# Patient Record
Sex: FEMALE | Race: OTHER | HISPANIC OR LATINO | Employment: FULL TIME | URBAN - METROPOLITAN AREA
[De-identification: names, ages, dates, MRNs, and addresses within clinical notes are randomized per-mention and may not be internally consistent; named-entity substitution may affect disease eponyms.]

---

## 2017-01-03 ENCOUNTER — OFFICE VISIT (OUTPATIENT)
Dept: FAMILY MEDICINE | Facility: CLINIC | Age: 47
End: 2017-01-03
Payer: COMMERCIAL

## 2017-01-03 VITALS
WEIGHT: 150.81 LBS | DIASTOLIC BLOOD PRESSURE: 91 MMHG | SYSTOLIC BLOOD PRESSURE: 146 MMHG | HEIGHT: 61 IN | TEMPERATURE: 98 F | BODY MASS INDEX: 28.47 KG/M2 | HEART RATE: 63 BPM

## 2017-01-03 DIAGNOSIS — J45.30 MILD PERSISTENT ASTHMA WITHOUT COMPLICATION: ICD-10-CM

## 2017-01-03 DIAGNOSIS — J20.9 ACUTE BRONCHITIS, UNSPECIFIED ORGANISM: Primary | ICD-10-CM

## 2017-01-03 PROCEDURE — 3077F SYST BP >= 140 MM HG: CPT | Mod: S$GLB,,, | Performed by: PHYSICIAN ASSISTANT

## 2017-01-03 PROCEDURE — 1159F MED LIST DOCD IN RCRD: CPT | Mod: S$GLB,,, | Performed by: PHYSICIAN ASSISTANT

## 2017-01-03 PROCEDURE — 3080F DIAST BP >= 90 MM HG: CPT | Mod: S$GLB,,, | Performed by: PHYSICIAN ASSISTANT

## 2017-01-03 PROCEDURE — 99213 OFFICE O/P EST LOW 20 MIN: CPT | Mod: S$GLB,,, | Performed by: PHYSICIAN ASSISTANT

## 2017-01-03 PROCEDURE — 99999 PR PBB SHADOW E&M-EST. PATIENT-LVL III: CPT | Mod: PBBFAC,,, | Performed by: PHYSICIAN ASSISTANT

## 2017-01-03 RX ORDER — CODEINE PHOSPHATE AND GUAIFENESIN 10; 100 MG/5ML; MG/5ML
5-10 SOLUTION ORAL EVERY 4 HOURS PRN
Qty: 120 ML | Refills: 0 | Status: SHIPPED | OUTPATIENT
Start: 2017-01-03 | End: 2017-09-19

## 2017-01-03 RX ORDER — ALBUTEROL SULFATE 90 UG/1
2 AEROSOL, METERED RESPIRATORY (INHALATION) EVERY 6 HOURS PRN
Qty: 1 INHALER | Refills: 0 | Status: SHIPPED | OUTPATIENT
Start: 2017-01-03 | End: 2018-01-03

## 2017-01-03 RX ORDER — ALBUTEROL SULFATE 1.25 MG/3ML
1.25 SOLUTION RESPIRATORY (INHALATION) EVERY 6 HOURS PRN
Qty: 25 VIAL | Refills: 11 | Status: SHIPPED | OUTPATIENT
Start: 2017-01-03

## 2017-01-03 RX ORDER — AZITHROMYCIN 250 MG/1
TABLET, FILM COATED ORAL
Qty: 6 TABLET | Refills: 0 | Status: SHIPPED | OUTPATIENT
Start: 2017-01-03 | End: 2017-09-19

## 2017-01-03 NOTE — PROGRESS NOTES
Subjective:       Patient ID: Jacqui Cruz is a 46 y.o. female.    Chief Complaint: Cough and Chest Congestion    Cough   This is a new problem. The current episode started 1 to 4 weeks ago. The problem occurs constantly. The cough is productive of purulent sputum. Associated symptoms include nasal congestion, a sore throat and shortness of breath. Pertinent negatives include no chest pain, chills, fever, rhinorrhea or wheezing. She has tried a beta-agonist inhaler and OTC cough suppressant for the symptoms. The treatment provided no relief. Her past medical history is significant for asthma.     Review of Systems   Constitutional: Negative for appetite change, chills, diaphoresis, fatigue and fever.   HENT: Positive for congestion and sore throat. Negative for rhinorrhea.    Respiratory: Positive for cough and shortness of breath. Negative for chest tightness and wheezing.    Cardiovascular: Negative for chest pain, palpitations and leg swelling.       Objective:      Physical Exam   Constitutional: She appears well-developed and well-nourished. No distress.   HENT:   Head: Normocephalic and atraumatic.   Right Ear: Tympanic membrane, external ear and ear canal normal.   Left Ear: Tympanic membrane, external ear and ear canal normal.   Nose: No mucosal edema or rhinorrhea. Right sinus exhibits no maxillary sinus tenderness and no frontal sinus tenderness. Left sinus exhibits no maxillary sinus tenderness and no frontal sinus tenderness.   Mouth/Throat: No oropharyngeal exudate, posterior oropharyngeal edema or posterior oropharyngeal erythema.   Cardiovascular: Normal rate, regular rhythm and normal heart sounds.    Pulmonary/Chest: Effort normal. She has rhonchi (cleared post tussive ). Chest wall is not dull to percussion. She exhibits no tenderness.   Lymphadenopathy:        Head (right side): No tonsillar adenopathy present.        Head (left side): No tonsillar adenopathy present.     She has no cervical  adenopathy.   Skin: Skin is warm and dry. No cyanosis. Nails show no clubbing.   Vitals reviewed.      Assessment:       1. Acute bronchitis, unspecified organism    2. Mild persistent asthma without complication        Plan:       Jacqui was seen today for cough and chest congestion.    Diagnoses and all orders for this visit:    Acute bronchitis, unspecified organism    Mild persistent asthma without complication  -     beclomethasone (QVAR) 40 mcg/actuation Aero; Inhale 1 puff into the lungs 2 (two) times daily.  -     albuterol (ACCUNEB) 1.25 mg/3 mL Nebu; Take 3 mLs (1.25 mg total) by nebulization every 6 (six) hours as needed.      -     azithromycin (ZITHROMAX Z-EFREM) 250 MG tablet; Tad  -     albuterol 90 mcg/actuation inhaler; Inhale 2 puffs into the lungs every 6 (six) hours as needed.  -     guaifenesin-codeine 100-10 mg/5 ml (CHERATUSSIN AC)  mg/5 mL syrup; Take 5-10 mLs by mouth every 4 (four) hours as needed for Cough or Congestion.

## 2017-01-03 NOTE — LETTER
January 3, 2017      Jacqui Cruz  [address]  [place] [address]             [place] [facility]  [address]  [place] [address]  Phone: [phone]  Fax: [phone] Ms. Cruz    Was treated here on 01/03/2017    May Return to work/school on 1/3/2017    medical excuse for time missed on 1/2/2017  1/3/2017            SAVANAH Brown

## 2017-01-04 ENCOUNTER — DOCUMENTATION ONLY (OUTPATIENT)
Dept: FAMILY MEDICINE | Facility: CLINIC | Age: 47
End: 2017-01-04

## 2017-01-04 NOTE — PROGRESS NOTES
Pre-Visit Chart Review  For Appointment Scheduled on 1-5-17    Health Maintenance Due   Topic Date Due    Lipid Panel  1970    TETANUS VACCINE  04/13/1986    Mammogram  04/22/2015    Pap Smear  10/01/2015    Influenza Vaccine  08/01/2016

## 2017-01-05 ENCOUNTER — OFFICE VISIT (OUTPATIENT)
Dept: FAMILY MEDICINE | Facility: CLINIC | Age: 47
End: 2017-01-05
Payer: COMMERCIAL

## 2017-01-05 VITALS
TEMPERATURE: 98 F | HEART RATE: 72 BPM | WEIGHT: 150.81 LBS | HEIGHT: 61 IN | SYSTOLIC BLOOD PRESSURE: 131 MMHG | DIASTOLIC BLOOD PRESSURE: 89 MMHG | BODY MASS INDEX: 28.47 KG/M2

## 2017-01-05 DIAGNOSIS — R92.0 MICROCALCIFICATIONS OF THE BREAST: ICD-10-CM

## 2017-01-05 DIAGNOSIS — Z12.31 ENCOUNTER FOR SCREENING MAMMOGRAM FOR MALIGNANT NEOPLASM OF BREAST: Primary | ICD-10-CM

## 2017-01-05 DIAGNOSIS — J45.30 MILD PERSISTENT ASTHMA WITHOUT COMPLICATION: ICD-10-CM

## 2017-01-05 DIAGNOSIS — M75.111 INCOMPLETE TEAR OF RIGHT ROTATOR CUFF: ICD-10-CM

## 2017-01-05 PROCEDURE — 1159F MED LIST DOCD IN RCRD: CPT | Mod: S$GLB,,, | Performed by: FAMILY MEDICINE

## 2017-01-05 PROCEDURE — 99999 PR PBB SHADOW E&M-EST. PATIENT-LVL III: CPT | Mod: PBBFAC,,, | Performed by: FAMILY MEDICINE

## 2017-01-05 PROCEDURE — 3075F SYST BP GE 130 - 139MM HG: CPT | Mod: S$GLB,,, | Performed by: FAMILY MEDICINE

## 2017-01-05 PROCEDURE — 99214 OFFICE O/P EST MOD 30 MIN: CPT | Mod: S$GLB,,, | Performed by: FAMILY MEDICINE

## 2017-01-05 PROCEDURE — 3079F DIAST BP 80-89 MM HG: CPT | Mod: S$GLB,,, | Performed by: FAMILY MEDICINE

## 2017-01-05 RX ORDER — MELOXICAM 7.5 MG/1
7.5 TABLET ORAL DAILY
Qty: 30 TABLET | Refills: 11 | Status: SHIPPED | OUTPATIENT
Start: 2017-01-05 | End: 2017-09-19

## 2017-01-05 NOTE — MR AVS SNAPSHOT
[facility]  [address] [place] [address]  Phone: [phone]  Fax: [phone]                  Jacqui Cruz   2017 2:40 PM   Office Visit    Description:  Female : 1970   Provider:  Josephine Marquez MD   Department:  [facility]           Reason for Visit     Establish Care           Diagnoses this Visit        Comments    Encounter for screening mammogram for malignant neoplasm of breast    -  Primary     Incomplete tear of right rotator cuff                To Do List           Future Appointments        Provider Department Dept Phone    2017 4:20 PM Josephine Marquez MD [facility] [phone]      Goals (5 Years of Data)     None      Follow-Up and Disposition     Return in about 3 months (around 2017).       These Medications        Disp Refills Start End    meloxicam (MOBIC) 7.5 MG tablet 30 tablet 11 2017     Take 1 tablet (7.5 mg total) by mouth once daily. - Oral    Pharmacy: [facility] [address] [place] [address] Ph #: [phone]         [facility] On Call     [facility] On [facility] Nurse Care Line -  Assistance  Registered nurses in the [facility] On Call Center provide clinical advisement, health education, appointment booking, and other advisory services.  Call for this free service at [phone].             Medications           Message regarding Medications     Verify the changes and/or additions to your medication regime listed below are the same as discussed with your clinician today.  If any of these changes or additions are incorrect, please notify your healthcare provider.        START taking these NEW medications        Refills    meloxicam (MOBIC) 7.5 MG tablet 11    Sig: Take 1 tablet (7.5 mg total) by mouth once daily.    Class: Normal    Route: Oral           Verify that the below list of medications is an accurate representation of the medications you are currently taking.  If  "none reported, the list may be blank. If incorrect, please contact your healthcare provider. Carry this list with you in case of emergency.           Current Medications     albuterol (ACCUNEB) 1.25 mg/3 mL Nebu Take 3 mLs (1.25 mg total) by nebulization every 6 (six) hours as needed.    albuterol 90 mcg/actuation inhaler Inhale 2 puffs into the lungs every 6 (six) hours as needed.    ASA/ACETAMINOPHN/CAFFEIN/POT (GOODY'S HEADACHE POWDER ORAL) Take 1 each by mouth 3 (three) times daily as needed.    azithromycin (ZITHROMAX Z-EFREM) 250 MG tablet Tad    baclofen (LIORESAL) 10 MG tablet Take 1 tablet (10 mg total) by mouth 3 (three) times daily.    beclomethasone (QVAR) 40 mcg/actuation Aero Inhale 1 puff into the lungs 2 (two) times daily.    diclofenac sodium (VOLTAREN) 1 % Gel Apply 2 g topically 4 (four) times daily.    guaifenesin-codeine 100-10 mg/5 ml (CHERATUSSIN AC)  mg/5 mL syrup Take 5-10 mLs by mouth every 4 (four) hours as needed for Cough or Congestion.    meloxicam (MOBIC) 7.5 MG tablet Take 1 tablet (7.5 mg total) by mouth once daily.           Clinical Reference Information           Vital Signs - Last Recorded  Most recent update: 1/5/2017  2:55 PM by Ebony Morales MA    BP Pulse Temp Ht Wt BMI    131/89 (BP Location: Right arm, Patient Position: Sitting, BP Method: Automatic) 72 98.4 °F (36.9 °C) (Oral) 5' 1" (1.549 m) 68.4 kg (150 lb 12.7 oz) 28.49 kg/m2      Blood Pressure          Most Recent Value    BP  131/89      Allergies as of 1/5/2017     Doxycycline Hyclate      Immunizations Administered on Date of Encounter - 1/5/2017     None      Orders Placed During Today's Visit     Future Labs/Procedures Expected by Expires    Mammo Digital Screening Bilateral With CAD  1/5/2017 3/5/2018      "

## 2017-01-05 NOTE — PROGRESS NOTES
Subjective:       Patient ID: Jacqui Cruz is a 46 y.o. female.    Chief Complaint: Establish Care    HPIPatient is here to establish care  Patient Active Problem List   Diagnosis    Microcalcifications of the breast    Mild persistent asthma without complication    Strain of tendon of right rotator cuff   Recently had asthma exacerbation 2 days ago and seen in clinic 1/3/17.  Brought on by inhaling flour at [employer] where she works despite wearing mask.  Chronically on and usually controlled on Qvar and albuterol nebs/inh.  Started zpack and is doing better.    Has right rot cuff tear and bursitis.  Has had 2 steroid shots, PT, nsaids, tylenol and sx have improved but not resolved.  Under care of Dr. Marga morgan.  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  MRI 5/16 showed partial tear and bursitis.    Last mammogram 1 1/2 years ago -GYN clinic.  Has metal marker for calcifications being monitored in left breast    PAP [place] < 3 years-wnl    Review of Systems   Constitutional: Negative for fatigue and unexpected weight change.   Respiratory: Negative for cough, chest tightness, shortness of breath and wheezing.    Cardiovascular: Negative for chest pain, palpitations and leg swelling.   Gastrointestinal: Negative for abdominal pain.   Endocrine: Negative for polydipsia, polyphagia and polyuria.   Musculoskeletal: Positive for arthralgias.   Neurological: Negative for dizziness, syncope, light-headedness and headaches.       Objective:      Physical Exam   Constitutional: She is oriented to person, place, and time. She appears well-developed and well-nourished.   Cardiovascular: Normal rate, regular rhythm and normal heart sounds.    Pulmonary/Chest: Effort normal and breath sounds normal.   Musculoskeletal: She exhibits no edema.        Right shoulder: She exhibits tenderness, crepitus and pain. She exhibits normal range of motion, no bony tenderness, no swelling, no effusion, no  deformity, no laceration, no spasm, normal pulse and normal strength.   Neurological: She is alert and oriented to person, place, and time.   Skin: Skin is warm and dry.   Psychiatric: She has a normal mood and affect.   Nursing note and vitals reviewed.      Assessment:       1. Encounter for screening mammogram for malignant neoplasm of breast    2. Incomplete tear of right rotator cuff    3. Mild persistent asthma without complication    4. Microcalcifications of the breast        Plan:       1. Encounter for screening mammogram for malignant neoplasm of breast  Screen and treat as indicated:    - Mammo Digital Screening Bilateral With CAD; Future    2. Incomplete tear of right rotator cuff  Recommend f/u with ortho to consider surgical alternatives.  Add:  - meloxicam (MOBIC) 7.5 MG tablet; Take 1 tablet (7.5 mg total) by mouth once daily.  Dispense: 30 tablet; Refill: 11  Avoid other nsaids , tylenol ok otc.  [place], avoid repetitive shoulder activities.  May need work restrictions if sx persist and unable to manage work responsibilities    3. Mild persistent asthma without complication  Controlled on current medications.  Continue current medications.  Finish zpack.  Avoid allergens    4. Microcalcifications of the breast  Mammo ordered    Reeval 3months or sooner prn

## 2017-01-09 ENCOUNTER — TELEPHONE (OUTPATIENT)
Dept: FAMILY MEDICINE | Facility: CLINIC | Age: 47
End: 2017-01-09

## 2017-01-09 NOTE — TELEPHONE ENCOUNTER
----- Message from Milena Patel sent at 1/9/2017  1:28 PM CST -----  Contact: cyndi   Wants to know if dr damon is at    Call back      States she has called for this for the last couple days

## 2017-01-16 ENCOUNTER — TELEPHONE (OUTPATIENT)
Dept: ORTHOPEDICS | Facility: CLINIC | Age: 47
End: 2017-01-16

## 2017-01-16 NOTE — TELEPHONE ENCOUNTER
Spoke to patient and advised that she would need a follow up apt with Dr. Gresham. Scheduled patient an appt for 1/20/17.

## 2017-01-16 NOTE — TELEPHONE ENCOUNTER
----- Message from Abrahan Wright sent at 1/16/2017  9:00 AM CST -----  Contact: same  Patient stated she has completed her physical therapy and would like to see about being released so she can get back to work.    Patient call back number is [phone]

## 2017-01-20 ENCOUNTER — OFFICE VISIT (OUTPATIENT)
Dept: ORTHOPEDICS | Facility: CLINIC | Age: 47
End: 2017-01-20
Payer: COMMERCIAL

## 2017-01-20 VITALS
HEIGHT: 63 IN | BODY MASS INDEX: 25.87 KG/M2 | SYSTOLIC BLOOD PRESSURE: 138 MMHG | DIASTOLIC BLOOD PRESSURE: 79 MMHG | WEIGHT: 146 LBS | HEART RATE: 65 BPM

## 2017-01-20 DIAGNOSIS — M75.51 SHOULDER BURSITIS, RIGHT: Primary | ICD-10-CM

## 2017-01-20 PROCEDURE — 1159F MED LIST DOCD IN RCRD: CPT | Mod: S$GLB,,, | Performed by: ORTHOPAEDIC SURGERY

## 2017-01-20 PROCEDURE — 99999 PR PBB SHADOW E&M-EST. PATIENT-LVL III: CPT | Mod: PBBFAC,,, | Performed by: ORTHOPAEDIC SURGERY

## 2017-01-20 PROCEDURE — 3075F SYST BP GE 130 - 139MM HG: CPT | Mod: S$GLB,,, | Performed by: ORTHOPAEDIC SURGERY

## 2017-01-20 PROCEDURE — 99213 OFFICE O/P EST LOW 20 MIN: CPT | Mod: S$GLB,,, | Performed by: ORTHOPAEDIC SURGERY

## 2017-01-20 PROCEDURE — 3078F DIAST BP <80 MM HG: CPT | Mod: S$GLB,,, | Performed by: ORTHOPAEDIC SURGERY

## 2017-01-20 NOTE — MR AVS SNAPSHOT
[facility]  [place] [address]  Phone: [phone]                  Jacqui Cruz   2017 8:45 AM   Office Visit    Description:  Female : 1970   Provider:  Lev Gresham MD   Department:  [facility]           Reason for Visit     Right Shoulder - Pain           Diagnoses this Visit        Comments    Shoulder bursitis, right    -  Primary            To Do List           Future Appointments        Provider Department Dept Phone    2017 4:20 PM Josephine Marquez MD [facility] [phone]      Goals (5 Years of Data)     None      [facility] On Call     [facility] On Call Nurse Care Line -  Assistance  Registered nurses in the [facility] On Call Center provide clinical advisement, health education, appointment booking, and other advisory services.  Call for this free service at [phone].             Medications           Message regarding Medications     Verify the changes and/or additions to your medication regime listed below are the same as discussed with your clinician today.  If any of these changes or additions are incorrect, please notify your healthcare provider.             Verify that the below list of medications is an accurate representation of the medications you are currently taking.  If none reported, the list may be blank. If incorrect, please contact your healthcare provider. Carry this list with you in case of emergency.           Current Medications     albuterol (ACCUNEB) 1.25 mg/3 mL Nebu Take 3 mLs (1.25 mg total) by nebulization every 6 (six) hours as needed.    albuterol 90 mcg/actuation inhaler Inhale 2 puffs into the lungs every 6 (six) hours as needed.    ASA/ACETAMINOPHN/CAFFEIN/POT (GOODY'S HEADACHE POWDER ORAL) Take 1 each by mouth 3 (three) times daily as needed.    azithromycin (ZITHROMAX Z-EFREM) 250 MG tablet Tad    baclofen (LIORESAL) 10 MG tablet Take 1 tablet (10 mg total) by mouth  "3 (three) times daily.    beclomethasone (QVAR) 40 mcg/actuation Aero Inhale 1 puff into the lungs 2 (two) times daily.    guaifenesin-codeine 100-10 mg/5 ml (CHERATUSSIN AC)  mg/5 mL syrup Take 5-10 mLs by mouth every 4 (four) hours as needed for Cough or Congestion.    meloxicam (MOBIC) 7.5 MG tablet Take 1 tablet (7.5 mg total) by mouth once daily.    diclofenac sodium (VOLTAREN) 1 % Gel Apply 2 g topically 4 (four) times daily.           Clinical Reference Information           Vital Signs - Last Recorded  Most recent update: 1/20/2017  9:03 AM by Emily Mary LPN    BP Pulse Ht Wt BMI    138/79 65 5' 3" (1.6 m) 66.2 kg (146 lb) 25.86 kg/m2      Blood Pressure          Most Recent Value    BP  138/79      Allergies as of 1/20/2017     Doxycycline Hyclate      Immunizations Administered on Date of Encounter - 1/20/2017     None      "

## 2017-01-20 NOTE — PROGRESS NOTES
Past Medical History   Diagnosis Date    Asthma     Hypertension        Past Surgical History   Procedure Laterality Date    Breast surgery       biopsy       Current Outpatient Prescriptions   Medication Sig    albuterol (ACCUNEB) 1.25 mg/3 mL Nebu Take 3 mLs (1.25 mg total) by nebulization every 6 (six) hours as needed.    albuterol 90 mcg/actuation inhaler Inhale 2 puffs into the lungs every 6 (six) hours as needed.    ASA/ACETAMINOPHN/CAFFEIN/POT (GOODY'S HEADACHE POWDER ORAL) Take 1 each by mouth 3 (three) times daily as needed.    azithromycin (ZITHROMAX Z-EFREM) 250 MG tablet Tad    baclofen (LIORESAL) 10 MG tablet Take 1 tablet (10 mg total) by mouth 3 (three) times daily.    beclomethasone (QVAR) 40 mcg/actuation Aero Inhale 1 puff into the lungs 2 (two) times daily.    guaifenesin-codeine 100-10 mg/5 ml (CHERATUSSIN AC)  mg/5 mL syrup Take 5-10 mLs by mouth every 4 (four) hours as needed for Cough or Congestion.    meloxicam (MOBIC) 7.5 MG tablet Take 1 tablet (7.5 mg total) by mouth once daily.    diclofenac sodium (VOLTAREN) 1 % Gel Apply 2 g topically 4 (four) times daily.     No current facility-administered medications for this visit.        Review of patient's allergies indicates:   Allergen Reactions    Doxycycline hyclate Itching       Family History   Problem Relation Age of Onset    Hypertension Mother     Cancer Maternal Aunt      cervical       Social History     Social History    Marital status:      Spouse name: N/A    Number of children: N/A    Years of education: N/A     Occupational History    Not on file.     Social History Main Topics    Smoking status: Never Smoker    Smokeless tobacco: Never Used    Alcohol use No    Drug use: No    Sexual activity: Not Currently     Partners: Male     Birth control/ protection: None     Other Topics Concern    Not on file     Social History Narrative       Chief Complaint:   Chief Complaint   Patient presents with     Right Shoulder - Pain     Wants to return to work        Consulting Physician: No ref. provider found    History of present illness: This is a 46-year-old woman who had right shoulder pain.  Treated with injections and exercises and she reports right now that she is having 0 pain along with full range of motion.    Review of Systems:    Constitution: Denies chills, fever, and sweats.    HENT: Denies headaches or blurry vision.    Cardiovascular: Denies chest pain or irregular heart beat.    Respiratory: Denies cough or shortness of breath.    Gastrointestinal: Denies abdominal pain, nausea, or vomiting.    Musculoskeletal:  Denies muscle cramps.    Neurological: Denies dizziness or focal weakness.    Psychiatric/Behavioral: Normal mental status.    Hematologic/Lymphatic: Denies bleeding problem or easy bruising/bleeding.    Skin: Denies rash or suspicious lesions.      Examination:    Vital Signs:    Vitals:    01/20/17 0859   BP: 138/79   Pulse: 65       Body mass index is 25.86 kg/(m^2).    This a well-developed, well nourished patient in no acute distress.    Alert and oriented and cooperative to examination.       Physical Exam: Right Shoulder Exam     Skin  Rash:   None  Scars:   None    Inspection/Observation  Swelling:   None  Erythema:   None  Bruising:   None  Wounds:   None  Scapular Winging:  None  Scapular Dyskinesia:  None  Atrophy:   None  Masses:   None  Lymphadenopathy: None    Tenderness   AC Joint:   None    Range of Motion   Active Forward Flexion:  180  Active External Rotation:  45  Active Internal Rotation: Low Back    Muscle Strength   Forward Flexion:  5/5  External Rotation: 5/5  Internal Rotation:  5/5    Instability:  None    Tests & Signs   Cross Arm:   Negative    Other   Sensation:  Normal  Pulse:    2+ radial          Imaging:      Assessment: Right shoulder bursitis    Plan: She is doing very well this point in time with full range of motion and normal strength.  We will give her  a full release to return to work without restrictions.  We'll see her back on an as-needed basis.    DISCLAIMER: This note may have been dictated using voice recognition software and may contain grammatical errors.     NOTE: Consult report sent to referring provider via [employer] EMR.

## 2017-05-11 ENCOUNTER — TELEPHONE (OUTPATIENT)
Dept: FAMILY MEDICINE | Facility: CLINIC | Age: 47
End: 2017-05-11

## 2017-05-11 ENCOUNTER — DOCUMENTATION ONLY (OUTPATIENT)
Dept: FAMILY MEDICINE | Facility: CLINIC | Age: 47
End: 2017-05-11

## 2017-05-11 NOTE — TELEPHONE ENCOUNTER
----- Message from Becky Irby sent at 5/9/2017 11:12 AM CDT -----  Contact: Patient  Jacqui, patient [phone], Patient is calling about coding to visit on 1/5/17. It should have been coded as a annual physical but was mis-coded as a mammogram. Please correct. Thanks.

## 2017-05-11 NOTE — TELEPHONE ENCOUNTER
----- Message from Becky Irby sent at 5/11/2017 10:13 AM CDT -----  Contact: Patient  Jacqui, patient [phone], calling with information from her insurance company. Call to POD. Please advise. Thanks.

## 2017-05-11 NOTE — TELEPHONE ENCOUNTER
Spoke with patient about bill/coding issue. She had an [facility] appointment level 4 appointment with us. Not sure why they billed a mammogram. Gave her the code for her appointment type- 94469. She will call and see what needs to be done.

## 2017-05-11 NOTE — PROGRESS NOTES
Pre-Visit Chart Review  For Appointment Scheduled on 5-12-17    Health Maintenance Due   Topic Date Due    Mammogram  04/22/2015    Pap Smear  10/01/2015

## 2017-05-16 ENCOUNTER — TELEPHONE (OUTPATIENT)
Dept: FAMILY MEDICINE | Facility: CLINIC | Age: 47
End: 2017-05-16

## 2017-05-16 NOTE — TELEPHONE ENCOUNTER
----- Message from Tish Vogt sent at 5/12/2017  7:18 AM CDT -----  Contact: self  Patient [phone] is calling/left two messages yesterday to Nurse Monge but did not receive a return call concerning some information that patient is needing to give nurse/please call

## 2017-05-16 NOTE — TELEPHONE ENCOUNTER
Please inform patient:  I have reviewed my notes from the visit 1/5/17.  I cannot change the code for the office visit to an annual due to the fact that problems were addressed including her rotator cuff tear and her asthma.  Annual wellness visit are intended to be preventive maintenance only-no problems addressed.  Had she informed me at the time of the visit that she only wanted an annual wellness then I would have performed by eval and exam without addressing those problems and could have coded it as she wished.  I regret any unforeseen cost or inconvenience this has caused but must follow the proper rules for coding with regard to appropriate insurance billing    My chart message sent as well

## 2017-07-13 DIAGNOSIS — M25.511 RIGHT SHOULDER PAIN, UNSPECIFIED CHRONICITY: Primary | ICD-10-CM

## 2017-07-13 RX ORDER — IBUPROFEN 800 MG/1
800 TABLET ORAL 3 TIMES DAILY
Qty: 90 TABLET | Refills: 0 | Status: SHIPPED | OUTPATIENT
Start: 2017-07-13 | End: 2017-09-19

## 2017-07-13 NOTE — TELEPHONE ENCOUNTER
----- Message from Milena Patel sent at 7/13/2017  8:41 AM CDT -----  Contact: cyndi   Wants shot today, or 800 mg motrin and a note to stay home from work   Call back      Will not travel to [place]

## 2017-07-14 ENCOUNTER — OFFICE VISIT (OUTPATIENT)
Dept: ORTHOPEDICS | Facility: CLINIC | Age: 47
End: 2017-07-14
Payer: COMMERCIAL

## 2017-07-14 ENCOUNTER — HOSPITAL ENCOUNTER (OUTPATIENT)
Dept: RADIOLOGY | Facility: HOSPITAL | Age: 47
Discharge: HOME | End: 2017-07-14
Attending: ORTHOPAEDIC SURGERY
Payer: COMMERCIAL

## 2017-07-14 VITALS
BODY MASS INDEX: 25.86 KG/M2 | HEIGHT: 63 IN | DIASTOLIC BLOOD PRESSURE: 88 MMHG | WEIGHT: 145.94 LBS | SYSTOLIC BLOOD PRESSURE: 140 MMHG | HEART RATE: 76 BPM

## 2017-07-14 DIAGNOSIS — M25.511 RIGHT SHOULDER PAIN, UNSPECIFIED CHRONICITY: ICD-10-CM

## 2017-07-14 DIAGNOSIS — M25.511 ACUTE PAIN OF RIGHT SHOULDER: Primary | ICD-10-CM

## 2017-07-14 PROCEDURE — 99213 OFFICE O/P EST LOW 20 MIN: CPT | Mod: 25,S$GLB,, | Performed by: ORTHOPAEDIC SURGERY

## 2017-07-14 PROCEDURE — 20610 DRAIN/INJ JOINT/BURSA W/O US: CPT | Mod: RT,S$GLB,, | Performed by: ORTHOPAEDIC SURGERY

## 2017-07-14 PROCEDURE — 73030 X-RAY EXAM OF SHOULDER: CPT | Mod: TC,PN,RT

## 2017-07-14 PROCEDURE — 99999 PR PBB SHADOW E&M-EST. PATIENT-LVL III: CPT | Mod: PBBFAC,,, | Performed by: ORTHOPAEDIC SURGERY

## 2017-07-14 PROCEDURE — 73030 X-RAY EXAM OF SHOULDER: CPT | Mod: 26,RT,, | Performed by: RADIOLOGY

## 2017-07-14 RX ORDER — MELOXICAM 7.5 MG/1
7.5 TABLET ORAL DAILY
Qty: 60 TABLET | Refills: 1 | Status: SHIPPED | OUTPATIENT
Start: 2017-07-14 | End: 2017-09-19 | Stop reason: SDUPTHER

## 2017-07-14 RX ORDER — TRIAMCINOLONE ACETONIDE 40 MG/ML
40 INJECTION, SUSPENSION INTRA-ARTICULAR; INTRAMUSCULAR
Status: DISCONTINUED | OUTPATIENT
Start: 2017-07-14 | End: 2017-07-14 | Stop reason: HOSPADM

## 2017-07-14 RX ADMIN — TRIAMCINOLONE ACETONIDE 40 MG: 40 INJECTION, SUSPENSION INTRA-ARTICULAR; INTRAMUSCULAR at 04:07

## 2017-07-14 NOTE — PROCEDURES
Large Joint Aspiration/Injection  Date/Time: 7/14/2017 4:20 PM  Performed by: LYNDSAY ROBERSON  Authorized by: LYNDSAY ROBERSON     Consent Done?:  Yes (Verbal)  Indications:  Pain  Timeout: Prior to procedure the correct patient, procedure, and site was verified      Location:  Shoulder  Site:  R subacromial bursa  Prep: Patient was prepped and draped in usual sterile fashion    Approach:  Lateral  Medications:  40 mg triamcinolone acetonide 40 mg/mL

## 2017-07-14 NOTE — PROGRESS NOTES
Past Medical History:   Diagnosis Date    Asthma     Hypertension        Past Surgical History:   Procedure Laterality Date    BREAST SURGERY      biopsy       Current Outpatient Prescriptions   Medication Sig    albuterol (ACCUNEB) 1.25 mg/3 mL Nebu Take 3 mLs (1.25 mg total) by nebulization every 6 (six) hours as needed.    albuterol 90 mcg/actuation inhaler Inhale 2 puffs into the lungs every 6 (six) hours as needed.    ASA/ACETAMINOPHN/CAFFEIN/POT (GOODY'S HEADACHE POWDER ORAL) Take 1 each by mouth 3 (three) times daily as needed.    azithromycin (ZITHROMAX Z-EFREM) 250 MG tablet Tad    baclofen (LIORESAL) 10 MG tablet Take 1 tablet (10 mg total) by mouth 3 (three) times daily.    beclomethasone (QVAR) 40 mcg/actuation Aero Inhale 1 puff into the lungs 2 (two) times daily.    guaifenesin-codeine 100-10 mg/5 ml (CHERATUSSIN AC)  mg/5 mL syrup Take 5-10 mLs by mouth every 4 (four) hours as needed for Cough or Congestion.    ibuprofen (ADVIL,MOTRIN) 800 MG tablet Take 1 tablet (800 mg total) by mouth 3 (three) times daily.    meloxicam (MOBIC) 7.5 MG tablet Take 1 tablet (7.5 mg total) by mouth once daily.    diclofenac sodium (VOLTAREN) 1 % Gel Apply 2 g topically 4 (four) times daily.    meloxicam (MOBIC) 7.5 MG tablet Take 1 tablet (7.5 mg total) by mouth once daily.     No current facility-administered medications for this visit.        Review of patient's allergies indicates:   Allergen Reactions    Doxycycline hyclate Itching       Family History   Problem Relation Age of Onset    Hypertension Mother     Cancer Maternal Aunt      cervical       Social History     Social History    Marital status:      Spouse name: N/A    Number of children: N/A    Years of education: N/A     Occupational History    Not on file.     Social History Main Topics    Smoking status: Never Smoker    Smokeless tobacco: Never Used    Alcohol use No    Drug use: No    Sexual activity: Not Currently      Partners: Male     Birth control/ protection: None     Other Topics Concern    Not on file     Social History Narrative    No narrative on file       Chief Complaint:   Chief Complaint   Patient presents with    Right Shoulder - Pain       Consulting Physician: No ref. provider found    History of present illness: This is a 46-year-old woman who had right shoulder pain.  Treated with injections and exercises and she reports she was doing well up until last week.  She did a ladder at work and now her shoulder is sore and painful again.  Puts her pain is much as an 8 out of 10 and states it's worse with activities and overhead use.    Review of Systems:    Constitution: Denies chills, fever, and sweats.    HENT: Denies headaches or blurry vision.    Cardiovascular: Denies chest pain or irregular heart beat.    Respiratory: Denies cough or shortness of breath.    Gastrointestinal: Denies abdominal pain, nausea, or vomiting.    Musculoskeletal:  Denies muscle cramps.    Neurological: Denies dizziness or focal weakness.    Psychiatric/Behavioral: Normal mental status.    Hematologic/Lymphatic: Denies bleeding problem or easy bruising/bleeding.    Skin: Denies rash or suspicious lesions.      Examination:    Vital Signs:    Vitals:    07/14/17 1355   BP: (!) 140/88   Pulse: 76       Body mass index is 25.85 kg/m².    This a well-developed, well nourished patient in no acute distress.    Alert and oriented and cooperative to examination.       Physical Exam: Right Shoulder Exam     Skin  Rash:   None  Scars:   None    Inspection/Observation  Swelling:   None  Erythema:   None  Bruising:   None  Wounds:   None  Scapular Winging:  None  Scapular Dyskinesia:  None  Atrophy:   None  Masses:   None  Lymphadenopathy: None    Tenderness   AC Joint:   None    Range of Motion   Active Forward Flexion:  180  w pain  Active External Rotation:  45  Active Internal Rotation: Low Back w pain    Muscle Strength   Forward Flexion:   5/5  External Rotation: 5/5  Internal Rotation:  5/5    Instability:  None    Tests & Signs   Cross Arm:   Negative    Other   Sensation:  Normal  Pulse:    2+ radial          Imaging: X-rays of the right shoulder taken and reviewed today show no acute bony abnormality, fracture or dislocation.     Assessment: Right shoulder bursitis    Plan: She has some painful range of motion.  She has good strength.  She likely does have some bursitis.  We'll go ahead and give her an injection today and a prescription for Mobic.  We'll see her back as needed.    DISCLAIMER: This note may have been dictated using voice recognition software and may contain grammatical errors.     NOTE: Consult report sent to referring provider via [facility] EMR.

## 2017-07-17 ENCOUNTER — TELEPHONE (OUTPATIENT)
Dept: ORTHOPEDICS | Facility: CLINIC | Age: 47
End: 2017-07-17

## 2017-07-17 NOTE — TELEPHONE ENCOUNTER
----- Message from Bernie Allred sent at 7/17/2017 11:36 AM CDT -----  Contact: self  Patient needs a note for work Sunday 7/16 because she slept on her arm and it was painful and she could not make it to work

## 2017-07-17 NOTE — TELEPHONE ENCOUNTER
Spoke with pt and informed her that her return to work letter will be ready to picked up tomorrow in [facility] between the hours of 8-5. Pt voiced understanding and stated that she would pick letter up tomorrow. Pt has no further requests at this time.

## 2017-08-09 ENCOUNTER — TELEPHONE (OUTPATIENT)
Dept: ORTHOPEDICS | Facility: CLINIC | Age: 47
End: 2017-08-09

## 2017-08-09 NOTE — TELEPHONE ENCOUNTER
Called pt regarding her letter for work. Left VM for pt to call into clinic and left clinic call back number.

## 2017-08-09 NOTE — TELEPHONE ENCOUNTER
----- Message from Gauri Thomas sent at 8/9/2017  9:18 AM CDT -----  Contact: patient  Patient calling to request a note to get off of her work restrictions. Please advise.  Call back   Thanks!

## 2017-09-18 ENCOUNTER — DOCUMENTATION ONLY (OUTPATIENT)
Dept: FAMILY MEDICINE | Facility: CLINIC | Age: 47
End: 2017-09-18

## 2017-09-18 ENCOUNTER — TELEPHONE (OUTPATIENT)
Dept: FAMILY MEDICINE | Facility: CLINIC | Age: 47
End: 2017-09-18

## 2017-09-18 NOTE — PROGRESS NOTES
Pre-Visit Chart Review  For Appointment Scheduled on 09/19/2017    Health Maintenance Due   Topic Date Due    Mammogram  04/22/2015    Pap Smear with HPV Cotest  10/01/2015    Influenza Vaccine  08/01/2017

## 2017-09-18 NOTE — TELEPHONE ENCOUNTER
----- Message from Earline Gonzalez sent at 9/18/2017 10:57 AM CDT -----  Contact: [phone]  Patient called asking for a new rx antibiotic Z pack for sinus infection. [facility] on [address].Thanks!

## 2017-09-19 ENCOUNTER — OFFICE VISIT (OUTPATIENT)
Dept: FAMILY MEDICINE | Facility: CLINIC | Age: 47
End: 2017-09-19
Payer: COMMERCIAL

## 2017-09-19 VITALS
DIASTOLIC BLOOD PRESSURE: 94 MMHG | BODY MASS INDEX: 25.39 KG/M2 | HEART RATE: 65 BPM | HEIGHT: 63 IN | SYSTOLIC BLOOD PRESSURE: 152 MMHG | TEMPERATURE: 98 F | WEIGHT: 143.31 LBS

## 2017-09-19 DIAGNOSIS — R03.0 ELEVATED BP WITHOUT DIAGNOSIS OF HYPERTENSION: ICD-10-CM

## 2017-09-19 DIAGNOSIS — J01.90 ACUTE SINUSITIS, RECURRENCE NOT SPECIFIED, UNSPECIFIED LOCATION: Primary | ICD-10-CM

## 2017-09-19 PROCEDURE — 99213 OFFICE O/P EST LOW 20 MIN: CPT | Mod: S$GLB,,, | Performed by: PHYSICIAN ASSISTANT

## 2017-09-19 PROCEDURE — 3077F SYST BP >= 140 MM HG: CPT | Mod: S$GLB,,, | Performed by: PHYSICIAN ASSISTANT

## 2017-09-19 PROCEDURE — 99999 PR PBB SHADOW E&M-EST. PATIENT-LVL IV: CPT | Mod: PBBFAC,,, | Performed by: PHYSICIAN ASSISTANT

## 2017-09-19 PROCEDURE — 3008F BODY MASS INDEX DOCD: CPT | Mod: S$GLB,,, | Performed by: PHYSICIAN ASSISTANT

## 2017-09-19 PROCEDURE — 3080F DIAST BP >= 90 MM HG: CPT | Mod: S$GLB,,, | Performed by: PHYSICIAN ASSISTANT

## 2017-09-19 RX ORDER — DICLOFENAC SODIUM 50 MG/1
50 TABLET, DELAYED RELEASE ORAL 2 TIMES DAILY
Qty: 20 TABLET | Refills: 0 | Status: SHIPPED | OUTPATIENT
Start: 2017-09-19 | End: 2017-10-30

## 2017-09-19 RX ORDER — CEFDINIR 300 MG/1
300 CAPSULE ORAL 2 TIMES DAILY
Qty: 20 CAPSULE | Refills: 0 | Status: SHIPPED | OUTPATIENT
Start: 2017-09-19 | End: 2017-09-20

## 2017-09-19 RX ORDER — GUAIFENESIN 1200 MG/1
TABLET, EXTENDED RELEASE ORAL
Qty: 20 TABLET | Refills: 0 | Status: SHIPPED | OUTPATIENT
Start: 2017-09-19 | End: 2018-02-03

## 2017-09-19 NOTE — PATIENT INSTRUCTIONS
Sinusitis (Antibiotic Treatment)    The sinuses are air-filled spaces within the bones of the face. They connect to the inside of the nose. Sinusitis is an inflammation of the tissue lining the sinus cavity. Sinus inflammation can occur during a cold. It can also be due to allergies to pollens and other particles in the air. Sinusitis can cause symptoms of sinus congestion and fullness. A sinus infection causes fever, headache and facial pain. There is often green or yellow drainage from the nose or into the back of the throat (post-nasal drip). You have been given antibiotics to treat this condition.  Home care:  · Take the full course of antibiotics as instructed. Do not stop taking them, even if you feel better.  · Drink plenty of water, hot tea, and other liquids. This may help thin mucus. It also may promote sinus drainage.  · Heat may help soothe painful areas of the face. Use a towel soaked in hot water. Or,  the shower and direct the hot spray onto your face. Using a vaporizer along with a menthol rub at night may also help.   · An expectorant containing guaifenesin may help thin the mucus and promote drainage from the sinuses.  · Over-the-counter decongestants may be used unless a similar medicine was prescribed. Nasal sprays work the fastest. Use one that contains phenylephrine or oxymetazoline. First blow the nose gently. Then use the spray. Do not use these medicines more often than directed on the label or symptoms may get worse. You may also use tablets containing pseudoephedrine. Avoid products that combine ingredients, because side effects may be increased. Read labels. You can also ask the pharmacist for help. (NOTE: Persons with high blood pressure should not use decongestants. They can raise blood pressure.)  · Over-the-counter antihistamines may help if allergies contributed to your sinusitis.    · Do not use nasal rinses or irrigation during an acute sinus infection, unless told to by  your health care provider. Rinsing may spread the infection to other sinuses.  · Use acetaminophen or ibuprofen to control pain, unless another pain medicine was prescribed. (If you have chronic liver or kidney disease or ever had a stomach ulcer, talk with your doctor before using these medicines. Aspirin should never be used in anyone under 18 years of age who is ill with a fever. It may cause severe liver damage.)  · Don't smoke. This can worsen symptoms.  Follow-up care  Follow up with your healthcare provider or our staff if you are not improving within the next week.  When to seek medical advice  Call your healthcare provider if any of these occur:  · Facial pain or headache becoming more severe  · Stiff neck  · Unusual drowsiness or confusion  · Swelling of the forehead or eyelids  · Vision problems, including blurred or double vision  · Fever of 100.4ºF (38ºC) or higher, or as directed by your healthcare provider  · Seizure  · Breathing problems  · Symptoms not resolving within 10 days  Date Last Reviewed: 4/13/2015  © 4408-4851 [employer]. [address] [place] [address], [place] [address]. All rights reserved. This information is not intended as a substitute for professional medical care. Always follow your healthcare professional's instructions.

## 2017-09-19 NOTE — PROGRESS NOTES
Subjective:       Patient ID: Jacqui Cruz is a 47 y.o. female.    Chief Complaint: Sinusitis    Sinusitis   This is a recurrent problem. The current episode started 1 to 4 weeks ago (3 weeks). The problem is unchanged. There has been no fever. The pain is moderate. Associated symptoms include congestion, coughing, ear pain, sinus pressure and sneezing. Pertinent negatives include no chills or shortness of breath. Past treatments include acetaminophen. The treatment provided no relief.     Review of Systems   Constitutional: Positive for fatigue. Negative for activity change, appetite change, chills and fever.   HENT: Positive for congestion, ear pain, postnasal drip, rhinorrhea, sinus pressure and sneezing. Negative for voice change.    Respiratory: Positive for cough. Negative for shortness of breath and wheezing.    Cardiovascular: Negative for chest pain.   Gastrointestinal: Negative for nausea and vomiting.       Objective:      Physical Exam   Constitutional: She appears well-developed and well-nourished. No distress.   HENT:   Head: Normocephalic and atraumatic.   Right Ear: Tympanic membrane, external ear and ear canal normal.   Left Ear: Tympanic membrane, external ear and ear canal normal.   Nose: Mucosal edema present. No rhinorrhea. Right sinus exhibits maxillary sinus tenderness and frontal sinus tenderness. Left sinus exhibits maxillary sinus tenderness and frontal sinus tenderness.   Mouth/Throat: Oropharynx is clear and moist. Mucous membranes are not dry. No oropharyngeal exudate, posterior oropharyngeal edema or posterior oropharyngeal erythema.   Cardiovascular: Normal rate and regular rhythm.    No murmur heard.  Pulmonary/Chest: Effort normal and breath sounds normal. She has no wheezes. She has no rales.   Lymphadenopathy:        Head (right side): No tonsillar adenopathy present.        Head (left side): No tonsillar adenopathy present.     She has no cervical adenopathy.   Skin: Skin is  warm and dry.   Nursing note and vitals reviewed.      Assessment:       1. Acute sinusitis, recurrence not specified, unspecified location    2. Elevated BP without diagnosis of hypertension        Plan:       Jacqui was seen today for sinusitis.    Diagnoses and all orders for this visit:    Acute sinusitis, recurrence not specified, unspecified location  -     cefdinir (OMNICEF) 300 MG capsule; Take 1 capsule (300 mg total) by mouth 2 (two) times daily.  -     guaifenesin (MUCINEX) 1,200 mg Ta12; BID  -     diclofenac (VOLTAREN) 50 MG EC tablet; Take 1 tablet (50 mg total) by mouth 2 (two) times daily.    Elevated BP without diagnosis of hypertension    BP log    2 weeks with nurse for BP check and log review  Follow up PCP

## 2017-09-19 NOTE — LETTER
September 19, 2017      Jacqui Cruz   [address]  [place] [address]             [facility]  [address]  [facility] [address]  Phone: [phone]  Fax: [phone] Jacqui Cruz    Was treated here on 09/19/2017    May Return to work/school on 9/20/2017    please allow for frequent restroom breaks if needed              SAVANAH Brown

## 2017-09-20 ENCOUNTER — TELEPHONE (OUTPATIENT)
Dept: FAMILY MEDICINE | Facility: CLINIC | Age: 47
End: 2017-09-20

## 2017-09-20 RX ORDER — AZITHROMYCIN 250 MG/1
TABLET, FILM COATED ORAL
Qty: 6 TABLET | Refills: 0 | Status: SHIPPED | OUTPATIENT
Start: 2017-09-20 | End: 2017-10-30

## 2017-09-20 NOTE — TELEPHONE ENCOUNTER
Experiencing liquid stools since starting antibiotic; unable to work. Will start Immodium now, but requests antibiotic change.

## 2017-09-20 NOTE — TELEPHONE ENCOUNTER
----- Message from Sharri Campbell sent at 9/20/2017  8:41 AM CDT -----  Please call patient in regards to antibiotic that was prescribed yesterday making her sick, [phone] (home)     [facility] [address] [place] [address] [facility]  [address] [facility] [address]  Phone: [phone] Fax: [phone]

## 2017-09-26 ENCOUNTER — TELEPHONE (OUTPATIENT)
Dept: FAMILY MEDICINE | Facility: CLINIC | Age: 47
End: 2017-09-26

## 2017-09-26 NOTE — TELEPHONE ENCOUNTER
----- Message from Gauri William sent at 9/26/2017  9:23 AM CDT -----  Contact: patient  Patient calling in regards to medication giving her diarrhea. She wants to speak with the Nurse to see about coming in to get a Doctor's note for today. Please advise.  Call back   Thanks!

## 2017-09-26 NOTE — TELEPHONE ENCOUNTER
Patient misses work today due to continuing diarrhea even with taking Immodium. Started Zpak 9/21/17. She asks for work excuse for today and will pickup.

## 2017-10-28 ENCOUNTER — TELEPHONE (OUTPATIENT)
Dept: FAMILY MEDICINE | Facility: CLINIC | Age: 47
End: 2017-10-28

## 2017-10-28 NOTE — TELEPHONE ENCOUNTER
----- Message from Dimas Chandler sent at 10/28/2017  9:30 AM CDT -----  Contact: pt  Pt is requesting to be seen today(possible pink eye and fever blister)  Call Back#[phone]  Thanks

## 2017-10-30 ENCOUNTER — DOCUMENTATION ONLY (OUTPATIENT)
Dept: FAMILY MEDICINE | Facility: CLINIC | Age: 47
End: 2017-10-30

## 2017-10-30 ENCOUNTER — OFFICE VISIT (OUTPATIENT)
Dept: FAMILY MEDICINE | Facility: CLINIC | Age: 47
End: 2017-10-30
Payer: COMMERCIAL

## 2017-10-30 VITALS
RESPIRATION RATE: 20 BRPM | DIASTOLIC BLOOD PRESSURE: 104 MMHG | BODY MASS INDEX: 25.58 KG/M2 | SYSTOLIC BLOOD PRESSURE: 165 MMHG | WEIGHT: 144.38 LBS | HEIGHT: 63 IN | TEMPERATURE: 98 F | HEART RATE: 72 BPM

## 2017-10-30 DIAGNOSIS — J01.00 ACUTE NON-RECURRENT MAXILLARY SINUSITIS: Primary | ICD-10-CM

## 2017-10-30 DIAGNOSIS — B00.1 HERPES LABIALIS: ICD-10-CM

## 2017-10-30 DIAGNOSIS — R03.0 ELEVATED BLOOD PRESSURE READING WITHOUT DIAGNOSIS OF HYPERTENSION: ICD-10-CM

## 2017-10-30 PROCEDURE — 99214 OFFICE O/P EST MOD 30 MIN: CPT | Mod: S$GLB,,, | Performed by: PHYSICIAN ASSISTANT

## 2017-10-30 PROCEDURE — 99999 PR PBB SHADOW E&M-EST. PATIENT-LVL IV: CPT | Mod: PBBFAC,,, | Performed by: PHYSICIAN ASSISTANT

## 2017-10-30 RX ORDER — IBUPROFEN 800 MG/1
800 TABLET ORAL 3 TIMES DAILY
COMMUNITY
End: 2018-01-25

## 2017-10-30 RX ORDER — AMOXICILLIN AND CLAVULANATE POTASSIUM 875; 125 MG/1; MG/1
1 TABLET, FILM COATED ORAL 2 TIMES DAILY
Qty: 20 TABLET | Refills: 0 | Status: SHIPPED | OUTPATIENT
Start: 2017-10-30 | End: 2017-11-09

## 2017-10-30 RX ORDER — VALACYCLOVIR HYDROCHLORIDE 1 G/1
TABLET, FILM COATED ORAL
Qty: 4 TABLET | Refills: 0 | Status: SHIPPED | OUTPATIENT
Start: 2017-10-30 | End: 2018-01-25

## 2017-10-30 RX ORDER — CODEINE PHOSPHATE AND GUAIFENESIN 10; 100 MG/5ML; MG/5ML
5 SOLUTION ORAL 3 TIMES DAILY PRN
COMMUNITY
End: 2018-02-03

## 2017-10-30 NOTE — LETTER
[facility]  Family Medicine  [address]  [place] [address]  Phone: [phone]  Fax: [phone]   October 30, 2017     Patient: Jacqui Cruz   YOB: 1970   Date of Visit: 10/30/2017       To Whom it May Concern:    Jacqui Cruz was seen in my clinic on 10/30/2017. Please excuse her from work 10/28/17 and 10/29/17.    If you have any questions or concerns, please don't hesitate to call.    Sincerely,         Sumaya Moreira PA-C

## 2017-10-30 NOTE — PROGRESS NOTES
Pre-Visit Chart Review  For Appointment Scheduled on10/30/17.  Health Maintenance Due   Topic Date Due    Mammogram  04/22/2015    Pap Smear with HPV Cotest  10/01/2015    Influenza Vaccine  08/01/2017

## 2017-10-30 NOTE — PROGRESS NOTES
Subjective:       Patient ID: Jacqui Cruz is a 47 y.o. female.    Chief Complaint: Cough (started 10/27/17); Nausea; and Dizziness    Mrs. Cruz is a 47 year old female who presents to clinic for urgent care evaluation of cough. She reports 3 day history of abdominal pain, nausea, vomiting, and fever the end of last week, which has now resolved. She also complains of productive cough, sinus pressure, sinus pain, and thick nasal drainage. She has been taking ibuprofen, which was prescribed for her shoulder pain, for her facial/sinus pain in addition to goody's powder. BP is quite elevated today in clinic, but she denies headache, visual changes, or chest pain. She also complains of cold sore on the upper lip, which started 2 days ago.       Review of Systems   Constitutional: Positive for chills and fever. Negative for fatigue.   HENT: Positive for congestion, ear pain, postnasal drip, rhinorrhea, sinus pain, sinus pressure, sore throat and voice change. Negative for ear discharge, nosebleeds, sneezing and trouble swallowing.         Cold sore   Eyes: Negative.    Respiratory: Positive for cough. Negative for chest tightness, shortness of breath and wheezing.    Cardiovascular: Negative for chest pain, palpitations and leg swelling.   Gastrointestinal: Positive for abdominal pain (resolved) and vomiting (resolved). Negative for constipation, diarrhea and nausea.   Musculoskeletal: Negative for arthralgias and myalgias.   Allergic/Immunologic: Negative for environmental allergies and immunocompromised state.   Neurological: Positive for headaches. Negative for dizziness, syncope and light-headedness.   Hematological: Negative for adenopathy.       Objective:      Vitals:    10/30/17 1157   BP: (!) 165/104   Pulse:    Resp:    Temp:      Physical Exam   Constitutional: She is oriented to person, place, and time. She appears well-developed.   HENT:   Head: Normocephalic and atraumatic.   Right Ear: Hearing,  tympanic membrane, external ear and ear canal normal.   Left Ear: Hearing, tympanic membrane, external ear and ear canal normal.   Nose: Mucosal edema and rhinorrhea present. Right sinus exhibits maxillary sinus tenderness and frontal sinus tenderness. Left sinus exhibits maxillary sinus tenderness and frontal sinus tenderness.   Mouth/Throat: Oropharynx is clear and moist and mucous membranes are normal. Oral lesions (upper lip) present.   Eyes: Conjunctivae, EOM and lids are normal. Pupils are equal, round, and reactive to light.   Cardiovascular: Normal rate, regular rhythm, normal heart sounds and intact distal pulses.    Pulmonary/Chest: Effort normal and breath sounds normal.   Abdominal: Soft. Normal appearance. There is no tenderness. There is no rigidity, no rebound and no guarding.   Lymphadenopathy:     She has no cervical adenopathy.   Neurological: She is alert and oriented to person, place, and time.   Psychiatric: Her speech is normal.       Assessment:       1. Acute non-recurrent maxillary sinusitis    2. Herpes labialis    3. Elevated blood pressure reading without diagnosis of hypertension        Plan:       Acute non-recurrent maxillary sinusitis  -     amoxicillin-clavulanate 875-125mg (AUGMENTIN) 875-125 mg per tablet; Take 1 tablet by mouth 2 (two) times daily.  Dispense: 20 tablet; Refill: 0  - Take antibiotics with food.  Increase fluid intake.  Call the clinic if symptoms worsen, new symptoms develop or if you are not any better after completion of your antibiotics.      Herpes labialis  -     valACYclovir (VALTREX) 1000 MG tablet; 2 tabs PO every 12 hours for one day  Dispense: 4 tablet; Refill: 0  - Return to clinic if symptoms worsen or do not improve with treatment    Elevated blood pressure reading without diagnosis of hypertension        - Avoid NSAIDs and goody's powder. May take tylenol 1000 mg BID for pain.         - Nursing BP check in 1-2 weeks

## 2017-11-22 ENCOUNTER — TELEPHONE (OUTPATIENT)
Dept: FAMILY MEDICINE | Facility: CLINIC | Age: 47
End: 2017-11-22

## 2017-11-22 NOTE — TELEPHONE ENCOUNTER
----- Message from Madeline Humphreys sent at 11/22/2017 12:17 PM CST -----  Contact: Patient  Patient missed her appointment yesterday to have her blood pressure checked; patient needs to reschedule her appointment.  Please call patient with new date and time; please leave message is she doesn't answer.  Call Back#[phone]  Thanks

## 2017-11-24 ENCOUNTER — CLINICAL SUPPORT (OUTPATIENT)
Dept: FAMILY MEDICINE | Facility: CLINIC | Age: 47
End: 2017-11-24
Payer: COMMERCIAL

## 2017-11-24 ENCOUNTER — TELEPHONE (OUTPATIENT)
Dept: FAMILY MEDICINE | Facility: CLINIC | Age: 47
End: 2017-11-24

## 2017-11-24 VITALS — DIASTOLIC BLOOD PRESSURE: 90 MMHG | HEART RATE: 67 BPM | SYSTOLIC BLOOD PRESSURE: 144 MMHG

## 2017-11-24 DIAGNOSIS — R03.0 ELEVATED BP WITHOUT DIAGNOSIS OF HYPERTENSION: Primary | ICD-10-CM

## 2017-11-24 DIAGNOSIS — I10 ESSENTIAL HYPERTENSION: Primary | ICD-10-CM

## 2017-11-24 PROCEDURE — 99999 PR PBB SHADOW E&M-EST. PATIENT-LVL III: CPT | Mod: PBBFAC,,, | Performed by: PHYSICIAN ASSISTANT

## 2017-11-24 NOTE — TELEPHONE ENCOUNTER
Patient came in for nurse blood pressure check. Automatic reading was 144/94  P-67 . Right arm resting on desk, feet flat on floor, back straight. Patient had c/o knee pain. Patient is not on blood pressure medication this time . Allergies and medications reviewed with the patient. Blood pressure re-checked after 5 minutes with manual cuff, reading was 144/90 . Patient advised that message will be sent to the provider for recommendations and we will call with the reply.     Please advise

## 2017-11-24 NOTE — NURSING
2 patient identifiers used (name & ). Patient came in for nurse blood pressure check. Automatic reading was 144/94  P-67 . Right arm resting on desk, feet flat on floor, back straight. Patient had c/o knee pain. Patient is not on blood pressure medication this time . Allergies and medications reviewed with the patient. Blood pressure re-checked after 5 minutes with manual cuff, reading was 144/90 . Patient advised that message will be sent to the provider for recommendations and we will call with the reply.

## 2017-11-27 RX ORDER — LISINOPRIL 10 MG/1
10 TABLET ORAL DAILY
Qty: 90 TABLET | Refills: 3 | Status: SHIPPED | OUTPATIENT
Start: 2017-11-27 | End: 2018-01-25

## 2017-11-27 NOTE — TELEPHONE ENCOUNTER
Needs to start a BP medication.  I called in lisinopril 10 mg to take daily to pharmacy.  Common side effect is cough- let me  Know if occurs. 4 week nurse BP check and f/u

## 2017-11-29 NOTE — TELEPHONE ENCOUNTER
Spoke to patient and read message as written. Made appointment for a blood pressure check in 2 weeks.

## 2017-12-01 ENCOUNTER — TELEPHONE (OUTPATIENT)
Dept: FAMILY MEDICINE | Facility: CLINIC | Age: 47
End: 2017-12-01

## 2017-12-01 NOTE — TELEPHONE ENCOUNTER
Patient asking if she should take her blood pressure one or two times a day. Advised to take blood pressure twice a day. patient agreed.

## 2017-12-01 NOTE — TELEPHONE ENCOUNTER
----- Message from Gauri Thomas sent at 12/1/2017 10:36 AM CST -----  Contact: patient  Patient calling in regards to speaking with the Nurse. She wants to know does she take her blood pressure once or twice a day. Please advise.  Call back   Thanks!

## 2017-12-29 ENCOUNTER — CLINICAL SUPPORT (OUTPATIENT)
Dept: FAMILY MEDICINE | Facility: CLINIC | Age: 47
End: 2017-12-29
Payer: COMMERCIAL

## 2017-12-29 VITALS — SYSTOLIC BLOOD PRESSURE: 126 MMHG | DIASTOLIC BLOOD PRESSURE: 88 MMHG | HEART RATE: 62 BPM

## 2017-12-29 DIAGNOSIS — I10 ESSENTIAL HYPERTENSION: Primary | ICD-10-CM

## 2017-12-29 PROCEDURE — 99999 PR PBB SHADOW E&M-EST. PATIENT-LVL II: CPT | Mod: PBBFAC,,, | Performed by: NURSE PRACTITIONER

## 2017-12-29 NOTE — PROGRESS NOTES
Patient comes in for a nurse BP check. Patient's BP was 126/88 manually on RA. Patient is currently taking 10 mg lisinopril once daily. Advised patient to continue current medication and f/u with pcp as directed.

## 2018-01-04 ENCOUNTER — TELEPHONE (OUTPATIENT)
Dept: FAMILY MEDICINE | Facility: CLINIC | Age: 48
End: 2018-01-04

## 2018-01-04 NOTE — TELEPHONE ENCOUNTER
Left message that provider will be out of the clinic today and will need to call back to reschedule appointment.

## 2018-01-05 NOTE — TELEPHONE ENCOUNTER
Left message for patient to call back to reschedule appointment. Provider will be out of clinic on date of appointment.

## 2018-01-08 NOTE — TELEPHONE ENCOUNTER
Left message for patient to call back to reschedule appointment. Provider will be out of clinic on date of appointment.   Called patients  and he will notify patient of appointment.

## 2018-01-25 ENCOUNTER — OFFICE VISIT (OUTPATIENT)
Dept: FAMILY MEDICINE | Facility: CLINIC | Age: 48
End: 2018-01-25
Payer: COMMERCIAL

## 2018-01-25 ENCOUNTER — DOCUMENTATION ONLY (OUTPATIENT)
Dept: FAMILY MEDICINE | Facility: CLINIC | Age: 48
End: 2018-01-25

## 2018-01-25 VITALS
SYSTOLIC BLOOD PRESSURE: 140 MMHG | HEIGHT: 63 IN | HEART RATE: 67 BPM | OXYGEN SATURATION: 97 % | WEIGHT: 149.06 LBS | TEMPERATURE: 98 F | DIASTOLIC BLOOD PRESSURE: 86 MMHG | BODY MASS INDEX: 26.41 KG/M2

## 2018-01-25 DIAGNOSIS — I10 ESSENTIAL HYPERTENSION: Primary | ICD-10-CM

## 2018-01-25 DIAGNOSIS — J01.00 ACUTE NON-RECURRENT MAXILLARY SINUSITIS: ICD-10-CM

## 2018-01-25 PROCEDURE — 3008F BODY MASS INDEX DOCD: CPT | Mod: S$GLB,,, | Performed by: FAMILY MEDICINE

## 2018-01-25 PROCEDURE — 99999 PR PBB SHADOW E&M-EST. PATIENT-LVL III: CPT | Mod: PBBFAC,,, | Performed by: FAMILY MEDICINE

## 2018-01-25 PROCEDURE — 99214 OFFICE O/P EST MOD 30 MIN: CPT | Mod: S$GLB,,, | Performed by: FAMILY MEDICINE

## 2018-01-25 RX ORDER — LOSARTAN POTASSIUM AND HYDROCHLOROTHIAZIDE 12.5; 5 MG/1; MG/1
1 TABLET ORAL DAILY
Qty: 90 TABLET | Refills: 3 | Status: SHIPPED | OUTPATIENT
Start: 2018-01-25 | End: 2021-04-30

## 2018-01-25 RX ORDER — AMOXICILLIN AND CLAVULANATE POTASSIUM 875; 125 MG/1; MG/1
1 TABLET, FILM COATED ORAL 2 TIMES DAILY
Qty: 20 TABLET | Refills: 0 | Status: SHIPPED | OUTPATIENT
Start: 2018-01-25 | End: 2018-03-12 | Stop reason: ALTCHOICE

## 2018-01-25 RX ORDER — METHYLPREDNISOLONE 4 MG/1
TABLET ORAL
Qty: 1 PACKAGE | Refills: 0 | Status: SHIPPED | OUTPATIENT
Start: 2018-01-25 | End: 2018-02-03

## 2018-01-25 NOTE — LETTER
[facility]  Family Medicine  [address]  [place] [address]  Phone: [phone]  Fax: [phone]   January 25, 2018     Patient: Jacqui Cruz   YOB: 1970   Date of Visit: 1/25/2018       To Whom it May Concern:    Jacqui Cruz was seen in my clinic on 1/25/2018. She may return to work on 1/25/18.  She is taking a medication which may cause increased urination.  Please allow for her to be excused for brief restroom breaks every 1-2 hours.    If you have any questions or concerns, please don't hesitate to call.    Sincerely,         Josephine Marquez MD

## 2018-01-25 NOTE — PROGRESS NOTES
Subjective:       Patient ID: Jacqui Cruz is a 47 y.o. female.    Chief Complaint: Headache and Cough    Patient Active Problem List   Diagnosis    Microcalcifications of the breast    Mild persistent asthma without complication    Strain of tendon of right rotator cuff     HPI  Review of Systems   Constitutional: Positive for fatigue. Negative for chills and fever.   HENT: Positive for congestion, postnasal drip, rhinorrhea, sneezing and sore throat. Negative for ear discharge, ear pain and sinus pressure.    Respiratory: Positive for cough. Negative for shortness of breath and wheezing.        Objective:      Physical Exam   Constitutional: She is oriented to person, place, and time. She appears well-developed and well-nourished.   HENT:   Right Ear: Tympanic membrane and ear canal normal.   Left Ear: Tympanic membrane and ear canal normal.   Nose: Mucosal edema present. Right sinus exhibits maxillary sinus tenderness. Right sinus exhibits no frontal sinus tenderness. Left sinus exhibits maxillary sinus tenderness. Left sinus exhibits no frontal sinus tenderness.   Mouth/Throat: Mucous membranes are normal. Posterior oropharyngeal erythema present. No oropharyngeal exudate, posterior oropharyngeal edema or tonsillar abscesses.   Cardiovascular: Normal rate, regular rhythm and normal heart sounds.    Pulmonary/Chest: Effort normal and breath sounds normal.   Musculoskeletal: She exhibits no edema.   Neurological: She is alert and oriented to person, place, and time.   Skin: Skin is warm and dry.   Psychiatric: She has a normal mood and affect.   Nursing note and vitals reviewed.      Assessment:       1. Essential hypertension    2. Acute non-recurrent maxillary sinusitis        Plan:       1. Essential hypertension  Siobhan nue  - losartan-hydrochlorothiazide 50-12.5 mg (HYZAAR) 50-12.5 mg per tablet; Take 1 tablet by mouth once daily.  Dispense: 90 tablet; Refill: 3    2. Acute non-recurrent maxillary  sinusitis  I counseled the patient on general home care guidelines for cough and congestion including increasing fluid intake, getting plenty of rest and use of OTC cough and cold medications.  I recommended guafenesin for congestion and dextromethorphan as directed for cough.  A brand like Mucinex DM is recommended.  Avoidance of decongestants is recommended for patients with heart problems and hypertension.  Extra vitamin C may also benefit.  Return to clinic if symptoms last longer than 10 days or sooner if worsen with symptoms like fever > 100.4, severe sinus pain or headache, thick yellow nasal discharge or sputum, dehydration or lethargy.      - amoxicillin-clavulanate 875-125mg (AUGMENTIN) 875-125 mg per tablet; Take 1 tablet by mouth 2 (two) times daily.  Dispense: 20 tablet; Refill: 0  [facility] goal documentation:  Patient readiness: acceptance and barriers:readiness  During the course of the visit the patient was educated and counseled about the following: Hypertension:   Dietary sodium restriction.  Regular aerobic exercise.  Goals: Hypertension: Reduce Blood Pressure  Goal/Outcomes met:Hypertension  The following self management tools provided:none  Patient Instructions (the written plan) was given to the patient/family: Yes  Time spent with patient: 20 minutes    Patient with be reevaluated in 3 months or sooner prn    Greater than 50% of this visit was spent counseling as described in above documentation:No

## 2018-01-25 NOTE — PROGRESS NOTES
Pre-Visit Chart Review  For Appointment Scheduled on 1-25-18    Health Maintenance Due   Topic Date Due    Mammogram  04/22/2015    Pap Smear with HPV Cotest  10/01/2015    Influenza Vaccine  08/01/2017

## 2018-02-02 ENCOUNTER — TELEPHONE (OUTPATIENT)
Dept: FAMILY MEDICINE | Facility: CLINIC | Age: 48
End: 2018-02-02

## 2018-02-02 ENCOUNTER — DOCUMENTATION ONLY (OUTPATIENT)
Dept: FAMILY MEDICINE | Facility: CLINIC | Age: 48
End: 2018-02-02

## 2018-02-02 NOTE — PROGRESS NOTES
Health Maintenance Due   Topic Date Due    Mammogram  04/22/2015    Pap Smear with HPV Cotest  10/01/2015    Influenza Vaccine  08/01/2017

## 2018-02-02 NOTE — TELEPHONE ENCOUNTER
----- Message from Kimberly Mane sent at 2/2/2018  9:12 AM CST -----  Contact: Patient  Patient states that she has been very ill with stomach cramps and diarrhea.  She would like to talk to you about getting a doctors note for the two days she's been out.  She left work 1/2 day yesterday, 02/01/2018.  Can you please call the patient back at [phone].  Thank you

## 2018-02-03 ENCOUNTER — OFFICE VISIT (OUTPATIENT)
Dept: FAMILY MEDICINE | Facility: CLINIC | Age: 48
End: 2018-02-03
Payer: COMMERCIAL

## 2018-02-03 VITALS
SYSTOLIC BLOOD PRESSURE: 145 MMHG | BODY MASS INDEX: 25.47 KG/M2 | HEART RATE: 59 BPM | DIASTOLIC BLOOD PRESSURE: 87 MMHG | WEIGHT: 143.75 LBS | TEMPERATURE: 98 F | HEIGHT: 63 IN

## 2018-02-03 DIAGNOSIS — A05.9 FOODBORNE GASTROENTERITIS: Primary | ICD-10-CM

## 2018-02-03 PROCEDURE — 3008F BODY MASS INDEX DOCD: CPT | Mod: S$GLB,,, | Performed by: FAMILY MEDICINE

## 2018-02-03 PROCEDURE — 99999 PR PBB SHADOW E&M-EST. PATIENT-LVL III: CPT | Mod: PBBFAC,,, | Performed by: FAMILY MEDICINE

## 2018-02-03 PROCEDURE — 99214 OFFICE O/P EST MOD 30 MIN: CPT | Mod: S$GLB,,, | Performed by: FAMILY MEDICINE

## 2018-02-03 NOTE — LETTER
February 3, 2018                   [facility]  Family Medicine  [address] [place] [address]  Phone: [phone]  Fax: [phone]   February 3, 2018     Patient: Jacqui Cruz   YOB: 1970   Date of Visit: 2/3/2018       To Whom it May Concern:    Jacqui Cruz was seen in my clinic on 2/3/2018. She has been ill since 2/1/2018 with gastroenteritis and may return to work on 2/5/2018.    If you have any questions or concerns, please don't hesitate to call.    Sincerely,         Driss Milton MD

## 2018-02-03 NOTE — PATIENT INSTRUCTIONS
Food Poisoning (Adult)  Food poisoning is illness that is passed along in food. It usually occurs from 1 to 24 hours after eating food that has spoiled. Food poisoning can occur when you eat food or drink that contains viruses, bacteria, parasites, or toxins. This includes food that has not been cooked or refrigerated properly.  Food poisoning can cause these symptoms:  · Abdominal pain and cramping  · Nausea  · Vomiting  · Diarrhea  · Fever and chills  · Fatigue  The symptoms usually last from 1 to 2 days.  Antibiotics are not effective for this illness.  Home care  Follow all instructions given by your healthcare provider. Rest at home for the next 24 hours, or until you feel better. Avoid caffeine, tobacco, and alcohol. These can make diarrhea, cramping, and pain worse.  If taking medicines:  · Dont take over-the-counter diarrhea or nausea medicines unless your healthcare provider tells you to.  · You may be given medicine for nausea or vomiting to help keep down fluids. Take these medicines as prescribed.  · You may use acetaminophen or NSAID medicine like ibuprofen or naproxen to reduce pain and fever. Dont use these if you have chronic liver or kidney disease, or ever had a stomach ulcer or gastrointestinal bleeding. Talk with your healthcare provider first. Don't use NSAID medicines if you are already taking one for another condition (like arthritis) or are on aspirin (such as for heart disease or after a stroke).  To prevent the spread of illness:  · Remember that washing with soap and water or using alcohol-based  is the best way to prevent the spread of infection.  · Clean the toilet after each use.  · Wash your hands before eating.  · Wash your hands or use alcohol-based  before and after preparing food. Keep in mind that people with diarrhea or vomiting should not prepare food for others.  · Wash your hands after using cutting boards, counter-tops, and knives or other utensils that  have been in contact with raw foods.  · Wash and then peel fruits and vegetables.  · Keep uncooked meats away from cooked and ready-to-eat foods.  · Use a food thermometer when cooking. Cook poultry to at least 165°F (74°C). Cook ground meat (beef, veal, pork, lamb) to at least 160°F (71°C). Cook fresh beef, veal, lamb, and pork to at least 145°F (63°C).  · Dont eat raw or undercooked eggs (poached or carole side up), poultry, meat or unpasteurized milk and juices.  · Do not eat foods requiring refrigeration. Don't eat foods that have not been refrigerated for long periods such as at buffets or picnics.  · Do not eat seafood that is undercooked or with high rates of food toxins.  Food and drinks  The main goal while treating vomiting or diarrhea is to prevent dehydration. This is done by taking small amounts of liquids often.  · Keep in mind that liquids are more important than food right now.  · Drink only small amounts of liquids at a time.  · Dont force yourself to eat, especially if you are having cramping, vomiting, or diarrhea. Dont eat large amounts at a time, even if you are hungry.  · If you eat, avoid fatty, greasy, spicy, or fried foods.  · Dont eat dairy foods or drink milk if you have diarrhea. These can make diarrhea worse.  The first 24 hours you can try:  · Soft drinks without caffeine  · Ginger ale  · Water (plain or flavored)  · Decaf tea or coffee  · Clear broth, consommé, or bouillon  · Gelatin, popsicles, or frozen fruit juice bars  If you are very dehydrated, sports drinks are not a good choice. They have too much sugar and not enough electrolytes. In this case, commercially available products called oral rehydration solutions are best.  The second 24 hours, if you are feeling better, you can add:  · Hot cereal, plain toast, bread, rolls, or crackers  · Plain noodles, rice, mashed potatoes, chicken noodle soup, or rice soup  · Unsweetened canned fruit (no pineapple)  · Bananas  As you  recover:  · Limit fat intake to less than 15 grams per day. Dont eat margarine, butter, oils, mayonnaise, sauces, gravies, fried foods, peanut butter, meat, poultry, or fish.  · Limit fiber. Dont eat raw or cooked vegetables, fresh fruits except bananas, and bran cereals.  · Limit caffeine and chocolate.  · Dont use spices or seasonings except salt.  · Resume a normal diet over time, as you feel better and your symptoms improve.  · If the symptoms come back, go back to a simple diet or clear liquids.  Follow-up care  Follow up with your healthcare provider, or as advised. If a stool sample was taken or cultures were done, call the healthcare provider for the results as instructed.  Call 911  Call 911 if you have any of these symptoms:  · Trouble breathing  · Chest pain  · Confusion  · Extreme drowsiness or trouble walking  · Loss of consciousness  · Rapid heart rate  · Stiff neck  · Seizure  When to seek medical advice  Call your health care provider right away if any of these occur:  · Abdominal pain that gets worse  · Constant lower right abdominal pain  · Continued vomiting and inability to keep liquids down  · Diarrhea more than 5 times a day  · Blood in vomit or stool  · Dark urine or no urine for 8 hours, dry mouth and tongue, tiredness, weakness, or dizziness  · New rash  · You dont get better in 2 to 3 days  · Fever of 100.4°F (38°C) or higher that doesnt get lower with medicine  Date Last Reviewed: 1/3/2016  © 0384-4798 [employer]. [address] [place] [address], [place] [address]. All rights reserved. This information is not intended as a substitute for professional medical care. Always follow your healthcare professional's instructions.

## 2018-02-03 NOTE — PROGRESS NOTES
Subjective:       Patient ID: Jacqui Cruz is a 47 y.o. female.    Chief Complaint: Diarrhea    Diarrhea    This is a new problem. Episode onset: 2 days. The problem occurs 5 to 10 times per day. The problem has been gradually improving. The stool consistency is described as watery. Associated symptoms include abdominal pain and vomiting (once at onset). Pertinent negatives include no fever. Risk factors include suspect food intake. She has tried change of diet for the symptoms. The treatment provided mild relief. There is no history of inflammatory bowel disease.     Review of Systems   Constitutional: Negative for fever.   Respiratory: Negative for shortness of breath.    Cardiovascular: Negative for chest pain.   Gastrointestinal: Positive for abdominal pain, diarrhea and vomiting (once at onset). Negative for nausea.   Skin: Negative for rash.   All other systems reviewed and are negative.      Objective:      Physical Exam   Constitutional: She appears well-developed and well-nourished.   Eyes: Pupils are equal, round, and reactive to light. No scleral icterus.   Neck: Neck supple.   Cardiovascular: Normal rate and regular rhythm.    No murmur heard.  Pulmonary/Chest: Effort normal and breath sounds normal.   Abdominal: Soft. Bowel sounds are increased. There is tenderness in the left lower quadrant. There is no guarding.   Musculoskeletal: She exhibits no edema or tenderness.   Lymphadenopathy:     She has no cervical adenopathy.   Skin: Skin is warm and dry.       Assessment:       1. Foodborne gastroenteritis        Plan:         Jacqui was seen today for diarrhea.    Diagnoses and all orders for this visit:    Foodborne gastroenteritis     Printed DI's and reviewed w pt.

## 2018-03-12 ENCOUNTER — OFFICE VISIT (OUTPATIENT)
Dept: FAMILY MEDICINE | Facility: CLINIC | Age: 48
End: 2018-03-12
Payer: COMMERCIAL

## 2018-03-12 VITALS
TEMPERATURE: 98 F | DIASTOLIC BLOOD PRESSURE: 84 MMHG | HEIGHT: 63 IN | WEIGHT: 143.5 LBS | BODY MASS INDEX: 25.43 KG/M2 | HEART RATE: 74 BPM | SYSTOLIC BLOOD PRESSURE: 128 MMHG

## 2018-03-12 DIAGNOSIS — M75.111 INCOMPLETE TEAR OF RIGHT ROTATOR CUFF: Primary | ICD-10-CM

## 2018-03-12 PROCEDURE — 99213 OFFICE O/P EST LOW 20 MIN: CPT | Mod: S$GLB,,, | Performed by: NURSE PRACTITIONER

## 2018-03-12 PROCEDURE — 3074F SYST BP LT 130 MM HG: CPT | Mod: CPTII,S$GLB,, | Performed by: NURSE PRACTITIONER

## 2018-03-12 PROCEDURE — 99999 PR PBB SHADOW E&M-EST. PATIENT-LVL III: CPT | Mod: PBBFAC,,, | Performed by: NURSE PRACTITIONER

## 2018-03-12 PROCEDURE — 3079F DIAST BP 80-89 MM HG: CPT | Mod: CPTII,S$GLB,, | Performed by: NURSE PRACTITIONER

## 2018-03-12 RX ORDER — TIZANIDINE 2 MG/1
4 TABLET ORAL EVERY 6 HOURS PRN
Qty: 90 TABLET | Refills: 3 | Status: SHIPPED | OUTPATIENT
Start: 2018-03-12 | End: 2023-02-06 | Stop reason: SDUPTHER

## 2018-03-12 NOTE — PROGRESS NOTES
Subjective:       Patient ID: Jacqui Cruz is a 47 y.o. female.    Chief Complaint: Neck Pain    Ms. Cruz presents to the clinic today for right shoulder and neck pain present for over a year but comes and goes.  Worsens with more work.  She works at a factory.  She cannot take cyclobenzaprine or baclofen due to side effect of drowsiness.  She is taking NSAIDs.  Tylenol does not help. Applies heat and biofreeze which help.      Review of Systems   Constitutional: Negative for chills and fever.   Respiratory: Negative for cough and shortness of breath.    Musculoskeletal: Positive for arthralgias and neck pain. Negative for back pain and joint swelling.       Objective:      Physical Exam   Constitutional: She is oriented to person, place, and time. She appears well-nourished. No distress.   HENT:   Head: Normocephalic and atraumatic.   Right Ear: External ear normal.   Left Ear: External ear normal.   Mouth/Throat: Oropharynx is clear and moist. No oropharyngeal exudate.   Eyes: Pupils are equal, round, and reactive to light. Right eye exhibits no discharge. Left eye exhibits no discharge.   Neck: Neck supple. No thyromegaly present.   Cardiovascular: Normal rate and regular rhythm.  Exam reveals no gallop and no friction rub.    No murmur heard.  Pulmonary/Chest: Effort normal and breath sounds normal. No respiratory distress. She has no wheezes. She has no rales.   Abdominal: Soft. She exhibits no distension. There is no tenderness.   Musculoskeletal:        Right shoulder: She exhibits decreased range of motion, tenderness, swelling and decreased strength. She exhibits no bony tenderness, no effusion, no crepitus and normal pulse.        Arms:  Lymphadenopathy:     She has no cervical adenopathy.   Neurological: She is alert and oriented to person, place, and time. Coordination normal.   Skin: Skin is warm and dry.   Psychiatric: She has a normal mood and affect. Her behavior is normal. Thought content  normal.   Vitals reviewed.          Current Outpatient Prescriptions:     albuterol (ACCUNEB) 1.25 mg/3 mL Nebu, Take 3 mLs (1.25 mg total) by nebulization every 6 (six) hours as needed., Disp: 25 vial, Rfl: 11    losartan-hydrochlorothiazide 50-12.5 mg (HYZAAR) 50-12.5 mg per tablet, Take 1 tablet by mouth once daily., Disp: 90 tablet, Rfl: 3    beclomethasone (QVAR) 40 mcg/actuation Aero, Inhale 1 puff into the lungs 2 (two) times daily., Disp: 120 each, Rfl: 3    tiZANidine (ZANAFLEX) 2 MG tablet, Take 2 tablets (4 mg total) by mouth every 6 (six) hours as needed., Disp: 90 tablet, Rfl: 3  Assessment:       1. Incomplete tear of right rotator cuff        Plan:       Incomplete tear of right rotator cuff  Alternate heat and ice.  Keep f/u with Dr. Gresham.  Work note given--no heavy lifting or pulling.  -     tiZANidine (ZANAFLEX) 2 MG tablet; Take 2 tablets (4 mg total) by mouth every 6 (six) hours as needed.  Dispense: 90 tablet; Refill: 3

## 2018-03-13 ENCOUNTER — PATIENT MESSAGE (OUTPATIENT)
Dept: FAMILY MEDICINE | Facility: CLINIC | Age: 48
End: 2018-03-13

## 2018-03-13 ENCOUNTER — TELEPHONE (OUTPATIENT)
Dept: FAMILY MEDICINE | Facility: CLINIC | Age: 48
End: 2018-03-13

## 2018-03-13 NOTE — TELEPHONE ENCOUNTER
----- Message from Latrice Kaur sent at 3/13/2018  8:26 AM CDT -----  Contact: patient   Patient calling to speak to the Nurse about FMLA paperwork. She has a torn rotator cuff and asthma. She wants to cover all her bases at work. Please advise.   Call back  call before 12.   Thanks!

## 2018-03-16 ENCOUNTER — OFFICE VISIT (OUTPATIENT)
Dept: ORTHOPEDICS | Facility: CLINIC | Age: 48
End: 2018-03-16
Payer: COMMERCIAL

## 2018-03-16 VITALS
SYSTOLIC BLOOD PRESSURE: 157 MMHG | DIASTOLIC BLOOD PRESSURE: 87 MMHG | HEIGHT: 63 IN | HEART RATE: 63 BPM | BODY MASS INDEX: 25.43 KG/M2 | WEIGHT: 143.5 LBS

## 2018-03-16 DIAGNOSIS — M25.511 CHRONIC RIGHT SHOULDER PAIN: Primary | ICD-10-CM

## 2018-03-16 DIAGNOSIS — G89.29 CHRONIC RIGHT SHOULDER PAIN: Primary | ICD-10-CM

## 2018-03-16 PROCEDURE — 3077F SYST BP >= 140 MM HG: CPT | Mod: CPTII,S$GLB,, | Performed by: ORTHOPAEDIC SURGERY

## 2018-03-16 PROCEDURE — 99213 OFFICE O/P EST LOW 20 MIN: CPT | Mod: 25,S$GLB,, | Performed by: ORTHOPAEDIC SURGERY

## 2018-03-16 PROCEDURE — 3079F DIAST BP 80-89 MM HG: CPT | Mod: CPTII,S$GLB,, | Performed by: ORTHOPAEDIC SURGERY

## 2018-03-16 PROCEDURE — 20610 DRAIN/INJ JOINT/BURSA W/O US: CPT | Mod: RT,S$GLB,, | Performed by: ORTHOPAEDIC SURGERY

## 2018-03-16 PROCEDURE — 99999 PR PBB SHADOW E&M-EST. PATIENT-LVL III: CPT | Mod: PBBFAC,,, | Performed by: ORTHOPAEDIC SURGERY

## 2018-03-16 RX ADMIN — TRIAMCINOLONE ACETONIDE 40 MG: 40 INJECTION, SUSPENSION INTRA-ARTICULAR; INTRAMUSCULAR at 10:03

## 2018-03-21 RX ORDER — TRIAMCINOLONE ACETONIDE 40 MG/ML
40 INJECTION, SUSPENSION INTRA-ARTICULAR; INTRAMUSCULAR
Status: DISCONTINUED | OUTPATIENT
Start: 2018-03-16 | End: 2018-03-22 | Stop reason: HOSPADM

## 2018-03-22 ENCOUNTER — TELEPHONE (OUTPATIENT)
Dept: ORTHOPEDICS | Facility: CLINIC | Age: 48
End: 2018-03-22

## 2018-03-22 NOTE — TELEPHONE ENCOUNTER
----- Message from Nivia Lockwood sent at 3/22/2018  7:12 AM CDT -----  Patient requesting to speak with nurse concerning FMLA paperwork/would like to  today/needs to know if ready/please call patient back at [phone] to advise.

## 2018-03-22 NOTE — PROGRESS NOTES
Past Medical History:   Diagnosis Date    Asthma     Hypertension        Past Surgical History:   Procedure Laterality Date    BREAST SURGERY      biopsy       Current Outpatient Prescriptions   Medication Sig    losartan-hydrochlorothiazide 50-12.5 mg (HYZAAR) 50-12.5 mg per tablet Take 1 tablet by mouth once daily.    tiZANidine (ZANAFLEX) 2 MG tablet Take 2 tablets (4 mg total) by mouth every 6 (six) hours as needed.    albuterol (ACCUNEB) 1.25 mg/3 mL Nebu Take 3 mLs (1.25 mg total) by nebulization every 6 (six) hours as needed.    beclomethasone (QVAR) 40 mcg/actuation Aero Inhale 1 puff into the lungs 2 (two) times daily.     No current facility-administered medications for this visit.        Review of patient's allergies indicates:   Allergen Reactions    Doxycycline hyclate Itching       Family History   Problem Relation Age of Onset    Hypertension Mother     Cancer Maternal Aunt      cervical       Social History     Social History    Marital status:      Spouse name: N/A    Number of children: N/A    Years of education: N/A     Occupational History    Not on file.     Social History Main Topics    Smoking status: Never Smoker    Smokeless tobacco: Never Used    Alcohol use No    Drug use: No    Sexual activity: Not Currently     Partners: Male     Birth control/ protection: None     Other Topics Concern    Not on file     Social History Narrative    No narrative on file       Chief Complaint:   Chief Complaint   Patient presents with    Right Shoulder - Pain       Consulting Physician: No ref. provider found    History of present illness: This is a 46-year-old woman who has had right shoulder pain.  Treated with injections and exercises and she reports she was doing well up until last week.  She is sore and painful again.  Puts her pain is much as an 8 out of 10 and states it's worse with activities and overhead use.    Review of Systems:    Constitution: Denies chills, fever,  and sweats.    HENT: Denies headaches or blurry vision.    Cardiovascular: Denies chest pain or irregular heart beat.    Respiratory: Denies cough or shortness of breath.    Gastrointestinal: Denies abdominal pain, nausea, or vomiting.    Musculoskeletal:  Denies muscle cramps.    Neurological: Denies dizziness or focal weakness.    Psychiatric/Behavioral: Normal mental status.    Hematologic/Lymphatic: Denies bleeding problem or easy bruising/bleeding.    Skin: Denies rash or suspicious lesions.      Examination:    Vital Signs:    Vitals:    03/16/18 1106   BP: (!) 157/87   Pulse: 63       Body mass index is 25.42 kg/m².    This a well-developed, well nourished patient in no acute distress.    Alert and oriented and cooperative to examination.       Physical Exam: Right Shoulder Exam     Skin  Rash:   None  Scars:   None    Inspection/Observation  Swelling:   None  Erythema:   None  Bruising:   None  Wounds:   None  Scapular Winging:  None  Scapular Dyskinesia:  None  Atrophy:   None  Masses:   None  Lymphadenopathy: None    Tenderness   AC Joint:   None    Range of Motion   Active Forward Flexion:  180  w pain  Active External Rotation:  45  Active Internal Rotation: Low Back w pain    Muscle Strength   Forward Flexion:  5/5  External Rotation: 5/5  Internal Rotation:  5/5    Instability:  None    Tests & Signs   Cross Arm:   Negative    Other   Sensation:  Normal  Pulse:    2+ radial          Imaging: X-rays of the right shoulder show no acute bony abnormality, fracture or dislocation.     Assessment: Right shoulder bursitis    Plan: She likely has some bursitis.  We'll go ahead and give her an injection today and a prescription for Voltaren.  We'll see her back as needed.    DISCLAIMER: This note may have been dictated using voice recognition software and may contain grammatical errors.     NOTE: Consult report sent to referring provider via [employer].

## 2018-03-22 NOTE — TELEPHONE ENCOUNTER
Returned pt's call, advised that forms will be ready for  tomorrow.  She verbalized understanding.

## 2018-03-22 NOTE — PROCEDURES
Large Joint Aspiration/Injection  Date/Time: 3/16/2018 10:17 PM  Performed by: LYNDSAY ROBERSON  Authorized by: LYNDSAY ROBERSON     Consent Done?:  Yes (Verbal)  Indications:  Pain  Timeout: Prior to procedure the correct patient, procedure, and site was verified      Location:  Shoulder  Site:  R subacromial bursa  Prep: Patient was prepped and draped in usual sterile fashion    Approach:  Lateral  Medications:  40 mg triamcinolone acetonide 40 mg/mL

## 2018-04-10 ENCOUNTER — TELEPHONE (OUTPATIENT)
Dept: ORTHOPEDICS | Facility: CLINIC | Age: 48
End: 2018-04-10

## 2018-04-10 NOTE — TELEPHONE ENCOUNTER
----- Message from Tish Vogt sent at 4/10/2018  7:24 AM CDT -----  Contact: self  Patient [phone] had left a message yesterday to  a note in [place] to return to work today/Patient has Bursitis in her right arm and was off of work on Saturday 04 07 18 and is on FMLA/she has to be at work by 11am today in [place] and needs to  the note by 10am this morning 04 10 18/please have note ready and advise patient/she did call yesterday but did not receive any return call

## 2018-04-25 DIAGNOSIS — Z12.11 COLON CANCER SCREENING: Primary | ICD-10-CM

## 2018-04-27 ENCOUNTER — TELEPHONE (OUTPATIENT)
Dept: OPTOMETRY | Facility: CLINIC | Age: 48
End: 2018-04-27

## 2018-04-27 ENCOUNTER — DOCUMENTATION ONLY (OUTPATIENT)
Dept: FAMILY MEDICINE | Facility: CLINIC | Age: 48
End: 2018-04-27

## 2018-04-27 NOTE — PROGRESS NOTES
Pre-Visit Chart Review  For Appointment Scheduled on 4-30-18    Health Maintenance Due   Topic Date Due    Mammogram  04/22/2015    Pap Smear with HPV Cotest  10/01/2015    Influenza Vaccine  08/01/2017    Colonoscopy  04/13/2018

## 2018-04-30 ENCOUNTER — OFFICE VISIT (OUTPATIENT)
Dept: FAMILY MEDICINE | Facility: CLINIC | Age: 48
End: 2018-04-30
Payer: COMMERCIAL

## 2018-04-30 VITALS
SYSTOLIC BLOOD PRESSURE: 160 MMHG | TEMPERATURE: 98 F | HEART RATE: 61 BPM | BODY MASS INDEX: 25.35 KG/M2 | DIASTOLIC BLOOD PRESSURE: 96 MMHG | HEIGHT: 63 IN | WEIGHT: 143.06 LBS

## 2018-04-30 DIAGNOSIS — I10 ESSENTIAL HYPERTENSION: ICD-10-CM

## 2018-04-30 DIAGNOSIS — J32.9 FREQUENT SINUS INFECTIONS: ICD-10-CM

## 2018-04-30 DIAGNOSIS — J01.01 ACUTE RECURRENT MAXILLARY SINUSITIS: Primary | ICD-10-CM

## 2018-04-30 PROCEDURE — 3077F SYST BP >= 140 MM HG: CPT | Mod: CPTII,S$GLB,, | Performed by: NURSE PRACTITIONER

## 2018-04-30 PROCEDURE — 99213 OFFICE O/P EST LOW 20 MIN: CPT | Mod: S$GLB,,, | Performed by: NURSE PRACTITIONER

## 2018-04-30 PROCEDURE — 3080F DIAST BP >= 90 MM HG: CPT | Mod: CPTII,S$GLB,, | Performed by: NURSE PRACTITIONER

## 2018-04-30 PROCEDURE — 99999 PR PBB SHADOW E&M-EST. PATIENT-LVL IV: CPT | Mod: PBBFAC,,, | Performed by: NURSE PRACTITIONER

## 2018-04-30 RX ORDER — AMOXICILLIN 500 MG/1
500 TABLET, FILM COATED ORAL EVERY 12 HOURS
Qty: 14 TABLET | Refills: 0 | Status: SHIPPED | OUTPATIENT
Start: 2018-04-30 | End: 2018-05-07

## 2018-04-30 NOTE — PROGRESS NOTES
Subjective:       Patient ID: Jacqui Cruz is a 48 y.o. female.    Chief Complaint: Sinus Problem    Ms. Cruz presents to the clinic today for sinus pressure and pain, purulent nasal drainage.  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  States this is the seventh sinus infection she has had this year.  She uses Saline nasal spray.      Sinus Problem   This is a recurrent problem. The current episode started 1 to 4 weeks ago. The problem has been gradually worsening since onset. There has been no fever. The pain is severe. Associated symptoms include congestion, ear pain, headaches and sinus pressure. Pertinent negatives include no chills, coughing, shortness of breath, sore throat or swollen glands. Past treatments include acetaminophen and saline sprays.     Review of Systems   Constitutional: Negative for chills and fever.   HENT: Positive for congestion, ear pain, postnasal drip, sinus pain and sinus pressure. Negative for sore throat.    Respiratory: Negative for cough and shortness of breath.    Cardiovascular: Negative for chest pain.   Neurological: Positive for headaches. Negative for dizziness.       Objective:      Physical Exam   Constitutional: She is oriented to person, place, and time. She appears well-nourished. No distress.   HENT:   Head: Normocephalic and atraumatic.   Right Ear: Tympanic membrane and external ear normal.   Left Ear: Tympanic membrane and external ear normal.   Nose: No mucosal edema or rhinorrhea. Right sinus exhibits maxillary sinus tenderness and frontal sinus tenderness. Left sinus exhibits maxillary sinus tenderness and frontal sinus tenderness.   Mouth/Throat: Oropharynx is clear and moist. No oropharyngeal exudate or posterior oropharyngeal erythema.   Eyes: Pupils are equal, round, and reactive to light. Right eye exhibits no discharge. Left eye exhibits no discharge.   Neck: Neck supple. No  thyromegaly present.   Cardiovascular: Normal rate and regular rhythm.  Exam reveals no gallop and no friction rub.    No murmur heard.  Pulmonary/Chest: Effort normal and breath sounds normal. No respiratory distress. She has no wheezes. She has no rales.   Abdominal: Soft. She exhibits no distension. There is no tenderness.   Lymphadenopathy:     She has no cervical adenopathy.   Neurological: She is alert and oriented to person, place, and time. Coordination normal.   Skin: Skin is warm and dry.   Psychiatric: She has a normal mood and affect. Her behavior is normal. Thought content normal.   Vitals reviewed.          Current Outpatient Prescriptions:     albuterol (ACCUNEB) 1.25 mg/3 mL Nebu, Take 3 mLs (1.25 mg total) by nebulization every 6 (six) hours as needed., Disp: 25 vial, Rfl: 11    losartan-hydrochlorothiazide 50-12.5 mg (HYZAAR) 50-12.5 mg per tablet, Take 1 tablet by mouth once daily., Disp: 90 tablet, Rfl: 3    amoxicillin (AMOXIL) 500 MG Tab, Take 1 tablet (500 mg total) by mouth every 12 (twelve) hours., Disp: 14 tablet, Rfl: 0    beclomethasone (QVAR) 40 mcg/actuation Aero, Inhale 1 puff into the lungs 2 (two) times daily., Disp: 120 each, Rfl: 3    sodium chloride (SALINE NASAL MIST) 3 % Mist, 1 spray by Nasal route every 4 (four) hours as needed., Disp: , Rfl:   Assessment:       1. Acute recurrent maxillary sinusitis    2. Frequent sinus infections    3. Essential hypertension        Plan:       Acute recurrent maxillary sinusitis  Continue saline nasal spray.  -     sodium chloride (SALINE NASAL MIST) 3 % Mist; 1 spray by Nasal route every 4 (four) hours as needed.  -     amoxicillin (AMOXIL) 500 MG Tab; Take 1 tablet (500 mg total) by mouth every 12 (twelve) hours.  Dispense: 14 tablet; Refill: 0    Frequent sinus infections  Will see if ENT has any recs as far as preventing future sinusitis.  -     Ambulatory referral to ENT    Essential hypertension  Uncontrolled, likely due to sinus  pain.  States has been controlled at home.   BP log x 2 weeks, rtc nurse visit.    Patient readiness: acceptance and barriers:none    During the course of the visit the patient was educated and counseled about the following:     Hypertension:   Medication: no change.    Goals: Hypertension: Reduce Blood Pressure    Did patient meet goals/outcomes: No    The following self management tools provided: blood pressure log    Patient Instructions (the written plan) was given to the patient/family.     Time spent with patient: 15 minutes    Barriers to medications present (no )    Adverse reactions to current medications (no)    Over the counter medications reviewed (Yes)

## 2018-06-21 DIAGNOSIS — Z12.39 BREAST CANCER SCREENING: ICD-10-CM

## 2018-06-28 ENCOUNTER — TELEPHONE (OUTPATIENT)
Dept: FAMILY MEDICINE | Facility: CLINIC | Age: 48
End: 2018-06-28

## 2018-06-28 ENCOUNTER — DOCUMENTATION ONLY (OUTPATIENT)
Dept: FAMILY MEDICINE | Facility: CLINIC | Age: 48
End: 2018-06-28

## 2018-06-28 NOTE — TELEPHONE ENCOUNTER
Returned patient call. Appointment scheduled for tomorrow. No available appointments in clinic on today

## 2018-06-28 NOTE — TELEPHONE ENCOUNTER
----- Message from Sharri Miller sent at 6/28/2018  8:51 AM CDT -----  Type: Needs Medical Advice    Who Called:  patient  Symptoms (please be specific): sty in left eye  How long has patient had these symptoms: 3 days  Pharmacy name and phone #:   [facility] [place] [place] [address] [address]  [address] [address]  [facility] [address]  Phone: [phone] Fax: [phone]      Best Call Back Number:[phone] (home)     Additional Information: Wanted to be seen today before 12pm or have a prescription sent to the pharmacy.

## 2018-06-28 NOTE — PROGRESS NOTES
Pre-Visit Chart Review  For Appointment Scheduled on 6/29/18    Health Maintenance Due   Topic Date Due    Mammogram  04/22/2015    Pap Smear with HPV Cotest  10/01/2015    Colonoscopy  04/13/2018

## 2018-07-09 ENCOUNTER — TELEPHONE (OUTPATIENT)
Dept: ORTHOPEDICS | Facility: CLINIC | Age: 48
End: 2018-07-09

## 2018-07-10 ENCOUNTER — HOSPITAL ENCOUNTER (OUTPATIENT)
Dept: RADIOLOGY | Facility: HOSPITAL | Age: 48
Discharge: HOME | End: 2018-07-10
Attending: ORTHOPAEDIC SURGERY
Payer: COMMERCIAL

## 2018-07-10 ENCOUNTER — OFFICE VISIT (OUTPATIENT)
Dept: ORTHOPEDICS | Facility: CLINIC | Age: 48
End: 2018-07-10
Payer: COMMERCIAL

## 2018-07-10 ENCOUNTER — TELEPHONE (OUTPATIENT)
Dept: FAMILY MEDICINE | Facility: CLINIC | Age: 48
End: 2018-07-10

## 2018-07-10 VITALS
BODY MASS INDEX: 25.35 KG/M2 | WEIGHT: 143.06 LBS | SYSTOLIC BLOOD PRESSURE: 142 MMHG | DIASTOLIC BLOOD PRESSURE: 80 MMHG | HEIGHT: 63 IN | HEART RATE: 54 BPM

## 2018-07-10 DIAGNOSIS — M25.562 LEFT KNEE PAIN, UNSPECIFIED CHRONICITY: ICD-10-CM

## 2018-07-10 DIAGNOSIS — M17.12 ARTHRITIS OF LEFT KNEE: Primary | ICD-10-CM

## 2018-07-10 DIAGNOSIS — M25.562 LEFT KNEE PAIN, UNSPECIFIED CHRONICITY: Primary | ICD-10-CM

## 2018-07-10 PROCEDURE — 73562 X-RAY EXAM OF KNEE 3: CPT | Mod: 26,XS,RT, | Performed by: RADIOLOGY

## 2018-07-10 PROCEDURE — 73562 X-RAY EXAM OF KNEE 3: CPT | Mod: TC,PN,RT

## 2018-07-10 PROCEDURE — 3079F DIAST BP 80-89 MM HG: CPT | Mod: CPTII,S$GLB,, | Performed by: ORTHOPAEDIC SURGERY

## 2018-07-10 PROCEDURE — 3077F SYST BP >= 140 MM HG: CPT | Mod: CPTII,S$GLB,, | Performed by: ORTHOPAEDIC SURGERY

## 2018-07-10 PROCEDURE — 3008F BODY MASS INDEX DOCD: CPT | Mod: CPTII,S$GLB,, | Performed by: ORTHOPAEDIC SURGERY

## 2018-07-10 PROCEDURE — 99999 PR PBB SHADOW E&M-EST. PATIENT-LVL III: CPT | Mod: PBBFAC,,, | Performed by: ORTHOPAEDIC SURGERY

## 2018-07-10 PROCEDURE — 20610 DRAIN/INJ JOINT/BURSA W/O US: CPT | Mod: LT,S$GLB,, | Performed by: ORTHOPAEDIC SURGERY

## 2018-07-10 PROCEDURE — 73564 X-RAY EXAM KNEE 4 OR MORE: CPT | Mod: TC,PN,LT

## 2018-07-10 PROCEDURE — 73564 X-RAY EXAM KNEE 4 OR MORE: CPT | Mod: 26,LT,, | Performed by: RADIOLOGY

## 2018-07-10 PROCEDURE — 99214 OFFICE O/P EST MOD 30 MIN: CPT | Mod: 25,S$GLB,, | Performed by: ORTHOPAEDIC SURGERY

## 2018-07-10 RX ADMIN — TRIAMCINOLONE ACETONIDE 40 MG: 40 INJECTION, SUSPENSION INTRA-ARTICULAR; INTRAMUSCULAR at 08:07

## 2018-07-11 ENCOUNTER — TELEPHONE (OUTPATIENT)
Dept: ORTHOPEDICS | Facility: CLINIC | Age: 48
End: 2018-07-11

## 2018-07-11 NOTE — TELEPHONE ENCOUNTER
----- Message from Jessica Neal MA sent at 7/11/2018  9:42 AM CDT -----  Contact: Self  The patient is requesting a call back before 11:30.    The patient needs to reschedule her MRI for a Saturday or Monday Morning. The appointment scheduled for Friday is not going to work for her.    Call Back# [phone]  Thanks

## 2018-07-12 RX ORDER — TRIAMCINOLONE ACETONIDE 40 MG/ML
40 INJECTION, SUSPENSION INTRA-ARTICULAR; INTRAMUSCULAR
Status: DISCONTINUED | OUTPATIENT
Start: 2018-07-10 | End: 2018-07-12 | Stop reason: HOSPADM

## 2018-07-13 ENCOUNTER — HOSPITAL ENCOUNTER (OUTPATIENT)
Dept: RADIOLOGY | Facility: HOSPITAL | Age: 48
Discharge: HOME | End: 2018-07-13
Attending: ORTHOPAEDIC SURGERY
Payer: COMMERCIAL

## 2018-07-13 DIAGNOSIS — M25.562 LEFT KNEE PAIN, UNSPECIFIED CHRONICITY: ICD-10-CM

## 2018-07-13 PROCEDURE — 73721 MRI JNT OF LWR EXTRE W/O DYE: CPT | Mod: TC,LT

## 2018-07-13 PROCEDURE — 73721 MRI JNT OF LWR EXTRE W/O DYE: CPT | Mod: 26,LT,, | Performed by: RADIOLOGY

## 2018-07-13 NOTE — PROGRESS NOTES
Past Medical History:   Diagnosis Date    Asthma     Hypertension        Past Surgical History:   Procedure Laterality Date    BREAST SURGERY      biopsy       Current Outpatient Prescriptions   Medication Sig    albuterol (ACCUNEB) 1.25 mg/3 mL Nebu Take 3 mLs (1.25 mg total) by nebulization every 6 (six) hours as needed.    losartan-hydrochlorothiazide 50-12.5 mg (HYZAAR) 50-12.5 mg per tablet Take 1 tablet by mouth once daily.    sodium chloride (SALINE NASAL MIST) 3 % Mist 1 spray by Nasal route every 4 (four) hours as needed.    beclomethasone (QVAR) 40 mcg/actuation Aero Inhale 1 puff into the lungs 2 (two) times daily.     No current facility-administered medications for this visit.        Review of patient's allergies indicates:   Allergen Reactions    Omnicef [cefdinir] Diarrhea    Doxycycline hyclate Itching       Family History   Problem Relation Age of Onset    Hypertension Mother     Cancer Maternal Aunt         cervical       Social History     Social History    Marital status:      Spouse name: N/A    Number of children: N/A    Years of education: N/A     Occupational History    Not on file.     Social History Main Topics    Smoking status: Never Smoker    Smokeless tobacco: Never Used    Alcohol use No    Drug use: No    Sexual activity: Not Currently     Partners: Male     Birth control/ protection: None     Other Topics Concern    Not on file     Social History Narrative    No narrative on file       Chief Complaint:   Chief Complaint   Patient presents with    Left Knee - Pain       Consulting Physician: No ref. provider found    History of present illness:    This is a 48 y.o. female who complains of left knee pain for 2 months. 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 Pain 8/10 and worse with use. No injury.    Review of Systems:    Constitution: Denies chills, fever, and sweats.  HENT: Denies headaches or blurry vision.  Cardiovascular: Denies chest pain or irregular  "heart beat.  Respiratory: Denies cough or shortness of breath.  Gastrointestinal: Denies abdominal pain, nausea, or vomiting.  Musculoskeletal:  Denies muscle cramps.  Neurological: Denies dizziness or focal weakness.  Psychiatric/Behavioral: Normal mental status.  Hematologic/Lymphatic: Denies bleeding problem or easy bruising/bleeding.  Skin: Denies rash or suspicious lesions.    Examination:    Vital Signs:    Vitals:    07/10/18 0948   BP: (!) 142/80   Pulse: (!) 54   Weight: 64.9 kg (143 lb 1.3 oz)   Height: 5' 3" (1.6 m)   PainSc:   8   PainLoc: Knee       Body mass index is 25.35 kg/m².    This a well-developed, well nourished patient in no acute distress.    Alert and oriented x 3 and cooperative to examination.       Physical Exam: Left Knee Exam    Gait   Normal    Skin  Rash:   None  Scars:   None    Inspection  Erythema:  None  Bruising:  None  Effusion:  mild  Masses:  None  Lymphadenopathy: None    Range of Motion: 0 to 130° with pain    Medial Joint : y  Lateral Joint : n    Patellofemoral Tenderness: None  Patellofemoral Crepitus: None    Lachman:  Normal  Anterior Drawer: Normal  Posterior Drawer: Normal    Orquidea's:  +  Apley's:  +    Varus Stress:  Stable  Valgus Stress:  Stable    Strength:  5/5    Pulses:  Palpable distal    Sensation:  Intact          Imaging: X-rays ordered and reviewed today personally of left knee shows mild DJD.        Assessment: Arthritis of left knee  -     Large Joint Aspiration/Injection        Plan:  Possible medial meniscal injury from overuse. Will inject and MRI. Rx for orthotics.      DISCLAIMER: This note may have been dictated using voice recognition software and may contain grammatical errors.     NOTE: Consult report sent to referring provider via EP[facility] EMR.  "

## 2018-07-13 NOTE — PROCEDURES
Large Joint Aspiration/Injection  Date/Time: 7/10/2018 8:49 PM  Performed by: LYNDSAY ROBERSON  Authorized by: LYNDSAY ROBERSON     Consent Done?:  Yes (Verbal)  Indications:  Pain  Timeout: Prior to procedure the correct patient, procedure, and site was verified      Location:  Knee  Site:  L knee  Prep: Patient was prepped and draped in usual sterile fashion    Approach:  Anteromedial  Medications:  40 mg triamcinolone acetonide 40 mg/mL

## 2018-07-16 ENCOUNTER — OFFICE VISIT (OUTPATIENT)
Dept: ORTHOPEDICS | Facility: CLINIC | Age: 48
End: 2018-07-16
Payer: COMMERCIAL

## 2018-07-16 VITALS
DIASTOLIC BLOOD PRESSURE: 77 MMHG | HEART RATE: 61 BPM | HEIGHT: 63 IN | BODY MASS INDEX: 25.35 KG/M2 | SYSTOLIC BLOOD PRESSURE: 148 MMHG | WEIGHT: 143.06 LBS

## 2018-07-16 DIAGNOSIS — M25.562 LEFT KNEE PAIN, UNSPECIFIED CHRONICITY: Primary | ICD-10-CM

## 2018-07-16 PROCEDURE — 99214 OFFICE O/P EST MOD 30 MIN: CPT | Mod: S$GLB,,, | Performed by: ORTHOPAEDIC SURGERY

## 2018-07-16 PROCEDURE — 3077F SYST BP >= 140 MM HG: CPT | Mod: CPTII,S$GLB,, | Performed by: ORTHOPAEDIC SURGERY

## 2018-07-16 PROCEDURE — 3078F DIAST BP <80 MM HG: CPT | Mod: CPTII,S$GLB,, | Performed by: ORTHOPAEDIC SURGERY

## 2018-07-16 PROCEDURE — 3008F BODY MASS INDEX DOCD: CPT | Mod: CPTII,S$GLB,, | Performed by: ORTHOPAEDIC SURGERY

## 2018-07-16 PROCEDURE — 99999 PR PBB SHADOW E&M-EST. PATIENT-LVL III: CPT | Mod: PBBFAC,,, | Performed by: ORTHOPAEDIC SURGERY

## 2018-07-17 NOTE — PROGRESS NOTES
Past Medical History:   Diagnosis Date    Asthma     Hypertension        Past Surgical History:   Procedure Laterality Date    BREAST SURGERY      biopsy       Current Outpatient Prescriptions   Medication Sig    albuterol (ACCUNEB) 1.25 mg/3 mL Nebu Take 3 mLs (1.25 mg total) by nebulization every 6 (six) hours as needed.    losartan-hydrochlorothiazide 50-12.5 mg (HYZAAR) 50-12.5 mg per tablet Take 1 tablet by mouth once daily.    sodium chloride (SALINE NASAL MIST) 3 % Mist 1 spray by Nasal route every 4 (four) hours as needed.    beclomethasone (QVAR) 40 mcg/actuation Aero Inhale 1 puff into the lungs 2 (two) times daily.     No current facility-administered medications for this visit.        Review of patient's allergies indicates:   Allergen Reactions    Omnicef [cefdinir] Diarrhea    Doxycycline hyclate Itching       Family History   Problem Relation Age of Onset    Hypertension Mother     Cancer Maternal Aunt         cervical       Social History     Social History    Marital status:      Spouse name: N/A    Number of children: N/A    Years of education: N/A     Occupational History    Not on file.     Social History Main Topics    Smoking status: Never Smoker    Smokeless tobacco: Never Used    Alcohol use No    Drug use: No    Sexual activity: Not Currently     Partners: Male     Birth control/ protection: None     Other Topics Concern    Not on file     Social History Narrative    No narrative on file       Chief Complaint:   Chief Complaint   Patient presents with    Left Knee - Pain       Consulting Physician: No ref. provider found    History of present illness:    This is a 48 y.o. female who complains of left knee pain for 2 months. 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 Pain 8/10 and worse with use. No injury.    Review of Systems:    Constitution: Denies chills, fever, and sweats.  HENT: Denies headaches or blurry vision.  Cardiovascular: Denies chest pain or irregular  "heart beat.  Respiratory: Denies cough or shortness of breath.  Gastrointestinal: Denies abdominal pain, nausea, or vomiting.  Musculoskeletal:  Denies muscle cramps.  Neurological: Denies dizziness or focal weakness.  Psychiatric/Behavioral: Normal mental status.  Hematologic/Lymphatic: Denies bleeding problem or easy bruising/bleeding.  Skin: Denies rash or suspicious lesions.    Examination:    Vital Signs:    Vitals:    07/16/18 1532   BP: (!) 148/77   Pulse: 61   Weight: 64.9 kg (143 lb 1.3 oz)   Height: 5' 3" (1.6 m)   PainSc:   4   PainLoc: Knee       Body mass index is 25.35 kg/m².    This a well-developed, well nourished patient in no acute distress.    Alert and oriented x 3 and cooperative to examination.       Physical Exam: Left Knee Exam    Gait   Normal    Skin  Rash:   None  Scars:   None    Inspection  Erythema:  None  Bruising:  None  Effusion:  mild  Masses:  None  Lymphadenopathy: None    Range of Motion: 0 to 130° with pain    Medial Joint : y  Lateral Joint : n    Patellofemoral Tenderness: None  Patellofemoral Crepitus: None    Lachman:  Normal  Anterior Drawer: Normal  Posterior Drawer: Normal    Orquidea's:  +  Apley's:  +    Varus Stress:  Stable  Valgus Stress:  Stable    Strength:  5/5    Pulses:  Palpable distal    Sensation:  Intact          Imaging: X-rays of left knee shows mild DJD. MRI reviewed with pt today shows incomplete medial meniscal tear.        Assessment: Left knee pain, unspecified chronicity        Plan:  We discussed MRI and treatment options, and she elected to see how she does. Will get orthotics. [facility] for knee strengthening.    DISCLAIMER: This note may have been dictated using voice recognition software and may contain grammatical errors.     NOTE: Consult report sent to referring provider via EPIC EMR.  "

## 2018-08-17 ENCOUNTER — OFFICE VISIT (OUTPATIENT)
Dept: FAMILY MEDICINE | Facility: CLINIC | Age: 48
End: 2018-08-17
Payer: COMMERCIAL

## 2018-08-17 VITALS
RESPIRATION RATE: 16 BRPM | BODY MASS INDEX: 25.04 KG/M2 | TEMPERATURE: 98 F | DIASTOLIC BLOOD PRESSURE: 70 MMHG | OXYGEN SATURATION: 97 % | HEIGHT: 63 IN | HEART RATE: 60 BPM | WEIGHT: 141.31 LBS | SYSTOLIC BLOOD PRESSURE: 128 MMHG

## 2018-08-17 DIAGNOSIS — Z12.11 COLON CANCER SCREENING: ICD-10-CM

## 2018-08-17 DIAGNOSIS — Z12.39 BREAST CANCER SCREENING: ICD-10-CM

## 2018-08-17 DIAGNOSIS — Z12.4 CERVICAL CANCER SCREENING: ICD-10-CM

## 2018-08-17 DIAGNOSIS — J32.0 MAXILLARY SINUSITIS, UNSPECIFIED CHRONICITY: ICD-10-CM

## 2018-08-17 DIAGNOSIS — I10 ESSENTIAL HYPERTENSION: ICD-10-CM

## 2018-08-17 DIAGNOSIS — N92.0 MENORRHAGIA WITH REGULAR CYCLE: Primary | ICD-10-CM

## 2018-08-17 PROCEDURE — 99213 OFFICE O/P EST LOW 20 MIN: CPT | Mod: S$GLB,,, | Performed by: PHYSICIAN ASSISTANT

## 2018-08-17 PROCEDURE — 3078F DIAST BP <80 MM HG: CPT | Mod: CPTII,S$GLB,, | Performed by: PHYSICIAN ASSISTANT

## 2018-08-17 PROCEDURE — 3008F BODY MASS INDEX DOCD: CPT | Mod: CPTII,S$GLB,, | Performed by: PHYSICIAN ASSISTANT

## 2018-08-17 PROCEDURE — 99999 PR PBB SHADOW E&M-EST. PATIENT-LVL IV: CPT | Mod: PBBFAC,,, | Performed by: PHYSICIAN ASSISTANT

## 2018-08-17 PROCEDURE — 3074F SYST BP LT 130 MM HG: CPT | Mod: CPTII,S$GLB,, | Performed by: PHYSICIAN ASSISTANT

## 2018-08-17 RX ORDER — LORATADINE 10 MG/1
10 TABLET ORAL DAILY
Refills: 0 | COMMUNITY
Start: 2018-08-17 | End: 2020-06-02

## 2018-08-17 NOTE — PROGRESS NOTES
Subjective:       Patient ID: Jacqui Cruz is a 48 y.o. female.    Chief Complaint: Diarrhea; Emesis; and Cough    Ms. Cruz comes to clinic today complaining of very heavy menstrual cycles. She reports that she uses a super plus tampon with a pad and still bleeds through clothing. She reports she has had heavy periods her entire life. The patient reports she was previously on birth control that helped with these symptoms. She is no longer on birth control at this time. The patient reports that she had one episode of emesis and 2 episodes of diarrhea; this has now resolved. She also complains of sinus pressure and drainage. She has been using saline nasal spray. The patient is due to update several health maintenance items including mammogram, pap smear, and colonoscopy.      Review of Systems   Constitutional: Negative for activity change, appetite change and fever.   HENT: Positive for sinus pressure. Negative for postnasal drip and rhinorrhea.    Eyes: Negative for visual disturbance.   Respiratory: Negative for cough and shortness of breath.    Cardiovascular: Negative for chest pain.   Gastrointestinal: Negative for abdominal distention and abdominal pain.   Genitourinary: Positive for menstrual problem and vaginal bleeding. Negative for difficulty urinating and dysuria.   Musculoskeletal: Negative for arthralgias and myalgias.   Neurological: Negative for headaches.   Hematological: Negative for adenopathy.   Psychiatric/Behavioral: The patient is not nervous/anxious.        Objective:      Physical Exam   Constitutional: She is oriented to person, place, and time.   HENT:   Mouth/Throat: Oropharynx is clear and moist. No oropharyngeal exudate.   Eyes: Conjunctivae are normal. Pupils are equal, round, and reactive to light.   Cardiovascular: Normal rate and regular rhythm.   Pulmonary/Chest: Effort normal and breath sounds normal. She has no wheezes.   Abdominal: Soft. Bowel sounds are normal. She  exhibits no distension. There is no tenderness.   Musculoskeletal: She exhibits no edema.   Lymphadenopathy:     She has no cervical adenopathy.   Neurological: She is alert and oriented to person, place, and time.   Skin: No erythema.   Psychiatric: Her behavior is normal.       Assessment:       1. Menorrhagia with regular cycle    2. Breast cancer screening    3. Cervical cancer screening    4. Colon cancer screening    5. Essential hypertension    6. Maxillary sinusitis, unspecified chronicity        Plan:   Jacqui was seen today for diarrhea, emesis and cough.    Diagnoses and all orders for this visit:    Menorrhagia with regular cycle  -     Ambulatory referral to Obstetrics / Gynecology    Breast cancer screening  Mammogram per GYN  Cervical cancer screening  Pap smear mammogram   Colon cancer screening  Schedule with GI  Essential hypertension  -     Comprehensive metabolic panel; Future  -     CBC auto differential; Future  -     Lipid panel; Future  Controlled  Low salt diet  Continue current medication  Maxillary sinusitis, unspecified chronicity  Start claritin  Continue saline nasal spray  Other orders  -     loratadine (CLARITIN) 10 mg tablet; Take 1 tablet (10 mg total) by mouth once daily.

## 2018-08-17 NOTE — LETTER
August 17, 2018      [facility]  [address] [place] [address]  Phone: [phone]  Fax: [phone]       Patient: Jacqui Cruz   YOB: 1970  Date of Visit: 08/17/2018    To Whom It May Concern:    Jh Cruz  was at [facility] on 08/17/2018. She may return to work/school on 08/18/18 with no restrictions. Please excuse her absence on 8/16/18.  Please be advised that the patient is taking a medication that causes her to have to urinate more frequently. If you have any questions or concerns, or if I can be of further assistance, please do not hesitate to contact me.    Sincerely,        Gretchen Driver PA-C

## 2018-08-20 ENCOUNTER — TELEPHONE (OUTPATIENT)
Dept: FAMILY MEDICINE | Facility: CLINIC | Age: 48
End: 2018-08-20

## 2018-08-20 NOTE — TELEPHONE ENCOUNTER
----- Message from Nivia Jones sent at 8/18/2018  9:44 AM CDT -----  Type: Needs Medical Advice    Who Called:  Patient   Symptoms (please be specific):  diarrhea  Best Call Back Number: [phone]  Additional Information: patient still ill is requesting a work excuse to cover Saturday 08/18/18 also, due to still having diarrhea at work.     Thank you

## 2018-08-20 NOTE — TELEPHONE ENCOUNTER
Spoke to patient. She reports that she felt better after taking a break and was able to stay at work. Does not need a work excuse.

## 2018-08-31 ENCOUNTER — TELEPHONE (OUTPATIENT)
Dept: FAMILY MEDICINE | Facility: CLINIC | Age: 48
End: 2018-08-31

## 2018-08-31 ENCOUNTER — LAB VISIT (OUTPATIENT)
Dept: LAB | Facility: HOSPITAL | Age: 48
End: 2018-08-31
Attending: PHYSICIAN ASSISTANT
Payer: COMMERCIAL

## 2018-08-31 DIAGNOSIS — I10 ESSENTIAL HYPERTENSION: ICD-10-CM

## 2018-08-31 LAB
ALBUMIN SERPL BCP-MCNC: 3.6 G/DL
ALP SERPL-CCNC: 57 U/L
ALT SERPL W/O P-5'-P-CCNC: 32 U/L
ANION GAP SERPL CALC-SCNC: 7 MMOL/L
AST SERPL-CCNC: 36 U/L
BASOPHILS # BLD AUTO: 0 K/UL
BASOPHILS NFR BLD: 0.5 %
BILIRUB SERPL-MCNC: 1 MG/DL
BUN SERPL-MCNC: 7 MG/DL
CALCIUM SERPL-MCNC: 9.6 MG/DL
CHLORIDE SERPL-SCNC: 104 MMOL/L
CHOLEST SERPL-MCNC: 163 MG/DL
CHOLEST/HDLC SERPL: 2.3 {RATIO}
CO2 SERPL-SCNC: 28 MMOL/L
CREAT SERPL-MCNC: 0.7 MG/DL
DIFFERENTIAL METHOD: ABNORMAL
EOSINOPHIL # BLD AUTO: 0.1 K/UL
EOSINOPHIL NFR BLD: 1.8 %
ERYTHROCYTE [DISTWIDTH] IN BLOOD BY AUTOMATED COUNT: 13.2 %
EST. GFR  (AFRICAN AMERICAN): >60 ML/MIN/1.73 M^2
EST. GFR  (NON AFRICAN AMERICAN): >60 ML/MIN/1.73 M^2
GLUCOSE SERPL-MCNC: 92 MG/DL
HCT VFR BLD AUTO: 39.5 %
HDLC SERPL-MCNC: 70 MG/DL
HDLC SERPL: 42.9 %
HGB BLD-MCNC: 12.7 G/DL
LDLC SERPL CALC-MCNC: 76.6 MG/DL
LYMPHOCYTES # BLD AUTO: 1.7 K/UL
LYMPHOCYTES NFR BLD: 30 %
MCH RBC QN AUTO: 26.2 PG
MCHC RBC AUTO-ENTMCNC: 32.2 G/DL
MCV RBC AUTO: 81 FL
MONOCYTES # BLD AUTO: 0.5 K/UL
MONOCYTES NFR BLD: 8.4 %
NEUTROPHILS # BLD AUTO: 3.4 K/UL
NEUTROPHILS NFR BLD: 59.3 %
NONHDLC SERPL-MCNC: 93 MG/DL
PLATELET # BLD AUTO: 163 K/UL
PMV BLD AUTO: 10 FL
POTASSIUM SERPL-SCNC: 3.9 MMOL/L
PROT SERPL-MCNC: 7.6 G/DL
RBC # BLD AUTO: 4.87 M/UL
SODIUM SERPL-SCNC: 139 MMOL/L
TRIGL SERPL-MCNC: 82 MG/DL
WBC # BLD AUTO: 5.8 K/UL

## 2018-08-31 PROCEDURE — 36415 COLL VENOUS BLD VENIPUNCTURE: CPT

## 2018-08-31 PROCEDURE — 80053 COMPREHEN METABOLIC PANEL: CPT

## 2018-08-31 PROCEDURE — 85025 COMPLETE CBC W/AUTO DIFF WBC: CPT

## 2018-08-31 PROCEDURE — 80061 LIPID PANEL: CPT

## 2018-08-31 NOTE — TELEPHONE ENCOUNTER
----- Message from Tish Vogt sent at 8/31/2018  7:17 AM CDT -----  Contact: self  Patient [phone] is calling because she has an abscess on her bottom eye tooth and is in pain/she does not have a dentist and is asking if Dr Marquez will give her an antibiotic for this?/please advise

## 2018-10-16 ENCOUNTER — TELEPHONE (OUTPATIENT)
Dept: FAMILY MEDICINE | Facility: CLINIC | Age: 48
End: 2018-10-16

## 2018-10-16 NOTE — TELEPHONE ENCOUNTER
Spoke to patient.   Patient has complaints of diarrhea and nausea.   Notified no appointments tomorrow appointment scheduled for Thursday patient confirmed appointment. Advise to increase fluids, bland diet patient states understanding also advised urgent care

## 2018-10-16 NOTE — TELEPHONE ENCOUNTER
----- Message from Tj Dotson sent at 10/16/2018  3:09 PM CDT -----            Name of Who is Calling: SHARON SWIFT [[id number]]      What is the request in detail: Pt states she had to leave work due to diarrhea and vomiting. She also is experiencing congestion. She would like to be seen tomorrow, but nothing was available. Please call to discuss scheduling.      Can the clinic reply by [facility]: no      What Number to Call Back if not in [facility]: [phone]

## 2018-10-18 ENCOUNTER — OFFICE VISIT (OUTPATIENT)
Dept: FAMILY MEDICINE | Facility: CLINIC | Age: 48
End: 2018-10-18
Payer: COMMERCIAL

## 2018-10-18 VITALS
TEMPERATURE: 98 F | HEIGHT: 63 IN | BODY MASS INDEX: 24.96 KG/M2 | OXYGEN SATURATION: 98 % | SYSTOLIC BLOOD PRESSURE: 152 MMHG | DIASTOLIC BLOOD PRESSURE: 99 MMHG | HEART RATE: 66 BPM | WEIGHT: 140.88 LBS

## 2018-10-18 DIAGNOSIS — B96.89 BACTERIAL SINUSITIS: Primary | ICD-10-CM

## 2018-10-18 DIAGNOSIS — K52.9 GASTROENTERITIS: ICD-10-CM

## 2018-10-18 DIAGNOSIS — J32.9 BACTERIAL SINUSITIS: Primary | ICD-10-CM

## 2018-10-18 PROCEDURE — 3008F BODY MASS INDEX DOCD: CPT | Mod: CPTII,S$GLB,, | Performed by: PHYSICIAN ASSISTANT

## 2018-10-18 PROCEDURE — 3077F SYST BP >= 140 MM HG: CPT | Mod: CPTII,S$GLB,, | Performed by: PHYSICIAN ASSISTANT

## 2018-10-18 PROCEDURE — 99213 OFFICE O/P EST LOW 20 MIN: CPT | Mod: S$GLB,,, | Performed by: PHYSICIAN ASSISTANT

## 2018-10-18 PROCEDURE — 3080F DIAST BP >= 90 MM HG: CPT | Mod: CPTII,S$GLB,, | Performed by: PHYSICIAN ASSISTANT

## 2018-10-18 PROCEDURE — 99999 PR PBB SHADOW E&M-EST. PATIENT-LVL III: CPT | Mod: PBBFAC,,, | Performed by: PHYSICIAN ASSISTANT

## 2018-10-18 RX ORDER — LISINOPRIL 10 MG/1
TABLET ORAL
COMMUNITY
Start: 2018-10-04 | End: 2021-09-16 | Stop reason: DRUGHIGH

## 2018-10-18 RX ORDER — TIZANIDINE 2 MG/1
TABLET ORAL
COMMUNITY
Start: 2018-10-04 | End: 2020-03-06 | Stop reason: SDUPTHER

## 2018-10-18 RX ORDER — METHYLPREDNISOLONE 4 MG/1
TABLET ORAL
Qty: 1 PACKAGE | Refills: 0 | Status: SHIPPED | OUTPATIENT
Start: 2018-10-18 | End: 2019-11-08 | Stop reason: SDUPTHER

## 2018-10-18 RX ORDER — AMOXICILLIN AND CLAVULANATE POTASSIUM 875; 125 MG/1; MG/1
1 TABLET, FILM COATED ORAL 2 TIMES DAILY
Qty: 20 TABLET | Refills: 0 | Status: SHIPPED | OUTPATIENT
Start: 2018-10-18 | End: 2018-10-28

## 2018-10-18 NOTE — LETTER
October 18, 2018      [facility]  [address] [place] [address]  Phone: [phone]  Fax: [phone]       Patient: Jacqui Cruz   YOB: 1970  Date of Visit: 10/18/2018    To Whom It May Concern:    Jh Cruz  was at [facility] on 10/18/2018. She has been cleared from 10/16/2018 and may return to work on 10/20/2018 With no restrictions. If you have any questions or concerns, or if I can be of further assistance, please do not hesitate to contact me.    Sincerely,            Genoveva Lopez MA

## 2018-10-18 NOTE — PROGRESS NOTES
Subjective:       Patient ID: Jacqui Cruz is a 48 y.o. female.    Chief Complaint: URI (coughing up light brown mucus, diarrhea)    HPI   Cough and congestion x 2 wks  Hx sinusitis and allergy  Nausea and vomiting 3 days ago with diarrhea  That has resolved but pt still with loose stool  Review of Systems   Constitutional: Positive for activity change, appetite change, chills and fatigue. Negative for diaphoresis, fever and unexpected weight change.   HENT: Positive for congestion, ear pain, postnasal drip, sinus pressure, sinus pain and sore throat.    Eyes: Negative.    Respiratory: Positive for cough. Negative for shortness of breath and wheezing.    Cardiovascular: Negative.  Negative for chest pain and leg swelling.   Gastrointestinal: Positive for diarrhea, nausea and vomiting. Negative for abdominal distention, abdominal pain, anal bleeding, blood in stool, constipation and rectal pain.   Endocrine: Negative.    Genitourinary: Negative.    Musculoskeletal: Negative.    Skin: Negative.  Negative for rash.       Objective:      Physical Exam   Constitutional: She appears well-developed and well-nourished. No distress.   HENT:   Head: Normocephalic and atraumatic.   Right Ear: External ear normal.   Left Ear: External ear normal.   Nose: Nose normal.   Mouth/Throat: Oropharynx is clear and moist. No oropharyngeal exudate.   Max and frontal sinus tenderness   Cloudy mucus with boggy pale membranes  Mild throat redness   Eyes: Conjunctivae are normal. No scleral icterus.   Neck: Normal range of motion. Neck supple. No tracheal deviation present. No thyromegaly present.   Cardiovascular: Normal rate, regular rhythm, normal heart sounds and intact distal pulses. Exam reveals no gallop and no friction rub.   No murmur heard.  Pulmonary/Chest: Effort normal and breath sounds normal. No stridor. No respiratory distress. She has no wheezes. She has no rales.   Abdominal: Soft. Bowel sounds are normal. She exhibits  no distension and no mass. There is tenderness. There is no rebound and no guarding. No hernia.   No organomegaly  Mild diffuse tenderness   Musculoskeletal: She exhibits no edema.   Lymphadenopathy:     She has no cervical adenopathy.   Skin: Skin is warm and dry. No rash noted.   Vitals reviewed.      Assessment:       1. Bacterial sinusitis    2. Gastroenteritis        Plan:       Bacterial sinusitis  -     amoxicillin-clavulanate 875-125mg (AUGMENTIN) 875-125 mg per tablet; Take 1 tablet by mouth 2 (two) times daily. for 10 days  Dispense: 20 tablet; Refill: 0  -     methylPREDNISolone (MEDROL DOSEPACK) 4 mg tablet; use as directed  Dispense: 1 Package; Refill: 0    Gastroenteritis    discussed otc's  Discussed diet  Note for work

## 2018-10-18 NOTE — LETTER
October 18, 2018      [facility]  [address] [place] [address]  Phone: [phone]  Fax: [phone]       Patient: Jacqui Cruz   YOB: 1970  Date of Visit: 10/18/2018    To Whom It May Concern:    Jh Cruz  was at [facility] on 10/18/2018. She has been cleared from 10/15/2018 and may return to work on 10/20/2018 with restriction for using the bathroom frequently. If you have any questions or concerns, or if I can be of further assistance, please do not hesitate to contact me.    Sincerely,            Genoveva Lopez MA

## 2018-10-19 ENCOUNTER — TELEPHONE (OUTPATIENT)
Dept: FAMILY MEDICINE | Facility: CLINIC | Age: 48
End: 2018-10-19

## 2018-10-19 NOTE — TELEPHONE ENCOUNTER
Spoke with patient. Patient wanted another letter for her work, as she felt unwell today. Printed letter and patient said that it will be picked up today.

## 2018-10-19 NOTE — TELEPHONE ENCOUNTER
----- Message from Ann Marie Dick sent at 10/19/2018  1:42 PM CDT -----  Contact: Patient  Type: Needs Medical Advice    Who Called:  Patient  Symptoms (please be specific):  diarrhea  How long has patient had these symptoms:  na  Pharmacy name and phone #:   [facility] - [place] - [address]  [address]  [facility] [address]  Phone: [phone] Fax: [phone]     Best Call Back Number: [phone] (home)    Additional Information: Patient is requesting that her work excuse be extended for an extra day, she is still not feeling well

## 2018-12-10 ENCOUNTER — TELEPHONE (OUTPATIENT)
Dept: FAMILY MEDICINE | Facility: CLINIC | Age: 48
End: 2018-12-10

## 2018-12-10 DIAGNOSIS — M25.511 RIGHT SHOULDER PAIN, UNSPECIFIED CHRONICITY: Primary | ICD-10-CM

## 2018-12-10 NOTE — TELEPHONE ENCOUNTER
----- Message from Vale Best sent at 12/10/2018  1:52 PM CST -----  Contact: Patient   Patient would like a call back at [phone], Regards to getting a work excuse for yesterday.    Thanks  Td

## 2018-12-10 NOTE — TELEPHONE ENCOUNTER
Spoke with patient, informed that an appointment would be needed before a work excuse could be written. Offered appointment on 12/13, patient declined, stated she would go to [facility] later today.

## 2018-12-11 ENCOUNTER — OFFICE VISIT (OUTPATIENT)
Dept: ORTHOPEDICS | Facility: CLINIC | Age: 48
End: 2018-12-11
Attending: ORTHOPAEDIC SURGERY
Payer: COMMERCIAL

## 2018-12-11 ENCOUNTER — HOSPITAL ENCOUNTER (OUTPATIENT)
Dept: RADIOLOGY | Facility: HOSPITAL | Age: 48
Discharge: HOME | End: 2018-12-11
Attending: ORTHOPAEDIC SURGERY
Payer: COMMERCIAL

## 2018-12-11 VITALS
HEART RATE: 75 BPM | SYSTOLIC BLOOD PRESSURE: 177 MMHG | HEIGHT: 63 IN | WEIGHT: 140.88 LBS | DIASTOLIC BLOOD PRESSURE: 97 MMHG | BODY MASS INDEX: 24.96 KG/M2

## 2018-12-11 DIAGNOSIS — M25.511 CHRONIC RIGHT SHOULDER PAIN: Primary | ICD-10-CM

## 2018-12-11 DIAGNOSIS — G89.29 CHRONIC RIGHT SHOULDER PAIN: Primary | ICD-10-CM

## 2018-12-11 DIAGNOSIS — M25.511 RIGHT SHOULDER PAIN, UNSPECIFIED CHRONICITY: ICD-10-CM

## 2018-12-11 PROCEDURE — 3077F SYST BP >= 140 MM HG: CPT | Mod: CPTII,S$GLB,, | Performed by: ORTHOPAEDIC SURGERY

## 2018-12-11 PROCEDURE — 20610 DRAIN/INJ JOINT/BURSA W/O US: CPT | Mod: RT,S$GLB,, | Performed by: ORTHOPAEDIC SURGERY

## 2018-12-11 PROCEDURE — 99213 OFFICE O/P EST LOW 20 MIN: CPT | Mod: 25,S$GLB,, | Performed by: ORTHOPAEDIC SURGERY

## 2018-12-11 PROCEDURE — 73030 X-RAY EXAM OF SHOULDER: CPT | Mod: TC,PN,RT

## 2018-12-11 PROCEDURE — 99999 PR PBB SHADOW E&M-EST. PATIENT-LVL III: CPT | Mod: PBBFAC,,, | Performed by: ORTHOPAEDIC SURGERY

## 2018-12-11 PROCEDURE — 3008F BODY MASS INDEX DOCD: CPT | Mod: CPTII,S$GLB,, | Performed by: ORTHOPAEDIC SURGERY

## 2018-12-11 PROCEDURE — 73030 X-RAY EXAM OF SHOULDER: CPT | Mod: 26,RT,, | Performed by: RADIOLOGY

## 2018-12-11 PROCEDURE — 3080F DIAST BP >= 90 MM HG: CPT | Mod: CPTII,S$GLB,, | Performed by: ORTHOPAEDIC SURGERY

## 2018-12-11 RX ORDER — IBUPROFEN 800 MG/1
800 TABLET ORAL 3 TIMES DAILY
Qty: 90 TABLET | Refills: 0 | Status: SHIPPED | OUTPATIENT
Start: 2018-12-11 | End: 2019-05-29 | Stop reason: SDUPTHER

## 2018-12-11 RX ADMIN — TRIAMCINOLONE ACETONIDE 40 MG: 40 INJECTION, SUSPENSION INTRA-ARTICULAR; INTRAMUSCULAR at 09:12

## 2018-12-12 ENCOUNTER — TELEPHONE (OUTPATIENT)
Dept: ORTHOPEDICS | Facility: CLINIC | Age: 48
End: 2018-12-12

## 2018-12-12 RX ORDER — TRIAMCINOLONE ACETONIDE 40 MG/ML
40 INJECTION, SUSPENSION INTRA-ARTICULAR; INTRAMUSCULAR
Status: DISCONTINUED | OUTPATIENT
Start: 2018-12-11 | End: 2018-12-12 | Stop reason: HOSPADM

## 2018-12-12 NOTE — PROGRESS NOTES
Past Medical History:   Diagnosis Date    Asthma     Hypertension        Past Surgical History:   Procedure Laterality Date    BREAST SURGERY      biopsy       Current Outpatient Medications   Medication Sig    albuterol (ACCUNEB) 1.25 mg/3 mL Nebu Take 3 mLs (1.25 mg total) by nebulization every 6 (six) hours as needed.    lisinopril 10 MG tablet     loratadine (CLARITIN) 10 mg tablet Take 1 tablet (10 mg total) by mouth once daily.    losartan-hydrochlorothiazide 50-12.5 mg (HYZAAR) 50-12.5 mg per tablet Take 1 tablet by mouth once daily.    sodium chloride (SALINE NASAL MIST) 3 % Mist 1 spray by Nasal route every 4 (four) hours as needed.    tiZANidine (ZANAFLEX) 2 MG tablet     beclomethasone (QVAR) 40 mcg/actuation Aero Inhale 1 puff into the lungs 2 (two) times daily.    diclofenac sodium (PENNSAID) 2 % SoPk Apply 40 mg topically 2 (two) times daily.    ibuprofen (ADVIL,MOTRIN) 800 MG tablet Take 1 tablet (800 mg total) by mouth 3 (three) times daily.    methylPREDNISolone (MEDROL DOSEPACK) 4 mg tablet use as directed     No current facility-administered medications for this visit.        Review of patient's allergies indicates:   Allergen Reactions    Doxycycline hyclate Itching       Family History   Problem Relation Age of Onset    Hypertension Mother     Cancer Maternal Aunt         cervical       Social History     Socioeconomic History    Marital status:      Spouse name: Not on file    Number of children: Not on file    Years of education: Not on file    Highest education level: Not on file   Social Needs    Financial resource strain: Not on file    Food insecurity - worry: Not on file    Food insecurity - inability: Not on file    Transportation needs - medical: Not on file    Transportation needs - non-medical: Not on file   Occupational History    Not on file   Tobacco Use    Smoking status: Never Smoker    Smokeless tobacco: Never Used   Substance and Sexual  Activity    Alcohol use: No    Drug use: No    Sexual activity: Not Currently     Partners: Male     Birth control/protection: None   Other Topics Concern    Not on file   Social History Narrative    Not on file       Chief Complaint:   Chief Complaint   Patient presents with    Right Shoulder - Pain       Consulting Physician: Lev Gresham MD    History of present illness: This is a 46-year-old woman who has a history right shoulder pain associated with overuse.  Treated with injections and exercises and she reports she was doing well up until 2 weeks ago.  She is sore and painful again.  Puts her pain is much as an 8 out of 10 and states it's worse with activities and overhead use.    Review of Systems:    Constitution: Denies chills, fever, and sweats.    HENT: Denies headaches or blurry vision.    Cardiovascular: Denies chest pain or irregular heart beat.    Respiratory: Denies cough or shortness of breath.    Gastrointestinal: Denies abdominal pain, nausea, or vomiting.    Musculoskeletal:  Denies muscle cramps.    Neurological: Denies dizziness or focal weakness.    Psychiatric/Behavioral: Normal mental status.    Hematologic/Lymphatic: Denies bleeding problem or easy bruising/bleeding.    Skin: Denies rash or suspicious lesions.      Examination:    Vital Signs:    Vitals:    12/11/18 1456   BP: (!) 177/97   Pulse: 75       Body mass index is 24.95 kg/m².    This a well-developed, well nourished patient in no acute distress.    Alert and oriented and cooperative to examination.       Physical Exam: Right Shoulder Exam     Skin  Rash:   None  Scars:   None    Inspection/Observation  Swelling:   None  Erythema:   None  Bruising:   None  Wounds:   None  Scapular Winging:  None  Scapular Dyskinesia:  None  Atrophy:   None  Masses:   None  Lymphadenopathy: None    Tenderness   AC Joint:   None    Range of Motion   Active Forward Flexion:  180  w pain  Active External Rotation:  45  Active Internal  Rotation: Low Back w pain    Muscle Strength   Forward Flexion:  5/5  External Rotation: 5/5  Internal Rotation:  5/5    Instability:  None    Tests & Signs   Cross Arm:   Negative    Other   Sensation:  Normal  Pulse:    2+ radial          Imaging: X-rays of the right shoulder show no acute bony abnormality, fracture or dislocation.     Assessment: Right shoulder bursitis    Plan: She has bursitis.  We'll go ahead and give her an injection today and a prescription for pennsaid.  We'll see her back as needed.    DISCLAIMER: This note may have been dictated using voice recognition software and may contain grammatical errors.     NOTE: Consult report sent to referring provider via [facility].

## 2018-12-12 NOTE — PROCEDURES
Large Joint Aspiration/Injection: R subacromial bursa  Date/Time: 12/11/2018 9:03 AM  Performed by: Lev Gresham MD  Authorized by: Lev Gresham MD     Consent Done?:  Yes (Verbal)  Indications:  Pain  Timeout: Prior to procedure the correct patient, procedure, and site was verified      Location:  Shoulder  Site:  R subacromial bursa  Prep: Patient was prepped and draped in usual sterile fashion    Approach:  Lateral  Medications:  40 mg triamcinolone acetonide 40 mg/mL

## 2018-12-12 NOTE — TELEPHONE ENCOUNTER
----- Message from Jessica Neal MA sent at 12/12/2018  8:26 AM CST -----  Work Excuse needs to be fixed  NO pushing, pulling, or lifting over 20 lbs.    Received yesterday was no lifting over 20 lbs for 1 week. Patient needs it by 10 today.    Call Back# [phone]  Thanks

## 2018-12-28 ENCOUNTER — TELEPHONE (OUTPATIENT)
Dept: FAMILY MEDICINE | Facility: CLINIC | Age: 48
End: 2018-12-28

## 2018-12-28 DIAGNOSIS — I10 ESSENTIAL HYPERTENSION: ICD-10-CM

## 2018-12-28 RX ORDER — LISINOPRIL 10 MG/1
TABLET ORAL
Qty: 30 TABLET | Refills: 11 | Status: SHIPPED | OUTPATIENT
Start: 2018-12-28 | End: 2019-04-11

## 2018-12-28 NOTE — TELEPHONE ENCOUNTER
----- Message from Sandra Castro sent at 12/28/2018  4:05 PM CST -----  Contact: pt  Pt states that she is completely out,pharmacy had faxed request to the office and got no response on her lisinopril 10 MG tablet..[phone] (home)             .  [facility] [address] [place] [address] - [address] [address] [facility]  [address] [address] [facility] [address]  Phone: [phone] Fax: [phone]

## 2018-12-28 NOTE — TELEPHONE ENCOUNTER
Attempted to call patient to inform refill request was sent to Dr. Marquez. No answer, unable to leave voicemail.

## 2019-01-23 ENCOUNTER — TELEPHONE (OUTPATIENT)
Dept: ORTHOPEDICS | Facility: CLINIC | Age: 49
End: 2019-01-23

## 2019-01-23 NOTE — TELEPHONE ENCOUNTER
----- Message from Jessica Neal MA sent at 1/23/2019 11:13 AM CST -----  Contact: Self  Patient is off work today due to shoulder hurting. HR is requesting a doctor's note in order for her to take off today. She states she can come pick the note up.    Call Back# [phone]  Thanks

## 2019-02-19 ENCOUNTER — TELEPHONE (OUTPATIENT)
Dept: ORTHOPEDICS | Facility: CLINIC | Age: 49
End: 2019-02-19

## 2019-02-19 NOTE — TELEPHONE ENCOUNTER
----- Message from Lev Gresham MD sent at 2/18/2019  5:40 PM CST -----  Contact: patient  Yes  ----- Message -----  From: Remedios Alvarado LPN  Sent: 2/18/2019   8:14 AM  To: Lev Gresham MD     What would you like us to do?    Remedios  ----- Message -----  From: Albert Allan  Sent: 2/18/2019   7:30 AM  To: Marga Ohara Staff    Type: Needs Medical Advice    Who Called:  patient  Symptoms (please be specific):    How long has patient had these symptoms:    Pharmacy name and phone #:    Best Call Back Number: [phone]  Additional Information: requesting excuse from work note for 02/16/19, and 02/17/19, and with restrictions for three more day to lift or pull nothing more than 10lbs,pain in right arm.stated will  note at the office. Call back when note is ready

## 2019-03-04 ENCOUNTER — DOCUMENTATION ONLY (OUTPATIENT)
Dept: FAMILY MEDICINE | Facility: CLINIC | Age: 49
End: 2019-03-04

## 2019-04-05 ENCOUNTER — TELEPHONE (OUTPATIENT)
Dept: ORTHOPEDICS | Facility: CLINIC | Age: 49
End: 2019-04-05

## 2019-04-05 NOTE — TELEPHONE ENCOUNTER
Returned patients call. Advised work note available for pickup at the . Pt verbalized understanding.

## 2019-04-05 NOTE — TELEPHONE ENCOUNTER
----- Message from Roberta Pascual sent at 4/5/2019  1:34 PM CDT -----  Contact: patient  Type: Needs Medical Advice    Who Called:  patient  Best Call Back Number: [phone]  Additional Information: wanted to know if she could  her paperwork today. Please give call back

## 2019-04-10 ENCOUNTER — TELEPHONE (OUTPATIENT)
Dept: FAMILY MEDICINE | Facility: CLINIC | Age: 49
End: 2019-04-10

## 2019-04-10 NOTE — TELEPHONE ENCOUNTER
----- Message from Tip Sharma sent at 4/10/2019  2:08 PM CDT -----  Type:  Patient Call Back    Who Called:  pt   Does the patient know what this is regarding?: pt wants to know does she need to bring in a stool culture? Pt may have food poisoning/ stomach flu   Best Call Back Number: [phone]  Additional Information:  Please call pt and leave a detailed message if there is no answer.

## 2019-04-10 NOTE — TELEPHONE ENCOUNTER
Spoke with patient about bringing in a stool specimen tomorrow. I advised her that she needs to be evaluated to see if that is even necessary. If it is we will give her the equipment to collect it, but that it may not even be necessary. Patient verbalized understanding and asked what time her appt was tomorrow. Appt. time given.

## 2019-04-11 ENCOUNTER — OFFICE VISIT (OUTPATIENT)
Dept: FAMILY MEDICINE | Facility: CLINIC | Age: 49
End: 2019-04-11
Payer: COMMERCIAL

## 2019-04-11 VITALS
HEART RATE: 78 BPM | TEMPERATURE: 99 F | BODY MASS INDEX: 25.82 KG/M2 | SYSTOLIC BLOOD PRESSURE: 153 MMHG | WEIGHT: 145.75 LBS | DIASTOLIC BLOOD PRESSURE: 100 MMHG | OXYGEN SATURATION: 98 % | HEIGHT: 63 IN

## 2019-04-11 DIAGNOSIS — K52.9 GASTROENTERITIS: Primary | ICD-10-CM

## 2019-04-11 DIAGNOSIS — I10 ESSENTIAL HYPERTENSION: ICD-10-CM

## 2019-04-11 DIAGNOSIS — K59.00 CONSTIPATION, UNSPECIFIED CONSTIPATION TYPE: ICD-10-CM

## 2019-04-11 PROCEDURE — 99999 PR PBB SHADOW E&M-EST. PATIENT-LVL III: CPT | Mod: PBBFAC,,, | Performed by: NURSE PRACTITIONER

## 2019-04-11 PROCEDURE — 3077F PR MOST RECENT SYSTOLIC BLOOD PRESSURE >= 140 MM HG: ICD-10-PCS | Mod: CPTII,S$GLB,, | Performed by: NURSE PRACTITIONER

## 2019-04-11 PROCEDURE — 3008F BODY MASS INDEX DOCD: CPT | Mod: CPTII,S$GLB,, | Performed by: NURSE PRACTITIONER

## 2019-04-11 PROCEDURE — 99999 PR PBB SHADOW E&M-EST. PATIENT-LVL III: ICD-10-PCS | Mod: PBBFAC,,, | Performed by: NURSE PRACTITIONER

## 2019-04-11 PROCEDURE — 3080F DIAST BP >= 90 MM HG: CPT | Mod: CPTII,S$GLB,, | Performed by: NURSE PRACTITIONER

## 2019-04-11 PROCEDURE — 99213 PR OFFICE/OUTPT VISIT, EST, LEVL III, 20-29 MIN: ICD-10-PCS | Mod: S$GLB,,, | Performed by: NURSE PRACTITIONER

## 2019-04-11 PROCEDURE — 3077F SYST BP >= 140 MM HG: CPT | Mod: CPTII,S$GLB,, | Performed by: NURSE PRACTITIONER

## 2019-04-11 PROCEDURE — 3008F PR BODY MASS INDEX (BMI) DOCUMENTED: ICD-10-PCS | Mod: CPTII,S$GLB,, | Performed by: NURSE PRACTITIONER

## 2019-04-11 PROCEDURE — 99213 OFFICE O/P EST LOW 20 MIN: CPT | Mod: S$GLB,,, | Performed by: NURSE PRACTITIONER

## 2019-04-11 PROCEDURE — 3080F PR MOST RECENT DIASTOLIC BLOOD PRESSURE >= 90 MM HG: ICD-10-PCS | Mod: CPTII,S$GLB,, | Performed by: NURSE PRACTITIONER

## 2019-04-11 RX ORDER — DICLOFENAC SODIUM 20 MG/G
SOLUTION TOPICAL
Refills: 3 | COMMUNITY
Start: 2019-03-29 | End: 2019-05-06 | Stop reason: SDUPTHER

## 2019-04-11 NOTE — PROGRESS NOTES
Subjective:       Patient ID: Jacqui Cruz is a 48 y.o. female.    Chief Complaint: Diarrhea (vomiting)    Ms. Cruz presents today with N/V/D that she believes is related to food poisoning. Had menstrual cycle last week, thought this was the cause of abdominal cramping. Cycle ended Monday. Cramping persisted and she took imodium. Had fried chicken from the gas station in the middle of the night on Tuesday. Yesterday evening, she stood up and she felt like she was going to pass out, became pale and clammy. Started to vomit and diarrhea. Had hard stool prior to diarrhea, the straining made her feel like she was going to pass out. After she was able to pass the stool, this resolved. Only one episode of diarrhea and vomiting. Cool air and a cold towel made her feel better. Weight stable- 5 pound weight gain since last visit. Denies fever. Did not take medicine or eat anything this morning because she was scared. BP is elevated- did not take medication.    Patient Active Problem List   Diagnosis    Microcalcifications of the breast    Mild persistent asthma without complication    Strain of tendon of right rotator cuff    Essential hypertension     Review of Systems   Constitutional: Positive for appetite change, chills and fatigue. Negative for fever.   Gastrointestinal: Positive for abdominal pain, constipation, diarrhea, nausea and vomiting. Negative for abdominal distention and blood in stool.       Objective:      Physical Exam   Constitutional: She is oriented to person, place, and time. She appears well-developed and well-nourished.   HENT:   Head: Normocephalic and atraumatic.   Cardiovascular: Normal rate, regular rhythm and normal heart sounds.   Pulmonary/Chest: Effort normal and breath sounds normal.   Abdominal: Soft. Bowel sounds are normal. There is no tenderness.   Musculoskeletal: She exhibits no edema.   Neurological: She is alert and oriented to person, place, and time.   Skin: Skin is warm  and dry.   Psychiatric: She has a normal mood and affect.   Nursing note and vitals reviewed.      Assessment:       1. Gastroenteritis    2. Constipation, unspecified constipation type    3. Essential hypertension        Plan:       Jacqui was seen today for diarrhea.    Diagnoses and all orders for this visit:    Gastroenteritis  -     CBC auto differential; Future  -     Comprehensive metabolic panel; Future  Discussed BRAT diet and OTCs. Printed material provided  Note for work  I counseled the patient to increase intake of electrolyte rich fluids.  I advised the patient to avoid dairy products, greasy, high fiber, caffeinated and sugary foods while acutely ill and eat a bland diet.  Probiotics otc may be helpful.  If prescribed, the patient should take anti-emetics as needed as directed for nausea and vomiting.  The patient was warned to return to clinic or go to the emergency room if fever > 100.4, bloody or black tarry foods, severe abdominal pain, lethargy, dehydration, or if symptoms last longer than 10 days.    Constipation, unspecified constipation type  Patient was counseled that these lifestyle changes can help prevent constipation:  · Diet. Eat a high-fiber diet, with fresh fruit and vegetables, and reduce dairy intake, meats, and processed foods.  An over the counter fiber supplement is recommended such as Fiberchoice chewables or Metamucil.  · Fluids. It's important to get enough fluids each day. Drink plenty of water when you eat more fiber. If you are on diet that limits the amount of fluid you can have, talk about this with your health care provider.  · Regular exercise. Check with your health care provider first.  I also advised use of over the counter stool softeners like docusate as needed for persistent constipation.  OTC probiotics may also be of benefit like Align to help regulation.  If acutely constipated the patient was advised to use otc fleets enema(s) and/or magnesium citrate to  relieve symptoms.      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

## 2019-04-11 NOTE — LETTER
April 11, 2019      [facility]  [address] [place] [address]  Phone: [phone]  Fax: [phone]       Patient: Jacqui Cruz   YOB: 1970  Date of Visit: 04/11/2019    To Whom It May Concern:    Jh Cruz  was at [facility] on 04/11/2019. If you have any questions or concerns, or if I can be of further assistance, please do not hesitate to contact me.    Sincerely,    Cassidy Sam NP

## 2019-04-11 NOTE — PATIENT INSTRUCTIONS
Constipation (Adult)  Constipation means that you have bowel movements that are less frequent than usual. Stools often become very hard and difficult to pass.  Constipation is very common. At some point in life it affects almost everyone. Since everyone's bowel habits are different, what is constipation to one person may not be to another. Your healthcare provider may do tests to diagnose constipation. It depends on what he or she finds when evaluating you.    Symptoms of constipation include:  · Abdominal pain  · Bloating  · Vomiting  · Painful bowel movements  · Itching, swelling, bleeding, or pain around the anus  Causes  Constipation can have many causes. These include:  · Diet low in fiber  · Too much dairy  · Not drinking enough liquids  · Lack of exercise or physical activity. This is especially true for older adults.  · Changes in lifestyle or daily routine, including pregnancy, aging, work, and travel  · Frequent use or misuse of laxatives  · Ignoring the urge to have a bowel movement or delaying it until later  · Medicines, such as certain prescription pain medicines, iron supplements, antacids, certain antidepressants, and calcium supplements  · Diseases like irritable bowel syndrome, bowel obstructions, stroke, diabetes, thyroid disease, Parkinson disease, hemorrhoids, and colon cancer  Complications  Potential complications of constipation can include:  · Hemorrhoids  · Rectal bleeding from hemorrhoids or anal fissures (skin tears)  · Hernias  · Dependency on laxatives  · Chronic constipation  · Fecal impaction  · Bowel obstruction or perforation  Home care  All treatment should be done after talking with your healthcare provider. This is especially true if you have another medical problems, are taking prescription medicines, or are an older adult. Treatment most often involves lifestyle changes. You may also need medicines. Your healthcare provider will tell you which will work best for you. Follow  the advice below to help avoid this problem in the future.  Lifestyle changes  These lifestyle changes can help prevent constipation:  · Diet. Eat a high-fiber diet, with fresh fruit and vegetables, and reduce dairy intake, meats, and processed foods  · Fluids. It's important to get enough fluids each day. Drink plenty of water when you eat more fiber. If you are on diet that limits the amount of fluid you can have, talk about this with your healthcare provider.  · Regular exercise. Check with your healthcare provider first.  Medications  Take any medicines as directed. Some laxatives are safe to use only every now and then. Others can be taken on a regular basis. Talk with your doctor or pharmacist if you have questions.  Prescription pain medicines can cause constipation. If you are taking this kind of medicine, ask your healthcare provider if you should also take a stool softener.  Medicines you may take to treat constipation include:  · Fiber supplements  · Stool softeners  · Laxatives  · Enemas  · Rectal suppositories  Follow-up care  Follow up with your healthcare provider if symptoms don't get better in the next few days. You may need to have more tests or see a specialist.  Call 911  Call 911 if any of these occur:  · Trouble breathing  · Stiff, rigid abdomen that is severely painful to touch  · Confusion  · Fainting or loss of consciousness  · Rapid heart rate  · Chest pain  When to seek medical advice  Call your healthcare provider right away if any of these occur:  · Fever over 100.4°F (38°C)  · Failure to resume normal bowel movements  · Pain in your abdomen or back gets worse  · Nausea or vomiting  · Swelling in your abdomen  · Blood in the stool  · Black, tarry stool  · Involuntary weight loss  · Weakness  Date Last Reviewed: 12/30/2015  © 5966-7662 [employer]. [address] [place] [address], [place] [address]. All rights reserved. This information is not intended as a substitute for  "professional medical care. Always follow your healthcare professional's instructions.        [place] Diet  Your healthcare provider may recommend a bland diet if you have an upset stomach. It consists of foods that are mild and easy to digest. It is better to eat small frequent meals rather than 3 large meals a day.    Beverages  OK: Fruit juices, non-caffeinated teas and coffee, non-carbonated brown  Avoid: Carbonated beverage, caffeinated tea and coffee, all alcoholic beverages  Bread  OK: Refined white, wheat or rye bread, patricio or soda crackers, Leia toast, plain rolls, bagels  Avoid: Whole-grain bread  Cereal  OK: Refined cereals: cooked or ready to eat  Avoid: Whole-grain cereals and granola, or those containing bran, seeds or nuts  Desserts  OK: Peanut butter and all others except those to "avoid"  Avoid: Chocolate, cocoa, coconut, popcorn, nuts, seeds, jam, marmalade  Fruits  OK: Canned, cooked, frozen or fresh fruits without seeds or tough skin  Avoid: Olives, skin and seeds of fruit  Meats  OK: All fresh or preserved meat, fish and fowl  Avoid: Any that are prepared with those spices to "avoid"  Cheese and eggs  OK: Eggs, cottage cheese, cream cheese, other cheeses  Avoid: All cheeses made with those spices to "avoid"  Potatoes and pasta  OK: Potato, rice, macaroni, noodles, spaghetti  Avoid: None  Soups  OK: All soups without heavy seasoning  Avoid: Soups made with those spices to "avoid"  Vegetables  OK: Canned, cooked, fresh or frozen mildly flavored vegetables without seeds, skins or coarse fiber  Avoid: Vegetables prepared with those spices to "avoid"; skin and seeds of vegetables and those with coarse fiber  Spices  OK: Salt, lemon and lime juice, vinegar, all extracts, jacquie, cinnamon, thyme, mace, allspice, paprika  Avoid: Chili powder, cloves, pepper, seed spices, garlic, gravy pickles, highly seasoned salad dressings  Date Last Reviewed: 11/20/2015  © 5406-4108 [employer] [address] [place] [address]. All rights reserved. This information is not intended as a substitute for professional medical care. Always follow your healthcare professional's instructions.

## 2019-05-06 ENCOUNTER — PATIENT MESSAGE (OUTPATIENT)
Dept: ORTHOPEDICS | Facility: CLINIC | Age: 49
End: 2019-05-06

## 2019-05-06 RX ORDER — DICLOFENAC SODIUM 20 MG/G
SOLUTION TOPICAL
Qty: 112 G | Refills: 0 | Status: SHIPPED | OUTPATIENT
Start: 2019-05-06 | End: 2020-01-24

## 2019-05-06 NOTE — TELEPHONE ENCOUNTER
----- Message from Milena Patel MA sent at 5/6/2019  2:23 PM CDT -----  Contact: cyndi   Refill   PENNSAID 20 mg/gram /actuation(2 %) [facility]     Pharmacy  [facility]   Call back  [phone]

## 2019-05-29 ENCOUNTER — OFFICE VISIT (OUTPATIENT)
Dept: ORTHOPEDICS | Facility: CLINIC | Age: 49
End: 2019-05-29
Payer: COMMERCIAL

## 2019-05-29 ENCOUNTER — TELEPHONE (OUTPATIENT)
Dept: ORTHOPEDICS | Facility: CLINIC | Age: 49
End: 2019-05-29

## 2019-05-29 VITALS — BODY MASS INDEX: 25.82 KG/M2 | RESPIRATION RATE: 20 BRPM | WEIGHT: 145.75 LBS | HEIGHT: 63 IN

## 2019-05-29 DIAGNOSIS — G89.29 CHRONIC RIGHT SHOULDER PAIN: Primary | ICD-10-CM

## 2019-05-29 DIAGNOSIS — M25.511 CHRONIC RIGHT SHOULDER PAIN: Primary | ICD-10-CM

## 2019-05-29 PROCEDURE — 99213 OFFICE O/P EST LOW 20 MIN: CPT | Mod: 25,S$GLB,, | Performed by: ORTHOPAEDIC SURGERY

## 2019-05-29 PROCEDURE — 3008F PR BODY MASS INDEX (BMI) DOCUMENTED: ICD-10-PCS | Mod: CPTII,S$GLB,, | Performed by: ORTHOPAEDIC SURGERY

## 2019-05-29 PROCEDURE — 20610 LARGE JOINT ASPIRATION/INJECTION: R SUBACROMIAL BURSA: ICD-10-PCS | Mod: RT,S$GLB,, | Performed by: ORTHOPAEDIC SURGERY

## 2019-05-29 PROCEDURE — 99999 PR PBB SHADOW E&M-EST. PATIENT-LVL III: CPT | Mod: PBBFAC,,, | Performed by: ORTHOPAEDIC SURGERY

## 2019-05-29 PROCEDURE — 99999 PR PBB SHADOW E&M-EST. PATIENT-LVL III: ICD-10-PCS | Mod: PBBFAC,,, | Performed by: ORTHOPAEDIC SURGERY

## 2019-05-29 PROCEDURE — 3008F BODY MASS INDEX DOCD: CPT | Mod: CPTII,S$GLB,, | Performed by: ORTHOPAEDIC SURGERY

## 2019-05-29 PROCEDURE — 20610 DRAIN/INJ JOINT/BURSA W/O US: CPT | Mod: RT,S$GLB,, | Performed by: ORTHOPAEDIC SURGERY

## 2019-05-29 PROCEDURE — 99213 PR OFFICE/OUTPT VISIT, EST, LEVL III, 20-29 MIN: ICD-10-PCS | Mod: 25,S$GLB,, | Performed by: ORTHOPAEDIC SURGERY

## 2019-05-29 RX ORDER — TRIAMCINOLONE ACETONIDE 40 MG/ML
40 INJECTION, SUSPENSION INTRA-ARTICULAR; INTRAMUSCULAR
Status: DISCONTINUED | OUTPATIENT
Start: 2019-05-29 | End: 2019-05-29 | Stop reason: HOSPADM

## 2019-05-29 RX ORDER — IBUPROFEN 800 MG/1
800 TABLET ORAL 3 TIMES DAILY
Qty: 90 TABLET | Refills: 0 | Status: SHIPPED | OUTPATIENT
Start: 2019-05-29 | End: 2020-06-22 | Stop reason: SDUPTHER

## 2019-05-29 RX ADMIN — TRIAMCINOLONE ACETONIDE 40 MG: 40 INJECTION, SUSPENSION INTRA-ARTICULAR; INTRAMUSCULAR at 03:05

## 2019-05-29 NOTE — PROGRESS NOTES
Past Medical History:   Diagnosis Date    Asthma     Hypertension        Past Surgical History:   Procedure Laterality Date    BREAST SURGERY      biopsy       Current Outpatient Medications   Medication Sig    albuterol (ACCUNEB) 1.25 mg/3 mL Nebu Take 3 mLs (1.25 mg total) by nebulization every 6 (six) hours as needed.    ibuprofen (ADVIL,MOTRIN) 800 MG tablet Take 1 tablet (800 mg total) by mouth 3 (three) times daily.    lisinopril 10 MG tablet     loratadine (CLARITIN) 10 mg tablet Take 1 tablet (10 mg total) by mouth once daily.    methylPREDNISolone (MEDROL DOSEPACK) 4 mg tablet use as directed    PENNSAID 20 mg/gram /actuation(2 %) sopm APPLY 2 PUMPS TO THE AFFECTED AREA TWICE DAILY    sodium chloride (SALINE NASAL MIST) 3 % Mist 1 spray by Nasal route every 4 (four) hours as needed.    tiZANidine (ZANAFLEX) 2 MG tablet     beclomethasone (QVAR) 40 mcg/actuation Aero Inhale 1 puff into the lungs 2 (two) times daily.    losartan-hydrochlorothiazide 50-12.5 mg (HYZAAR) 50-12.5 mg per tablet Take 1 tablet by mouth once daily.     No current facility-administered medications for this visit.        Review of patient's allergies indicates:   Allergen Reactions    Doxycycline hyclate Itching       Family History   Problem Relation Age of Onset    Hypertension Mother     Cancer Maternal Aunt         cervical       Social History     Socioeconomic History    Marital status:      Spouse name: Not on file    Number of children: Not on file    Years of education: Not on file    Highest education level: Not on file   Occupational History    Not on file   Social Needs    Financial resource strain: Not on file    Food insecurity:     Worry: Not on file     Inability: Not on file    Transportation needs:     Medical: Not on file     Non-medical: Not on file   Tobacco Use    Smoking status: Never Smoker    Smokeless tobacco: Never Used   Substance and Sexual Activity    Alcohol use: No     Drug use: No    Sexual activity: Not Currently     Partners: Male     Birth control/protection: None   Lifestyle    Physical activity:     Days per week: Not on file     Minutes per session: Not on file    Stress: Not on file   Relationships    Social connections:     Talks on phone: Not on file     Gets together: Not on file     Attends Confucianism service: Not on file     Active member of club or organization: Not on file     Attends meetings of clubs or organizations: Not on file     Relationship status: Not on file   Other Topics Concern    Not on file   Social History Narrative    Not on file       Chief Complaint:   Chief Complaint   Patient presents with    Right Shoulder - Pain       Consulting Physician: No ref. provider found    History of present illness: This is a 46-year-old woman who has a history right shoulder pain associated with overuse.  Treated with injections and exercises and she reports she was doing well up until 2 weeks ago.  She is sore and painful again.  Puts her pain is much as an 8 out of 10 and states it's worse with activities and overhead use.  No new injury    Review of Systems:    Constitution: Denies chills, fever, and sweats.    HENT: Denies headaches or blurry vision.    Cardiovascular: Denies chest pain or irregular heart beat.    Respiratory: Denies cough or shortness of breath.    Gastrointestinal: Denies abdominal pain, nausea, or vomiting.    Musculoskeletal:  Denies muscle cramps.    Neurological: Denies dizziness or focal weakness.    Psychiatric/Behavioral: Normal mental status.    Hematologic/Lymphatic: Denies bleeding problem or easy bruising/bleeding.    Skin: Denies rash or suspicious lesions.      Examination:    Vital Signs:    Vitals:    05/29/19 1530   Resp: 20       Body mass index is 25.81 kg/m².    This a well-developed, well nourished patient in no acute distress.    Alert and oriented and cooperative to examination.       Physical Exam: Right Shoulder  Exam     Skin  Rash:   None  Scars:   None    Inspection/Observation  Swelling:   None  Erythema:   None  Bruising:   None  Wounds:   None  Scapular Winging:  None  Scapular Dyskinesia:  None  Atrophy:   None  Masses:   None  Lymphadenopathy: None    Tenderness   AC Joint:   None    Range of Motion   Active Forward Flexion:  180  w pain  Active External Rotation:  45  Active Internal Rotation: Low Back w pain    Muscle Strength   Forward Flexion:  5/5  External Rotation: 5/5  Internal Rotation:  5/5    Instability:  None    Tests & Signs   Cross Arm:   Negative    Other   Sensation:  Normal  Pulse:    2+ radial          Imaging: X-rays of the right shoulder show no acute bony abnormality, fracture or dislocation.     Assessment: Right shoulder bursitis    Plan:  She is using ibuprofen and Pennsaid with good results.  Will give her an injection today.  Will also give her restricted duty for the next 2 weeks with no lifting pushing or pulling with a 5 lb.    DISCLAIMER: This note may have been dictated using voice recognition software and may contain grammatical errors.     NOTE: Consult report sent to referring provider via [employer].

## 2019-05-29 NOTE — TELEPHONE ENCOUNTER
----- Message from Milena Patel MA sent at 5/29/2019  1:53 PM CDT -----  Contact: cyndi   Took off of work yesterday and today, wants to know if Dr Gresham can inject R arm   Call back      Would not make appt without talking to nurse

## 2019-05-29 NOTE — PROCEDURES
Large Joint Aspiration/Injection: R subacromial bursa  Date/Time: 5/29/2019 3:47 PM  Performed by: Lev Gresham MD  Authorized by: Lev Gresham MD     Consent Done?:  Yes (Verbal)  Indications:  Pain  Timeout: Prior to procedure the correct patient, procedure, and site was verified      Location:  Shoulder  Site:  R subacromial bursa  Prep: Patient was prepped and draped in usual sterile fashion    Approach:  Lateral  Medications:  40 mg triamcinolone acetonide 40 mg/mL

## 2019-05-30 ENCOUNTER — TELEPHONE (OUTPATIENT)
Dept: ORTHOPEDICS | Facility: CLINIC | Age: 49
End: 2019-05-30

## 2019-05-30 NOTE — TELEPHONE ENCOUNTER
----- Message from Milena Patel MA sent at 5/30/2019  3:32 PM CDT -----  Contact: cyndi   Wants another day off,   Needs to change Dr note to employer   Call back

## 2019-05-30 NOTE — TELEPHONE ENCOUNTER
Spoke to patient. She was seen yesterday for her shoulder and still having a little pain. Would like work note for today. Advised work note will be available for pickup at the  at her convenience. Verbalized understanding.

## 2019-06-21 ENCOUNTER — TELEPHONE (OUTPATIENT)
Dept: ORTHOPEDICS | Facility: CLINIC | Age: 49
End: 2019-06-21

## 2019-06-21 NOTE — TELEPHONE ENCOUNTER
----- Message from Milena Patel MA sent at 6/21/2019  3:30 PM CDT -----  Contact: cyndi   Wants work excuse for today, arm hurting   Call back

## 2019-06-21 NOTE — TELEPHONE ENCOUNTER
Returned pt's call, advised that Dr. Gresham is on vacation and we will not be able give her an excuse.  She verbalized understanding.

## 2019-06-29 ENCOUNTER — CLINICAL SUPPORT (OUTPATIENT)
Dept: URGENT CARE | Facility: CLINIC | Age: 49
End: 2019-06-29
Payer: COMMERCIAL

## 2019-06-29 VITALS
HEART RATE: 70 BPM | RESPIRATION RATE: 16 BRPM | SYSTOLIC BLOOD PRESSURE: 161 MMHG | OXYGEN SATURATION: 98 % | WEIGHT: 150 LBS | BODY MASS INDEX: 26.57 KG/M2 | TEMPERATURE: 98 F | DIASTOLIC BLOOD PRESSURE: 95 MMHG

## 2019-06-29 DIAGNOSIS — J01.90 ACUTE NON-RECURRENT SINUSITIS, UNSPECIFIED LOCATION: Primary | ICD-10-CM

## 2019-06-29 PROCEDURE — 99204 PR OFFICE/OUTPT VISIT, NEW, LEVL IV, 45-59 MIN: ICD-10-PCS | Mod: S$GLB,,, | Performed by: NURSE PRACTITIONER

## 2019-06-29 PROCEDURE — 99204 OFFICE O/P NEW MOD 45 MIN: CPT | Mod: S$GLB,,, | Performed by: NURSE PRACTITIONER

## 2019-06-29 RX ORDER — FLUTICASONE PROPIONATE 50 MCG
2 SPRAY, SUSPENSION (ML) NASAL 2 TIMES DAILY
Qty: 1 BOTTLE | Refills: 0 | Status: SHIPPED | OUTPATIENT
Start: 2019-06-29 | End: 2019-06-29

## 2019-06-29 RX ORDER — CETIRIZINE HYDROCHLORIDE 10 MG/1
10 TABLET ORAL DAILY
Qty: 30 TABLET | Refills: 2 | Status: SHIPPED | OUTPATIENT
Start: 2019-06-29 | End: 2021-04-30

## 2019-06-29 RX ORDER — PREDNISONE 20 MG/1
20 TABLET ORAL 2 TIMES DAILY
Qty: 10 TABLET | Refills: 0 | Status: SHIPPED | OUTPATIENT
Start: 2019-06-29 | End: 2019-07-04

## 2019-06-29 RX ORDER — AMOXICILLIN 875 MG/1
875 TABLET, FILM COATED ORAL 2 TIMES DAILY
Qty: 20 TABLET | Refills: 0 | Status: SHIPPED | OUTPATIENT
Start: 2019-06-29 | End: 2019-07-09

## 2019-06-29 RX ORDER — BROMPHENIRAMINE MALEATE, PSEUDOEPHEDRINE HYDROCHLORIDE, AND DEXTROMETHORPHAN HYDROBROMIDE 2; 30; 10 MG/5ML; MG/5ML; MG/5ML
5 SYRUP ORAL EVERY 4 HOURS PRN
Qty: 240 ML | Refills: 0 | Status: SHIPPED | OUTPATIENT
Start: 2019-06-29 | End: 2019-07-09

## 2019-06-29 NOTE — LETTER
June 29, 2019      [facility]  [address]  [place] [address]  Phone: [phone]       Patient: Jacqui Cruz   YOB: 1970  Date of Visit: 06/29/2019    To Whom It May Concern:    Jh Cruz  was at [facility] on 06/29/2019. She may return to work/school on 6/30/19 with restrictions. Please allow bathroom breaks as needed due to side effects of medications. If you have any questions or concerns, or if I can be of further assistance, please do not hesitate to contact me.    Sincerely,    AMADEO Coreas

## 2019-06-29 NOTE — PROGRESS NOTES
Subjective: sinus pressure, right ear pain, stuffy nose, dizziness,        Patient ID: Jacqui Cruz is a 49 y.o. female.    Vitals:  weight is 68 kg (150 lb). Her temperature is 97.8 °F (36.6 °C). Her blood pressure is 161/95 (abnormal) and her pulse is 70. Her respiration is 16 and oxygen saturation is 98%.     Chief Complaint: Sinus Problem (sinus pressure, right ear pain, stuffy nose)    Sinus Problem   This is a new problem. The current episode started in the past 7 days. Associated symptoms include congestion, coughing, ear pain, headaches, sinus pressure and a sore throat. Pertinent negatives include no chills or shortness of breath.       Constitution: Positive for fatigue. Negative for chills and fever.   HENT: Positive for ear pain, congestion, postnasal drip, sinus pain, sinus pressure and sore throat.    Neck: Negative for painful lymph nodes.   Cardiovascular: Negative for chest pain and leg swelling.   Eyes: Negative for double vision and blurred vision.   Respiratory: Positive for cough. Negative for shortness of breath.    Gastrointestinal: Negative for nausea, vomiting and diarrhea.   Genitourinary: Negative for dysuria, frequency, urgency and history of kidney stones.   Musculoskeletal: Negative for joint pain, joint swelling, muscle cramps and muscle ache.   Skin: Negative for color change, pale, rash and bruising.   Allergic/Immunologic: Negative for seasonal allergies.   Neurological: Positive for headaches. Negative for dizziness, history of vertigo, light-headedness and passing out.   Hematologic/Lymphatic: Negative for swollen lymph nodes.   Psychiatric/Behavioral: Negative for nervous/anxious, sleep disturbance and depression. The patient is not nervous/anxious.        Objective:      Physical Exam   Constitutional: She is oriented to person, place, and time. Vital signs are normal. She appears well-developed and well-nourished. She is cooperative.   HENT:   Head: Normocephalic.   Right  Ear: Hearing, external ear and ear canal normal. A middle ear effusion is present.   Left Ear: Hearing, external ear and ear canal normal. A middle ear effusion is present.   Nose: Mucosal edema and rhinorrhea present. Right sinus exhibits maxillary sinus tenderness. Left sinus exhibits maxillary sinus tenderness.   Mouth/Throat: Uvula is midline and mucous membranes are normal. Posterior oropharyngeal erythema present.   Eyes: Pupils are equal, round, and reactive to light. Conjunctivae, EOM and lids are normal.   Neck: Trachea normal, normal range of motion, full passive range of motion without pain and phonation normal. Neck supple.   Cardiovascular: Normal rate, regular rhythm, normal heart sounds, intact distal pulses and normal pulses.   Pulmonary/Chest: Effort normal and breath sounds normal.   Abdominal: Soft. Normal appearance, normal aorta and bowel sounds are normal. There is no tenderness.   Musculoskeletal: Normal range of motion.   Neurological: She is alert and oriented to person, place, and time. She has normal strength. GCS eye subscore is 4. GCS verbal subscore is 5. GCS motor subscore is 6.   Skin: Skin is warm, dry and intact. Capillary refill takes less than 2 seconds.   Psychiatric: She has a normal mood and affect. Her speech is normal and behavior is normal. Judgment and thought content normal. Cognition and memory are normal.       Assessment:       1. Acute non-recurrent sinusitis, unspecified location        Plan:         Acute non-recurrent sinusitis, unspecified location    Other orders  -     brompheniramine-pseudoeph-DM (BROMFED DM) 2-30-10 mg/5 mL Syrp; Take 5 mLs by mouth every 4 (four) hours as needed.  Dispense: 240 mL; Refill: 0  -     cetirizine (ZYRTEC) 10 MG tablet; Take 1 tablet (10 mg total) by mouth once daily.  Dispense: 30 tablet; Refill: 2  -     Discontinue: fluticasone propionate (FLONASE) 50 mcg/actuation nasal spray; 2 sprays (100 mcg total) by Each Nare route 2 (two)  times daily.  Dispense: 1 Bottle; Refill: 0  -     predniSONE (DELTASONE) 20 MG tablet; Take 1 tablet (20 mg total) by mouth 2 (two) times daily. for 5 days  Dispense: 10 tablet; Refill: 0  -     amoxicillin (AMOXIL) 875 MG tablet; Take 1 tablet (875 mg total) by mouth 2 (two) times daily. for 10 days  Dispense: 20 tablet; Refill: 0  -     fluticasone (FLONASE SENSIMIST) 27.5 mcg/actuation nasal spray; 2 sprays by Nasal route once daily.  Dispense: 15.8 mL; Refill: 0

## 2019-07-01 DIAGNOSIS — Z12.11 COLON CANCER SCREENING: ICD-10-CM

## 2019-07-05 ENCOUNTER — HOSPITAL ENCOUNTER (OUTPATIENT)
Dept: RADIOLOGY | Facility: OTHER | Age: 49
Discharge: HOME | End: 2019-07-05
Attending: NURSE PRACTITIONER
Payer: COMMERCIAL

## 2019-07-05 ENCOUNTER — OFFICE VISIT (OUTPATIENT)
Dept: OBSTETRICS AND GYNECOLOGY | Facility: CLINIC | Age: 49
End: 2019-07-05
Payer: COMMERCIAL

## 2019-07-05 ENCOUNTER — PATIENT OUTREACH (OUTPATIENT)
Dept: ADMINISTRATIVE | Facility: OTHER | Age: 49
End: 2019-07-05

## 2019-07-05 ENCOUNTER — TELEPHONE (OUTPATIENT)
Dept: FAMILY MEDICINE | Facility: CLINIC | Age: 49
End: 2019-07-05

## 2019-07-05 ENCOUNTER — TELEPHONE (OUTPATIENT)
Dept: OBSTETRICS AND GYNECOLOGY | Facility: CLINIC | Age: 49
End: 2019-07-05

## 2019-07-05 VITALS — SYSTOLIC BLOOD PRESSURE: 128 MMHG | WEIGHT: 145.5 LBS | BODY MASS INDEX: 25.77 KG/M2 | DIASTOLIC BLOOD PRESSURE: 84 MMHG

## 2019-07-05 DIAGNOSIS — Z12.4 PAP SMEAR FOR CERVICAL CANCER SCREENING: ICD-10-CM

## 2019-07-05 DIAGNOSIS — Z01.419 WELL WOMAN EXAM WITH ROUTINE GYNECOLOGICAL EXAM: ICD-10-CM

## 2019-07-05 DIAGNOSIS — N93.8 DYSFUNCTIONAL UTERINE BLEEDING: ICD-10-CM

## 2019-07-05 DIAGNOSIS — Z12.31 VISIT FOR SCREENING MAMMOGRAM: ICD-10-CM

## 2019-07-05 DIAGNOSIS — N95.1 PERIMENOPAUSAL VASOMOTOR SYMPTOMS: Primary | ICD-10-CM

## 2019-07-05 PROCEDURE — 99386 PR PREVENTIVE VISIT,NEW,40-64: ICD-10-PCS | Mod: S$GLB,,, | Performed by: NURSE PRACTITIONER

## 2019-07-05 PROCEDURE — 77067 MAMMO DIGITAL SCREENING BILAT WITH TOMOSYNTHESIS_CAD: ICD-10-PCS | Mod: 26,,, | Performed by: RADIOLOGY

## 2019-07-05 PROCEDURE — 87624 HPV HI-RISK TYP POOLED RSLT: CPT

## 2019-07-05 PROCEDURE — 77067 SCR MAMMO BI INCL CAD: CPT | Mod: 26,,, | Performed by: RADIOLOGY

## 2019-07-05 PROCEDURE — 3074F PR MOST RECENT SYSTOLIC BLOOD PRESSURE < 130 MM HG: ICD-10-PCS | Mod: CPTII,S$GLB,, | Performed by: NURSE PRACTITIONER

## 2019-07-05 PROCEDURE — 99386 PREV VISIT NEW AGE 40-64: CPT | Mod: S$GLB,,, | Performed by: NURSE PRACTITIONER

## 2019-07-05 PROCEDURE — 76856 US EXAM PELVIC COMPLETE: CPT | Mod: 26,,, | Performed by: SPECIALIST

## 2019-07-05 PROCEDURE — 88141 CYTOPATH C/V INTERPRET: CPT | Mod: ,,, | Performed by: PATHOLOGY

## 2019-07-05 PROCEDURE — 76830 TRANSVAGINAL US NON-OB: CPT | Mod: 26,,, | Performed by: SPECIALIST

## 2019-07-05 PROCEDURE — 99999 PR PBB SHADOW E&M-EST. PATIENT-LVL IV: CPT | Mod: PBBFAC,,, | Performed by: NURSE PRACTITIONER

## 2019-07-05 PROCEDURE — 99999 PR PBB SHADOW E&M-EST. PATIENT-LVL IV: ICD-10-PCS | Mod: PBBFAC,,, | Performed by: NURSE PRACTITIONER

## 2019-07-05 PROCEDURE — 76830 US PELVIS COMP WITH TRANSVAG NON-OB (XPD): ICD-10-PCS | Mod: 26,,, | Performed by: SPECIALIST

## 2019-07-05 PROCEDURE — 88141 LIQUID-BASED PAP SMEAR, SCREENING: ICD-10-PCS | Mod: ,,, | Performed by: PATHOLOGY

## 2019-07-05 PROCEDURE — 3079F DIAST BP 80-89 MM HG: CPT | Mod: CPTII,S$GLB,, | Performed by: NURSE PRACTITIONER

## 2019-07-05 PROCEDURE — 3074F SYST BP LT 130 MM HG: CPT | Mod: CPTII,S$GLB,, | Performed by: NURSE PRACTITIONER

## 2019-07-05 PROCEDURE — 77067 SCR MAMMO BI INCL CAD: CPT | Mod: TC

## 2019-07-05 PROCEDURE — 3079F PR MOST RECENT DIASTOLIC BLOOD PRESSURE 80-89 MM HG: ICD-10-PCS | Mod: CPTII,S$GLB,, | Performed by: NURSE PRACTITIONER

## 2019-07-05 PROCEDURE — 76830 TRANSVAGINAL US NON-OB: CPT | Mod: TC

## 2019-07-05 PROCEDURE — 77063 MAMMO DIGITAL SCREENING BILAT WITH TOMOSYNTHESIS_CAD: ICD-10-PCS | Mod: 26,,, | Performed by: RADIOLOGY

## 2019-07-05 PROCEDURE — 76856 US PELVIS COMP WITH TRANSVAG NON-OB (XPD): ICD-10-PCS | Mod: 26,,, | Performed by: SPECIALIST

## 2019-07-05 PROCEDURE — 88142 CYTOPATH C/V THIN LAYER: CPT | Performed by: PATHOLOGY

## 2019-07-05 PROCEDURE — 77063 BREAST TOMOSYNTHESIS BI: CPT | Mod: 26,,, | Performed by: RADIOLOGY

## 2019-07-05 RX ORDER — FLUTICASONE PROPIONATE 50 MCG
SPRAY, SUSPENSION (ML) NASAL
Refills: 0 | COMMUNITY
Start: 2019-06-29 | End: 2022-03-21

## 2019-07-05 RX ORDER — PREDNISONE 5 MG/1
TABLET ORAL
Refills: 0 | COMMUNITY
Start: 2019-06-29 | End: 2020-06-02

## 2019-07-05 NOTE — TELEPHONE ENCOUNTER
Patient states she is having heavy period with clots for 13 days now. Patient states it is usually 7 days long. She states her legs have been cramping after 13 hours shifts and she has been feeling dizzy. Patient hasn't seen a GYN in a long time. Patient would like a referral to an OBGYN within [facility]. I will try to get her in with a female provider as soon as possible. Please advise.

## 2019-07-05 NOTE — LETTER
July 5, 2019      [facility] [id number]  [address] [address]  Phone: [phone]  Fax: [phone]       Patient: Jacqui Cruz   YOB: 1970  Date of Visit: 07/05/2019    To Whom It May Concern:    Jh Cruz  was at [facility] on 07/05/2019. She may return to work with no restrictions. If you have any questions or concerns, or if I can be of further assistance, please do not hesitate to contact me.    Sincerely,    Antoinette Mason NP

## 2019-07-05 NOTE — TELEPHONE ENCOUNTER
----- Message from Lexus Allen sent at 7/5/2019  9:29 AM CDT -----  Contact: Jacqiu pt  Type: Needs Medical Advice    Who Called:  Jacqui  Symptoms (please be specific):  Her period is still here after 13 days  How long has patient had these symptoms:  13 days  Pharmacy name and phone #:    [facility] - [place], [place] - [address]  [address] [facility] [address]  Phone: [phone] Fax: [phone]    Best Call Back Number: [phone]  Additional Information: Pls call pt regarding her menstrual cycle lasting over 13 days

## 2019-07-05 NOTE — TELEPHONE ENCOUNTER
----- Message from Ann Marie Dick sent at 7/5/2019 11:07 AM CDT -----  Contact: Patient  Type:  Same Day Appointment Request    Caller is requesting a same day appointment.  Caller declined first available appointment listed below.      Name of Caller:  Patient  When is the first available appointment?  7/9  Symptoms:  Ongoing period since 6/20  Best Call Back Number:  [phone] (home)    Additional Information:   New patient to the clinic. Has been having the same bleeding since  6/20/19.

## 2019-07-05 NOTE — PROGRESS NOTES
CC: Annual  HPI: Pt is a 49 y.o.  female who presents for routine annual exam. First time seeing me. Lives in [place] and was unable to find a provider there. Still having monthly cycles. They have started becoming somewhat irregular and vary when it comes down. Current cycle has been prolonged for her and abnormally heavy. Started on June 20 and is still bleeding today. Mother went through menopause in her 60s. + hot flashes mainly at night. No family hx of breast cancer. Personal hx of a left breast biopsy in 2012. She is . Reports low sex drive for years, last SA 7 months ago.    Last pap-2012  Last MMG-2013    ROS:  GENERAL: Feeling well overall. Denies fever or chills.   SKIN: Denies rash or lesions.   HEAD: Denies head injury or headache.   NODES: Denies enlarged lymph nodes.   CHEST: Denies chest pain or shortness of breath.   CARDIOVASCULAR: Denies palpitations or left sided chest pain.   ABDOMEN: No abdominal pain, constipation, diarrhea, nausea, vomiting or rectal bleeding.   URINARY: No dysuria, hematuria, or burning on urination.  REPRODUCTIVE: See HPI.   BREASTS: Denies pain, lumps, or nipple discharge.   HEMATOLOGIC: No easy bruisability or excessive bleeding.   MUSCULOSKELETAL: Denies joint pain or swelling.   NEUROLOGIC: Denies syncope or weakness.   PSYCHIATRIC: Denies depression, anxiety or mood swings.    PE:   APPEARANCE: Well nourished, well developed, Black or  female in no acute distress.  NODES: no cervical, supraclavicular, or inguinal lymphadenopathy  BREASTS: Symmetrical, no skin changes or visible lesions. No palpable masses, nipple discharge or adenopathy bilaterally.  ABDOMEN: Soft. No tenderness or masses. No distention. No hernias palpated. No CVA tenderness.  VULVA: No lesions. Normal external female genitalia.  URETHRAL MEATUS: Normal size and location, no lesions, no prolapse.  URETHRA: No masses, tenderness, or prolapse.  VAGINA: Moist. No lesions or  lacerations noted. No abnormal discharge present. No odor present. SMALL AMOUNT OF MENSTRUAL BLOOD IN VAULT  CERVIX: No lesions or discharge. No cervical motion tenderness.   UTERUS: Normal size, regular shape, mobile, non-tender.  ADNEXA: No tenderness. No fullness or masses palpated in the adnexal regions.   ANUS PERINEUM: Normal.      Diagnosis:  1. Perimenopausal vasomotor symptoms    2. Visit for screening mammogram    3. Well woman exam with routine gynecological exam    4. Pap smear for cervical cancer screening    5. Dysfunctional uterine bleeding        Plan:     Orders Placed This Encounter    HPV High Risk Genotypes, PCR    US Pelvis Comp with Transvag NON-OB (xpd    Mammo Digital Screening Bilat With CAD    Liquid-based pap smear, screening     1. MMG  2. Pelvic u/s 2/2 DUB and reassurance. D/w pt most likely perimenopause and reviewed bleeding precautions.   3. Discussed HRT options for hot flashes, pt declines for now  4. Pap    Patient was counseled today on the new ACS guidelines for cervical cytology screening as well as the current recommendations for breast cancer screening. She was counseled to follow up with her PCP for other routine health maintenance. Counseling session lasted approximately 10 minutes, and all her questions were answered.    Follow-up with me in 1 year for routine exam; pap in 3 years.

## 2019-07-09 ENCOUNTER — TELEPHONE (OUTPATIENT)
Dept: OBSTETRICS AND GYNECOLOGY | Facility: CLINIC | Age: 49
End: 2019-07-09

## 2019-07-09 DIAGNOSIS — N83.201 RIGHT OVARIAN CYST: Primary | ICD-10-CM

## 2019-07-09 NOTE — TELEPHONE ENCOUNTER
Called pt to discuss u/s results. No answer. Left VM for call back. MMG normal, needs f/u pelvic u/s in 6-8 weeks for right ovarian cyst.

## 2019-07-11 LAB
HPV HR 12 DNA CVX QL NAA+PROBE: NEGATIVE
HPV16 AG SPEC QL: NEGATIVE
HPV18 DNA SPEC QL NAA+PROBE: NEGATIVE

## 2019-07-16 ENCOUNTER — TELEPHONE (OUTPATIENT)
Dept: ORTHOPEDICS | Facility: CLINIC | Age: 49
End: 2019-07-16

## 2019-07-16 NOTE — TELEPHONE ENCOUNTER
Returned pt's call, Per Dr. Gresham pt would need to come in for an appointment.  He will not authorize work excuse.

## 2019-07-16 NOTE — TELEPHONE ENCOUNTER
----- Message from Milena Patel MA sent at 7/16/2019  3:03 PM CDT -----  Contact: pt   Needs back to work letter, will return today   Call back

## 2019-07-18 PROBLEM — R87.615 UNSATISFACTORY CERVICAL PAPANICOLAOU SMEAR: Status: ACTIVE | Noted: 2019-07-18

## 2019-07-19 ENCOUNTER — HOSPITAL ENCOUNTER (OUTPATIENT)
Dept: RADIOLOGY | Facility: HOSPITAL | Age: 49
Discharge: HOME | End: 2019-07-19
Attending: ORTHOPAEDIC SURGERY
Payer: COMMERCIAL

## 2019-07-19 ENCOUNTER — TELEPHONE (OUTPATIENT)
Dept: ORTHOPEDICS | Facility: CLINIC | Age: 49
End: 2019-07-19

## 2019-07-19 ENCOUNTER — OFFICE VISIT (OUTPATIENT)
Dept: ORTHOPEDICS | Facility: CLINIC | Age: 49
End: 2019-07-19
Payer: COMMERCIAL

## 2019-07-19 VITALS — BODY MASS INDEX: 25.69 KG/M2 | HEIGHT: 63 IN | WEIGHT: 145 LBS

## 2019-07-19 DIAGNOSIS — M25.569 KNEE PAIN, UNSPECIFIED CHRONICITY, UNSPECIFIED LATERALITY: Primary | ICD-10-CM

## 2019-07-19 DIAGNOSIS — M17.12 ARTHRITIS OF LEFT KNEE: Primary | ICD-10-CM

## 2019-07-19 DIAGNOSIS — M25.569 KNEE PAIN, UNSPECIFIED CHRONICITY, UNSPECIFIED LATERALITY: ICD-10-CM

## 2019-07-19 PROCEDURE — 20610 LARGE JOINT ASPIRATION/INJECTION: L KNEE: ICD-10-PCS | Mod: LT,S$GLB,, | Performed by: ORTHOPAEDIC SURGERY

## 2019-07-19 PROCEDURE — 73564 X-RAY EXAM KNEE 4 OR MORE: CPT | Mod: 26,LT,, | Performed by: RADIOLOGY

## 2019-07-19 PROCEDURE — 20610 DRAIN/INJ JOINT/BURSA W/O US: CPT | Mod: LT,S$GLB,, | Performed by: ORTHOPAEDIC SURGERY

## 2019-07-19 PROCEDURE — 3008F BODY MASS INDEX DOCD: CPT | Mod: CPTII,S$GLB,, | Performed by: ORTHOPAEDIC SURGERY

## 2019-07-19 PROCEDURE — 73564 X-RAY EXAM KNEE 4 OR MORE: CPT | Mod: TC,50,PN

## 2019-07-19 PROCEDURE — 3008F PR BODY MASS INDEX (BMI) DOCUMENTED: ICD-10-PCS | Mod: CPTII,S$GLB,, | Performed by: ORTHOPAEDIC SURGERY

## 2019-07-19 PROCEDURE — 99999 PR PBB SHADOW E&M-EST. PATIENT-LVL II: ICD-10-PCS | Mod: PBBFAC,,, | Performed by: ORTHOPAEDIC SURGERY

## 2019-07-19 PROCEDURE — 99213 PR OFFICE/OUTPT VISIT, EST, LEVL III, 20-29 MIN: ICD-10-PCS | Mod: 25,S$GLB,, | Performed by: ORTHOPAEDIC SURGERY

## 2019-07-19 PROCEDURE — 99999 PR PBB SHADOW E&M-EST. PATIENT-LVL II: CPT | Mod: PBBFAC,,, | Performed by: ORTHOPAEDIC SURGERY

## 2019-07-19 PROCEDURE — 99213 OFFICE O/P EST LOW 20 MIN: CPT | Mod: 25,S$GLB,, | Performed by: ORTHOPAEDIC SURGERY

## 2019-07-19 PROCEDURE — 73564 XR KNEE ORTHO BILAT WITH FLEXION: ICD-10-PCS | Mod: 26,RT,, | Performed by: RADIOLOGY

## 2019-07-19 PROCEDURE — 73564 X-RAY EXAM KNEE 4 OR MORE: CPT | Mod: 26,RT,, | Performed by: RADIOLOGY

## 2019-07-19 RX ADMIN — TRIAMCINOLONE ACETONIDE 40 MG: 40 INJECTION, SUSPENSION INTRA-ARTICULAR; INTRAMUSCULAR at 08:07

## 2019-07-19 NOTE — TELEPHONE ENCOUNTER
----- Message from Alex Garcia sent at 7/19/2019  8:44 AM CDT -----  Contact: pt   Patient wants yo come in today. [phone]

## 2019-07-20 RX ORDER — TRIAMCINOLONE ACETONIDE 40 MG/ML
40 INJECTION, SUSPENSION INTRA-ARTICULAR; INTRAMUSCULAR
Status: DISCONTINUED | OUTPATIENT
Start: 2019-07-19 | End: 2019-07-20 | Stop reason: HOSPADM

## 2019-07-21 NOTE — PROGRESS NOTES
Past Medical History:   Diagnosis Date    Asthma     Hypertension        Past Surgical History:   Procedure Laterality Date    BREAST BIOPSY Left 2012    Benign calcifications    BREAST SURGERY      biopsy    DILATION AND CURETTAGE OF UTERUS         Current Outpatient Medications   Medication Sig    albuterol (ACCUNEB) 1.25 mg/3 mL Nebu Take 3 mLs (1.25 mg total) by nebulization every 6 (six) hours as needed.    cetirizine (ZYRTEC) 10 MG tablet Take 1 tablet (10 mg total) by mouth once daily.    fluticasone (FLONASE SENSIMIST) 27.5 mcg/actuation nasal spray 2 sprays by Nasal route once daily.    fluticasone propionate (FLONASE) 50 mcg/actuation nasal spray USE 2 SPRAY(S) IN EACH NOSTRIL TWICE DAILY    ibuprofen (ADVIL,MOTRIN) 800 MG tablet Take 1 tablet (800 mg total) by mouth 3 (three) times daily.    lisinopril 10 MG tablet     loratadine (CLARITIN) 10 mg tablet Take 1 tablet (10 mg total) by mouth once daily.    methylPREDNISolone (MEDROL DOSEPACK) 4 mg tablet use as directed    PENNSAID 20 mg/gram /actuation(2 %) sopm APPLY 2 PUMPS TO THE AFFECTED AREA TWICE DAILY    predniSONE (DELTASONE) 5 MG tablet TAKE 4 TABLETS BY MOUTH TWICE DAILY FOR 5 DAYS    sodium chloride (SALINE NASAL MIST) 3 % Mist 1 spray by Nasal route every 4 (four) hours as needed.    tiZANidine (ZANAFLEX) 2 MG tablet     beclomethasone (QVAR) 40 mcg/actuation Aero Inhale 1 puff into the lungs 2 (two) times daily.    losartan-hydrochlorothiazide 50-12.5 mg (HYZAAR) 50-12.5 mg per tablet Take 1 tablet by mouth once daily.     No current facility-administered medications for this visit.        Review of patient's allergies indicates:   Allergen Reactions    Omnicef [cefdinir] Diarrhea    Doxycycline hyclate Itching       Family History   Problem Relation Age of Onset    Hypertension Mother     Cancer Maternal Aunt         cervical       Social History     Socioeconomic History    Marital status:      Spouse name: Not  on file    Number of children: Not on file    Years of education: Not on file    Highest education level: Not on file   Occupational History    Not on file   Social Needs    Financial resource strain: Not on file    Food insecurity:     Worry: Not on file     Inability: Not on file    Transportation needs:     Medical: Not on file     Non-medical: Not on file   Tobacco Use    Smoking status: Never Smoker    Smokeless tobacco: Never Used   Substance and Sexual Activity    Alcohol use: No    Drug use: No    Sexual activity: Not Currently     Partners: Male     Birth control/protection: None   Lifestyle    Physical activity:     Days per week: Not on file     Minutes per session: Not on file    Stress: Not on file   Relationships    Social connections:     Talks on phone: Not on file     Gets together: Not on file     Attends Latter-day service: Not on file     Active member of club or organization: Not on file     Attends meetings of clubs or organizations: Not on file     Relationship status: Not on file   Other Topics Concern    Not on file   Social History Narrative    Not on file       Chief Complaint:   Chief Complaint   Patient presents with    Knee Pain     left knee pain        Consulting Physician: No ref. provider found    History of present illness:    This is a 49 y.o. year old female who complains of left knee pain for weeks. Denies injury. Pain 10/10 and worse with use.    Review of Systems:    Constitution: Denies chills, fever, and sweats.  HENT: Denies headaches or blurry vision.  Cardiovascular: Denies chest pain or irregular heart beat.  Respiratory: Denies cough or shortness of breath.  Gastrointestinal: Denies abdominal pain, nausea, or vomiting.  Musculoskeletal:  Denies muscle cramps.  Neurological: Denies dizziness or focal weakness.  Psychiatric/Behavioral: Normal mental status.  Hematologic/Lymphatic: Denies bleeding problem or easy bruising/bleeding.  Skin: Denies rash or  "suspicious lesions.    Examination:    Vital Signs:    Vitals:    07/19/19 0945   Weight: 65.8 kg (145 lb)   Height: 5' 3" (1.6 m)   PainSc: 10-Worst pain ever       Body mass index is 25.69 kg/m².    This a well-developed, well nourished patient in no acute distress.    Alert and oriented x 3 and cooperative to examination.       Physical Exam: Left Knee Exam    Gait   Antalgic    Skin  Rash:   None  Scars:   None    Inspection  Erythema:  None  Bruising:  None  Effusion:  None  Masses:  None  Lymphadenopathy: None    Range of Motion: 0 to 130°    Medial Joint : y  Lateral Joint : y    Patellofemoral Tenderness: Yes  Patellofemoral Crepitus: Yes    Lachman:  Normal  Anterior Drawer: Normal  Posterior Drawer: Normal    Orquidea's:  Negative  Apley's:  Negative    Varus Stress:  Stable  Valgus Stress:  Stable    Strength:  5/5    Pulses:  Palpable  Sensation:  Intact          Imaging: X-rays ordered and images interpreted today personally by me of left knee shows mild DJD.        Assessment: Arthritis of left knee  -     Large Joint Aspiration/Injection: L knee        Plan:  Discussed options and she wants steroid injection. Declined euflexxa. Will restrict work duties for a few days.      DISCLAIMER: This note may have been dictated using voice recognition software and may contain grammatical errors.     NOTE: Consult report sent to referring provider via [employer] E[employer].  "

## 2019-07-21 NOTE — PROCEDURES
Large Joint Aspiration/Injection: L knee  Date/Time: 7/19/2019 8:12 PM  Performed by: Lev Gresham MD  Authorized by: Lev Gresham MD     Consent Done?:  Yes (Verbal)  Indications:  Pain  Timeout: Prior to procedure the correct patient, procedure, and site was verified      Location:  Knee  Site:  L knee  Prep: Patient was prepped and draped in usual sterile fashion    Approach:  Anteromedial  Medications:  40 mg triamcinolone acetonide 40 mg/mL

## 2019-07-22 ENCOUNTER — TELEPHONE (OUTPATIENT)
Dept: ORTHOPEDICS | Facility: CLINIC | Age: 49
End: 2019-07-22

## 2019-07-22 NOTE — TELEPHONE ENCOUNTER
Pt called back, she advised that she was unable to return to Saturday and will be returning to work today.  She will bring previous excuse in for correction.

## 2019-07-22 NOTE — TELEPHONE ENCOUNTER
----- Message from Milena Patel MA sent at 7/22/2019  9:36 AM CDT -----  Contact: james monet to be changed   Call back    Will come in today

## 2019-09-04 ENCOUNTER — TELEPHONE (OUTPATIENT)
Dept: ORTHOPEDICS | Facility: CLINIC | Age: 49
End: 2019-09-04

## 2019-09-04 NOTE — TELEPHONE ENCOUNTER
----- Message from Milena Patel MA sent at 9/4/2019 11:11 AM CDT -----  Contact: pt   Pt is asking for restriction letter, for employer, no push pull or liftging 10 lbs for   4 days    Call back

## 2019-09-04 NOTE — TELEPHONE ENCOUNTER
Returned pt's call, LVM advising that provider is out of town and we will not be able to give her the requested work note.

## 2019-09-06 ENCOUNTER — TELEPHONE (OUTPATIENT)
Dept: ORTHOPEDICS | Facility: CLINIC | Age: 49
End: 2019-09-06

## 2019-09-06 NOTE — TELEPHONE ENCOUNTER
----- Message from Milena Patel MA sent at 9/6/2019 11:13 AM CDT -----  Contact: pt   Wants call back from office   Needs work excuse for Wednesday   Wants work restrictions cannot pull, push, or lift over 10 lbs   Call back

## 2019-09-09 ENCOUNTER — TELEPHONE (OUTPATIENT)
Dept: ORTHOPEDICS | Facility: CLINIC | Age: 49
End: 2019-09-09

## 2019-09-09 NOTE — TELEPHONE ENCOUNTER
----- Message from Alex Garcia sent at 9/9/2019  3:42 PM CDT -----  Contact: pt  3rd call today, PLEASE call  ----- Message -----  From: Alex Garcia  Sent: 9/9/2019   8:09 AM  To: Marga Ohara Staff    regarding Dr Hawkins, [phone] can leave [facility]

## 2019-09-09 NOTE — TELEPHONE ENCOUNTER
Returned pt's call, she advised that she decided to stay off work Friday and Saturday also, and would like work note revised to state this.  She will bring origional note to office.

## 2019-09-09 NOTE — TELEPHONE ENCOUNTER
----- Message from Zakiya Choi LPN sent at 9/9/2019 10:21 AM CDT -----  Contact: pt      ----- Message -----  From: Alex Garcia  Sent: 9/9/2019   8:09 AM  To: Marga Ohara Staff    regarding Dr Hawkins, [phone] can leave [facility]

## 2019-09-10 ENCOUNTER — PATIENT OUTREACH (OUTPATIENT)
Dept: ADMINISTRATIVE | Facility: OTHER | Age: 49
End: 2019-09-10

## 2019-10-22 ENCOUNTER — OFFICE VISIT (OUTPATIENT)
Dept: ORTHOPEDICS | Facility: CLINIC | Age: 49
End: 2019-10-22
Payer: COMMERCIAL

## 2019-10-22 VITALS
BODY MASS INDEX: 25.7 KG/M2 | HEIGHT: 63 IN | WEIGHT: 145.06 LBS | HEART RATE: 62 BPM | DIASTOLIC BLOOD PRESSURE: 99 MMHG | SYSTOLIC BLOOD PRESSURE: 180 MMHG

## 2019-10-22 DIAGNOSIS — M25.511 CHRONIC RIGHT SHOULDER PAIN: Primary | ICD-10-CM

## 2019-10-22 DIAGNOSIS — G89.29 CHRONIC RIGHT SHOULDER PAIN: Primary | ICD-10-CM

## 2019-10-22 PROCEDURE — 99999 PR PBB SHADOW E&M-EST. PATIENT-LVL III: CPT | Mod: PBBFAC,,, | Performed by: ORTHOPAEDIC SURGERY

## 2019-10-22 PROCEDURE — 3080F PR MOST RECENT DIASTOLIC BLOOD PRESSURE >= 90 MM HG: ICD-10-PCS | Mod: CPTII,S$GLB,, | Performed by: ORTHOPAEDIC SURGERY

## 2019-10-22 PROCEDURE — 3080F DIAST BP >= 90 MM HG: CPT | Mod: CPTII,S$GLB,, | Performed by: ORTHOPAEDIC SURGERY

## 2019-10-22 PROCEDURE — 3077F PR MOST RECENT SYSTOLIC BLOOD PRESSURE >= 140 MM HG: ICD-10-PCS | Mod: CPTII,S$GLB,, | Performed by: ORTHOPAEDIC SURGERY

## 2019-10-22 PROCEDURE — 20610 LARGE JOINT ASPIRATION/INJECTION: R SUBACROMIAL BURSA: ICD-10-PCS | Mod: RT,S$GLB,, | Performed by: ORTHOPAEDIC SURGERY

## 2019-10-22 PROCEDURE — 3077F SYST BP >= 140 MM HG: CPT | Mod: CPTII,S$GLB,, | Performed by: ORTHOPAEDIC SURGERY

## 2019-10-22 PROCEDURE — 99213 PR OFFICE/OUTPT VISIT, EST, LEVL III, 20-29 MIN: ICD-10-PCS | Mod: 25,S$GLB,, | Performed by: ORTHOPAEDIC SURGERY

## 2019-10-22 PROCEDURE — 99999 PR PBB SHADOW E&M-EST. PATIENT-LVL III: ICD-10-PCS | Mod: PBBFAC,,, | Performed by: ORTHOPAEDIC SURGERY

## 2019-10-22 PROCEDURE — 20610 DRAIN/INJ JOINT/BURSA W/O US: CPT | Mod: RT,S$GLB,, | Performed by: ORTHOPAEDIC SURGERY

## 2019-10-22 PROCEDURE — 3008F BODY MASS INDEX DOCD: CPT | Mod: CPTII,S$GLB,, | Performed by: ORTHOPAEDIC SURGERY

## 2019-10-22 PROCEDURE — 3008F PR BODY MASS INDEX (BMI) DOCUMENTED: ICD-10-PCS | Mod: CPTII,S$GLB,, | Performed by: ORTHOPAEDIC SURGERY

## 2019-10-22 PROCEDURE — 99213 OFFICE O/P EST LOW 20 MIN: CPT | Mod: 25,S$GLB,, | Performed by: ORTHOPAEDIC SURGERY

## 2019-10-22 RX ORDER — TRIAMCINOLONE ACETONIDE 40 MG/ML
40 INJECTION, SUSPENSION INTRA-ARTICULAR; INTRAMUSCULAR
Status: DISCONTINUED | OUTPATIENT
Start: 2019-10-22 | End: 2019-10-22 | Stop reason: HOSPADM

## 2019-10-22 RX ADMIN — TRIAMCINOLONE ACETONIDE 40 MG: 40 INJECTION, SUSPENSION INTRA-ARTICULAR; INTRAMUSCULAR at 09:10

## 2019-10-22 NOTE — PROGRESS NOTES
Past Medical History:   Diagnosis Date    Asthma     Hypertension        Past Surgical History:   Procedure Laterality Date    BREAST BIOPSY Left 2012    Benign calcifications    BREAST SURGERY      biopsy    DILATION AND CURETTAGE OF UTERUS         Current Outpatient Medications   Medication Sig    ibuprofen (ADVIL,MOTRIN) 800 MG tablet Take 1 tablet (800 mg total) by mouth 3 (three) times daily.    lisinopril 10 MG tablet     PENNSAID 20 mg/gram /actuation(2 %) sopm APPLY 2 PUMPS TO THE AFFECTED AREA TWICE DAILY    tiZANidine (ZANAFLEX) 2 MG tablet     albuterol (ACCUNEB) 1.25 mg/3 mL Nebu Take 3 mLs (1.25 mg total) by nebulization every 6 (six) hours as needed. (Patient not taking: Reported on 10/22/2019)    beclomethasone (QVAR) 40 mcg/actuation Aero Inhale 1 puff into the lungs 2 (two) times daily.    cetirizine (ZYRTEC) 10 MG tablet Take 1 tablet (10 mg total) by mouth once daily. (Patient not taking: Reported on 10/22/2019)    fluticasone (FLONASE SENSIMIST) 27.5 mcg/actuation nasal spray 2 sprays by Nasal route once daily. (Patient not taking: Reported on 10/22/2019)    fluticasone propionate (FLONASE) 50 mcg/actuation nasal spray USE 2 SPRAY(S) IN EACH NOSTRIL TWICE DAILY    loratadine (CLARITIN) 10 mg tablet Take 1 tablet (10 mg total) by mouth once daily.    losartan-hydrochlorothiazide 50-12.5 mg (HYZAAR) 50-12.5 mg per tablet Take 1 tablet by mouth once daily.    methylPREDNISolone (MEDROL DOSEPACK) 4 mg tablet use as directed (Patient not taking: Reported on 10/22/2019)    predniSONE (DELTASONE) 5 MG tablet TAKE 4 TABLETS BY MOUTH TWICE DAILY FOR 5 DAYS    sodium chloride (SALINE NASAL MIST) 3 % Mist 1 spray by Nasal route every 4 (four) hours as needed. (Patient not taking: Reported on 10/22/2019)     No current facility-administered medications for this visit.        Review of patient's allergies indicates:   Allergen Reactions    Doxycycline hyclate Itching       Family History    Problem Relation Age of Onset    Hypertension Mother     Cancer Maternal Aunt         cervical       Social History     Socioeconomic History    Marital status:      Spouse name: Not on file    Number of children: Not on file    Years of education: Not on file    Highest education level: Not on file   Occupational History    Not on file   Social Needs    Financial resource strain: Not on file    Food insecurity:     Worry: Not on file     Inability: Not on file    Transportation needs:     Medical: Not on file     Non-medical: Not on file   Tobacco Use    Smoking status: Never Smoker    Smokeless tobacco: Never Used   Substance and Sexual Activity    Alcohol use: No    Drug use: No    Sexual activity: Not Currently     Partners: Male     Birth control/protection: None   Lifestyle    Physical activity:     Days per week: Not on file     Minutes per session: Not on file    Stress: Not on file   Relationships    Social connections:     Talks on phone: Not on file     Gets together: Not on file     Attends Sabianist service: Not on file     Active member of club or organization: Not on file     Attends meetings of clubs or organizations: Not on file     Relationship status: Not on file   Other Topics Concern    Not on file   Social History Narrative    Not on file       Chief Complaint:   Chief Complaint   Patient presents with    Right Shoulder - Pain       Consulting Physician: No ref. provider found    History of present illness: This is a 46-year-old woman who has a history right shoulder pain associated with overuse.  Treated with injections and exercises and she reports she was doing well up until 3 weeks ago.  She is sore and painful again.  Puts her pain is much as an 10 out of 10 and states it's worse with activities and overhead use.  No new injury.    Review of Systems:    Constitution: Denies chills, fever, and sweats.    HENT: Denies headaches or blurry  vision.    Cardiovascular: Denies chest pain or irregular heart beat.    Respiratory: Denies cough or shortness of breath.    Gastrointestinal: Denies abdominal pain, nausea, or vomiting.    Musculoskeletal:  Denies muscle cramps.    Neurological: Denies dizziness or focal weakness.    Psychiatric/Behavioral: Normal mental status.    Hematologic/Lymphatic: Denies bleeding problem or easy bruising/bleeding.    Skin: Denies rash or suspicious lesions.      Examination:    Vital Signs:    Vitals:    10/22/19 0928   BP: (!) 180/99   Pulse: 62       Body mass index is 25.7 kg/m².    This a well-developed, well nourished patient in no acute distress.    Alert and oriented and cooperative to examination.       Physical Exam: Right Shoulder Exam     Skin  Rash:   None  Scars:   None    Inspection/Observation  Swelling:   None  Erythema:   None  Bruising:   None  Wounds:   None  Scapular Winging:  None  Scapular Dyskinesia:  None  Atrophy:   None  Masses:   None  Lymphadenopathy: None    Tenderness   AC Joint:   None    Range of Motion   Active Forward Flexion:  180 w pain  Active External Rotation:  30  Active Internal Rotation: Low Back w pain    Muscle Strength   Forward Flexion:  5/5  External Rotation: 5/5  Internal Rotation:  5/5    Instability:  None    Tests & Signs   Cross Arm:   Negative    Other   Sensation:  Normal  Pulse:    2+ radial          Imaging: X-rays of the right shoulder show no acute bony abnormality, fracture or dislocation.     Assessment: Right shoulder bursitis    Plan:   Will give her an injection today.  Will also give her restricted duty with no lifting, pushing or pulling more than 5 lb.    DISCLAIMER: This note may have been dictated using voice recognition software and may contain grammatical errors.     NOTE: Consult report sent to referring provider via [employer].

## 2019-10-22 NOTE — PROCEDURES
Large Joint Aspiration/Injection: R subacromial bursa  Date/Time: 10/22/2019 9:15 AM  Performed by: eLv Gresham MD  Authorized by: Lev Gresham MD     Consent Done?:  Yes (Verbal)  Indications:  Pain  Timeout: Prior to procedure the correct patient, procedure, and site was verified      Location:  Shoulder  Site:  R subacromial bursa  Prep: Patient was prepped and draped in usual sterile fashion    Approach:  Lateral  Medications:  40 mg triamcinolone acetonide 40 mg/mL

## 2019-11-06 ENCOUNTER — TELEPHONE (OUTPATIENT)
Dept: ORTHOPEDICS | Facility: CLINIC | Age: 49
End: 2019-11-06

## 2019-11-06 NOTE — TELEPHONE ENCOUNTER
Returned call to patient. No answer, LVM advising to schedule an appointment if needed for her hand. Advised to return call an appt can be scheduled through the phone room.

## 2019-11-06 NOTE — TELEPHONE ENCOUNTER
----- Message from Alex Garcia sent at 11/6/2019  8:17 AM CST -----  Contact: pt  Hand hurting, [phone]

## 2019-11-07 DIAGNOSIS — M79.642 LEFT HAND PAIN: Primary | ICD-10-CM

## 2019-11-08 ENCOUNTER — TELEPHONE (OUTPATIENT)
Dept: ORTHOPEDICS | Facility: CLINIC | Age: 49
End: 2019-11-08

## 2019-11-08 ENCOUNTER — HOSPITAL ENCOUNTER (OUTPATIENT)
Dept: RADIOLOGY | Facility: HOSPITAL | Age: 49
Discharge: HOME | End: 2019-11-08
Attending: ORTHOPAEDIC SURGERY
Payer: COMMERCIAL

## 2019-11-08 ENCOUNTER — OFFICE VISIT (OUTPATIENT)
Dept: ORTHOPEDICS | Facility: CLINIC | Age: 49
End: 2019-11-08
Payer: COMMERCIAL

## 2019-11-08 VITALS
HEART RATE: 73 BPM | BODY MASS INDEX: 25.69 KG/M2 | HEIGHT: 63 IN | DIASTOLIC BLOOD PRESSURE: 98 MMHG | WEIGHT: 145 LBS | SYSTOLIC BLOOD PRESSURE: 174 MMHG

## 2019-11-08 DIAGNOSIS — M79.642 LEFT HAND PAIN: Primary | ICD-10-CM

## 2019-11-08 DIAGNOSIS — B96.89 BACTERIAL SINUSITIS: ICD-10-CM

## 2019-11-08 DIAGNOSIS — J32.9 BACTERIAL SINUSITIS: ICD-10-CM

## 2019-11-08 DIAGNOSIS — M79.642 LEFT HAND PAIN: ICD-10-CM

## 2019-11-08 PROCEDURE — 99999 PR PBB SHADOW E&M-EST. PATIENT-LVL III: CPT | Mod: PBBFAC,,, | Performed by: ORTHOPAEDIC SURGERY

## 2019-11-08 PROCEDURE — 3080F DIAST BP >= 90 MM HG: CPT | Mod: CPTII,S$GLB,, | Performed by: ORTHOPAEDIC SURGERY

## 2019-11-08 PROCEDURE — 99999 PR PBB SHADOW E&M-EST. PATIENT-LVL III: ICD-10-PCS | Mod: PBBFAC,,, | Performed by: ORTHOPAEDIC SURGERY

## 2019-11-08 PROCEDURE — 3080F PR MOST RECENT DIASTOLIC BLOOD PRESSURE >= 90 MM HG: ICD-10-PCS | Mod: CPTII,S$GLB,, | Performed by: ORTHOPAEDIC SURGERY

## 2019-11-08 PROCEDURE — 3008F BODY MASS INDEX DOCD: CPT | Mod: CPTII,S$GLB,, | Performed by: ORTHOPAEDIC SURGERY

## 2019-11-08 PROCEDURE — 99214 OFFICE O/P EST MOD 30 MIN: CPT | Mod: S$GLB,,, | Performed by: ORTHOPAEDIC SURGERY

## 2019-11-08 PROCEDURE — 3077F PR MOST RECENT SYSTOLIC BLOOD PRESSURE >= 140 MM HG: ICD-10-PCS | Mod: CPTII,S$GLB,, | Performed by: ORTHOPAEDIC SURGERY

## 2019-11-08 PROCEDURE — 3077F SYST BP >= 140 MM HG: CPT | Mod: CPTII,S$GLB,, | Performed by: ORTHOPAEDIC SURGERY

## 2019-11-08 PROCEDURE — 3008F PR BODY MASS INDEX (BMI) DOCUMENTED: ICD-10-PCS | Mod: CPTII,S$GLB,, | Performed by: ORTHOPAEDIC SURGERY

## 2019-11-08 PROCEDURE — 73130 X-RAY EXAM OF HAND: CPT | Mod: TC,PN,LT

## 2019-11-08 PROCEDURE — 99214 PR OFFICE/OUTPT VISIT, EST, LEVL IV, 30-39 MIN: ICD-10-PCS | Mod: S$GLB,,, | Performed by: ORTHOPAEDIC SURGERY

## 2019-11-08 PROCEDURE — 73130 XR HAND COMPLETE 3 VIEW LEFT: ICD-10-PCS | Mod: 26,LT,, | Performed by: RADIOLOGY

## 2019-11-08 PROCEDURE — 73130 X-RAY EXAM OF HAND: CPT | Mod: 26,LT,, | Performed by: RADIOLOGY

## 2019-11-08 RX ORDER — METHYLPREDNISOLONE 4 MG/1
TABLET ORAL
Qty: 1 PACKAGE | Refills: 0 | Status: SHIPPED | OUTPATIENT
Start: 2019-11-08 | End: 2020-06-02

## 2019-11-08 NOTE — TELEPHONE ENCOUNTER
Returned pt's call, advised that paperwork has been sent to [employer] and I will leave copy at  for her to .  She verbalized understanding.

## 2019-11-08 NOTE — TELEPHONE ENCOUNTER
----- Message from Milena Patel MA sent at 11/8/2019 12:50 PM CST -----  Contact: cyndi   Wants to know when paperwork will be completed and ready for    Call back

## 2019-11-12 ENCOUNTER — TELEPHONE (OUTPATIENT)
Dept: ORTHOPEDICS | Facility: CLINIC | Age: 49
End: 2019-11-12

## 2019-11-12 NOTE — TELEPHONE ENCOUNTER
Returned call to patient. States she will be dropping by additional paperwork to be signed by Dr Gresham regarding her short term disability.

## 2019-11-12 NOTE — PROGRESS NOTES
Past Medical History:   Diagnosis Date    Asthma     Hypertension        Past Surgical History:   Procedure Laterality Date    BREAST BIOPSY Left 2012    Benign calcifications    BREAST SURGERY      biopsy    DILATION AND CURETTAGE OF UTERUS         Current Outpatient Medications   Medication Sig    albuterol (ACCUNEB) 1.25 mg/3 mL Nebu Take 3 mLs (1.25 mg total) by nebulization every 6 (six) hours as needed.    cetirizine (ZYRTEC) 10 MG tablet Take 1 tablet (10 mg total) by mouth once daily.    fluticasone (FLONASE SENSIMIST) 27.5 mcg/actuation nasal spray 2 sprays by Nasal route once daily.    fluticasone propionate (FLONASE) 50 mcg/actuation nasal spray USE 2 SPRAY(S) IN EACH NOSTRIL TWICE DAILY    ibuprofen (ADVIL,MOTRIN) 800 MG tablet Take 1 tablet (800 mg total) by mouth 3 (three) times daily.    lisinopril 10 MG tablet     PENNSAID 20 mg/gram /actuation(2 %) sopm APPLY 2 PUMPS TO THE AFFECTED AREA TWICE DAILY    predniSONE (DELTASONE) 5 MG tablet TAKE 4 TABLETS BY MOUTH TWICE DAILY FOR 5 DAYS    sodium chloride (SALINE NASAL MIST) 3 % Mist 1 spray by Nasal route every 4 (four) hours as needed.    tiZANidine (ZANAFLEX) 2 MG tablet     beclomethasone (QVAR) 40 mcg/actuation Aero Inhale 1 puff into the lungs 2 (two) times daily.    loratadine (CLARITIN) 10 mg tablet Take 1 tablet (10 mg total) by mouth once daily.    losartan-hydrochlorothiazide 50-12.5 mg (HYZAAR) 50-12.5 mg per tablet Take 1 tablet by mouth once daily.    methylPREDNISolone (MEDROL DOSEPACK) 4 mg tablet use as directed     No current facility-administered medications for this visit.        Review of patient's allergies indicates:   Allergen Reactions    Omnicef [cefdinir] Diarrhea    Doxycycline hyclate Itching       Family History   Problem Relation Age of Onset    Hypertension Mother     Cancer Maternal Aunt         cervical       Social History     Socioeconomic History    Marital status:      Spouse name: Not  on file    Number of children: Not on file    Years of education: Not on file    Highest education level: Not on file   Occupational History    Not on file   Social Needs    Financial resource strain: Not on file    Food insecurity:     Worry: Not on file     Inability: Not on file    Transportation needs:     Medical: Not on file     Non-medical: Not on file   Tobacco Use    Smoking status: Never Smoker    Smokeless tobacco: Never Used   Substance and Sexual Activity    Alcohol use: No    Drug use: No    Sexual activity: Not Currently     Partners: Male     Birth control/protection: None   Lifestyle    Physical activity:     Days per week: Not on file     Minutes per session: Not on file    Stress: Not on file   Relationships    Social connections:     Talks on phone: Not on file     Gets together: Not on file     Attends Jain service: Not on file     Active member of club or organization: Not on file     Attends meetings of clubs or organizations: Not on file     Relationship status: Not on file   Other Topics Concern    Not on file   Social History Narrative    Not on file       Chief Complaint:   Chief Complaint   Patient presents with    Left Hand - Pain       Consulting Physician: No ref. provider found    History of present illness:    This is a 49 y.o. year old female who complains of pain in the left palm of her hand.  She denies any injury.  She has never had this before.  She puts her pain as much as a 7/10 worse with activities.  She states wearing a brace is helpful.    Review of Systems:    Constitution: Denies chills, fever, and sweats.  HENT: Denies headaches or blurry vision.  Cardiovascular: Denies chest pain or irregular heart beat.  Respiratory: Denies cough or shortness of breath.  Gastrointestinal: Denies abdominal pain, nausea, or vomiting.  Musculoskeletal:  Denies muscle cramps.  Neurological: Denies dizziness or focal weakness.  Psychiatric/Behavioral: Normal mental  "status.  Hematologic/Lymphatic: Denies bleeding problem or easy bruising/bleeding.  Skin: Denies rash or suspicious lesions.    Examination:    Vital Signs:    Vitals:    11/08/19 0910   BP: (!) 174/98   Pulse: 73   Weight: 65.8 kg (145 lb)   Height: 5' 3" (1.6 m)   PainSc:   7   PainLoc: Hand       Body mass index is 25.69 kg/m².    This a well-developed, well nourished patient in no acute distress.    Alert and oriented x 3 and cooperative to examination.       Physical Exam: Left Hand Exam    Skin  Scars:   None  Rash:   None    Inspection  Erythema:  None  Bruising:  None  Swelling:  None  Masses:  None  Lymphadenopathy: None    Coordination:  Normal  Instability:  None    Range of Motion  Finger ROM:  Full    Strength:  Normal   Tenderness:  Mild in center of palm    Pulse:   2+ radial  Capillary Refill: Normal    Sensation:  Intact          Imaging: X-rays ordered and images interpreted today personally by me of left hand appear normal.        Assessment: Left hand pain        Plan:  This is likely an overuse tendinitis injury.  We will start her on a Medrol Dosepak and have her continue her brace.  We will see her back in 2 weeks.      DISCLAIMER: This note may have been dictated using voice recognition software and may contain grammatical errors.     NOTE: Consult report sent to referring provider via [employer].  "

## 2019-11-12 NOTE — TELEPHONE ENCOUNTER
----- Message from Milena Patel MA sent at 11/12/2019 12:15 PM CST -----  Contact: cyndi   Checking on disability paperwork  Call back

## 2019-11-13 ENCOUNTER — PATIENT MESSAGE (OUTPATIENT)
Dept: ORTHOPEDICS | Facility: CLINIC | Age: 49
End: 2019-11-13

## 2019-11-14 ENCOUNTER — TELEPHONE (OUTPATIENT)
Dept: ORTHOPEDICS | Facility: CLINIC | Age: 49
End: 2019-11-14

## 2019-11-14 NOTE — TELEPHONE ENCOUNTER
----- Message from Alex Garcia sent at 11/14/2019  9:00 AM CST -----  Contact: pt  Are papers ready to be picked up? [phone]

## 2019-11-14 NOTE — TELEPHONE ENCOUNTER
Returned pt's call, LVM asking her to call back with last date that she worked so that forms can be completed.

## 2019-11-15 ENCOUNTER — TELEPHONE (OUTPATIENT)
Dept: ORTHOPEDICS | Facility: CLINIC | Age: 49
End: 2019-11-15

## 2019-11-15 NOTE — TELEPHONE ENCOUNTER
----- Message from Milena Patel MA sent at 11/15/2019 12:05 PM CST -----  Contact: pt   Wants to know if paperwork has been completed   And at    Call back

## 2019-11-19 ENCOUNTER — TELEPHONE (OUTPATIENT)
Dept: ORTHOPEDICS | Facility: CLINIC | Age: 49
End: 2019-11-19

## 2019-11-19 NOTE — TELEPHONE ENCOUNTER
Spoke to patient. Advised the paperwork she dropped off is ready to be picked up. Pt verbalized understanding.

## 2019-11-19 NOTE — TELEPHONE ENCOUNTER
----- Message from Alex Garcia sent at 11/19/2019  1:44 PM CST -----  Contact: pt  Need paperwork corrected ,needs a Return to word date for short term disability , [phone]

## 2019-11-21 ENCOUNTER — TELEPHONE (OUTPATIENT)
Dept: ORTHOPEDICS | Facility: CLINIC | Age: 49
End: 2019-11-21

## 2019-11-21 NOTE — TELEPHONE ENCOUNTER
Spoke to patient. States she would like to discuss her health issues related to her short term disablilty with Dr Gresham. Pt states she keeps getting phone calls being asked about her plan of care. Advised all paperwork we were sent has been completed and faxed in. Patient verbalized understanding, appt scheduled for 11/25/19.

## 2019-11-21 NOTE — TELEPHONE ENCOUNTER
----- Message from Alex Garcia sent at 11/21/2019 11:24 AM CST -----  Contact: pt  Personal health issues, [phone]

## 2019-11-22 ENCOUNTER — TELEPHONE (OUTPATIENT)
Dept: ORTHOPEDICS | Facility: CLINIC | Age: 49
End: 2019-11-22

## 2019-11-22 NOTE — TELEPHONE ENCOUNTER
----- Message from Zafar Braun MA sent at 11/22/2019 10:03 AM CST -----  Contact: patient  Short term disability needs medical records dated 10/1-present, patient ask that you please call her  Call back

## 2019-11-22 NOTE — TELEPHONE ENCOUNTER
Returned pt's call, she stated that her short term [employer] company is wanting her office notes from October to present faxed to them.  Advised that I would fax this afternoon, no other needs at present.

## 2019-11-25 RX ORDER — TIZANIDINE 2 MG/1
TABLET ORAL
OUTPATIENT
Start: 2019-11-25

## 2019-11-25 NOTE — TELEPHONE ENCOUNTER
Patient has been informed and verbalizes full understanding. States she will make an appointment after the 1st of the year as she only uses this med PRN.

## 2019-12-12 ENCOUNTER — TELEPHONE (OUTPATIENT)
Dept: ORTHOPEDICS | Facility: CLINIC | Age: 49
End: 2019-12-12

## 2019-12-12 NOTE — TELEPHONE ENCOUNTER
----- Message from Milena Patel MA sent at 12/12/2019  2:07 PM CST -----  Contact: the [employer]   Making sure fax was received   Short term disability   Call back   Ext [phone]

## 2019-12-17 ENCOUNTER — TELEPHONE (OUTPATIENT)
Dept: ORTHOPEDICS | Facility: CLINIC | Age: 49
End: 2019-12-17

## 2019-12-17 NOTE — TELEPHONE ENCOUNTER
----- Message from Alex Garcia sent at 12/17/2019 11:12 AM CST -----  Contact: pt  Did disability paperwork arrive, [phone]

## 2019-12-18 ENCOUNTER — TELEPHONE (OUTPATIENT)
Dept: ORTHOPEDICS | Facility: CLINIC | Age: 49
End: 2019-12-18

## 2019-12-18 NOTE — TELEPHONE ENCOUNTER
----- Message from Milena Patel MA sent at 12/18/2019  3:37 PM CST -----  Contact: [employer]   [employer]   Have we received fax from 12/12/2019   On short term disability   Call back [phone]

## 2020-01-20 ENCOUNTER — OFFICE VISIT (OUTPATIENT)
Dept: ORTHOPEDICS | Facility: CLINIC | Age: 50
End: 2020-01-20
Payer: COMMERCIAL

## 2020-01-20 VITALS
HEIGHT: 63 IN | BODY MASS INDEX: 25.69 KG/M2 | DIASTOLIC BLOOD PRESSURE: 83 MMHG | SYSTOLIC BLOOD PRESSURE: 165 MMHG | HEART RATE: 80 BPM | WEIGHT: 145 LBS

## 2020-01-20 DIAGNOSIS — M17.12 ARTHRITIS OF LEFT KNEE: Primary | ICD-10-CM

## 2020-01-20 PROCEDURE — 99213 PR OFFICE/OUTPT VISIT, EST, LEVL III, 20-29 MIN: ICD-10-PCS | Mod: 25,S$GLB,, | Performed by: ORTHOPAEDIC SURGERY

## 2020-01-20 PROCEDURE — 3079F DIAST BP 80-89 MM HG: CPT | Mod: CPTII,S$GLB,, | Performed by: ORTHOPAEDIC SURGERY

## 2020-01-20 PROCEDURE — 3077F PR MOST RECENT SYSTOLIC BLOOD PRESSURE >= 140 MM HG: ICD-10-PCS | Mod: CPTII,S$GLB,, | Performed by: ORTHOPAEDIC SURGERY

## 2020-01-20 PROCEDURE — 99999 PR PBB SHADOW E&M-EST. PATIENT-LVL III: CPT | Mod: PBBFAC,,, | Performed by: ORTHOPAEDIC SURGERY

## 2020-01-20 PROCEDURE — 20610 LARGE JOINT ASPIRATION/INJECTION: L KNEE: ICD-10-PCS | Mod: LT,S$GLB,, | Performed by: ORTHOPAEDIC SURGERY

## 2020-01-20 PROCEDURE — 3008F PR BODY MASS INDEX (BMI) DOCUMENTED: ICD-10-PCS | Mod: CPTII,S$GLB,, | Performed by: ORTHOPAEDIC SURGERY

## 2020-01-20 PROCEDURE — 3077F SYST BP >= 140 MM HG: CPT | Mod: CPTII,S$GLB,, | Performed by: ORTHOPAEDIC SURGERY

## 2020-01-20 PROCEDURE — 99999 PR PBB SHADOW E&M-EST. PATIENT-LVL III: ICD-10-PCS | Mod: PBBFAC,,, | Performed by: ORTHOPAEDIC SURGERY

## 2020-01-20 PROCEDURE — 3008F BODY MASS INDEX DOCD: CPT | Mod: CPTII,S$GLB,, | Performed by: ORTHOPAEDIC SURGERY

## 2020-01-20 PROCEDURE — 3079F PR MOST RECENT DIASTOLIC BLOOD PRESSURE 80-89 MM HG: ICD-10-PCS | Mod: CPTII,S$GLB,, | Performed by: ORTHOPAEDIC SURGERY

## 2020-01-20 PROCEDURE — 99213 OFFICE O/P EST LOW 20 MIN: CPT | Mod: 25,S$GLB,, | Performed by: ORTHOPAEDIC SURGERY

## 2020-01-20 PROCEDURE — 20610 DRAIN/INJ JOINT/BURSA W/O US: CPT | Mod: LT,S$GLB,, | Performed by: ORTHOPAEDIC SURGERY

## 2020-01-20 RX ORDER — TRIAMCINOLONE ACETONIDE 40 MG/ML
40 INJECTION, SUSPENSION INTRA-ARTICULAR; INTRAMUSCULAR
Status: DISCONTINUED | OUTPATIENT
Start: 2020-01-20 | End: 2020-01-20 | Stop reason: HOSPADM

## 2020-01-20 RX ADMIN — TRIAMCINOLONE ACETONIDE 40 MG: 40 INJECTION, SUSPENSION INTRA-ARTICULAR; INTRAMUSCULAR at 10:01

## 2020-01-20 NOTE — PROGRESS NOTES
Past Medical History:   Diagnosis Date    Asthma     Hypertension        Past Surgical History:   Procedure Laterality Date    BREAST BIOPSY Left 2012    Benign calcifications    BREAST SURGERY      biopsy    DILATION AND CURETTAGE OF UTERUS         Current Outpatient Medications   Medication Sig    albuterol (ACCUNEB) 1.25 mg/3 mL Nebu Take 3 mLs (1.25 mg total) by nebulization every 6 (six) hours as needed.    beclomethasone (QVAR) 40 mcg/actuation Aero Inhale 1 puff into the lungs 2 (two) times daily.    cetirizine (ZYRTEC) 10 MG tablet Take 1 tablet (10 mg total) by mouth once daily.    fluticasone (FLONASE SENSIMIST) 27.5 mcg/actuation nasal spray 2 sprays by Nasal route once daily.    fluticasone propionate (FLONASE) 50 mcg/actuation nasal spray USE 2 SPRAY(S) IN EACH NOSTRIL TWICE DAILY    ibuprofen (ADVIL,MOTRIN) 800 MG tablet Take 1 tablet (800 mg total) by mouth 3 (three) times daily.    lisinopril 10 MG tablet     loratadine (CLARITIN) 10 mg tablet Take 1 tablet (10 mg total) by mouth once daily.    losartan-hydrochlorothiazide 50-12.5 mg (HYZAAR) 50-12.5 mg per tablet Take 1 tablet by mouth once daily.    methylPREDNISolone (MEDROL DOSEPACK) 4 mg tablet use as directed    PENNSAID 20 mg/gram /actuation(2 %) sopm APPLY 2 PUMPS TO THE AFFECTED AREA TWICE DAILY    predniSONE (DELTASONE) 5 MG tablet TAKE 4 TABLETS BY MOUTH TWICE DAILY FOR 5 DAYS    sodium chloride (SALINE NASAL MIST) 3 % Mist 1 spray by Nasal route every 4 (four) hours as needed.    tiZANidine (ZANAFLEX) 2 MG tablet      No current facility-administered medications for this visit.        Review of patient's allergies indicates:   Allergen Reactions    Omnicef [cefdinir] Diarrhea    Doxycycline hyclate Itching       Family History   Problem Relation Age of Onset    Hypertension Mother     Cancer Maternal Aunt         cervical       Social History     Socioeconomic History    Marital status:      Spouse name: Not  on file    Number of children: Not on file    Years of education: Not on file    Highest education level: Not on file   Occupational History    Not on file   Social Needs    Financial resource strain: Not on file    Food insecurity:     Worry: Not on file     Inability: Not on file    Transportation needs:     Medical: Not on file     Non-medical: Not on file   Tobacco Use    Smoking status: Never Smoker    Smokeless tobacco: Never Used   Substance and Sexual Activity    Alcohol use: No    Drug use: No    Sexual activity: Not Currently     Partners: Male     Birth control/protection: None   Lifestyle    Physical activity:     Days per week: Not on file     Minutes per session: Not on file    Stress: Not on file   Relationships    Social connections:     Talks on phone: Not on file     Gets together: Not on file     Attends Latter day service: Not on file     Active member of club or organization: Not on file     Attends meetings of clubs or organizations: Not on file     Relationship status: Not on file   Other Topics Concern    Not on file   Social History Narrative    Not on file       Chief Complaint:   Chief Complaint   Patient presents with    Left Knee - Pain       Consulting Physician: No ref. provider found    History of present illness:    This is a 49 y.o. year old female who complains of left knee pain for a week. Denies injury. Pain 8/10 and worse with use.  She reports that her previous injection was very helpful lasted up until now.    Review of Systems:    Constitution: Denies chills, fever, and sweats.  HENT: Denies headaches or blurry vision.  Cardiovascular: Denies chest pain or irregular heart beat.  Respiratory: Denies cough or shortness of breath.  Gastrointestinal: Denies abdominal pain, nausea, or vomiting.  Musculoskeletal:  Denies muscle cramps.  Neurological: Denies dizziness or focal weakness.  Psychiatric/Behavioral: Normal mental status.  Hematologic/Lymphatic: Denies  "bleeding problem or easy bruising/bleeding.  Skin: Denies rash or suspicious lesions.    Examination:    Vital Signs:    Vitals:    01/20/20 1100   BP: (!) 165/83   Pulse: 80   Weight: 65.8 kg (145 lb)   Height: 5' 3" (1.6 m)   PainSc:   8   PainLoc: Knee       Body mass index is 25.69 kg/m².    This a well-developed, well nourished patient in no acute distress.    Alert and oriented x 3 and cooperative to examination.       Physical Exam: Left Knee Exam    Gait   Antalgic    Skin  Rash:   None  Scars:   None    Inspection  Erythema:  None  Bruising:  None  Effusion:  None  Masses:  None  Lymphadenopathy: None    Range of Motion: 0 to 130°    Medial Joint : y  Lateral Joint : y    Patellofemoral Tenderness: Yes  Patellofemoral Crepitus: Yes    Lachman:  Normal  Anterior Drawer: Normal  Posterior Drawer: Normal    Orquidea's:  Negative  Apley's:  Negative    Varus Stress:  Stable  Valgus Stress:  Stable    Strength:  5/5    Pulses:  Palpable  Sensation:  Intact          Imaging: X-rays of left knee shows mild DJD.        Assessment: Arthritis of left knee  -     Large Joint Aspiration/Injection: L knee        Plan:  Discussed options and she wants steroid injection followed by euflexxa. Will restrict work duties for a few days.      DISCLAIMER: This note may have been dictated using voice recognition software and may contain grammatical errors.     NOTE: Consult report sent to referring provider via [employer].  "

## 2020-01-20 NOTE — PROCEDURES
Large Joint Aspiration/Injection: L knee  Date/Time: 1/20/2020 10:45 AM  Performed by: Lev Gresham MD  Authorized by: Lev Gresham MD     Consent Done?:  Yes (Verbal)  Indications:  Pain  Timeout: Prior to procedure the correct patient, procedure, and site was verified      Location:  Knee  Site:  L knee  Prep: Patient was prepped and draped in usual sterile fashion    Approach:  Anteromedial  Medications:  40 mg triamcinolone acetonide 40 mg/mL

## 2020-01-24 RX ORDER — DICLOFENAC SODIUM 20 MG/G
SOLUTION TOPICAL
Qty: 112 G | Refills: 0 | Status: SHIPPED | OUTPATIENT
Start: 2020-01-24 | End: 2020-06-16

## 2020-01-30 ENCOUNTER — TELEPHONE (OUTPATIENT)
Dept: ORTHOPEDICS | Facility: CLINIC | Age: 50
End: 2020-01-30

## 2020-01-30 NOTE — TELEPHONE ENCOUNTER
Called and LVM for pt advising that insurance will not cover the Euflexxa injections.  Asked her to call back and let us know if she wants to pay for them out of pocket or cancel appt.

## 2020-02-01 ENCOUNTER — PATIENT OUTREACH (OUTPATIENT)
Dept: ADMINISTRATIVE | Facility: OTHER | Age: 50
End: 2020-02-01

## 2020-02-03 ENCOUNTER — TELEPHONE (OUTPATIENT)
Dept: ORTHOPEDICS | Facility: CLINIC | Age: 50
End: 2020-02-03

## 2020-02-03 NOTE — TELEPHONE ENCOUNTER
Called to [place] as requested from patient.  No answer, DANIELLE on her voicemail asking her to call me back so we can see if injections will be covered or not.

## 2020-02-03 NOTE — TELEPHONE ENCOUNTER
----- Message from Milena Patel MA sent at 2/3/2020  1:24 PM CST -----  Contact: pt   [phone]  [facility]   Direct number to insurance   Call back

## 2020-02-04 ENCOUNTER — TELEPHONE (OUTPATIENT)
Dept: ORTHOPEDICS | Facility: CLINIC | Age: 50
End: 2020-02-04

## 2020-02-04 NOTE — TELEPHONE ENCOUNTER
----- Message from Alex Garcia sent at 2/4/2020 11:04 AM CST -----  Contact: pt  --  [phone]  Return to work

## 2020-02-04 NOTE — TELEPHONE ENCOUNTER
Spoke to patient. Would like to come in to discuss her return to work. Appointment scheduled for tomorrow per request. Verbalized understanding.

## 2020-02-05 ENCOUNTER — OFFICE VISIT (OUTPATIENT)
Dept: ORTHOPEDICS | Facility: CLINIC | Age: 50
End: 2020-02-05
Payer: COMMERCIAL

## 2020-02-05 VITALS — WEIGHT: 145.06 LBS | BODY MASS INDEX: 25.7 KG/M2 | RESPIRATION RATE: 16 BRPM | HEIGHT: 63 IN

## 2020-02-05 DIAGNOSIS — G89.29 CHRONIC RIGHT SHOULDER PAIN: Primary | ICD-10-CM

## 2020-02-05 DIAGNOSIS — M25.511 CHRONIC RIGHT SHOULDER PAIN: Primary | ICD-10-CM

## 2020-02-05 PROCEDURE — 3008F BODY MASS INDEX DOCD: CPT | Mod: CPTII,S$GLB,, | Performed by: ORTHOPAEDIC SURGERY

## 2020-02-05 PROCEDURE — 99213 OFFICE O/P EST LOW 20 MIN: CPT | Mod: S$GLB,,, | Performed by: ORTHOPAEDIC SURGERY

## 2020-02-05 PROCEDURE — 3008F PR BODY MASS INDEX (BMI) DOCUMENTED: ICD-10-PCS | Mod: CPTII,S$GLB,, | Performed by: ORTHOPAEDIC SURGERY

## 2020-02-05 PROCEDURE — 99999 PR PBB SHADOW E&M-EST. PATIENT-LVL III: CPT | Mod: PBBFAC,,, | Performed by: ORTHOPAEDIC SURGERY

## 2020-02-05 PROCEDURE — 99999 PR PBB SHADOW E&M-EST. PATIENT-LVL III: ICD-10-PCS | Mod: PBBFAC,,, | Performed by: ORTHOPAEDIC SURGERY

## 2020-02-05 PROCEDURE — 99213 PR OFFICE/OUTPT VISIT, EST, LEVL III, 20-29 MIN: ICD-10-PCS | Mod: S$GLB,,, | Performed by: ORTHOPAEDIC SURGERY

## 2020-02-05 NOTE — PROGRESS NOTES
Past Medical History:   Diagnosis Date    Asthma     Hypertension        Past Surgical History:   Procedure Laterality Date    BREAST BIOPSY Left 2012    Benign calcifications    BREAST SURGERY      biopsy    DILATION AND CURETTAGE OF UTERUS         Current Outpatient Medications   Medication Sig    albuterol (ACCUNEB) 1.25 mg/3 mL Nebu Take 3 mLs (1.25 mg total) by nebulization every 6 (six) hours as needed.    cetirizine (ZYRTEC) 10 MG tablet Take 1 tablet (10 mg total) by mouth once daily.    fluticasone (FLONASE SENSIMIST) 27.5 mcg/actuation nasal spray 2 sprays by Nasal route once daily.    fluticasone propionate (FLONASE) 50 mcg/actuation nasal spray USE 2 SPRAY(S) IN EACH NOSTRIL TWICE DAILY    ibuprofen (ADVIL,MOTRIN) 800 MG tablet Take 1 tablet (800 mg total) by mouth 3 (three) times daily.    lisinopril 10 MG tablet     methylPREDNISolone (MEDROL DOSEPACK) 4 mg tablet use as directed    PENNSAID 20 mg/gram /actuation(2 %) sopm APPLY 2 PUMPS TO THE AFFECTED AREA TWICE DAILY    predniSONE (DELTASONE) 5 MG tablet TAKE 4 TABLETS BY MOUTH TWICE DAILY FOR 5 DAYS    sodium chloride (SALINE NASAL MIST) 3 % Mist 1 spray by Nasal route every 4 (four) hours as needed.    tiZANidine (ZANAFLEX) 2 MG tablet     beclomethasone (QVAR) 40 mcg/actuation Aero Inhale 1 puff into the lungs 2 (two) times daily.    loratadine (CLARITIN) 10 mg tablet Take 1 tablet (10 mg total) by mouth once daily.    losartan-hydrochlorothiazide 50-12.5 mg (HYZAAR) 50-12.5 mg per tablet Take 1 tablet by mouth once daily.     No current facility-administered medications for this visit.        Review of patient's allergies indicates:   Allergen Reactions    Doxycycline hyclate Itching       Family History   Problem Relation Age of Onset    Hypertension Mother     Cancer Maternal Aunt         cervical       Social History     Socioeconomic History    Marital status:      Spouse name: Not on file    Number of children:  Not on file    Years of education: Not on file    Highest education level: Not on file   Occupational History    Not on file   Social Needs    Financial resource strain: Not on file    Food insecurity:     Worry: Not on file     Inability: Not on file    Transportation needs:     Medical: Not on file     Non-medical: Not on file   Tobacco Use    Smoking status: Never Smoker    Smokeless tobacco: Never Used   Substance and Sexual Activity    Alcohol use: No    Drug use: No    Sexual activity: Not Currently     Partners: Male     Birth control/protection: None   Lifestyle    Physical activity:     Days per week: Not on file     Minutes per session: Not on file    Stress: Not on file   Relationships    Social connections:     Talks on phone: Not on file     Gets together: Not on file     Attends Confucianist service: Not on file     Active member of club or organization: Not on file     Attends meetings of clubs or organizations: Not on file     Relationship status: Not on file   Other Topics Concern    Not on file   Social History Narrative    Not on file       Chief Complaint:   Chief Complaint   Patient presents with    Right Shoulder - Pain       Consulting Physician: No ref. provider found    History of present illness: This is a 46-year-old woman who has a history right shoulder pain associated with overuse.  Treated with injections and exercises.  She reports that her shoulder is feeling better.  She puts her pain at a 3/10 worse with activities.  No new injury.    Review of Systems:    Constitution: Denies chills, fever, and sweats.    HENT: Denies headaches or blurry vision.    Cardiovascular: Denies chest pain or irregular heart beat.    Respiratory: Denies cough or shortness of breath.    Gastrointestinal: Denies abdominal pain, nausea, or vomiting.    Musculoskeletal:  Denies muscle cramps.    Neurological: Denies dizziness or focal weakness.    Psychiatric/Behavioral: Normal mental  status.    Hematologic/Lymphatic: Denies bleeding problem or easy bruising/bleeding.    Skin: Denies rash or suspicious lesions.      Examination:    Vital Signs:    Vitals:    02/05/20 0948   Resp: 16       Body mass index is 25.7 kg/m².    This a well-developed, well nourished patient in no acute distress.    Alert and oriented and cooperative to examination.       Physical Exam: Right Shoulder Exam     Skin  Rash:   None  Scars:   None    Inspection/Observation  Swelling:   None  Erythema:   None  Bruising:   None  Wounds:   None  Scapular Winging:  None  Scapular Dyskinesia:  None  Atrophy:   None  Masses:   None  Lymphadenopathy: None    Tenderness   AC Joint:   None    Range of Motion   Active Forward Flexion:  180   Active External Rotation:  30  Active Internal Rotation: Low Back    Muscle Strength   Forward Flexion:  5/5  External Rotation: 5/5  Internal Rotation:  5/5    Instability:  None    Tests & Signs   Cross Arm:   Negative    Other   Sensation:  Normal  Pulse:    2+ radial          Imaging: X-rays of the right shoulder show no acute bony abnormality, fracture or dislocation.     Assessment: Right shoulder bursitis    Plan:   She has recurring bursitis and tendinitis in her shoulder.  At this point time she is doing well with full range of motion and strength.  We will let her return to work but put restrictions on her of no lifting pushing or pulling more than 40 lb and no overhead lifting effective 02/11/2020.    DISCLAIMER: This note may have been dictated using voice recognition software and may contain grammatical errors.     NOTE: Consult report sent to referring provider via [employer].

## 2020-02-11 ENCOUNTER — PATIENT MESSAGE (OUTPATIENT)
Dept: ORTHOPEDICS | Facility: CLINIC | Age: 50
End: 2020-02-11

## 2020-02-19 ENCOUNTER — TELEPHONE (OUTPATIENT)
Dept: ORTHOPEDICS | Facility: CLINIC | Age: 50
End: 2020-02-19

## 2020-02-19 NOTE — TELEPHONE ENCOUNTER
----- Message from Zafar Braun MA sent at 2/19/2020  8:44 AM CST -----  Contact: Patient  Patient would like to schedule injection for hand,   Call back

## 2020-03-01 ENCOUNTER — PATIENT OUTREACH (OUTPATIENT)
Dept: ADMINISTRATIVE | Facility: OTHER | Age: 50
End: 2020-03-01

## 2020-03-05 ENCOUNTER — TELEPHONE (OUTPATIENT)
Dept: ORTHOPEDICS | Facility: CLINIC | Age: 50
End: 2020-03-05

## 2020-03-05 NOTE — TELEPHONE ENCOUNTER
Called patient, no answer. LVM advising will need to reschedule her missed appt in order to get a note for work. Advised to return call if needed.

## 2020-03-05 NOTE — TELEPHONE ENCOUNTER
----- Message from Alex Garcia sent at 3/5/2020 10:24 AM CST -----  Contact: pt  Please call before  [phone]  Needs work note

## 2020-03-06 ENCOUNTER — OFFICE VISIT (OUTPATIENT)
Dept: URGENT CARE | Facility: CLINIC | Age: 50
End: 2020-03-06
Payer: COMMERCIAL

## 2020-03-06 ENCOUNTER — PATIENT MESSAGE (OUTPATIENT)
Dept: ORTHOPEDICS | Facility: CLINIC | Age: 50
End: 2020-03-06

## 2020-03-06 VITALS
BODY MASS INDEX: 26.4 KG/M2 | DIASTOLIC BLOOD PRESSURE: 97 MMHG | OXYGEN SATURATION: 98 % | HEART RATE: 68 BPM | WEIGHT: 149 LBS | TEMPERATURE: 97 F | SYSTOLIC BLOOD PRESSURE: 162 MMHG | RESPIRATION RATE: 18 BRPM | HEIGHT: 63 IN

## 2020-03-06 DIAGNOSIS — M62.838 TRAPEZIUS MUSCLE SPASM: ICD-10-CM

## 2020-03-06 DIAGNOSIS — M25.511 CHRONIC RIGHT SHOULDER PAIN: Primary | ICD-10-CM

## 2020-03-06 DIAGNOSIS — G89.29 CHRONIC RIGHT SHOULDER PAIN: Primary | ICD-10-CM

## 2020-03-06 PROCEDURE — 99214 PR OFFICE/OUTPT VISIT, EST, LEVL IV, 30-39 MIN: ICD-10-PCS | Mod: S$GLB,,, | Performed by: NURSE PRACTITIONER

## 2020-03-06 PROCEDURE — 99214 OFFICE O/P EST MOD 30 MIN: CPT | Mod: S$GLB,,, | Performed by: NURSE PRACTITIONER

## 2020-03-06 RX ORDER — TIZANIDINE 2 MG/1
2 TABLET ORAL EVERY 8 HOURS PRN
Qty: 60 TABLET | Refills: 0 | Status: SHIPPED | OUTPATIENT
Start: 2020-03-06 | End: 2021-09-27 | Stop reason: SDUPTHER

## 2020-03-06 NOTE — PROGRESS NOTES
"Subjective:       Patient ID: Jacqui Cruz is a 49 y.o. female.    Vitals:  height is 5' 3" (1.6 m) and weight is 67.6 kg (149 lb). Her oral temperature is 97.3 °F (36.3 °C). Her blood pressure is 162/97 (abnormal) and her pulse is 68. Her respiration is 18 and oxygen saturation is 98%.     Chief Complaint: Shoulder Pain    Pt presents for right shoulder pain. Pt has h/o chronic right shoulder pain and is being treated by Dr. Gresham orthopedic. She states after working last night she developed increased pain to her right shoulder with assoc trapezius muscle spasm. She is unable to get appt with Dr. Gresham until Monday and is here for work note and refill on tizanidine. She describes her right shoulder pain as aching quality, constant timing, worsens with movement. She has NSAIDs at home.     Shoulder Pain    Pertinent negatives include no fever or headaches.       Constitution: Negative for chills, fatigue and fever.   HENT: Negative for congestion and sore throat.    Neck: Negative for painful lymph nodes.   Cardiovascular: Negative for chest pain and leg swelling.   Eyes: Negative for double vision and blurred vision.   Respiratory: Negative for cough and shortness of breath.    Gastrointestinal: Negative for nausea, vomiting and diarrhea.   Genitourinary: Negative for dysuria, frequency, urgency and history of kidney stones.   Musculoskeletal: Positive for joint pain and muscle ache. Negative for joint swelling and muscle cramps.   Skin: Negative for color change, pale, rash and bruising.   Allergic/Immunologic: Negative for seasonal allergies.   Neurological: Negative for dizziness, history of vertigo, light-headedness, passing out and headaches.   Hematologic/Lymphatic: Negative for swollen lymph nodes.   Psychiatric/Behavioral: Negative for nervous/anxious, sleep disturbance and depression. The patient is not nervous/anxious.        Objective:      Physical Exam   Constitutional: She is oriented to " person, place, and time. She appears well-developed and well-nourished. She is cooperative.  Non-toxic appearance. She does not appear ill. No distress.   HENT:   Head: Normocephalic and atraumatic.   Right Ear: Hearing, tympanic membrane, external ear and ear canal normal.   Left Ear: Hearing, tympanic membrane, external ear and ear canal normal.   Nose: Nose normal. No mucosal edema, rhinorrhea or nasal deformity. No epistaxis. Right sinus exhibits no maxillary sinus tenderness and no frontal sinus tenderness. Left sinus exhibits no maxillary sinus tenderness and no frontal sinus tenderness.   Mouth/Throat: Uvula is midline, oropharynx is clear and moist and mucous membranes are normal. No trismus in the jaw. Normal dentition. No uvula swelling. No posterior oropharyngeal erythema.   Eyes: Pupils are equal, round, and reactive to light. Conjunctivae, EOM and lids are normal. Right eye exhibits no discharge. Left eye exhibits no discharge. No scleral icterus.   Neck: Trachea normal, normal range of motion, full passive range of motion without pain and phonation normal. Neck supple.   Cardiovascular: Normal rate, regular rhythm, normal heart sounds, intact distal pulses and normal pulses.   Pulmonary/Chest: Effort normal and breath sounds normal. No respiratory distress.   Abdominal: Soft. Normal appearance and bowel sounds are normal. She exhibits no distension, no pulsatile midline mass and no mass. There is no tenderness.   Musculoskeletal: Normal range of motion. She exhibits tenderness. She exhibits no edema or deformity.   Muscle spasm to right trapezius, pain to right shoulder with ROM, no edema, ecchymosis or rash.   Neurological: She is alert and oriented to person, place, and time. She exhibits normal muscle tone. Coordination normal.   Skin: Skin is warm, dry, intact, not diaphoretic and not pale.   Psychiatric: She has a normal mood and affect. Her speech is normal and behavior is normal. Judgment and  thought content normal. Cognition and memory are normal.   Nursing note and vitals reviewed.        Assessment:       1. Chronic right shoulder pain    2. Trapezius muscle spasm        Plan:         Chronic right shoulder pain    Trapezius muscle spasm    Other orders  -     tiZANidine (ZANAFLEX) 2 MG tablet; Take 1 tablet (2 mg total) by mouth every 8 (eight) hours as needed.  Dispense: 60 tablet; Refill: 0

## 2020-03-06 NOTE — LETTER
March 6, 2020      [facility]  [address]  [place] [address]  Phone: [phone]       Patient: Jacqui Cruz   YOB: 1970  Date of Visit: 03/06/2020    To Whom It May Concern:    Jh Cruz  was at [facility] on 03/06/2020. She may return to work/school on 3/7/20 with no restrictions. If you have any questions or concerns, or if I can be of further assistance, please do not hesitate to contact me.    Sincerely,    Daphne Cotter, NP

## 2020-03-06 NOTE — PATIENT INSTRUCTIONS
Neck Spasm     A spasm of the neck muscles can happen after a sudden awkward neck movement. Sleeping with your neck in a crooked position can also cause spasm. Some people respond to emotional stress by tensing the muscles of their neck, shoulders, and upper back. If neck spasm lasts long enough, it can cause headache.  The treatment described below will usually help the pain to go away in 5 to 7 days. Pain that continues may need further evaluation or other types of treatment such as physical therapy.  Home care  · Rest and relax the muscles. Use a comfortable pillow that supports the head and keeps the spine in a neutral position. The position of the head should not be tilted forward or backward. A rolled up towel may help for a custom fit.  · Some people find relief with heat. Heat can be applied with either a warm shower or bath or a moist towel heated in the microwave and massage. Others prefer cold packs. You can make an ice pack by filling a plastic bag that seals at the top with ice cubes or crushed ice and then wrapping it with a thin towel. Try both and use the method that feels best for 15 to 20 minutes, several times a day.  · Whether using ice or heat, be careful that you do not injure your skin. Never put ice directly on the skin. Always wrap the ice in a towel or other type of cloth. This is very important, especially in people with poor skin sensations.  · Try to reduce your stress level. Emotional stress can lead to neck muscle tension and get in the way of or delay the healing process.  · You may use over-the-counter pain medicine to control pain, unless another medicine was prescribed.If you have chronic liver or kidney disease or ever had a stomach ulcer or GI bleeding, talk with your healthcare provider before using these medicines.  Follow-up care  Follow up with your healthcare provider if your symptoms do not show signs of improvement after one week. Physical therapy or further tests may be  needed.  If X-rays, CT scans, or MRI scans were taken, you will be told of any new findings that may affect your care.  Call 911  Call 911 if you have:  · Sudden weakness or numbness in one or both arms  · Neck swelling, difficulty or painful swallowing  · Difficulty breathing  · Chest pain  When to seek medical advice  Call your healthcare provider right away if any of these occur:  · Pain becomes worse or spreads into one or both arms  · Increasing headache with nausea or vomiting  · Fever of 100.4°F (38°C) or above lasting for 24 to 48 hours  Date Last Reviewed: 11/21/2015  © 8946-5914 [employer]. [address] [place] [address], [place] [address]. All rights reserved. This information is not intended as a substitute for professional medical care. Always follow your healthcare professional's instructions.

## 2020-03-08 ENCOUNTER — PATIENT OUTREACH (OUTPATIENT)
Dept: ADMINISTRATIVE | Facility: OTHER | Age: 50
End: 2020-03-08

## 2020-03-17 ENCOUNTER — PATIENT MESSAGE (OUTPATIENT)
Dept: ORTHOPEDICS | Facility: CLINIC | Age: 50
End: 2020-03-17

## 2020-03-27 ENCOUNTER — CLINICAL SUPPORT (OUTPATIENT)
Dept: URGENT CARE | Facility: CLINIC | Age: 50
End: 2020-03-27
Payer: COMMERCIAL

## 2020-03-27 VITALS
DIASTOLIC BLOOD PRESSURE: 87 MMHG | TEMPERATURE: 98 F | HEART RATE: 75 BPM | HEIGHT: 62 IN | BODY MASS INDEX: 27.23 KG/M2 | SYSTOLIC BLOOD PRESSURE: 132 MMHG | OXYGEN SATURATION: 98 % | RESPIRATION RATE: 19 BRPM | WEIGHT: 148 LBS

## 2020-03-27 DIAGNOSIS — G89.29 CHRONIC RIGHT SHOULDER PAIN: Primary | ICD-10-CM

## 2020-03-27 DIAGNOSIS — M25.511 CHRONIC RIGHT SHOULDER PAIN: Primary | ICD-10-CM

## 2020-03-27 DIAGNOSIS — M62.838 TRAPEZIUS MUSCLE SPASM: ICD-10-CM

## 2020-03-27 PROCEDURE — 99214 PR OFFICE/OUTPT VISIT, EST, LEVL IV, 30-39 MIN: ICD-10-PCS | Mod: S$GLB,,, | Performed by: NURSE PRACTITIONER

## 2020-03-27 PROCEDURE — 99214 OFFICE O/P EST MOD 30 MIN: CPT | Mod: S$GLB,,, | Performed by: NURSE PRACTITIONER

## 2020-03-27 RX ORDER — LIDOCAINE 50 MG/G
1 PATCH TOPICAL DAILY
Qty: 15 PATCH | Refills: 0 | Status: SHIPPED | OUTPATIENT
Start: 2020-03-27 | End: 2022-03-21

## 2020-03-27 NOTE — LETTER
March 27, 2020      [facility]  [address]  [place] [address]  Phone: [phone]       Patient: Jacqui Cruz   YOB: 1970  Date of Visit: 03/27/2020    To Whom It May Concern:    Jh Cruz  was at [facility] on 03/27/2020. She may return to work/school on 3/28/2020 with same prior restrictions. If you have any questions or concerns, or if I can be of further assistance, please do not hesitate to contact me.    Sincerely,    Valencia Gupta NP

## 2020-03-27 NOTE — PROGRESS NOTES
"  Subjective:       Patient ID: Jacqui Cruz is a 49 y.o. female.    Vitals:  height is 5' 2" (1.575 m) and weight is 67.1 kg (148 lb). Her temperature is 98.4 °F (36.9 °C). Her blood pressure is 132/87 and her pulse is 75. Her respiration is 19 and oxygen saturation is 98%.     Chief Complaint: Follow-up    Pt presents for right shoulder pain. Pt has h/o chronic right shoulder pain and is being treated by Dr. Gresham orthopedic. She states after working last night she developed increased pain to her right shoulder with assoc trapezius muscle spasm. She describes her right shoulder pain as aching quality, constant timing, worsens with movement. She has NSAIDs at home.  She was last seen on 3/6/20 for chronic shoulder pain; she was prescribed Zanaflex states which does help but she cant that while working bc makes her drowsy.  She is looking for something else that she can take while working.  is wondering if theres something OTC she can take to get thru work      Shoulder Pain    Pertinent negatives include no fever or headaches.     Constitution: Negative for chills, fatigue and fever.   HENT: Negative for congestion and sore throat.    Neck: Negative for painful lymph nodes.   Cardiovascular: Negative for chest pain and leg swelling.   Eyes: Negative for double vision and blurred vision.   Respiratory: Negative for cough and shortness of breath.    Gastrointestinal: Negative for nausea, vomiting and diarrhea.   Genitourinary: Negative for dysuria, frequency, urgency and history of kidney stones.   Musculoskeletal: Positive for joint pain and muscle ache. Negative for joint swelling and muscle cramps.   Skin: Negative for color change, pale, rash and bruising.   Allergic/Immunologic: Negative for seasonal allergies.   Neurological: Negative for dizziness, history of vertigo, light-headedness, passing out and headaches.   Hematologic/Lymphatic: Negative for swollen lymph nodes.   Psychiatric/Behavioral: Negative " for nervous/anxious, sleep disturbance and depression. The patient is not nervous/anxious.        Objective:      Physical Exam   Constitutional: She is oriented to person, place, and time. She appears well-developed and well-nourished. She is cooperative.  Non-toxic appearance. She does not appear ill. No distress.   HENT:   Head: Normocephalic and atraumatic.   Right Ear: Hearing, tympanic membrane, external ear and ear canal normal.   Left Ear: Hearing, tympanic membrane, external ear and ear canal normal.   Nose: Nose normal. No mucosal edema, rhinorrhea or nasal deformity. No epistaxis. Right sinus exhibits no maxillary sinus tenderness and no frontal sinus tenderness. Left sinus exhibits no maxillary sinus tenderness and no frontal sinus tenderness.   Mouth/Throat: Uvula is midline, oropharynx is clear and moist and mucous membranes are normal. No trismus in the jaw. Normal dentition. No uvula swelling. No posterior oropharyngeal erythema.   Eyes: Pupils are equal, round, and reactive to light. Conjunctivae, EOM and lids are normal. Right eye exhibits no discharge. Left eye exhibits no discharge. No scleral icterus.   Neck: Trachea normal, normal range of motion, full passive range of motion without pain and phonation normal. Neck supple.   Cardiovascular: Normal rate, regular rhythm, normal heart sounds, intact distal pulses and normal pulses.   Pulmonary/Chest: Effort normal and breath sounds normal. No respiratory distress.   Abdominal: Soft. Normal appearance and bowel sounds are normal. She exhibits no distension, no pulsatile midline mass and no mass. There is no tenderness.   Musculoskeletal: Normal range of motion. She exhibits tenderness. She exhibits no edema or deformity.   Muscle spasm to right trapezius, pain to right shoulder with ROM, no edema, ecchymosis or rash.   Neurological: She is alert and oriented to person, place, and time. She exhibits normal muscle tone. Coordination normal.   Skin:  Skin is warm, dry, intact, not diaphoretic and not pale.   Psychiatric: She has a normal mood and affect. Her speech is normal and behavior is normal. Judgment and thought content normal. Cognition and memory are normal.   Nursing note and vitals reviewed.        Assessment:       1. Chronic right shoulder pain    2. Trapezius muscle spasm        Plan:         Chronic right shoulder pain  -     lidocaine (LIDODERM) 5 %; Place 1 patch onto the skin once daily. Remove & Discard patch within 12 hours or as directed by MD  Dispense: 15 patch; Refill: 0    Trapezius muscle spasm  -     lidocaine (LIDODERM) 5 %; Place 1 patch onto the skin once daily. Remove & Discard patch within 12 hours or as directed by MD  Dispense: 15 patch; Refill: 0

## 2020-04-10 ENCOUNTER — OFFICE VISIT (OUTPATIENT)
Dept: URGENT CARE | Facility: CLINIC | Age: 50
End: 2020-04-10
Payer: COMMERCIAL

## 2020-04-10 VITALS
SYSTOLIC BLOOD PRESSURE: 162 MMHG | TEMPERATURE: 97 F | DIASTOLIC BLOOD PRESSURE: 100 MMHG | HEIGHT: 61 IN | BODY MASS INDEX: 27.56 KG/M2 | HEART RATE: 68 BPM | WEIGHT: 146 LBS | RESPIRATION RATE: 16 BRPM | OXYGEN SATURATION: 99 %

## 2020-04-10 DIAGNOSIS — K04.7 DENTAL ABSCESS: ICD-10-CM

## 2020-04-10 DIAGNOSIS — M27.2 ABSCESS OF JAW: Primary | ICD-10-CM

## 2020-04-10 PROCEDURE — 99214 PR OFFICE/OUTPT VISIT, EST, LEVL IV, 30-39 MIN: ICD-10-PCS | Mod: S$GLB,,, | Performed by: NURSE PRACTITIONER

## 2020-04-10 PROCEDURE — 99214 OFFICE O/P EST MOD 30 MIN: CPT | Mod: S$GLB,,, | Performed by: NURSE PRACTITIONER

## 2020-04-10 RX ORDER — TRAMADOL HYDROCHLORIDE 50 MG/1
50 TABLET ORAL EVERY 6 HOURS PRN
Qty: 12 TABLET | Refills: 0 | Status: SHIPPED | OUTPATIENT
Start: 2020-04-10 | End: 2022-08-22 | Stop reason: SDUPTHER

## 2020-04-10 RX ORDER — PENICILLIN V POTASSIUM 500 MG/1
500 TABLET, FILM COATED ORAL EVERY 6 HOURS
Qty: 28 TABLET | Refills: 0 | Status: SHIPPED | OUTPATIENT
Start: 2020-04-10 | End: 2020-04-17

## 2020-04-10 RX ORDER — CHLORHEXIDINE GLUCONATE ORAL RINSE 1.2 MG/ML
15 SOLUTION DENTAL 2 TIMES DAILY
Qty: 300 ML | Refills: 0 | Status: SHIPPED | OUTPATIENT
Start: 2020-04-10 | End: 2020-04-24

## 2020-04-10 NOTE — LETTER
April 10, 2020      [facility]  [address]  [place] [address]  Phone: [phone]       Patient: Jacqui Cruz   YOB: 1970  Date of Visit: 04/10/2020    To Whom It May Concern:    Jh Cruz  was at [facility] on 04/10/2020. She may return to work/school on 04/12/2020 with no restrictions.  Please allow frequent bathroom breaks as needed. If you have any questions or concerns, or if I can be of further assistance, please do not hesitate to contact me.    Sincerely,    Pelon Mcintyre MA

## 2020-04-10 NOTE — PATIENT INSTRUCTIONS
Dental Abscess  An abscess is a sac of pus. A dental abscess forms when a tooth or the tissue around it becomes infected with bacteria. The bacteria can enter through a cavity or a crack in a tooth. It can also infect the gum tissue or bone around a tooth. An untreated abscess can cause the loss of the tooth. It can even spread to other parts of the body and become life-threatening.    Symptoms of a dental abscess   Signs of a dental abscess include:  · Toothache, often severe  · Tooth pain with hot, cold, or pressure  · Pain in the gums, cheek, or jaw  · Bad breath or bitter taste in the mouth  · Trouble swallowing or opening the mouth  · Fever  · Swollen or enlarged glands in the neck  Diagnosing a dental abscess  An abscess is diagnosed by looking at your teeth and gums. You will be told if any tests, like dental X-rays, are needed.  Treating a dental abscess  Treatments for a dental abscess may include the following:  · Antibiotic medicines to treat the underlying infection.  · Pain relievers to help you feel more comfortable. Your health care provider may prescribe a medicine for you. Or, use over-the-counter pain relievers, like acetaminophen or ibuprofen.  · Warm saltwater rinses to soothe discomfort and help clear away pus.  · Root canal surgery if needed to save the tooth. With a root canal, the infected part of the tooth is removed. A special substance is then used to fill the empty space in the tooth.  · Drainage of the abscess if needed. Incisions are made to allow the infected material to drain from the tooth.  · Removal of the tooth in cases of severe infection that cant be treated another way.  If the infection is severe, has spread, or doesnt respond to treatment, you may need to be admitted to a hospital.        When to call the dentist  Call your dentist right away if you have any of the following:  · Fever of 100.4°F (38°C) or higher  · Increased pain, redness, drainage, or swelling in the  treated area  · Swelling of the face or jawbone  · Pain that cannot be controlled with medicines   Preventing dental abscess  To prevent another abscess in the future, keep your teeth clean and healthy. Brush twice a day and floss at least once daily. See your dentist for regular tooth cleanings. And avoid sugary foods and drinks that can lead to tooth decay.  Date Last Reviewed: 7/14/2015  © 1612-7470 [employer]. [address] [place] [address], [place] [address]. All rights reserved. This information is not intended as a substitute for professional medical care. Always follow your healthcare professional's instructions.

## 2020-04-10 NOTE — PROGRESS NOTES
"Subjective:       Patient ID: Jacqui Cruz is a 49 y.o. female.    Vitals:  height is 5' 1" (1.549 m) and weight is 66.2 kg (146 lb). Her temperature is 97.3 °F (36.3 °C). Her blood pressure is 162/100 (abnormal) and her pulse is 68. Her respiration is 16 and oxygen saturation is 99%.     Chief Complaint: Abscess    Left side Ear and face hurting for 3 days. It has started swelling.   Treatments tried- motrin, tylenol    Abscess   Chronicity:  New  Associated Symptoms: no fever, no chills      Constitution: Negative for appetite change, chills and fever.   HENT: Positive for dental problem. Negative for ear pain, drooling, congestion and sore throat.    Neck: Negative for painful lymph nodes.   Eyes: Negative for eye discharge and eye redness.   Respiratory: Negative for cough.    Gastrointestinal: Negative for vomiting and diarrhea.   Genitourinary: Negative for dysuria.   Musculoskeletal: Negative for muscle ache.   Skin: Positive for abscess. Negative for rash and erythema.   Neurological: Negative for headaches and seizures.   Hematologic/Lymphatic: Negative for swollen lymph nodes.       Objective:      Physical Exam   Constitutional: She is oriented to person, place, and time. She appears well-developed and well-nourished.   HENT:   Head: Normocephalic and atraumatic. Head is without abrasion, without contusion and without laceration.   Right Ear: External ear normal.   Left Ear: External ear normal.   Nose: Nose normal.   Mouth/Throat: Uvula is midline, oropharynx is clear and moist and mucous membranes are normal. Dental abscesses and dental caries present.       Eyes: Pupils are equal, round, and reactive to light. Conjunctivae, EOM and lids are normal.   Neck: Trachea normal, full passive range of motion without pain and phonation normal. Neck supple.   Cardiovascular: Normal rate, regular rhythm and normal heart sounds.   Pulmonary/Chest: Effort normal and breath sounds normal. No stridor. No " respiratory distress.   Musculoskeletal: Normal range of motion.   Neurological: She is alert and oriented to person, place, and time.   Skin: Skin is warm, dry, intact and no rash. Capillary refill takes less than 2 seconds. abrasion, burn, bruising, erythema and ecchymosis  Psychiatric: She has a normal mood and affect. Her speech is normal and behavior is normal. Judgment and thought content normal. Cognition and memory are normal.   Nursing note and vitals reviewed.        Assessment:       1. Abscess of jaw    2. Dental abscess        Plan:         Abscess of jaw    Dental abscess    Other orders  -     penicillin v potassium (VEETID) 500 MG tablet; Take 1 tablet (500 mg total) by mouth every 6 (six) hours. for 7 days  Dispense: 28 tablet; Refill: 0  -     chlorhexidine (PERIDEX) 0.12 % solution; Use as directed 15 mLs in the mouth or throat 2 (two) times daily. for 14 days  Dispense: 300 mL; Refill: 0  -     traMADoL (ULTRAM) 50 mg tablet; Take 1 tablet (50 mg total) by mouth every 6 (six) hours as needed for Pain.  Dispense: 12 tablet; Refill: 0

## 2020-04-10 NOTE — LETTER
April 10, 2020      [facility]  [address]  [facility] [address]  Phone: [phone]       Patient: Jacqui Cruz   YOB: 1970  Date of Visit: 04/10/2020    To Whom It May Concern:    Jh Cruz  was at [facility] on 04/10/2020. She may return to work/school on 4/11/2020 with no restrictions. Please allow frequent bathroom breaks as needed. If you have any questions or concerns, or if I can be of further assistance, please do not hesitate to contact me.    Sincerely,    AMADEO Coreas

## 2020-05-05 ENCOUNTER — PATIENT MESSAGE (OUTPATIENT)
Dept: ADMINISTRATIVE | Facility: HOSPITAL | Age: 50
End: 2020-05-05

## 2020-06-02 ENCOUNTER — PATIENT MESSAGE (OUTPATIENT)
Dept: FAMILY MEDICINE | Facility: CLINIC | Age: 50
End: 2020-06-02

## 2020-06-02 ENCOUNTER — OFFICE VISIT (OUTPATIENT)
Dept: FAMILY MEDICINE | Facility: CLINIC | Age: 50
End: 2020-06-02
Payer: COMMERCIAL

## 2020-06-02 ENCOUNTER — TELEPHONE (OUTPATIENT)
Dept: FAMILY MEDICINE | Facility: CLINIC | Age: 50
End: 2020-06-02

## 2020-06-02 DIAGNOSIS — J32.9 BACTERIAL SINUSITIS: Primary | ICD-10-CM

## 2020-06-02 DIAGNOSIS — B96.89 BACTERIAL SINUSITIS: Primary | ICD-10-CM

## 2020-06-02 DIAGNOSIS — B34.9 ACUTE VIRAL SYNDROME: ICD-10-CM

## 2020-06-02 DIAGNOSIS — H10.31 ACUTE BACTERIAL CONJUNCTIVITIS OF RIGHT EYE: ICD-10-CM

## 2020-06-02 PROCEDURE — 99213 OFFICE O/P EST LOW 20 MIN: CPT | Mod: 95,,, | Performed by: NURSE PRACTITIONER

## 2020-06-02 PROCEDURE — 99213 PR OFFICE/OUTPT VISIT, EST, LEVL III, 20-29 MIN: ICD-10-PCS | Mod: 95,,, | Performed by: NURSE PRACTITIONER

## 2020-06-02 RX ORDER — METHYLPREDNISOLONE 4 MG/1
TABLET ORAL
Qty: 1 PACKAGE | Refills: 0 | Status: SHIPPED | OUTPATIENT
Start: 2020-06-02 | End: 2022-03-21

## 2020-06-02 RX ORDER — AZITHROMYCIN 250 MG/1
TABLET, FILM COATED ORAL
Qty: 6 TABLET | Refills: 0 | Status: SHIPPED | OUTPATIENT
Start: 2020-06-02 | End: 2020-06-07

## 2020-06-02 RX ORDER — POLYMYXIN B SULFATE AND TRIMETHOPRIM 1; 10000 MG/ML; [USP'U]/ML
1 SOLUTION OPHTHALMIC EVERY 6 HOURS
Qty: 10.1334 ML | Refills: 0 | Status: SHIPPED | OUTPATIENT
Start: 2020-06-02 | End: 2020-07-10

## 2020-06-02 NOTE — LETTER
June 2, 2020      [facility]  [address]  [place] [address]  Phone: [phone]  Fax: [phone]       Patient: Jacqui Cruz   YOB: 1970  Date of Visit: 06/02/2020    To Whom It May Concern:    Jh Cruz  was at [facility] on 06/02/2020. She may return to work/school on 6/4/2020 with no restrictions. If you have any questions or concerns, or if I can be of further assistance, please do not hesitate to contact me.    Sincerely,    Rashida Gonzalez, DNP

## 2020-06-02 NOTE — PROGRESS NOTES
The patient location is: home  The chief complaint leading to consultation is: eye irritation, sinus problem    Visit type: audiovisual    Face to Face time with patient: 10 minutes  20 minutes of total time spent on the encounter, which includes face to face time and non-face to face time preparing to see the patient (eg, review of tests), Obtaining and/or reviewing separately obtained history, Documenting clinical information in the electronic or other health record, Independently interpreting results (not separately reported) and communicating results to the patient/family/caregiver, or Care coordination (not separately reported).         Each patient to whom he or she provides medical services by telemedicine is:  (1) informed of the relationship between the physician and patient and the respective role of any other health care provider with respect to management of the patient; and (2) notified that he or she may decline to receive medical services by telemedicine and may withdraw from such care at any time.    Notes:    Patient ID: Jacqui Cruz is a 50 y.o. female.    Chief Complaint:   Sinus Problem and Conjunctivitis    Sinus Problem   This is a new problem. The current episode started in the past 7 days. The problem has been gradually worsening since onset. There has been no fever. The pain is mild. Associated symptoms include congestion, sinus pressure, sneezing and a sore throat. Pertinent negatives include no chills, coughing, diaphoresis, ear pain, headaches, hoarse voice, neck pain, shortness of breath or swollen glands. Past treatments include saline sprays. The treatment provided no relief.   Conjunctivitis   This is a new problem. The current episode started yesterday. The problem has been gradually worsening. Associated symptoms include congestion, nausea and a sore throat. Pertinent negatives include no abdominal pain, anorexia, arthralgias, change in bowel habit, chest pain, chills, coughing,  diaphoresis, fatigue, fever, headaches, joint swelling, myalgias, neck pain, numbness, rash, swollen glands, urinary symptoms, vertigo, visual change, vomiting or weakness. Nothing aggravates the symptoms. Treatments tried: visine, warm compress. The treatment provided no relief.        Patient is new to me. Reviewed past medical and social history.    Past Medical History:   Diagnosis Date    Asthma     Hypertension      Past Surgical History:   Procedure Laterality Date    BREAST BIOPSY Left 2012    Benign calcifications    BREAST SURGERY      biopsy    DILATION AND CURETTAGE OF UTERUS       Current Outpatient Medications on File Prior to Visit   Medication Sig Dispense Refill    albuterol (ACCUNEB) 1.25 mg/3 mL Nebu Take 3 mLs (1.25 mg total) by nebulization every 6 (six) hours as needed. 25 vial 11    beclomethasone (QVAR) 40 mcg/actuation Aero Inhale 1 puff into the lungs 2 (two) times daily. 120 each 3    cetirizine (ZYRTEC) 10 MG tablet Take 1 tablet (10 mg total) by mouth once daily. 30 tablet 2    fluticasone (FLONASE SENSIMIST) 27.5 mcg/actuation nasal spray 2 sprays by Nasal route once daily. 15.8 mL 0    fluticasone propionate (FLONASE) 50 mcg/actuation nasal spray USE 2 SPRAY(S) IN EACH NOSTRIL TWICE DAILY  0    ibuprofen (ADVIL,MOTRIN) 800 MG tablet Take 1 tablet (800 mg total) by mouth 3 (three) times daily. 90 tablet 0    lidocaine (LIDODERM) 5 % Place 1 patch onto the skin once daily. Remove & Discard patch within 12 hours or as directed by MD 15 patch 0    lisinopril 10 MG tablet       losartan-hydrochlorothiazide 50-12.5 mg (HYZAAR) 50-12.5 mg per tablet Take 1 tablet by mouth once daily. 90 tablet 3    PENNSAID 20 mg/gram /actuation(2 %) sopm APPLY 2 PUMPS TO THE AFFECTED AREA TWICE DAILY 112 g 0    sodium chloride (SALINE NASAL MIST) 3 % Mist 1 spray by Nasal route every 4 (four) hours as needed.      tiZANidine (ZANAFLEX) 2 MG tablet Take 1 tablet (2 mg total) by mouth every 8  (eight) hours as needed. 60 tablet 0    traMADoL (ULTRAM) 50 mg tablet Take 1 tablet (50 mg total) by mouth every 6 (six) hours as needed for Pain. 12 tablet 0    [DISCONTINUED] loratadine (CLARITIN) 10 mg tablet Take 1 tablet (10 mg total) by mouth once daily.  0    [DISCONTINUED] methylPREDNISolone (MEDROL DOSEPACK) 4 mg tablet use as directed 1 Package 0    [DISCONTINUED] predniSONE (DELTASONE) 5 MG tablet TAKE 4 TABLETS BY MOUTH TWICE DAILY FOR 5 DAYS  0     No current facility-administered medications on file prior to visit.        Review of Systems   Constitutional: Negative for chills, diaphoresis, fatigue and fever.   HENT: Positive for congestion, postnasal drip, rhinorrhea, sinus pressure, sinus pain, sneezing and sore throat. Negative for ear pain and hoarse voice.    Respiratory: Negative for cough, shortness of breath and wheezing.    Cardiovascular: Negative for chest pain and palpitations.   Gastrointestinal: Positive for nausea. Negative for abdominal pain, anorexia, change in bowel habit, constipation, diarrhea and vomiting.   Musculoskeletal: Negative for arthralgias, joint swelling, myalgias and neck pain.   Skin: Negative for rash.   Neurological: Negative for vertigo, weakness, numbness and headaches.   All other systems reviewed and are negative.      Objective:      Physical Exam   Constitutional: She is oriented to person, place, and time. She appears well-developed and well-nourished.   HENT:   Head: Normocephalic and atraumatic.   Nose: Right sinus exhibits maxillary sinus tenderness. Left sinus exhibits maxillary sinus tenderness.   Sinus tenderness per patient assessment   Eyes: Right eye exhibits discharge.   Neck: Normal range of motion.   Pulmonary/Chest: Effort normal.   Neurological: She is alert and oriented to person, place, and time.   Psychiatric: She has a normal mood and affect.       Assessment:       1. Bacterial sinusitis    2. Acute viral syndrome    3. Acute bacterial  conjunctivitis of right eye        Plan:       Jacqui was seen today for sinus problem and conjunctivitis.    Diagnoses and all orders for this visit:    Bacterial sinusitis  -     azithromycin (Z-EFREM) 250 MG tablet; Take 2 tablets by mouth on day 1; Take 1 tablet by mouth on days 2-5  -     methylPREDNISolone (MEDROL DOSEPACK) 4 mg tablet; use as directed    Acute viral syndrome    Acute bacterial conjunctivitis of right eye  -     polymyxin B sulf-trimethoprim (POLYTRIM) 10,000 unit- 1 mg/mL Drop; Place 1 drop into the right eye every 6 (six) hours.    Patient education provided.  All questions and concerns addressed  RTC PRN and if symptoms worsen or fail to improve  Patient verbalizes understanding        Patient Instructions       Conjunctivitis, Bacterial    You have an infection in the membranes covering the white part of the eye. This part of the eye is called the conjunctiva. The infection is called conjunctivitis. The most common symptoms of conjunctivitis include a thick, pus-like discharge from the eye, swollen eyelids, redness, eyelids sticking together upon awakening, and a gritty or scratchy feeling in the eye. Your infection was caused by bacteria. It may be treated with medicine. With treatment, the infection takes about 7 to 10 days to resolve.  Home care  · Use prescribed antibiotic eye drops or ointment as directed to treat the infection.  · Apply a warm compress (towel soaked in warm water) to the affected eye 3 to 4 times a day. Do this just before applying medicine to the eye.  · Use a warm, wet cloth to wipe away crusting of the eyelids in the morning. This is caused by mucus drainage during the night. You may also use saline irrigating solution or artificial tears to rinse away mucus in the eye. Do not put a patch over the eye.  · Wash your hands before and after touching the infected eye. This is to prevent spreading the infection to the other eye, and to other people. Do not share your  towels or washcloths with others.  · You may use acetaminophen or ibuprofen to control pain, unless another medicine was prescribed. (Note: If you have chronic liver or kidney disease or have ever had a stomach ulcer or gastrointestinal bleeding, talk with your doctor before using these medicines.)  · Do not wear contact lenses until your eyes have healed and all symptoms are gone.  Follow-up care  Follow up with your healthcare provider, or as advised.  When to seek medical advice  Call your healthcare provider right away if any of these occur:  · Worsening vision  · Increasing pain in the eye  · Increasing swelling or redness of the eyelid  · Redness spreading around the eye  Date Last Reviewed: 6/14/2015  © 8086-4094 [employer]. [address] [place] [address], [place] [address]. All rights reserved. This information is not intended as a substitute for professional medical care. Always follow your healthcare professional's instructions.        Self-Care for Sinusitis     Drinking plenty of water can help sinuses drain.   Sinusitis can often be managed with self-care. Self-care can keep sinuses moist and make you feel more comfortable. Remember to follow your doctor's instructions closely. This can make a big difference in getting your sinus problem under control.  Drink fluids  Drinking extra fluids helps thin your mucus. This lets it drain from your sinuses more easily. Have a glass of water every hour or two. A humidifier helps in much the same way. Fluids can also offset the drying effects of certain medicines. If you use a humidifier, follow the product maker's instructions on how to use it. Clean it on a regular schedule.  Use saltwater rinses  Rinses help keep your sinuses and nose moist. Mix a teaspoon of salt in 8 ounces of fresh, warm water. Use a bulb syringe to gently squirt the water into your nose a few times a day. You can also buy ready-made saline nasal sprays.  Apply hot or cold  packs  Applying heat to the area surrounding your sinuses may make you feel more comfortable. Use a hot water bottle or a hand towel dipped in hot water. Some people also find ice packs effective for relieving pain.  Medicines  Your doctor may prescribe medications to help treat your sinusitis. If you have an infection, antibiotics can help clear it up. If you are prescribed antibiotics, take all pills on schedule until they are gone, even if you feel better. Decongestants help relieve swelling. Use decongestant sprays for short periods only under the direction of your doctor. If you have allergies, your doctor may prescribe medications to help relieve them.   Date Last Reviewed: 10/1/2016  © 5366-4835 [employer]. [address] [place] [address], [place] [address]. All rights reserved. This information is not intended as a substitute for professional medical care. Always follow your healthcare professional's instructions.

## 2020-06-02 NOTE — TELEPHONE ENCOUNTER
----- Message from Sarahy Rodriguez sent at 6/2/2020  9:09 AM CDT -----  Contact: pt  Type:  Same Day Appointment Request    Caller is requesting a same day appointment.  Caller declined first available appointment listed below.      Name of Caller:  pt  When is the first available appointment?  06/03  Symptoms:  Pinkeye and poss sinus infection  Best Call Back Number:  [phone] (home)     Additional Information:        I have reviewed and confirmed nurses' notes...

## 2020-06-02 NOTE — PATIENT INSTRUCTIONS
Conjunctivitis, Bacterial    You have an infection in the membranes covering the white part of the eye. This part of the eye is called the conjunctiva. The infection is called conjunctivitis. The most common symptoms of conjunctivitis include a thick, pus-like discharge from the eye, swollen eyelids, redness, eyelids sticking together upon awakening, and a gritty or scratchy feeling in the eye. Your infection was caused by bacteria. It may be treated with medicine. With treatment, the infection takes about 7 to 10 days to resolve.  Home care  · Use prescribed antibiotic eye drops or ointment as directed to treat the infection.  · Apply a warm compress (towel soaked in warm water) to the affected eye 3 to 4 times a day. Do this just before applying medicine to the eye.  · Use a warm, wet cloth to wipe away crusting of the eyelids in the morning. This is caused by mucus drainage during the night. You may also use saline irrigating solution or artificial tears to rinse away mucus in the eye. Do not put a patch over the eye.  · Wash your hands before and after touching the infected eye. This is to prevent spreading the infection to the other eye, and to other people. Do not share your towels or washcloths with others.  · You may use acetaminophen or ibuprofen to control pain, unless another medicine was prescribed. (Note: If you have chronic liver or kidney disease or have ever had a stomach ulcer or gastrointestinal bleeding, talk with your doctor before using these medicines.)  · Do not wear contact lenses until your eyes have healed and all symptoms are gone.  Follow-up care  Follow up with your healthcare provider, or as advised.  When to seek medical advice  Call your healthcare provider right away if any of these occur:  · Worsening vision  · Increasing pain in the eye  · Increasing swelling or redness of the eyelid  · Redness spreading around the eye  Date Last Reviewed: 6/14/2015  © 3225-7600 [employer]. [address] [place] [address], [place] [address]. All rights reserved. This information is not intended as a substitute for professional medical care. Always follow your healthcare professional's instructions.        Self-Care for Sinusitis     Drinking plenty of water can help sinuses drain.   Sinusitis can often be managed with self-care. Self-care can keep sinuses moist and make you feel more comfortable. Remember to follow your doctor's instructions closely. This can make a big difference in getting your sinus problem under control.  Drink fluids  Drinking extra fluids helps thin your mucus. This lets it drain from your sinuses more easily. Have a glass of water every hour or two. A humidifier helps in much the same way. Fluids can also offset the drying effects of certain medicines. If you use a humidifier, follow the product maker's instructions on how to use it. Clean it on a regular schedule.  Use saltwater rinses  Rinses help keep your sinuses and nose moist. Mix a teaspoon of salt in 8 ounces of fresh, warm water. Use a bulb syringe to gently squirt the water into your nose a few times a day. You can also buy ready-made saline nasal sprays.  Apply hot or cold packs  Applying heat to the area surrounding your sinuses may make you feel more comfortable. Use a hot water bottle or a hand towel dipped in hot water. Some people also find ice packs effective for relieving pain.  Medicines  Your doctor may prescribe medications to help treat your sinusitis. If you have an infection, antibiotics can help clear it up. If you are prescribed antibiotics, take all pills on schedule until they are gone, even if you feel better. Decongestants help relieve swelling. Use decongestant sprays for short periods only under the direction of your doctor. If you have allergies, your doctor may prescribe medications to help relieve them.   Date Last Reviewed: 10/1/2016  © 2304-5292 [employer]. [address] [place] [address]  [place] [address]. All rights reserved. This information is not intended as a substitute for professional medical care. Always follow your healthcare professional's instructions.

## 2020-06-22 ENCOUNTER — OFFICE VISIT (OUTPATIENT)
Dept: URGENT CARE | Facility: CLINIC | Age: 50
End: 2020-06-22
Payer: COMMERCIAL

## 2020-06-22 VITALS
WEIGHT: 150 LBS | SYSTOLIC BLOOD PRESSURE: 139 MMHG | HEART RATE: 63 BPM | DIASTOLIC BLOOD PRESSURE: 91 MMHG | OXYGEN SATURATION: 98 % | HEIGHT: 62 IN | BODY MASS INDEX: 27.6 KG/M2 | TEMPERATURE: 98 F | RESPIRATION RATE: 16 BRPM

## 2020-06-22 DIAGNOSIS — M25.511 CHRONIC RIGHT SHOULDER PAIN: Primary | ICD-10-CM

## 2020-06-22 DIAGNOSIS — G89.29 CHRONIC RIGHT SHOULDER PAIN: Primary | ICD-10-CM

## 2020-06-22 PROCEDURE — 99214 PR OFFICE/OUTPT VISIT, EST, LEVL IV, 30-39 MIN: ICD-10-PCS | Mod: S$GLB,,, | Performed by: NURSE PRACTITIONER

## 2020-06-22 PROCEDURE — 99214 OFFICE O/P EST MOD 30 MIN: CPT | Mod: S$GLB,,, | Performed by: NURSE PRACTITIONER

## 2020-06-22 NOTE — LETTER
June 22, 2020      [facility]  [address]  [place] [address]  Phone: [phone]       Patient: Jacqui Cruz   YOB: 1970  Date of Visit: 06/22/2020    To Whom It May Concern:    Jh Cruz  was at [facility] on 06/23/2020. She may return to work/school on 06/24/2020 with no restrictions. Please excuse her form 06/20/2020- 06/23/202. If you have any questions or concerns, or if I can be of further assistance, please do not hesitate to contact me.    Sincerely,    Pelon Mcintyre MA

## 2020-06-22 NOTE — LETTER
June 22, 2020      [facility]  [address]  [facility] [place] [address]  Phone: [phone]       Patient: Jacqui Cruz   YOB: 1970  Date of Visit: 06/22/2020    To Whom It May Concern:    Jh Cruz  was at [facility] on 06/22/2020. She may return to work/school on 6- with no restrictions. Please excuse  6- through 6-210-2020 as well. If you have any questions or concerns, or if I can be of further assistance, please do not hesitate to contact me.    Sincerely,    Blanca Rodriguez NP

## 2020-06-22 NOTE — TELEPHONE ENCOUNTER
----- Message from Alex Garcia sent at 6/22/2020 11:44 AM CDT -----  Regarding: wants to be seen today  Contact: pt [phone]  wants to be seen today, I cant riley

## 2020-06-22 NOTE — LETTER
June 23, 2020      [facility]  [address]  [place] [address]  Phone: [phone]       Patient: Jacqui Cruz   YOB: 1970  Date of Visit: 06/22/2020    To Whom It May Concern:    Jh Cruz  was at [facility] on 06/22/2020. She may return to work/school on 06/24/2020 with no restrictions.Please excuse her from 06/20/2020-06/23/2020.  If you have any questions or concerns, or if I can be of further assistance, please do not hesitate to contact me.    Sincerely,    Pelon Mcintyre MA

## 2020-06-22 NOTE — TELEPHONE ENCOUNTER
Returned call to patient. Patient requesting work excuse for missing work on Saturday. Informed patient that I could not do that without orders from Dr. Gresham. Informed patient he was not in clinic today.  Appointment made for 6/30/2020 at 2:45 for right shoulder pain.     Patient requesting refill on medication for Motrin. Informed patient I would send to Dr. Gresham.    Patient verbalized understanding.     Please advise: last refill 5/29/19 #90 Motrin 800mg

## 2020-06-23 RX ORDER — IBUPROFEN 800 MG/1
800 TABLET ORAL 3 TIMES DAILY
Qty: 90 TABLET | Refills: 0 | Status: SHIPPED | OUTPATIENT
Start: 2020-06-23 | End: 2021-05-06 | Stop reason: SDUPTHER

## 2020-06-23 NOTE — PATIENT INSTRUCTIONS
Shoulder Problems  Arthritis, injury, bone disease, and torn muscles and tendons can cause pain, stiffness, and sometimes swelling in your shoulder. Then even simple movements become painful and difficult.    Osteoarthritis  Osteoarthritis is a wearing away of your joint. Your cartilage becomes cracked and pitted, and your socket may wear down. Eventually, your bone is exposed and may develop growths called spurs. Without a cushion of cartilage, your joint becomes stiff and painful. It may feel as if its grinding or slipping out of place when you move your arm.    Inflammatory (rheumatoid) arthritis  Inflammatory arthritis is a chronic joint disease. Your synovium (the membrane that lines your joints) thickens. It then forms a tissue growth (pannus) that clings to your cartilage and releases chemicals that destroy it. Your joint may become red, swollen, and warm. Pain may radiate into your neck and arm. Over time, your joint may get stiff and your muscles may weaken from disuse. Your bone may also be destroyed.     Fracture  A fracture can occur when you fall on an outstretched hand or elbow. The ball and/or tuberosities can break off, leaving your arm bone in pieces. A fractured shoulder is painful and may be black and blue and look deformed.    Avascular necrosis  A number of conditions, including long-term use of steroids or alcohol, can cause the blood supply to your bone to be cut off. As the bone dies, it collapses. Your shoulder becomes painful and movement is limited.    Rotator cuff tear  A chronic rotator cuff tear may lead to severe arthritis. As the ball rides up against your acromion, your joint becomes painful, stiff, and weak. Surgery can relieve the pain, but you may never regain flexibility and strength.  Date Last Reviewed: 9/26/2015  © 5576-5200 [employer]. [address] [place] [address], [place] [address]. All rights reserved. This information is not intended as a substitute for  professional medical care. Always follow your healthcare professional's instructions.

## 2020-06-25 ENCOUNTER — PATIENT OUTREACH (OUTPATIENT)
Dept: ADMINISTRATIVE | Facility: OTHER | Age: 50
End: 2020-06-25

## 2020-06-25 DIAGNOSIS — M25.511 RIGHT SHOULDER PAIN, UNSPECIFIED CHRONICITY: Primary | ICD-10-CM

## 2020-06-25 NOTE — PROGRESS NOTES
Requested updates within Care Everywhere.  Patient's chart was reviewed for overdue ASPEN topics.   updated

## 2020-06-30 ENCOUNTER — HOSPITAL ENCOUNTER (OUTPATIENT)
Dept: RADIOLOGY | Facility: HOSPITAL | Age: 50
Discharge: HOME | End: 2020-06-30
Attending: ORTHOPAEDIC SURGERY
Payer: COMMERCIAL

## 2020-06-30 ENCOUNTER — OFFICE VISIT (OUTPATIENT)
Dept: ORTHOPEDICS | Facility: CLINIC | Age: 50
End: 2020-06-30
Payer: COMMERCIAL

## 2020-06-30 VITALS — WEIGHT: 150 LBS | BODY MASS INDEX: 27.6 KG/M2 | RESPIRATION RATE: 15 BRPM | TEMPERATURE: 98 F | HEIGHT: 62 IN

## 2020-06-30 DIAGNOSIS — M25.611 GLENOHUMERAL INTERNAL ROTATION DEFICIT OF RIGHT SHOULDER: Primary | ICD-10-CM

## 2020-06-30 DIAGNOSIS — M25.511 CHRONIC RIGHT SHOULDER PAIN: Primary | ICD-10-CM

## 2020-06-30 DIAGNOSIS — M25.511 RIGHT SHOULDER PAIN, UNSPECIFIED CHRONICITY: ICD-10-CM

## 2020-06-30 DIAGNOSIS — G89.29 CHRONIC RIGHT SHOULDER PAIN: Primary | ICD-10-CM

## 2020-06-30 PROCEDURE — 20610 LARGE JOINT ASPIRATION/INJECTION: R SUBACROMIAL BURSA: ICD-10-PCS | Mod: RT,S$GLB,, | Performed by: ORTHOPAEDIC SURGERY

## 2020-06-30 PROCEDURE — 73030 X-RAY EXAM OF SHOULDER: CPT | Mod: TC,PN,RT

## 2020-06-30 PROCEDURE — 73030 XR SHOULDER TRAUMA 3 VIEW RIGHT: ICD-10-PCS | Mod: 26,RT,, | Performed by: RADIOLOGY

## 2020-06-30 PROCEDURE — 3008F PR BODY MASS INDEX (BMI) DOCUMENTED: ICD-10-PCS | Mod: CPTII,S$GLB,, | Performed by: ORTHOPAEDIC SURGERY

## 2020-06-30 PROCEDURE — 99999 PR PBB SHADOW E&M-EST. PATIENT-LVL III: ICD-10-PCS | Mod: PBBFAC,,, | Performed by: ORTHOPAEDIC SURGERY

## 2020-06-30 PROCEDURE — 73030 X-RAY EXAM OF SHOULDER: CPT | Mod: 26,RT,, | Performed by: RADIOLOGY

## 2020-06-30 PROCEDURE — 99999 PR PBB SHADOW E&M-EST. PATIENT-LVL III: CPT | Mod: PBBFAC,,, | Performed by: ORTHOPAEDIC SURGERY

## 2020-06-30 PROCEDURE — 3008F BODY MASS INDEX DOCD: CPT | Mod: CPTII,S$GLB,, | Performed by: ORTHOPAEDIC SURGERY

## 2020-06-30 PROCEDURE — 99213 PR OFFICE/OUTPT VISIT, EST, LEVL III, 20-29 MIN: ICD-10-PCS | Mod: 25,S$GLB,, | Performed by: ORTHOPAEDIC SURGERY

## 2020-06-30 PROCEDURE — 99213 OFFICE O/P EST LOW 20 MIN: CPT | Mod: 25,S$GLB,, | Performed by: ORTHOPAEDIC SURGERY

## 2020-06-30 PROCEDURE — 20610 DRAIN/INJ JOINT/BURSA W/O US: CPT | Mod: RT,S$GLB,, | Performed by: ORTHOPAEDIC SURGERY

## 2020-06-30 RX ORDER — TRIAMCINOLONE ACETONIDE 40 MG/ML
40 INJECTION, SUSPENSION INTRA-ARTICULAR; INTRAMUSCULAR
Status: DISCONTINUED | OUTPATIENT
Start: 2020-06-30 | End: 2020-06-30 | Stop reason: HOSPADM

## 2020-06-30 RX ADMIN — TRIAMCINOLONE ACETONIDE 40 MG: 40 INJECTION, SUSPENSION INTRA-ARTICULAR; INTRAMUSCULAR at 02:06

## 2020-06-30 NOTE — PROCEDURES
Large Joint Aspiration/Injection: R subacromial bursa    Date/Time: 6/30/2020 2:45 PM  Performed by: Lev Gresham MD  Authorized by: Lev Gresham MD     Consent Done?:  Yes (Verbal)  Indications:  Pain  Timeout: prior to procedure the correct patient, procedure, and site was verified    Approach:  Lateral  Location:  Shoulder  Site:  R subacromial bursa  Medications:  40 mg triamcinolone acetonide 40 mg/mL

## 2020-06-30 NOTE — PROGRESS NOTES
Past Medical History:   Diagnosis Date    Asthma     Hypertension        Past Surgical History:   Procedure Laterality Date    BREAST BIOPSY Left 2012    Benign calcifications    BREAST SURGERY      biopsy    DILATION AND CURETTAGE OF UTERUS         Current Outpatient Medications   Medication Sig    albuterol (ACCUNEB) 1.25 mg/3 mL Nebu Take 3 mLs (1.25 mg total) by nebulization every 6 (six) hours as needed. (Patient not taking: Reported on 6/22/2020)    beclomethasone (QVAR) 40 mcg/actuation Aero Inhale 1 puff into the lungs 2 (two) times daily.    cetirizine (ZYRTEC) 10 MG tablet Take 1 tablet (10 mg total) by mouth once daily.    fluticasone (FLONASE SENSIMIST) 27.5 mcg/actuation nasal spray 2 sprays by Nasal route once daily.    fluticasone propionate (FLONASE) 50 mcg/actuation nasal spray USE 2 SPRAY(S) IN EACH NOSTRIL TWICE DAILY    ibuprofen (ADVIL,MOTRIN) 800 MG tablet Take 1 tablet (800 mg total) by mouth 3 (three) times daily.    lidocaine (LIDODERM) 5 % Place 1 patch onto the skin once daily. Remove & Discard patch within 12 hours or as directed by MD (Patient not taking: Reported on 6/22/2020)    lisinopril 10 MG tablet     losartan-hydrochlorothiazide 50-12.5 mg (HYZAAR) 50-12.5 mg per tablet Take 1 tablet by mouth once daily.    methylPREDNISolone (MEDROL DOSEPACK) 4 mg tablet use as directed (Patient not taking: Reported on 6/22/2020)    PENNSAID 20 mg/gram /actuation(2 %) sopm APPLY 2 PUMPS TO THE AFFECTED AREA TWICE DAILY    polymyxin B sulf-trimethoprim (POLYTRIM) 10,000 unit- 1 mg/mL Drop Place 1 drop into the right eye every 6 (six) hours. (Patient not taking: Reported on 6/22/2020)    sodium chloride (SALINE NASAL MIST) 3 % Mist 1 spray by Nasal route every 4 (four) hours as needed.    tiZANidine (ZANAFLEX) 2 MG tablet Take 1 tablet (2 mg total) by mouth every 8 (eight) hours as needed.    traMADoL (ULTRAM) 50 mg tablet Take 1 tablet (50 mg total) by mouth every 6 (six)  hours as needed for Pain.     No current facility-administered medications for this visit.        Review of patient's allergies indicates:   Allergen Reactions    Doxycycline hyclate Itching       Family History   Problem Relation Age of Onset    Hypertension Mother     Cancer Maternal Aunt         cervical       Social History     Socioeconomic History    Marital status:      Spouse name: Not on file    Number of children: Not on file    Years of education: Not on file    Highest education level: Not on file   Occupational History    Not on file   Social Needs    Financial resource strain: Somewhat hard    Food insecurity     Worry: Sometimes true     Inability: Never true    Transportation needs     Medical: No     Non-medical: No   Tobacco Use    Smoking status: Never Smoker    Smokeless tobacco: Never Used   Substance and Sexual Activity    Alcohol use: No     Frequency: Monthly or less     Drinks per session: Patient refused     Binge frequency: Never    Drug use: No    Sexual activity: Not Currently     Partners: Male     Birth control/protection: None   Lifestyle    Physical activity     Days per week: 3 days     Minutes per session: 60 min    Stress: Not at all   Relationships    Social connections     Talks on phone: More than three times a week     Gets together: Never     Attends Mu-ism service: Not on file     Active member of club or organization: No     Attends meetings of clubs or organizations: Never     Relationship status:    Other Topics Concern    Not on file   Social History Narrative    Not on file       Chief Complaint:   Chief Complaint   Patient presents with    Right Shoulder - Pain       Consulting Physician: No ref. provider found    History of present illness: This is a 46-year-old woman who has a history right shoulder pain associated with overuse.  Treated with injections and exercises. She puts her pain at a 4/10 worse with activities.  No new  injury.    Review of Systems:    Constitution: Denies chills, fever, and sweats.    HENT: Denies headaches or blurry vision.    Cardiovascular: Denies chest pain or irregular heart beat.    Respiratory: Denies cough or shortness of breath.    Gastrointestinal: Denies abdominal pain, nausea, or vomiting.    Musculoskeletal:  Denies muscle cramps.    Neurological: Denies dizziness or focal weakness.    Psychiatric/Behavioral: Normal mental status.    Hematologic/Lymphatic: Denies bleeding problem or easy bruising/bleeding.    Skin: Denies rash or suspicious lesions.      Examination:    Vital Signs:    Vitals:    06/30/20 1420   Resp: 15   Temp: 98.4 °F (36.9 °C)       Body mass index is 27.44 kg/m².    This a well-developed, well nourished patient in no acute distress.    Alert and oriented and cooperative to examination.       Physical Exam: Right Shoulder Exam     Skin  Rash:   None  Scars:   None    Inspection/Observation  Swelling:   None  Erythema:   None  Bruising:   None  Wounds:   None  Scapular Winging:  None  Scapular Dyskinesia:  None  Atrophy:   None  Masses:   None  Lymphadenopathy: None    Tenderness   AC Joint:   None    Range of Motion   Active Forward Flexion:  180   Active External Rotation:  30  Active Internal Rotation: Low Back    Muscle Strength   Forward Flexion:  5/5  External Rotation: 5/5  Internal Rotation:  5/5    Instability:  None    Tests & Signs   Cross Arm:   Negative    Other   Sensation:  Normal  Pulse:    2+ radial          Imaging: X-rays of the right shoulder show no acute bony abnormality, fracture or dislocation.     Assessment: Right shoulder bursitis in secondary impingement    Plan:   She has recurring bursitis and tendinitis in her shoulder.  She is glenohumeral internal rotation deficit.  We discussed this with her and she will start physical therapy including the sleeper stretch.  We gave her another injection today.  We gave her prescription for Pennsaid.  We will let  her return to work but put restrictions on her of no lifting pushing or pulling more than 20 lb and no overhead lifting effective today.    DISCLAIMER: This note may have been dictated using voice recognition software and may contain grammatical errors.     NOTE: Consult report sent to referring provider via [employer].

## 2020-09-22 ENCOUNTER — TELEPHONE (OUTPATIENT)
Dept: ORTHOPEDICS | Facility: CLINIC | Age: 50
End: 2020-09-22

## 2020-09-22 ENCOUNTER — HOSPITAL ENCOUNTER (OUTPATIENT)
Dept: RADIOLOGY | Facility: HOSPITAL | Age: 50
Discharge: HOME | End: 2020-09-22
Attending: ORTHOPAEDIC SURGERY
Payer: COMMERCIAL

## 2020-09-22 ENCOUNTER — OFFICE VISIT (OUTPATIENT)
Dept: ORTHOPEDICS | Facility: CLINIC | Age: 50
End: 2020-09-22
Payer: COMMERCIAL

## 2020-09-22 ENCOUNTER — PATIENT OUTREACH (OUTPATIENT)
Dept: ADMINISTRATIVE | Facility: OTHER | Age: 50
End: 2020-09-22

## 2020-09-22 VITALS — RESPIRATION RATE: 16 BRPM | BODY MASS INDEX: 27.6 KG/M2 | HEIGHT: 62 IN | WEIGHT: 150 LBS

## 2020-09-22 DIAGNOSIS — M25.579 ANKLE PAIN, UNSPECIFIED CHRONICITY, UNSPECIFIED LATERALITY: ICD-10-CM

## 2020-09-22 DIAGNOSIS — M25.572 ACUTE LEFT ANKLE PAIN: Primary | ICD-10-CM

## 2020-09-22 DIAGNOSIS — M77.52 LEFT ANKLE TENDONITIS: ICD-10-CM

## 2020-09-22 DIAGNOSIS — M25.579 ANKLE PAIN, UNSPECIFIED CHRONICITY, UNSPECIFIED LATERALITY: Primary | ICD-10-CM

## 2020-09-22 PROCEDURE — 99999 PR PBB SHADOW E&M-EST. PATIENT-LVL III: ICD-10-PCS | Mod: PBBFAC,,, | Performed by: ORTHOPAEDIC SURGERY

## 2020-09-22 PROCEDURE — 99999 PR PBB SHADOW E&M-EST. PATIENT-LVL III: CPT | Mod: PBBFAC,,, | Performed by: ORTHOPAEDIC SURGERY

## 2020-09-22 PROCEDURE — 99214 OFFICE O/P EST MOD 30 MIN: CPT | Mod: S$GLB,,, | Performed by: ORTHOPAEDIC SURGERY

## 2020-09-22 PROCEDURE — 3008F BODY MASS INDEX DOCD: CPT | Mod: CPTII,S$GLB,, | Performed by: ORTHOPAEDIC SURGERY

## 2020-09-22 PROCEDURE — 73610 X-RAY EXAM OF ANKLE: CPT | Mod: TC,PN,LT

## 2020-09-22 PROCEDURE — 3008F PR BODY MASS INDEX (BMI) DOCUMENTED: ICD-10-PCS | Mod: CPTII,S$GLB,, | Performed by: ORTHOPAEDIC SURGERY

## 2020-09-22 PROCEDURE — 99214 PR OFFICE/OUTPT VISIT, EST, LEVL IV, 30-39 MIN: ICD-10-PCS | Mod: S$GLB,,, | Performed by: ORTHOPAEDIC SURGERY

## 2020-09-22 PROCEDURE — 73610 XR ANKLE COMPLETE 3 VIEW LEFT: ICD-10-PCS | Mod: 26,LT,, | Performed by: RADIOLOGY

## 2020-09-22 PROCEDURE — 73610 X-RAY EXAM OF ANKLE: CPT | Mod: 26,LT,, | Performed by: RADIOLOGY

## 2020-09-22 NOTE — PROGRESS NOTES
Past Medical History:   Diagnosis Date    Asthma     Hypertension        Past Surgical History:   Procedure Laterality Date    BREAST BIOPSY Left 2012    Benign calcifications    BREAST SURGERY      biopsy    DILATION AND CURETTAGE OF UTERUS         Current Outpatient Medications   Medication Sig    albuterol (ACCUNEB) 1.25 mg/3 mL Nebu Take 3 mLs (1.25 mg total) by nebulization every 6 (six) hours as needed.    fluticasone propionate (FLONASE) 50 mcg/actuation nasal spray USE 2 SPRAY(S) IN EACH NOSTRIL TWICE DAILY    ibuprofen (ADVIL,MOTRIN) 800 MG tablet Take 1 tablet (800 mg total) by mouth 3 (three) times daily.    lidocaine (LIDODERM) 5 % Place 1 patch onto the skin once daily. Remove & Discard patch within 12 hours or as directed by MD    lisinopril 10 MG tablet     methylPREDNISolone (MEDROL DOSEPACK) 4 mg tablet use as directed    PENNSAID 20 mg/gram /actuation(2 %) sopm APPLY 2 PUMPS TO THE AFFECTED AREA TWICE DAILY    sodium chloride (SALINE NASAL MIST) 3 % Mist 1 spray by Nasal route every 4 (four) hours as needed.    tiZANidine (ZANAFLEX) 2 MG tablet Take 1 tablet (2 mg total) by mouth every 8 (eight) hours as needed.    traMADoL (ULTRAM) 50 mg tablet Take 1 tablet (50 mg total) by mouth every 6 (six) hours as needed for Pain.    beclomethasone (QVAR) 40 mcg/actuation Aero Inhale 1 puff into the lungs 2 (two) times daily.    cetirizine (ZYRTEC) 10 MG tablet Take 1 tablet (10 mg total) by mouth once daily.    fluticasone (FLONASE SENSIMIST) 27.5 mcg/actuation nasal spray 2 sprays by Nasal route once daily.    losartan-hydrochlorothiazide 50-12.5 mg (HYZAAR) 50-12.5 mg per tablet Take 1 tablet by mouth once daily.     No current facility-administered medications for this visit.        Review of patient's allergies indicates:   Allergen Reactions    Omnicef [cefdinir] Diarrhea    Doxycycline hyclate Itching       Family History   Problem Relation Age of Onset    Hypertension Mother      Cancer Maternal Aunt         cervical       Social History     Socioeconomic History    Marital status:      Spouse name: Not on file    Number of children: Not on file    Years of education: Not on file    Highest education level: Not on file   Occupational History    Not on file   Social Needs    Financial resource strain: Somewhat hard    Food insecurity     Worry: Sometimes true     Inability: Never true    Transportation needs     Medical: No     Non-medical: No   Tobacco Use    Smoking status: Never Smoker    Smokeless tobacco: Never Used   Substance and Sexual Activity    Alcohol use: No     Frequency: Monthly or less     Drinks per session: Patient refused     Binge frequency: Never    Drug use: No    Sexual activity: Not Currently     Partners: Male     Birth control/protection: None   Lifestyle    Physical activity     Days per week: 3 days     Minutes per session: 60 min    Stress: Not at all   Relationships    Social connections     Talks on phone: More than three times a week     Gets together: Never     Attends Orthodoxy service: Not on file     Active member of club or organization: No     Attends meetings of clubs or organizations: Never     Relationship status:    Other Topics Concern    Not on file   Social History Narrative    Not on file       Chief Complaint:   Chief Complaint   Patient presents with    Ankle Pain     left ankle pain and popping        Consulting Physician: No ref. provider found    History of present illness:    This is a 50 y.o. year old female who complains of left ankle pain and popping that she has had for about 2 months.  She denies any injury or fall.  She puts her pain at a 4 in 10 worse with activities.  She states that the popping is increasing and now happens all the time.  She states the pain is also getting worse.    Review of Systems:    Constitution:   Denies chills, fever, and sweats.  HENT:   Denies headaches or blurry  "vision.  Cardiovascular:  Denies chest pain or irregular heart beat.  Respiratory:   Denies cough or shortness of breath.  Gastrointestinal:  Denies abdominal pain, nausea, or vomiting.  Musculoskeletal:   Denies muscle cramps.  Neurological:   Denies dizziness or focal weakness.  Psychiatric/Behavior: Normal mental status.  Hematology/Lymph:  Denies bleeding problem or easy bruising/bleeding.  Skin:    Denies rash or suspicious lesions.    Examination:    Vital Signs:    Vitals:    09/22/20 1436   Resp: 16   Weight: 68 kg (150 lb)   Height: 5' 2" (1.575 m)   PainSc:   4   PainLoc: Ankle       Body mass index is 27.44 kg/m².    Constitution:   Well-developed, well nourished patient in no acute distress.  Neurological:   Alert and oriented x 3 and cooperative to examination.     Psychiatric/Behavior: Normal mental status.  Respiratory:   No shortness of breath.  Eyes:    Extraoccular muscles intact  Skin:    No scars, rash or suspicious lesions.    Physical Exam: Left Ankle Exam     Gait:   Normal    Skin  Scars:   None  Rashes:  None    Inspection   Deformity:   None  Erythema:   None  Bruising:   None   Swelling:   None  Lymphadenopathy: None    Instability:  None    Range of Motion   Ankle Joint   Dorsiflexion:   Normal   Plantar flexion:  Normal   Subtalar Joint   Inversion:   Normal   Eversion:  Normal     Muscle Strength   Strength:  5/5    Tests   Anterior drawer:  Negative  Varus tilt:  Negative    Other   Ankle Crepitus:  None  Sensation:   Normal  Achilles:  Normal  Forefoot:  Normal  Tenderness:  Mild over medial tendons    Vascular Exam   Dorsalis Pedis:       Palpable  Capillary refill:    Normal          Imaging: X-rays ordered and images interpreted today personally by me of left ankle appear normal.         Assessment: Acute left ankle pain        Plan:  She does have a pop when she walks.  She has some tenderness over the tendons behind the medial malleolus.  It is possible that she has a snapping " tendon.  We discussed treatment options and we will go ahead and put her into a boot today and have her see Dr. Chaney.      DISCLAIMER: This note may have been dictated using voice recognition software and may contain grammatical errors.     NOTE: Consult report sent to referring provider via [employer]MR.

## 2020-09-22 NOTE — TELEPHONE ENCOUNTER
Spoke to patient. Appointment scheduled with Dr Chaney this Friday at 10am for her left ankle. Pt verbalized understanding.

## 2020-09-22 NOTE — PROGRESS NOTES
Chart was reviewed for overdue [facility] Encounters (ASPEN)  topics  Updates were requested from care everywhere  Health Maintenance has been updated  LIN[place] immunization registry triggered  Colonoscopy due. Did not order fit kit due to patient having a hx of rectal bleeding

## 2020-09-23 ENCOUNTER — PATIENT MESSAGE (OUTPATIENT)
Dept: ORTHOPEDICS | Facility: CLINIC | Age: 50
End: 2020-09-23

## 2020-09-25 ENCOUNTER — HOSPITAL ENCOUNTER (OUTPATIENT)
Dept: RADIOLOGY | Facility: HOSPITAL | Age: 50
Discharge: HOME | End: 2020-09-25
Attending: ORTHOPAEDIC SURGERY
Payer: COMMERCIAL

## 2020-09-25 ENCOUNTER — PATIENT MESSAGE (OUTPATIENT)
Dept: ORTHOPEDICS | Facility: CLINIC | Age: 50
End: 2020-09-25

## 2020-09-25 ENCOUNTER — OFFICE VISIT (OUTPATIENT)
Dept: ORTHOPEDICS | Facility: CLINIC | Age: 50
End: 2020-09-25
Payer: COMMERCIAL

## 2020-09-25 ENCOUNTER — TELEPHONE (OUTPATIENT)
Dept: ORTHOPEDICS | Facility: CLINIC | Age: 50
End: 2020-09-25

## 2020-09-25 VITALS — HEIGHT: 62 IN | WEIGHT: 150 LBS | BODY MASS INDEX: 27.6 KG/M2 | RESPIRATION RATE: 16 BRPM

## 2020-09-25 DIAGNOSIS — M76.822 TIBIAL TENDONITIS, POSTERIOR, LEFT: ICD-10-CM

## 2020-09-25 DIAGNOSIS — M79.672 LEFT FOOT PAIN: Primary | ICD-10-CM

## 2020-09-25 DIAGNOSIS — M79.672 LEFT FOOT PAIN: ICD-10-CM

## 2020-09-25 PROCEDURE — 99999 PR PBB SHADOW E&M-EST. PATIENT-LVL III: CPT | Mod: PBBFAC,,, | Performed by: ORTHOPAEDIC SURGERY

## 2020-09-25 PROCEDURE — 99244 OFF/OP CNSLTJ NEW/EST MOD 40: CPT | Mod: S$GLB,,, | Performed by: ORTHOPAEDIC SURGERY

## 2020-09-25 PROCEDURE — 99999 PR PBB SHADOW E&M-EST. PATIENT-LVL III: ICD-10-PCS | Mod: PBBFAC,,, | Performed by: ORTHOPAEDIC SURGERY

## 2020-09-25 PROCEDURE — 73630 X-RAY EXAM OF FOOT: CPT | Mod: TC,PN,LT

## 2020-09-25 PROCEDURE — 99244 PR OFFICE CONSULTATION,LEVEL IV: ICD-10-PCS | Mod: S$GLB,,, | Performed by: ORTHOPAEDIC SURGERY

## 2020-09-25 PROCEDURE — 73630 X-RAY EXAM OF FOOT: CPT | Mod: 26,LT,, | Performed by: RADIOLOGY

## 2020-09-25 PROCEDURE — 73630 XR FOOT COMPLETE 3 VIEW LEFT: ICD-10-PCS | Mod: 26,LT,, | Performed by: RADIOLOGY

## 2020-09-25 NOTE — LETTER
September 25, 2020    Jacqui Cruz  [address] [facility] [place] [address]         [facility]  [address] [place] [address] [address]  [facility] [address]  Phone: [phone] September 25, 2020     Patient: Jacqui Cruz   YOB: 1970   Date of Visit: 9/25/2020       To Whom It May Concern:    It is my medical opinion that Jacqui Cruz should remain out of work until 10/10/2020.    If you have any questions or concerns, please don't hesitate to call.    Sincerely,        Aravind Chaney MD

## 2020-09-25 NOTE — LETTER
October 6, 2020      Lev Gresham MD  [address] [place] [address] [address]  [address]  [facility] [address]           [facility]  [address] [place] [address] [address]  [facility] [address]  Phone: [phone]          Patient: Jacqui Cruz   MR Number: [id number]   YOB: 1970   Date of Visit: 9/25/2020       Dear Dr. Lev Gresham:    Thank you for referring Jacqui Cruz to me for evaluation. Attached you will find relevant portions of my assessment and plan of care.    If you have questions, please do not hesitate to call me. I look forward to following Jacqui Cruz along with you.    Sincerely,    Aravind Chaney MD    Enclosure  CC:  No Recipients    If you would like to receive this communication electronically, please contact [email or web address] or [phone] to request more information on [employer] Link access.    For providers and/or their staff who would like to refer a patient to [facility], please contact us through our one-stop-shop provider referral line, [facility] [place], at [phone].    If you feel you have received this communication in error or would no longer like to receive these types of communications, please e-mail [email or web address]

## 2020-09-25 NOTE — TELEPHONE ENCOUNTER
----- Message from Milena Patel MA sent at 9/25/2020  2:41 PM CDT -----  Contact: pt  Did not get RX for inserts for shoes   Call back

## 2020-09-25 NOTE — TELEPHONE ENCOUNTER
Left message on voicemail for pt to check her [facility] messages. I have sent the info there. Thanks, oNva

## 2020-09-25 NOTE — PROGRESS NOTES
"HPI: Jacqui Cruz is a  50 y.o. female who was referred to me by Dr. Gresham and was seen in consultation today for left ankle pain.  No history of injury or trauma. 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 The pain is intermittent but it is getting owrse of the past several months. She notes popping in the ankle at times. She has been wearing a boot. Pt denies weakness, numbness, and tingling.  She rates her pain as 3/10 today. Pt denies weakness, numbness, and tingling.       PAST MEDICAL/SURGICAL/FAMILY/SOCIAL/ HISTORY: REVIEWED    ALLERGIES/MEDICATIONS: REVIEWED       Review of Systems:     Constitution: Negative.   HEENT: Negative.   Eyes: Negative.   Cardiovascular: Negative.   Respiratory: Negative.   Endocrine: Negative.   Hematologic/Lymphatic: Negative.   Skin: Negative.   Musculoskeletal: Positive for left ankle pain   Gastrointestinal: Negative.   Genitourinary: Negative.   Neurological: Negative.   Psychiatric/Behavioral: Negative.   Allergic/Immunologic: Negative.       PHYSICAL EXAM:  Vitals:    09/25/20 1012   Resp: 16     Ht Readings from Last 1 Encounters:   09/25/20 5' 2" (1.575 m)     Wt Readings from Last 1 Encounters:   09/25/20 68 kg (150 lb)       GENERAL: Well developed, well nourished, no acute distress.  SKIN: Skin is intact. No atrophy, abrasions or lesions are noted.   Neurological: Normal mental status. Appropriate and conversant. Alert and oriented x 3.  GAIT: Walks with non-antalgic gait.    Left  lower extremity compared with RLE:  2+ dorsalis pedis pulse.  Capillary refill < 3 seconds.  Normal range of motion tibiotalar and subtalar joints. Mild pes planovalgus.  5/5 strength EHL, FHL, tibialis anterior, gastrocsoleus, tibialis posterior and peroneals. Sensation to light touch intact sural, saphenous, superficial peroneal and deep peroneal nerves. No swelling, ecchymosis or deformity. No lymphadenopathy, no masses or tumors palpated.  "  tenderness to palpation at the posterior tibial tendon insertion. She is able to perform single heel raise.     XRAYS:   3 views of left foot and ankle obtained and reviewed today reveal No evidence of fractures or dislocations. Mild pes planus.       ASSESSMENT:        Encounter Diagnoses   Name Primary?    Left foot pain Yes    Tibial tendonitis, posterior, left         PLAN:  I spent 45 minutes in consulation with the patient today. More than half the time was spent counseling the patient on her condition. I gave her a handout on posterior tibial tendonitis.  I will keep her off of work for 2 weeks to rest the foot. Ankle sleeve given. PT 2/6. Return to clinic once 6 weeks of PT has been completed.

## 2020-10-06 PROBLEM — M76.822 TIBIAL TENDONITIS, POSTERIOR, LEFT: Status: ACTIVE | Noted: 2020-10-06

## 2020-10-07 ENCOUNTER — PATIENT MESSAGE (OUTPATIENT)
Dept: ORTHOPEDICS | Facility: CLINIC | Age: 50
End: 2020-10-07

## 2020-10-07 ENCOUNTER — TELEPHONE (OUTPATIENT)
Dept: ORTHOPEDICS | Facility: CLINIC | Age: 50
End: 2020-10-07

## 2020-10-07 DIAGNOSIS — M79.672 LEFT FOOT PAIN: ICD-10-CM

## 2020-10-07 DIAGNOSIS — M76.822 TIBIAL TENDONITIS, POSTERIOR, LEFT: Primary | ICD-10-CM

## 2020-10-07 NOTE — TELEPHONE ENCOUNTER
----- Message from Milena Patel MA sent at 10/7/2020  8:10 AM CDT -----  Contact: pt  Wants call back   Wants more time off from work   Call back      MRI and PT start dates

## 2020-10-08 ENCOUNTER — CLINICAL SUPPORT (OUTPATIENT)
Dept: REHABILITATION | Facility: HOSPITAL | Age: 50
End: 2020-10-08
Attending: ORTHOPAEDIC SURGERY
Payer: COMMERCIAL

## 2020-10-08 DIAGNOSIS — R29.898 LEFT LEG WEAKNESS: ICD-10-CM

## 2020-10-08 DIAGNOSIS — M79.672 LEFT FOOT PAIN: ICD-10-CM

## 2020-10-08 DIAGNOSIS — M25.672 DECREASED RANGE OF MOTION OF LEFT ANKLE: ICD-10-CM

## 2020-10-08 DIAGNOSIS — R26.89 ANTALGIC GAIT: ICD-10-CM

## 2020-10-08 DIAGNOSIS — M76.822 TIBIAL TENDONITIS, POSTERIOR, LEFT: Primary | ICD-10-CM

## 2020-10-08 PROCEDURE — 97112 NEUROMUSCULAR REEDUCATION: CPT | Mod: PN

## 2020-10-08 PROCEDURE — 97161 PT EVAL LOW COMPLEX 20 MIN: CPT | Mod: PN

## 2020-10-08 PROCEDURE — 97110 THERAPEUTIC EXERCISES: CPT | Mod: PN

## 2020-10-08 NOTE — TELEPHONE ENCOUNTER
Called pt back. Advised that Dr. Chaney will authorize 2 weeks off of work. Pt would like to  the letter tomorrow in [place]. Thanks, Nova

## 2020-10-08 NOTE — PLAN OF CARE
"[facility]  Physical Therapy Initial Evaluation    Name: Jacqui Cruz  Clinic Number: [id number]    Therapy Diagnosis:   Encounter Diagnoses   Name Primary?    Tibial tendonitis, posterior, left Yes    Left foot pain     Decreased range of motion of left ankle     Left leg weakness     Antalgic gait      Physician: Aravind Chaney MD  Physician Orders: PT Eval and Treat  Medical Diagnosis from Referral:   M76.822 (ICD-10-CM) - Tibial tendonitis, posterior, left   M79.672 (ICD-10-CM) - Left foot pain     Evaluation Date: 10/8/2020  Plan of Care Certification Period: 10/8/2020 - 11/30/20  Authorization Period Start - Expiration: 10/7/20 - 12/31/20  Visit # / Visits POC / Visits authorized: 1/ 7 / 20    Time In: 0815  Time Out: 0900  Total Billable Timed: 25 minutes  Total Billable Un-timed: 20 minutes    Precautions: Standard    Subjective   Date of onset: chronic  History of current condition - Jacqui reports: increasing left ankle/foot pain. She reports no mechanism of injury, but relates the injury to her job in which she stands for 12 hour shifts. She denies the use of any orthotic shoes or arch supports. She reports that pain increases in her left medial ankle and lower leg with prolonged standing and walking >15 minutes. She also reports "popping" in her ankle with walking. No numbness or tingling noted. She has taken x2 weeks off of work per dr. Mayers and has not noticed any changes in symptoms. She also states that she did not take time off her feet either stating she had to cook, walk the dog, and clean which also irritated her symptoms. She also reports increase in swelling with prolonged standing. She was provided a compression brace. She also notes that she may have to extend her time off due to continued pain.      Medical History:   Past Medical History:   Diagnosis Date    Asthma     Hypertension      Surgical History:   Jacqui Cruz  has a past surgical " history that includes Breast surgery; Dilation and curettage of uterus; and Breast biopsy (Left, 2012).    Medications:   Jacqui has a current medication list which includes the following prescription(s): albuterol, beclomethasone, cetirizine, fluticasone, fluticasone propionate, ibuprofen, lidocaine, lisinopril, losartan-hydrochlorothiazide 50-12.5 mg, methylprednisolone, pennsaid, sodium chloride, tizanidine, and tramadol.    Allergies:   Review of patient's allergies indicates:   Allergen Reactions    Omnicef [cefdinir] Diarrhea    Doxycycline hyclate Itching      Imaging, X-ray: no significant findings    Prior Therapy: right RTC  Social History: lives with their family  Occupation: [employer]  Prior Level of Function: independent  Current Level of Function: modified independent with walking and standing at work.     Pain:  Current 2/10, worst 7/10, best 0/10   Location: left ankles and feet   Description: Aching and Sharp  Aggravating Factors: Standing and Walking  Easing Factors: rest    Pts goals: to decrease pain in left ankle in order to return to work.     Objective     Structural/Postural Inspection: noted increase subtalar supination bilateral in standing    Palpation for Condition: no noted edema, redness or bruising    Active Range of Motion (AROM)/Passive Range of Motion (PROM):     Hip/Knee/Ankle (Degrees) AROM (Right) PROM (Right) AROM (Left) PROM (Left) Comments   Hip Flexion        Hip Extension        Hip Abduction        Hip Adduction        Hip Internal Rotation        Hip External Rotation        Knee Flexion        Knee Extension        Ankle Dorsiflexion 5  -5 3    Ankle Plantarflexion WFL  WFL WFL    Ankle Inversion WFL  WFL WFL    Ankle Eversion 15  5  12      Pain in left medial ankle with passive eversion, active inversion and plantarflexion    Joint Accessory: WFL for left talocrural and subtalar    Muscle Length Tension Testing:     Lumbar/Hip/Knee Right  Left  Comments    Gastrocnemius  Significant tightness to noted dorsiflexion at 3 degrees pain   Soleus  Significant tightness noted to 3 degrees of dorsiflexion pain     Manual Muscle Testing:     RLE 5/5 4+/5 4/5 4-/5 3+/5 3/5 3-/5 2+/5 2/5 2-/5 1/5 0   Hip Flexion               Hip Extension               Hip Abduction               Hip Adduction               Hip Internal Rotation               Hip External Rotation               Knee Flexion               Knee Extension               Ankle Dorsiflexion x              Ankle Plantarflexion x              Ankle Inversion   x            Ankle Eversion x              Iliopsoas               Glut Max               Posterior Glut Med fibers               Sartorious                 LLE 5/5 4+/5 4/5 4-/5 3+/5 3/5 3-/5 2+/5 2/5 2-/5 1/5 0   Hip Flexion               Hip Extension               Hip Abduction               Hip Adduction               Hip Internal Rotation               Hip External Rotation               Knee Flexion               Knee Extension               Ankle Dorsiflexion x              Ankle Plantarflexion   X pain            Ankle Inversion    X pain           Ankle Eversion  x             EDL x              FDL  X pain             Posterior Glut Med fibers               Sartorious                  Special Tests:    Ankle Right Left   Anterior Drawer Test Negative. Negative.   Compression Test Negative. Negative.   Eversion Stress Test Negative. Positive.   Inversion Stress Test Negative. Negative.   Medardo's Sign Test Negative. Negative.   Monroe's Squeeze Test Negative. Negative.   Tibial Torsion Test Negative. Negative.   Feiss' Line Test Negative. Negative.   Heel Tap Test Negative. Negative.     Movement Analysis:  SLS on Right: 30 sec  SLS on Left: 9 sec  Sit <> Stand: increase in talocrural supination  Squat: increase in talocrural supination  Single leg calf raise right x 16, left x 7  Gait: decreased LLE stance time with increase supination of left  ankle    Palpation for Tenderness: posterior tibialis insertion and muscle belly    Neurovascular/Neural Tension:   None indicated    Limitation/Restriction for FOTO LEFS Survey    Therapist reviewed FOTO scores for Jacqui Cruz on 10/8/2020.   FOTO documents entered into EPIC - see Media section.    Limitation Score: 60%       TREATMENT   Treatment Time In: 0835  Treatment Time Out: 0900  Total Treatment time separate from Evaluation: 25 minutes    Jacqui received 1:1 therapeutic exercises to develop strength, endurance, ROM, flexibility and posture for 15 minutes including:  Left gastrocnemius stretch 3 x 30 sec long sitting   Left soleus stretch 3 x 30 sec long sitting  Bilateral ankle inversion in sitting with pillow 10 x 5 sec  Left ankle inversion against gravity 3 x 10 with 3 sec hold  Seated arch activation 10 x 3 sec hold  Standing arch activation 10 x 3 sec hold    Jacqui participated in neuromuscular re-education activities to improve: Coordination, Kinesthetic, Proprioception and Posture for 10 minutes. The following activities were included:  Tactile cuing for posterior tib activation with arch support in sitting and standing   Rock tape applied to facilitate left posterior tibialis. Pt educated on donning and doffing of tape. Cleared of contraindications    Patient Education and Home Exercise Program     Education provided:   - HEP provided, activity modification, mechanics of ankle and contributing factors to pain, purpose of taping, arch support for shoes    Written Home Exercises Provided: yes.  Exercises were reviewed and Jacqui was able to demonstrate them prior to the end of the session.  Jacqui demonstrated fair  understanding of the education provided.     See EMR under Patient Instructions for exercises provided 10/8/2020.    Assessment   Jacqui is a 50 y.o. female referred to outpatient Physical Therapy with a medical diagnosis of tibialis tendonitis, posterior, left. The patient  demonstrates decreased left ankle dorsiflexion and eversion with pain passively and weakness of ankle plantarflexion and inversion with pain during active and resistance. Pt demonstrates decreased arch support with increased supination during standing and gait increasing her pain during prolonged standing. Pt will benefit from skilled PT to address left ankle impairments in order to increase her tolerance to standing and walking and for her to return to work with decrease pain and functional limitations.     The patient will benefit from skilled outpatient Physical Therapy to address the deficits stated above and in the chart below, provide pt/family education, and to maximize pt's level of independence.     Pt prognosis is Good.     Plan of care discussed with patient: Yes  Pt's spiritual, cultural and educational needs considered and patient is agreeable to the plan of care and goals as stated below:     Anticipated Barriers for therapy: lack of understanding diagnosis and current condition    Medical Necessity is demonstrated by the following  History  Co-morbidities and personal factors that may impact the plan of care Co-morbidities:   HTN    Personal Factors:   no deficits     low   Examination  Body Structures and Functions, activity limitations and participation restrictions that may impact the plan of care Body Regions:   lower extremities    Body Systems:    ROM  strength  balance  gait  motor control    Participation Restrictions:   none    Activity limitations:   Learning and applying knowledge  no deficits    General Tasks and Commands  no deficits    Communication  no deficits    Mobility  walking    Self care  looking after one's health    Domestic Life  shopping  cooking  doing house work (cleaning house, washing dishes, laundry)    Interactions/Relationships  no deficits    Life Areas  employment    Community and Social Life  no deficits         low   Clinical Presentation stable and uncomplicated  low   Decision Making/ Complexity Score: low     Goals:  Short Term Goals (STG) # weeks Goal Review Date Reviewed Date Met   Pt will demonstrate independence with initial HEP to facilitate therex progression and improvements in functional mobility 1 Initial 10/8/2020    The patient will increase left ankle strength to greater than or equal to 1/3 manual muscle grade for all deficient areas in order to facilitate standing tolerance >20 min 2 Initial 10/8/2020    The patient will improve left ankle active dorsiflexion to > neutral in order to facilitate heel strike during gait 2 Initial 10/8/2020    Pt will be able to perform SLS on left foot >5 sec to facilitate increased stance time during gait 2 Initial 10/8/2020                                                Long Term Goals (LTG) # weeks Goal Review Date Reviewed Date Met   The patient will improve the Lower Extremity Functional Scale to less than 35% Limitations 4 Initial 10/8/2020    The patient will increase left ankle strength to 5/5 in all planes in order to return to work fulltime 4 Initial 10/8/2020    The patient will increase left ankle ROM to WFL for all planes in order to perform gait with no deviations  4 Initial 10/8/2020                                                         Plan   Plan of care Certification: 10/8/2020 to 11/30/20.    Outpatient Physical Therapy 2 times weekly for 3 weeks to include the following interventions: Electrical Stimulation IFC, TENs, NMES, Fluidotherapy, Gait Training, Iontophoresis (with Dexa), Manual Therapy, Moist Heat/ Ice, Neuromuscular Re-ed, Orthotic Management and Training, Patient Education, Self Care, Therapeutic Activites, Therapeutic Exercise, Ultrasound and Dry Needling.     Jackson Ma, PT

## 2020-10-13 ENCOUNTER — CLINICAL SUPPORT (OUTPATIENT)
Dept: REHABILITATION | Facility: HOSPITAL | Age: 50
End: 2020-10-13
Payer: COMMERCIAL

## 2020-10-13 DIAGNOSIS — R26.89 ANTALGIC GAIT: ICD-10-CM

## 2020-10-13 DIAGNOSIS — R29.898 LEFT LEG WEAKNESS: ICD-10-CM

## 2020-10-13 DIAGNOSIS — M25.672 DECREASED RANGE OF MOTION OF LEFT ANKLE: ICD-10-CM

## 2020-10-13 PROCEDURE — 97110 THERAPEUTIC EXERCISES: CPT | Mod: PN

## 2020-10-13 PROCEDURE — 97112 NEUROMUSCULAR REEDUCATION: CPT | Mod: PN

## 2020-10-13 PROCEDURE — 97140 MANUAL THERAPY 1/> REGIONS: CPT | Mod: PN

## 2020-10-13 NOTE — PROGRESS NOTES
[facility]  Outpatient Physical Therapy Daily Treatment Note      Name: Jacqui Cruz  Clinic Number: [id number]  Visit Date: 10/13/2020    Therapy Diagnosis:   Encounter Diagnoses   Name Primary?    Decreased range of motion of left ankle     Left leg weakness     Antalgic gait      Physician: Lev Gresham MD  Physician Orders: PT Eval and Treat  Medical Diagnosis from Referral:   M76.822 (ICD-10-CM) - Tibial tendonitis, posterior, left   M79.672 (ICD-10-CM) - Left foot pain      Evaluation Date: 10/8/2020  Plan of Care Certification Period: 10/8/2020 - 11/30/20  Authorization Period Start - Expiration: 10/7/20 - 12/31/20  Visit # / Visits POC / Visits authorized: 1/ 7 / 20  FOTO Due Visit 5 -  Follow up  FOTO Due Visit 10 - Follow up    Time In: 0815  Time Out: 0900  Total Billable Timed: 45 minutes  Total Billable Un-timed: 0 minutes    Precautions: Standard    Subjective     The patient presents to therapy reporting that she still has her pain but the taping helped. She has also performed her exercises in which she stated she felt    Response to previous treatment: good to taping for arch support  Functional change: none noted    Pain: 1/10  Location: left ankles     Objective     Jacqui received therapeutic exercises to develop strength, endurance, ROM, flexibility, posture and core stabilization for 25 minutes including:  Calf stretch 3 x 30 sec  Soleus stretch 3 x 30 sec  Ankle inversion 3 x 10   Ankle eversion 3 x 10   Calf raise 3 x 10  Towel crunches 2 x 2 min  Toe yoga with big toe ext, digit 2-4 ext, and arch lift x 20 each    Jacqui received the following manual therapy techniques: Joint mobilizations and Friction Massage were applied to the: left ankle for 10 minutes, including:  Left talocrural P/A and A/P   Subtalar medial glides  1st MTP plantar/dorsal  Friction massage to left posterior tib tendon posterior to medial malleoli    Jacqui participated in  neuromuscular re-education activities to improve: Balance, Coordination, Kinesthetic, Proprioception and Posture for 10 minutes. The following activities were included:  Supine ankle inversion with isometric hold using submax contraction and tactile cue for inversion, hold for 5 sec  Supine ankle eversion with isometric hold using submax contraction and tactile cue for eversion, hold for 5 sec  SLS on foam in // bars with BUE - tactile cuing for maintaining arch, hold for 15 sec  Rock tape applied for arch support    Patient Education and HEP     She was compliant with home exercise program.    Education provided:   - ankle ABC, purchase of arch support insert    Written Home Exercises Provided: Patient instructed to cont prior HEP.  Exercises were reviewed and Jacqui was able to demonstrate them prior to the end of the session.  Jacqui demonstrated good  understanding of the education provided.     See EMR under Patient Instructions for exercises provided prior visit.    Assessment     The patient demonstrates poor activity tolerance and slight increases in ankle pain during therex that had to be modified to prevent onset of pain. Pt demonstrates weakness of ankle stabilizers and decreased coordination and will continue to benefit from skilled PT to address her deficits in order to facilitate her return to work.     Pt will continue to benefit from skilled outpatient physical therapy to address the deficits listed in the problem list box on initial evaluation, provide pt/family education and to maximize pt's level of independence in the home and community environment.     Jacqui is progressing well towards her goals.   Pt prognosis is Good.     Pt's spiritual, cultural and educational needs considered and pt agreeable to plan of care and goals.    Anticipated barriers to physical therapy: lack of understanding of diagnosis and current condition    Goals:   Short Term Goals (STG) # weeks Goal Review Date Reviewed  Date Met   Pt will demonstrate independence with initial HEP to facilitate therex progression and improvements in functional mobility 1 Progressing 10/13/2020       The patient will increase left ankle strength to greater than or equal to 1/3 manual muscle grade for all deficient areas in order to facilitate standing tolerance >20 min 2 Progressing 10/13/2020       The patient will improve left ankle active dorsiflexion to > neutral in order to facilitate heel strike during gait 2 Progressing 10/13/2020       Pt will be able to perform SLS on left foot >5 sec to facilitate increased stance time during gait 2 Progressing 10/13/2020                                                                                  Long Term Goals (LTG) # weeks Goal Review Date Reviewed Date Met   The patient will improve the Lower Extremity Functional Scale to less than 35% Limitations 4 Progressing 10/13/2020       The patient will increase left ankle strength to 5/5 in all planes in order to return to work fulltime 4 Progressing 10/13/2020       The patient will increase left ankle ROM to WFL for all planes in order to perform gait with no deviations  4 Progressing 10/13/2020                                                                                           Plan     Continue with the plan of care established per initial evaluation    Jackson Ma, PT

## 2020-10-22 ENCOUNTER — CLINICAL SUPPORT (OUTPATIENT)
Dept: REHABILITATION | Facility: HOSPITAL | Age: 50
End: 2020-10-22
Payer: COMMERCIAL

## 2020-10-22 DIAGNOSIS — R26.89 ANTALGIC GAIT: ICD-10-CM

## 2020-10-22 DIAGNOSIS — R29.898 LEFT LEG WEAKNESS: ICD-10-CM

## 2020-10-22 DIAGNOSIS — M25.672 DECREASED RANGE OF MOTION OF LEFT ANKLE: ICD-10-CM

## 2020-10-22 PROCEDURE — 97140 MANUAL THERAPY 1/> REGIONS: CPT | Mod: PN

## 2020-10-22 PROCEDURE — 97110 THERAPEUTIC EXERCISES: CPT | Mod: PN

## 2020-10-22 PROCEDURE — 97112 NEUROMUSCULAR REEDUCATION: CPT | Mod: PN

## 2020-10-22 NOTE — PROGRESS NOTES
[facility]  Outpatient Physical Therapy Daily Treatment Note      Name: Jacqui Cruz  Clinic Number: [id number]  Visit Date: 10/22/2020    Therapy Diagnosis:   Encounter Diagnoses   Name Primary?    Decreased range of motion of left ankle     Left leg weakness     Antalgic gait      Physician: Lev Gresham MD  Physician Orders: PT Eval and Treat  Medical Diagnosis from Referral:   M76.822 (ICD-10-CM) - Tibial tendonitis, posterior, left   M79.672 (ICD-10-CM) - Left foot pain      Evaluation Date: 10/8/2020  Plan of Care Certification Period: 10/8/2020 - 11/30/20  Authorization Period Start - Expiration: 10/7/20 - 12/31/20  Visit # / Visits POC / Visits authorized: 2/ 7 / 20  FOTO Due Visit 5 -  Follow up  FOTO Due Visit 10 - Follow up    Time In: 0815  Time Out: 0900  Total Billable Timed: 45 minutes  Total Billable Un-timed: 0 minutes    Precautions: Standard    Subjective     The patient presents to therapy reporting that she purchased her arch supports but has not tried them out yet. She reports that the taping continues to help.     Response to previous treatment: good to taping for arch support  Functional change: none noted    Pain: 0/10  Location: left ankles     Objective     Jacqui received therapeutic exercises to develop strength, endurance, ROM, flexibility, posture and core stabilization for 25 minutes including:  Calf stretch 3 x 30 sec  Soleus stretch 3 x 30 sec  Ankle inversion 3 x 10 GTB  Ankle eversion 3 x 10 - manual resistance  Calf raise 3 x 10 on foam  Towel crunches 2 x 2 min  Toe yoga with big toe ext, digit 2-4 ext, and arch lift x 20 each  Standing on half foam inversion and eversion x 30    Jacqui received the following manual therapy techniques: Joint mobilizations and Friction Massage were applied to the: left ankle for 10 minutes, including:  Left talocrural P/A and A/P   Subtalar medial glides  1st MTP plantar/dorsal  Friction massage to left  posterior tib tendon posterior to medial malleoli    Jacqui participated in neuromuscular re-education activities to improve: Balance, Coordination, Kinesthetic, Proprioception and Posture for 10 minutes. The following activities were included:  Supine ankle inversion with isometric hold using submax contraction and tactile cue for inversion, hold for 5 sec  Supine ankle eversion with isometric hold using submax contraction and tactile cue for eversion, hold for 5 sec  SLS on foam in // bars with BUE - tactile cuing for maintaining arch, hold for 15 sec  Ankle rhythmic stabilization 2 x 15 sec with manual pertubations    Patient Education and HEP     She was compliant with home exercise program.    Education provided:   - don arch supports    Written Home Exercises Provided: Patient instructed to cont prior HEP.  Exercises were reviewed and Jacqui was able to demonstrate them prior to the end of the session.  Jacqui demonstrated good  understanding of the education provided.     See EMR under Patient Instructions for exercises provided prior visit.    Assessment     The patient continues to demonstrate poor activity tolerance but no increase in pain this session. Pt demonstrates weakness of ankle stabilizers and decreased coordination and will continue to benefit from skilled PT to address her deficits in order to facilitate her return to work.     Pt will continue to benefit from skilled outpatient physical therapy to address the deficits listed in the problem list box on initial evaluation, provide pt/family education and to maximize pt's level of independence in the home and community environment.     Jacqui is progressing well towards her goals.   Pt prognosis is Good.     Pt's spiritual, cultural and educational needs considered and pt agreeable to plan of care and goals.    Anticipated barriers to physical therapy: lack of understanding of diagnosis and current condition    Goals:   Short Term Goals (STG)  # weeks Goal Review Date Reviewed Date Met   Pt will demonstrate independence with initial HEP to facilitate therex progression and improvements in functional mobility 1 Met  10/22/20   The patient will increase left ankle strength to greater than or equal to 1/3 manual muscle grade for all deficient areas in order to facilitate standing tolerance >20 min 2 Progressing 10/22/2020       The patient will improve left ankle active dorsiflexion to > neutral in order to facilitate heel strike during gait 2 Progressing 10/22/2020       Pt will be able to perform SLS on left foot >5 sec to facilitate increased stance time during gait 2 Progressing 10/22/2020                                                                                  Long Term Goals (LTG) # weeks Goal Review Date Reviewed Date Met   The patient will improve the Lower Extremity Functional Scale to less than 35% Limitations 4 Progressing 10/22/2020       The patient will increase left ankle strength to 5/5 in all planes in order to return to work fulltime 4 Progressing 10/22/2020       The patient will increase left ankle ROM to WFL for all planes in order to perform gait with no deviations  4 Progressing 10/22/2020                                                                                           Plan     Continue with the plan of care established per initial evaluation    Jackson Ma, PT

## 2020-10-26 ENCOUNTER — PATIENT OUTREACH (OUTPATIENT)
Dept: ADMINISTRATIVE | Facility: OTHER | Age: 50
End: 2020-10-26

## 2020-10-26 NOTE — PROGRESS NOTES
Chart was reviewed for overdue [facility]active [facility] Encounters ([facility])  topics  Updates were requested from care everywhere  Health Maintenance has been updated  [place] immunization registry triggered  Colonoscopy due did not order fit kit due to patient having a hx of rectal bleeding

## 2020-10-27 ENCOUNTER — CLINICAL SUPPORT (OUTPATIENT)
Dept: REHABILITATION | Facility: HOSPITAL | Age: 50
End: 2020-10-27
Attending: ORTHOPAEDIC SURGERY
Payer: COMMERCIAL

## 2020-10-27 ENCOUNTER — TELEPHONE (OUTPATIENT)
Dept: ORTHOPEDICS | Facility: CLINIC | Age: 50
End: 2020-10-27

## 2020-10-27 DIAGNOSIS — M25.672 DECREASED RANGE OF MOTION OF LEFT ANKLE: ICD-10-CM

## 2020-10-27 DIAGNOSIS — R26.89 ANTALGIC GAIT: ICD-10-CM

## 2020-10-27 DIAGNOSIS — R29.898 LEFT LEG WEAKNESS: ICD-10-CM

## 2020-10-27 PROCEDURE — 97110 THERAPEUTIC EXERCISES: CPT | Mod: PN

## 2020-10-27 NOTE — PROGRESS NOTES
[facility]  Outpatient Physical Therapy Daily Treatment Note      Name: Jacqui Cruz  Clinic Number: [id number]  Visit Date: 10/27/2020    Therapy Diagnosis:   Encounter Diagnoses   Name Primary?    Decreased range of motion of left ankle     Left leg weakness     Antalgic gait      Physician: Lev Gresham MD  Physician Orders: PT Eval and Treat  Medical Diagnosis from Referral:   M76.822 (ICD-10-CM) - Tibial tendonitis, posterior, left   M79.672 (ICD-10-CM) - Left foot pain      Evaluation Date: 10/8/2020  Plan of Care Certification Period: 10/8/2020 - 11/30/20  Authorization Period Start - Expiration: 10/7/20 - 12/31/20  Visit # / Visits POC / Visits authorized: 3/ 7 / 20  FOTO Due Visit 5 -  Follow up  FOTO Due Visit 10 - Follow up    Time In: 1200  Time Out: 1215  Total Billable Timed: 15 minutes  Total Billable Un-timed: 0 minutes    Precautions: Standard    Subjective     The patient presents to therapy following her first return to work. She states her inserts really help her and her pain has lessened since beginning therapy. She does report increased fatigue and stomach pain increasing her bathroom use but is willing to attempt PT.     Response to previous treatment: good to taping for arch support  Functional change: none noted    Pain: 0/10  Location: left ankles     Objective     Jacqui received therapeutic exercises to develop strength, endurance, ROM, flexibility, posture and core stabilization for 15 minutes including:  Calf stretch 3 x 30 sec  Soleus stretch 3 x 30 sec  Ankle inversion 3 x 10 GTB  Ankle eversion 3 x 10 - manual resistance  Calf raise 3 x 10 on foam    Not Performed  Towel crunches 2 x 2 min  Toe yoga with big toe ext, digit 2-4 ext, and arch lift x 20 each  Standing on half foam inversion and eversion x 30    Not performed  Jacqui received the following manual therapy techniques: Joint mobilizations and Friction Massage were applied to the:  left ankle for 10 minutes, including:  Left talocrural P/A and A/P   Subtalar medial glides  1st MTP plantar/dorsal  Friction massage to left posterior tib tendon posterior to medial malleoli    Not performed  Jacqui participated in neuromuscular re-education activities to improve: Balance, Coordination, Kinesthetic, Proprioception and Posture for 10 minutes. The following activities were included:  Supine ankle inversion with isometric hold using submax contraction and tactile cue for inversion, hold for 5 sec  Supine ankle eversion with isometric hold using submax contraction and tactile cue for eversion, hold for 5 sec  SLS on foam in // bars with BUE - tactile cuing for maintaining arch, hold for 15 sec  Ankle rhythmic stabilization 2 x 15 sec with manual pertubations    Patient Education and HEP     She was compliant with home exercise program.    Education provided:   - don arch supports    Written Home Exercises Provided: Patient instructed to cont prior HEP.  Exercises were reviewed and Jacqui was able to demonstrate them prior to the end of the session.  Jacqui demonstrated good  understanding of the education provided.     See EMR under Patient Instructions for exercises provided prior visit.    Assessment     The patient had to discontinue the therapy session due to stomach aches and bathroom urge. She also demonstrated decreased motivation to participate in PT due to increase in fatigue following long work shift. She will continue to benefit from skilled PT to address her deficits in order to facilitate her return to work.     Pt will continue to benefit from skilled outpatient physical therapy to address the deficits listed in the problem list box on initial evaluation, provide pt/family education and to maximize pt's level of independence in the home and community environment.     Jacqui is progressing well towards her goals.   Pt prognosis is Good.     Pt's spiritual, cultural and educational  needs considered and pt agreeable to plan of care and goals.    Anticipated barriers to physical therapy: lack of understanding of diagnosis and current condition    Goals:   Short Term Goals (STG) # weeks Goal Review Date Reviewed Date Met   Pt will demonstrate independence with initial HEP to facilitate therex progression and improvements in functional mobility 1 Met  10/22/20   The patient will increase left ankle strength to greater than or equal to 1/3 manual muscle grade for all deficient areas in order to facilitate standing tolerance >20 min 2 Progressing 10/27/2020       The patient will improve left ankle active dorsiflexion to > neutral in order to facilitate heel strike during gait 2 Progressing 10/27/2020       Pt will be able to perform SLS on left foot >5 sec to facilitate increased stance time during gait 2 Progressing 10/27/2020                                                                                  Long Term Goals (LTG) # weeks Goal Review Date Reviewed Date Met   The patient will improve the Lower Extremity Functional Scale to less than 35% Limitations 4 Progressing 10/27/2020       The patient will increase left ankle strength to 5/5 in all planes in order to return to work fulltime 4 Progressing 10/27/2020       The patient will increase left ankle ROM to WFL for all planes in order to perform gait with no deviations  4 Progressing 10/27/2020                                                                                           Plan     Continue with the plan of care established per initial evaluation    Jackson Ma, PT

## 2020-10-27 NOTE — TELEPHONE ENCOUNTER
Left message on voicemail for pt to call back. Per Dr. Chaney, appt on 10/30/2020 needs to be rescheduled until after pt has completed her full course of Physical Therapy. Will also send my chart message. Thanks, Nova

## 2020-10-28 ENCOUNTER — TELEPHONE (OUTPATIENT)
Dept: ORTHOPEDICS | Facility: CLINIC | Age: 50
End: 2020-10-28

## 2020-10-28 NOTE — TELEPHONE ENCOUNTER
Left another message on voicemail for pt to call back. Per Dr. Chaney, appt on 10/30/2020 needs to be rescheduled until after pt has completed her full course of Physical Therapy. Thanks, Nova

## 2020-10-29 ENCOUNTER — TELEPHONE (OUTPATIENT)
Dept: ORTHOPEDICS | Facility: CLINIC | Age: 50
End: 2020-10-29

## 2020-10-29 NOTE — TELEPHONE ENCOUNTER
Called for pt again today. Spoke to her. Advised that we need to reschedule her visit with Dr. Chaney until finished with PT. Pt will call once her last visit has been scheduled. Thanks, Nova

## 2020-10-30 ENCOUNTER — TELEPHONE (OUTPATIENT)
Dept: ORTHOPEDICS | Facility: CLINIC | Age: 50
End: 2020-10-30

## 2020-10-30 NOTE — TELEPHONE ENCOUNTER
----- Message from Alex Garcia sent at 10/30/2020 11:34 AM CDT -----  Regarding: Wants a virtural visit today  Contact: pt   call

## 2020-11-05 ENCOUNTER — CLINICAL SUPPORT (OUTPATIENT)
Dept: REHABILITATION | Facility: HOSPITAL | Age: 50
End: 2020-11-05
Attending: ORTHOPAEDIC SURGERY
Payer: COMMERCIAL

## 2020-11-05 DIAGNOSIS — R29.898 LEFT LEG WEAKNESS: ICD-10-CM

## 2020-11-05 DIAGNOSIS — R26.89 ANTALGIC GAIT: ICD-10-CM

## 2020-11-05 DIAGNOSIS — M25.672 DECREASED RANGE OF MOTION OF LEFT ANKLE: ICD-10-CM

## 2020-11-05 PROCEDURE — 97140 MANUAL THERAPY 1/> REGIONS: CPT | Mod: PN

## 2020-11-05 PROCEDURE — 97112 NEUROMUSCULAR REEDUCATION: CPT | Mod: PN

## 2020-11-05 PROCEDURE — 97110 THERAPEUTIC EXERCISES: CPT | Mod: PN

## 2020-11-05 NOTE — PROGRESS NOTES
[facility]  Outpatient Physical Therapy Daily Treatment Note      Name: Jacqui Cruz  Clinic Number: [id number]  Visit Date: 11/5/2020    Therapy Diagnosis:   Encounter Diagnoses   Name Primary?    Decreased range of motion of left ankle     Left leg weakness     Antalgic gait      Physician: Aravind Chaney MD  Physician Orders: PT Eval and Treat  Medical Diagnosis from Referral:   M76.822 (ICD-10-CM) - Tibial tendonitis, posterior, left   M79.672 (ICD-10-CM) - Left foot pain      Evaluation Date: 10/8/2020  Plan of Care Certification Period: 10/8/2020 - 11/30/20  Authorization Period Start - Expiration: 10/7/20 - 12/31/20  Visit # / Visits POC / Visits authorized: 3/ 7 / 20  FOTO Due Visit 5 -  Follow up  FOTO Due Visit 10 - Follow up    Time In: 805  Time Out: 900  Total Billable Timed: 55 minutes  Total Billable Un-timed: 0 minutes    Precautions: Standard    Subjective     The patient presents to therapy following her first return to work. She states her inserts really help her and her pain has lessened since beginning therapy. She does report increased fatigue and stomach pain increasing her bathroom use but is willing to attempt PT.     Pt reports the use of shoe inserts and work task modifications/sitting during 12 hour work shift to reduce pain. Pt     Response to previous treatment: good to taping for arch support  Functional change: none noted    Pain: 0/10  Location: left ankles     Objective     Jacqui received therapeutic exercises to develop strength, endurance, ROM, flexibility, posture and core stabilization for 30 minutes including:  Calf stretch 3 x 30 sec  Soleus stretch 3 x 30 sec  Ankle inversion 3 x 10 GTB  Ankle eversion 3 x 10 GTB  Calf raise 3 x 10 on foam  Standing on half foam inversion and eversion x 30  Standing on half foam PF/DF x 30      Not Performed  Towel crunches 2 x 2 min  Toe yoga with big toe ext, digit 2-4 ext, and arch lift x 20  each      Not performed  Jacqui received the following manual therapy techniques: Joint mobilizations and Friction Massage were applied to the: left ankle for 10 minutes, including:  Left talocrural P/A and A/P   Subtalar medial glides  1st MTP plantar/dorsal  Friction massage to left posterior tib tendon posterior to medial malleoli    Not performed  Jacqui participated in neuromuscular re-education activities to improve: Balance, Coordination, Kinesthetic, Proprioception and Posture for 15 minutes. The following activities were included:  Supine ankle inversion with isometric hold using submax contraction and tactile cue for inversion, hold for 5 sec  Supine ankle eversion with isometric hold using submax contraction and tactile cue for eversion, hold for 5 sec  SLS on foam in // bars with BUE - tactile cuing for maintaining arch, hold for 15 sec  Ankle rhythmic stabilization 2 x 15 sec with manual pertubations    Patient Education and HEP     She was compliant with home exercise program.    Education provided:   - don arch supports    Written Home Exercises Provided: Patient instructed to cont prior HEP.  Exercises were reviewed and Jacqui was able to demonstrate them prior to the end of the session.  Jacqui demonstrated good  understanding of the education provided.     See EMR under Patient Instructions for exercises provided prior visit.    Assessment     Pt participated in therapy with improve tolerance for participations with reports of HEP compliance.  Pt continues to present with limited talo-cruel mobility with manual therapy; Pt required multiple verbal and contact cues to perform therex without substitutions.     Pt will continue to benefit from skilled outpatient physical therapy to address the deficits listed in the problem list box on initial evaluation, provide pt/family education and to maximize pt's level of independence in the home and community environment.     Jacqui is progressing well  towards her goals.   Pt prognosis is Good.     Pt's spiritual, cultural and educational needs considered and pt agreeable to plan of care and goals.    Anticipated barriers to physical therapy: lack of understanding of diagnosis and current condition    Goals:   Short Term Goals (STG) # weeks Goal Review Date Reviewed Date Met   Pt will demonstrate independence with initial HEP to facilitate therex progression and improvements in functional mobility 1 Met  10/22/20   The patient will increase left ankle strength to greater than or equal to 1/3 manual muscle grade for all deficient areas in order to facilitate standing tolerance >20 min 2 Progressing 11/5/2020       The patient will improve left ankle active dorsiflexion to > neutral in order to facilitate heel strike during gait 2 Progressing 11/5/2020       Pt will be able to perform SLS on left foot >5 sec to facilitate increased stance time during gait 2 Progressing 11/5/2020                                                                                  Long Term Goals (LTG) # weeks Goal Review Date Reviewed Date Met   The patient will improve the Lower Extremity Functional Scale to less than 35% Limitations 4 Progressing 11/5/2020       The patient will increase left ankle strength to 5/5 in all planes in order to return to work fulltime 4 Progressing 11/5/2020       The patient will increase left ankle ROM to WFL for all planes in order to perform gait with no deviations  4 Progressing 11/5/2020                                                                                           Plan     Continue with the plan of care established per initial evaluation    James Wadsworth, PT

## 2020-11-13 ENCOUNTER — OFFICE VISIT (OUTPATIENT)
Dept: ORTHOPEDICS | Facility: CLINIC | Age: 50
End: 2020-11-13
Payer: COMMERCIAL

## 2020-11-13 VITALS — BODY MASS INDEX: 27.6 KG/M2 | HEIGHT: 62 IN | WEIGHT: 150 LBS | RESPIRATION RATE: 16 BRPM

## 2020-11-13 DIAGNOSIS — G89.29 CHRONIC RIGHT SHOULDER PAIN: Primary | ICD-10-CM

## 2020-11-13 DIAGNOSIS — M25.511 CHRONIC RIGHT SHOULDER PAIN: Primary | ICD-10-CM

## 2020-11-13 PROCEDURE — 1125F PR PAIN SEVERITY QUANTIFIED, PAIN PRESENT: ICD-10-PCS | Mod: S$GLB,,, | Performed by: ORTHOPAEDIC SURGERY

## 2020-11-13 PROCEDURE — 20610 LARGE JOINT ASPIRATION/INJECTION: R SUBACROMIAL BURSA: ICD-10-PCS | Mod: RT,S$GLB,, | Performed by: ORTHOPAEDIC SURGERY

## 2020-11-13 PROCEDURE — 99999 PR PBB SHADOW E&M-EST. PATIENT-LVL III: ICD-10-PCS | Mod: PBBFAC,,, | Performed by: ORTHOPAEDIC SURGERY

## 2020-11-13 PROCEDURE — 99213 PR OFFICE/OUTPT VISIT, EST, LEVL III, 20-29 MIN: ICD-10-PCS | Mod: 25,S$GLB,, | Performed by: ORTHOPAEDIC SURGERY

## 2020-11-13 PROCEDURE — 1125F AMNT PAIN NOTED PAIN PRSNT: CPT | Mod: S$GLB,,, | Performed by: ORTHOPAEDIC SURGERY

## 2020-11-13 PROCEDURE — 3008F BODY MASS INDEX DOCD: CPT | Mod: CPTII,S$GLB,, | Performed by: ORTHOPAEDIC SURGERY

## 2020-11-13 PROCEDURE — 20610 DRAIN/INJ JOINT/BURSA W/O US: CPT | Mod: RT,S$GLB,, | Performed by: ORTHOPAEDIC SURGERY

## 2020-11-13 PROCEDURE — 3008F PR BODY MASS INDEX (BMI) DOCUMENTED: ICD-10-PCS | Mod: CPTII,S$GLB,, | Performed by: ORTHOPAEDIC SURGERY

## 2020-11-13 PROCEDURE — 99213 OFFICE O/P EST LOW 20 MIN: CPT | Mod: 25,S$GLB,, | Performed by: ORTHOPAEDIC SURGERY

## 2020-11-13 PROCEDURE — 99999 PR PBB SHADOW E&M-EST. PATIENT-LVL III: CPT | Mod: PBBFAC,,, | Performed by: ORTHOPAEDIC SURGERY

## 2020-11-13 RX ADMIN — TRIAMCINOLONE ACETONIDE 40 MG: 40 INJECTION, SUSPENSION INTRA-ARTICULAR; INTRAMUSCULAR at 09:11

## 2020-11-16 RX ORDER — TRIAMCINOLONE ACETONIDE 40 MG/ML
40 INJECTION, SUSPENSION INTRA-ARTICULAR; INTRAMUSCULAR
Status: DISCONTINUED | OUTPATIENT
Start: 2020-11-13 | End: 2020-11-17 | Stop reason: HOSPADM

## 2020-11-17 ENCOUNTER — PATIENT MESSAGE (OUTPATIENT)
Dept: REHABILITATION | Facility: HOSPITAL | Age: 50
End: 2020-11-17

## 2020-11-17 NOTE — PROCEDURES
Large Joint Aspiration/Injection: R subacromial bursa    Date/Time: 11/13/2020 9:15 AM  Performed by: Lev Gresham MD  Authorized by: Lev Gresham MD     Consent Done?:  Yes (Verbal)  Indications:  Pain  Timeout: prior to procedure the correct patient, procedure, and site was verified    Approach:  Anteromedial  Location:  Shoulder  Site:  R subacromial bursa  Medications:  40 mg triamcinolone acetonide 40 mg/mL

## 2020-11-17 NOTE — PROGRESS NOTES
Past Medical History:   Diagnosis Date    Asthma     Hypertension        Past Surgical History:   Procedure Laterality Date    BREAST BIOPSY Left 2012    Benign calcifications    BREAST SURGERY      biopsy    DILATION AND CURETTAGE OF UTERUS         Current Outpatient Medications   Medication Sig    albuterol (ACCUNEB) 1.25 mg/3 mL Nebu Take 3 mLs (1.25 mg total) by nebulization every 6 (six) hours as needed.    fluticasone propionate (FLONASE) 50 mcg/actuation nasal spray USE 2 SPRAY(S) IN EACH NOSTRIL TWICE DAILY    ibuprofen (ADVIL,MOTRIN) 800 MG tablet Take 1 tablet (800 mg total) by mouth 3 (three) times daily.    lidocaine (LIDODERM) 5 % Place 1 patch onto the skin once daily. Remove & Discard patch within 12 hours or as directed by MD    lisinopril 10 MG tablet     methylPREDNISolone (MEDROL DOSEPACK) 4 mg tablet use as directed    PENNSAID 20 mg/gram /actuation(2 %) sopm APPLY 2 PUMPS TO THE AFFECTED AREA TWICE DAILY    sodium chloride (SALINE NASAL MIST) 3 % Mist 1 spray by Nasal route every 4 (four) hours as needed.    tiZANidine (ZANAFLEX) 2 MG tablet Take 1 tablet (2 mg total) by mouth every 8 (eight) hours as needed.    traMADoL (ULTRAM) 50 mg tablet Take 1 tablet (50 mg total) by mouth every 6 (six) hours as needed for Pain.    beclomethasone (QVAR) 40 mcg/actuation Aero Inhale 1 puff into the lungs 2 (two) times daily.    cetirizine (ZYRTEC) 10 MG tablet Take 1 tablet (10 mg total) by mouth once daily.    fluticasone (FLONASE SENSIMIST) 27.5 mcg/actuation nasal spray 2 sprays by Nasal route once daily.    losartan-hydrochlorothiazide 50-12.5 mg (HYZAAR) 50-12.5 mg per tablet Take 1 tablet by mouth once daily.     No current facility-administered medications for this visit.        Review of patient's allergies indicates:   Allergen Reactions    Doxycycline hyclate Itching       Family History   Problem Relation Age of Onset    Hypertension Mother     Cancer Maternal Aunt          cervical       Social History     Socioeconomic History    Marital status:      Spouse name: Not on file    Number of children: Not on file    Years of education: Not on file    Highest education level: Not on file   Occupational History    Not on file   Social Needs    Financial resource strain: Somewhat hard    Food insecurity     Worry: Sometimes true     Inability: Never true    Transportation needs     Medical: No     Non-medical: No   Tobacco Use    Smoking status: Never Smoker    Smokeless tobacco: Never Used   Substance and Sexual Activity    Alcohol use: No     Frequency: Monthly or less     Drinks per session: Patient refused     Binge frequency: Never    Drug use: No    Sexual activity: Not Currently     Partners: Male     Birth control/protection: None   Lifestyle    Physical activity     Days per week: 3 days     Minutes per session: 60 min    Stress: Not at all   Relationships    Social connections     Talks on phone: More than three times a week     Gets together: Never     Attends Hindu service: Not on file     Active member of club or organization: No     Attends meetings of clubs or organizations: Never     Relationship status:    Other Topics Concern    Not on file   Social History Narrative    Not on file       Chief Complaint:   Chief Complaint   Patient presents with    Right Shoulder - Pain, Swelling       Consulting Physician: No ref. provider found    History of present illness: This is a 46-year-old woman who has a history right shoulder pain associated with overuse.  Treated with injections and exercises. She puts her pain at a 8/10 worse with activities.  No new injury but has been lifting more at work.    Review of Systems:    Constitution: Denies chills, fever, and sweats.    HENT: Denies headaches or blurry vision.    Cardiovascular: Denies chest pain or irregular heart beat.    Respiratory: Denies cough or shortness of breath.    Gastrointestinal:  Denies abdominal pain, nausea, or vomiting.    Musculoskeletal:  Denies muscle cramps.    Neurological: Denies dizziness or focal weakness.    Psychiatric/Behavioral: Normal mental status.    Hematologic/Lymphatic: Denies bleeding problem or easy bruising/bleeding.    Skin: Denies rash or suspicious lesions.      Examination:    Vital Signs:    Vitals:    11/13/20 0928   Resp: 16       Body mass index is 27.44 kg/m².    This a well-developed, well nourished patient in no acute distress.    Alert and oriented and cooperative to examination.       Physical Exam: Right Shoulder Exam     Skin  Rash:   None  Scars:   None    Inspection/Observation  Swelling:   None  Erythema:   None  Bruising:   None  Wounds:   None  Scapular Winging:  None  Scapular Dyskinesia:  None  Atrophy:   None  Masses:   None  Lymphadenopathy: None    Tenderness   AC Joint:   None    Range of Motion   Active Forward Flexion:  180   Active External Rotation:  30  Active Internal Rotation: Low Back    Muscle Strength   Forward Flexion:  5/5  External Rotation: 5/5  Internal Rotation:  5/5    Instability:  None    Tests & Signs   Cross Arm:   Negative    Other   Sensation:  Normal  Pulse:    2+ radial          Imaging: X-rays of the right shoulder show no acute bony abnormality, fracture or dislocation.     Assessment: Right shoulder bursitis with secondary impingement    Plan:   She has recurring bursitis and tendinitis in her shoulder.  She has glenohumeral internal rotation deficit. We gave her another injection today.  We will let her return to work but put restrictions on her of no lifting pushing or pulling more than 20 lb and no overhead lifting.    DISCLAIMER: This note may have been dictated using voice recognition software and may contain grammatical errors.     NOTE: Consult report sent to referring provider via [employer].

## 2020-11-19 ENCOUNTER — PATIENT MESSAGE (OUTPATIENT)
Dept: REHABILITATION | Facility: HOSPITAL | Age: 50
End: 2020-11-19

## 2020-11-30 ENCOUNTER — PATIENT MESSAGE (OUTPATIENT)
Dept: REHABILITATION | Facility: HOSPITAL | Age: 50
End: 2020-11-30

## 2020-12-08 ENCOUNTER — PATIENT MESSAGE (OUTPATIENT)
Dept: ORTHOPEDICS | Facility: CLINIC | Age: 50
End: 2020-12-08

## 2020-12-08 DIAGNOSIS — M76.822 TIBIAL TENDONITIS, POSTERIOR, LEFT: Primary | ICD-10-CM

## 2020-12-08 NOTE — TELEPHONE ENCOUNTER
Aravind Chaney MD  You 24 minutes ago (10:57 AM)     We can reorder her PT.   She can f/u once she completes a total of 6 weeks of PT.     Message

## 2020-12-14 ENCOUNTER — TELEPHONE (OUTPATIENT)
Dept: FAMILY MEDICINE | Facility: CLINIC | Age: 50
End: 2020-12-14

## 2020-12-14 ENCOUNTER — OFFICE VISIT (OUTPATIENT)
Dept: URGENT CARE | Facility: CLINIC | Age: 50
End: 2020-12-14
Payer: COMMERCIAL

## 2020-12-14 VITALS
RESPIRATION RATE: 18 BRPM | OXYGEN SATURATION: 98 % | HEART RATE: 88 BPM | SYSTOLIC BLOOD PRESSURE: 136 MMHG | TEMPERATURE: 98 F | DIASTOLIC BLOOD PRESSURE: 92 MMHG

## 2020-12-14 DIAGNOSIS — Z03.818 ENCOUNTER FOR OBSERVATION FOR SUSPECTED EXPOSURE TO OTHER BIOLOGICAL AGENTS RULED OUT: ICD-10-CM

## 2020-12-14 DIAGNOSIS — U07.1 COVID-19 VIRUS DETECTED: ICD-10-CM

## 2020-12-14 DIAGNOSIS — U07.1 COVID-19 VIRUS DETECTED: Primary | ICD-10-CM

## 2020-12-14 LAB
CTP QC/QA: YES
SARS-COV-2 RDRP RESP QL NAA+PROBE: POSITIVE

## 2020-12-14 PROCEDURE — U0002: ICD-10-PCS | Mod: QW,S$GLB,, | Performed by: EMERGENCY MEDICINE

## 2020-12-14 PROCEDURE — 99213 PR OFFICE/OUTPT VISIT, EST, LEVL III, 20-29 MIN: ICD-10-PCS | Mod: S$GLB,,, | Performed by: EMERGENCY MEDICINE

## 2020-12-14 PROCEDURE — 99213 OFFICE O/P EST LOW 20 MIN: CPT | Mod: S$GLB,,, | Performed by: EMERGENCY MEDICINE

## 2020-12-14 PROCEDURE — U0002 COVID-19 LAB TEST NON-CDC: HCPCS | Mod: QW,S$GLB,, | Performed by: EMERGENCY MEDICINE

## 2020-12-14 NOTE — PROGRESS NOTES
Subjective:       Patient ID: Jacqui Cruz is a 50 y.o. female.    Vitals:  temperature is 98.2 °F (36.8 °C). Her blood pressure is 136/92 (abnormal) and her pulse is 88. Her respiration is 18 and oxygen saturation is 98%.     Chief Complaint: No chief complaint on file.    Jacqui Cruz is a 50 year old female presenting to the clinic with fatigue, fever up to 101, chest congestion, right ear pain that began 2 days ago. She had diarrhea today with no vomiting. She has has been taking mucinex and tylenol. No known exposure.       Constitution: Positive for fatigue and fever. Negative for chills.   HENT: Positive for congestion. Negative for sore throat.    Neck: Negative for painful lymph nodes.   Cardiovascular: Negative for chest pain and leg swelling.   Eyes: Negative for double vision and blurred vision.   Respiratory: Positive for cough. Negative for shortness of breath.    Gastrointestinal: Positive for diarrhea. Negative for nausea and vomiting.   Genitourinary: Negative for dysuria, frequency, urgency and history of kidney stones.   Musculoskeletal: Negative for joint pain, joint swelling, muscle cramps and muscle ache.   Skin: Negative for color change, pale, rash and bruising.   Allergic/Immunologic: Negative for seasonal allergies.   Neurological: Negative for dizziness, history of vertigo, light-headedness, passing out and headaches.   Hematologic/Lymphatic: Negative for swollen lymph nodes.   Psychiatric/Behavioral: Negative for nervous/anxious, sleep disturbance and depression. The patient is not nervous/anxious.        Objective:      Physical Exam   Constitutional: She is oriented to person, place, and time. She appears well-developed. She is cooperative.  Non-toxic appearance. She does not appear ill. No distress.   HENT:   Head: Normocephalic and atraumatic.   Ears:   Right Ear: Hearing, tympanic membrane, external ear and ear canal normal.   Left Ear: Hearing, tympanic membrane, external ear  and ear canal normal.   Nose: Nose normal. No mucosal edema, rhinorrhea or nasal deformity. No epistaxis. Right sinus exhibits no maxillary sinus tenderness and no frontal sinus tenderness. Left sinus exhibits no maxillary sinus tenderness and no frontal sinus tenderness.   Mouth/Throat: Uvula is midline, oropharynx is clear and moist and mucous membranes are normal. No trismus in the jaw. Normal dentition. No uvula swelling. No posterior oropharyngeal erythema.   Eyes: Conjunctivae and lids are normal. Right eye exhibits no discharge. Left eye exhibits no discharge. No scleral icterus.   Neck: Trachea normal, normal range of motion, full passive range of motion without pain and phonation normal. Neck supple.   Cardiovascular: Normal rate, regular rhythm, normal heart sounds and normal pulses.   Pulmonary/Chest: Effort normal and breath sounds normal. No respiratory distress.   Abdominal: Normal appearance.   Musculoskeletal: Normal range of motion.         General: No deformity.   Neurological: She is alert and oriented to person, place, and time. She exhibits normal muscle tone. Coordination normal.   Skin: Skin is warm, dry, intact, not diaphoretic and not pale. Psychiatric: Her speech is normal and behavior is normal. Judgment and thought content normal.   Nursing note and vitals reviewed.        Assessment:       1. COVID-19 virus detected    2. Encounter for observation for suspected exposure to other biological agents ruled out        Plan:       The patient appears well hydrated and nontoxic. In no distress while in clinic. She is tolerating PO intake without difficulty. COVID positive so advised of CDC guidelines for quarantine. Also advised to notify close contacts. Follow up as needed. Symptomatic treatment as needed.     COVID-19 virus detected    Encounter for observation for suspected exposure to other biological agents ruled out  -     POCT COVID-19 Rapid Screening

## 2020-12-17 ENCOUNTER — TELEPHONE (OUTPATIENT)
Dept: FAMILY MEDICINE | Facility: CLINIC | Age: 50
End: 2020-12-17

## 2020-12-17 NOTE — TELEPHONE ENCOUNTER
Spoke to patient, she stated that she was diagnosed with Covid on the 14th and she has been taking Vitamin D3 Vitamin C and Asprin. She stated the is not taking anything else and would like to know what else she should be doing. She is walking around and drinking fluids but she has not been eating and she said she has no appetite and has lost 9lbs since Friday. Please advise.

## 2020-12-17 NOTE — TELEPHONE ENCOUNTER
----- Message from Suze David sent at 12/17/2020  9:28 AM CST -----  Type: Needs Medical Advice  Who Called:  Patient  Best Call Back Number: [phone] (home)   Additional Information: the pt said she tested positive for Covid, and she has been waiting on a nurse to call her back she needs advice that the health dept told her to get from her Dr she cant eat at all she needs to speak to someone today asap

## 2020-12-18 ENCOUNTER — NURSE TRIAGE (OUTPATIENT)
Dept: ADMINISTRATIVE | Facility: CLINIC | Age: 50
End: 2020-12-18

## 2020-12-18 ENCOUNTER — OFFICE VISIT (OUTPATIENT)
Dept: FAMILY MEDICINE | Facility: CLINIC | Age: 50
End: 2020-12-18
Payer: COMMERCIAL

## 2020-12-18 DIAGNOSIS — U07.1 COVID-19 VIRUS INFECTION: Primary | ICD-10-CM

## 2020-12-18 PROCEDURE — 99213 PR OFFICE/OUTPT VISIT, EST, LEVL III, 20-29 MIN: ICD-10-PCS | Mod: 95,,, | Performed by: NURSE PRACTITIONER

## 2020-12-18 PROCEDURE — 99213 OFFICE O/P EST LOW 20 MIN: CPT | Mod: 95,,, | Performed by: NURSE PRACTITIONER

## 2020-12-18 NOTE — PROGRESS NOTES
Subjective:       Patient ID: Jacqui Cruz is a 50 y.o. female.    Chief Complaint: No chief complaint on file.    The patient location is: [place], [place]  The chief complaint leading to consultation is: COVID-19 Infection    Visit type: audiovisual    Face to Face time with patient: 20 minutes of total time spent on the encounter, which includes face to face time and non-face to face time preparing to see the patient (eg, review of tests), Obtaining and/or reviewing separately obtained history, Documenting clinical information in the electronic or other health record, Independently interpreting results (not separately reported) and communicating results to the patient/family/caregiver, or Care coordination (not separately reported).         Each patient to whom he or she provides medical services by telemedicine is:  (1) informed of the relationship between the physician and patient and the respective role of any other health care provider with respect to management of the patient; and (2) notified that he or she may decline to receive medical services by telemedicine and may withdraw from such care at any time.    Notes:     URI   Associated symptoms include diarrhea. Pertinent negatives include no chest pain, headaches or rash.       Past Medical History:   Diagnosis Date    Asthma     Hypertension        Review of patient's allergies indicates:   Allergen Reactions    Omnicef [cefdinir] Diarrhea    Doxycycline hyclate Itching         Current Outpatient Medications:     albuterol (ACCUNEB) 1.25 mg/3 mL Nebu, Take 3 mLs (1.25 mg total) by nebulization every 6 (six) hours as needed., Disp: 25 vial, Rfl: 11    beclomethasone (QVAR) 40 mcg/actuation Aero, Inhale 1 puff into the lungs 2 (two) times daily., Disp: 120 each, Rfl: 3    cetirizine (ZYRTEC) 10 MG tablet, Take 1 tablet (10 mg total) by mouth once daily., Disp: 30 tablet, Rfl: 2    fluticasone (FLONASE SENSIMIST) 27.5 mcg/actuation nasal spray, 2  sprays by Nasal route once daily., Disp: 15.8 mL, Rfl: 0    fluticasone propionate (FLONASE) 50 mcg/actuation nasal spray, USE 2 SPRAY(S) IN EACH NOSTRIL TWICE DAILY, Disp: , Rfl: 0    ibuprofen (ADVIL,MOTRIN) 800 MG tablet, Take 1 tablet (800 mg total) by mouth 3 (three) times daily., Disp: 90 tablet, Rfl: 0    lidocaine (LIDODERM) 5 %, Place 1 patch onto the skin once daily. Remove & Discard patch within 12 hours or as directed by MD, Disp: 15 patch, Rfl: 0    lisinopril 10 MG tablet, , Disp: , Rfl:     losartan-hydrochlorothiazide 50-12.5 mg (HYZAAR) 50-12.5 mg per tablet, Take 1 tablet by mouth once daily., Disp: 90 tablet, Rfl: 3    methylPREDNISolone (MEDROL DOSEPACK) 4 mg tablet, use as directed, Disp: 1 Package, Rfl: 0    PENNSAID 20 mg/gram /actuation(2 %) sopm, APPLY 2 PUMPS TO THE AFFECTED AREA TWICE DAILY, Disp: 112 g, Rfl: 0    sodium chloride (SALINE NASAL MIST) 3 % Mist, 1 spray by Nasal route every 4 (four) hours as needed., Disp: , Rfl:     tiZANidine (ZANAFLEX) 2 MG tablet, Take 1 tablet (2 mg total) by mouth every 8 (eight) hours as needed., Disp: 60 tablet, Rfl: 0    traMADoL (ULTRAM) 50 mg tablet, Take 1 tablet (50 mg total) by mouth every 6 (six) hours as needed for Pain., Disp: 12 tablet, Rfl: 0    Review of Systems   Constitutional: Positive for fatigue and fever (101.5). Negative for unexpected weight change.   HENT: Negative for trouble swallowing.    Eyes: Negative for visual disturbance.   Respiratory: Positive for shortness of breath.    Cardiovascular: Negative for chest pain, palpitations and leg swelling.   Gastrointestinal: Positive for diarrhea. Negative for blood in stool.   Genitourinary: Negative for hematuria.   Musculoskeletal: Positive for myalgias.   Skin: Negative for rash.   Allergic/Immunologic: Negative for immunocompromised state.   Neurological: Negative for headaches.   Hematological: Does not bruise/bleed easily.   Psychiatric/Behavioral: Negative for  agitation. The patient is not nervous/anxious.        Objective:      There were no vitals taken for this visit.  Physical Exam  Constitutional:       Appearance: She is well-developed. She is ill-appearing.   Pulmonary:      Effort: Pulmonary effort is normal.   Neurological:      Mental Status: She is alert and oriented to person, place, and time.   Psychiatric:         Behavior: Behavior normal.         Thought Content: Thought content normal.         Judgment: Judgment normal.         Assessment:       1. COVID-19 virus infection        Plan:       COVID-19 virus infection  -     Refer to Emergency Dept.          Time spent with patient: 20 minutes    Patient with be reevaluated in ER or sooner prn    Greater than 50% of this visit was spent counseling as described in above documentation:Yes

## 2020-12-18 NOTE — TELEPHONE ENCOUNTER
Day 5  She has virtual visit with her PCP today       Reason for Disposition   [1] COVID-19 diagnosed by HCP (doctor, NP or PA) AND [2] mild symptoms (e.g., cough, fever, others) AND [3] no complications or SOB    Additional Information   Negative: Severe difficulty breathing (e.g., struggling for each breath, speaks in single words)   Negative: Difficult to awaken or acting confused (e.g., disoriented, slurred speech)   Negative: Bluish (or gray) lips or face now   Negative: Shock suspected (e.g., cold/pale/clammy skin, too weak to stand, low BP, rapid pulse)   Negative: Sounds like a life-threatening emergency to the triager   Negative: [1] COVID-19 exposure AND [2] no symptoms   Negative: COVID-19 and Breastfeeding, questions about   Negative: [1] Adult with possible COVID-19 symptoms AND [2] triager concerned about severity of symptoms or other causes   Negative: SEVERE or constant chest pain or pressure (Exception: mild central chest pain, present only when coughing)   Negative: MODERATE difficulty breathing (e.g., speaks in phrases, SOB even at rest, pulse 100-120)   Negative: MILD difficulty breathing (e.g., minimal/no SOB at rest, SOB with walking, pulse <100)   Negative: Chest pain   Negative: Patient sounds very sick or weak to the triager   Negative: Fever > 103 F (39.4 C)   Negative: [1] Fever > 101 F (38.3 C) AND [2] age > 60   Negative: [1] Fever > 100.0 F (37.8 C) AND [2] bedridden (e.g., nursing home patient, CVA, chronic illness, recovering from surgery)   Negative: HIGH RISK patient (e.g., age > 64 years, diabetes, heart or lung disease, weak immune system) (Exception: has already been evaluated by healthcare provider and has no new or worsening symptoms)   Negative: [1] COVID-19 infection suspected by caller or triager AND [2] mild symptoms (cough, fever, or others) AND [3] no complications or SOB   Negative: Fever present > 3 days (72 hours)   Negative: [1] Fever returns  after gone for over 24 hours AND [2] symptoms worse or not improved   Negative: [1] Continuous (nonstop) coughing interferes with work or school AND [2] no improvement using cough treatment per protocol   Negative: Cough present > 3 weeks    Protocols used: CORONAVIRUS (COVID-19) DIAGNOSED OR [id number]

## 2020-12-21 ENCOUNTER — PATIENT MESSAGE (OUTPATIENT)
Dept: FAMILY MEDICINE | Facility: CLINIC | Age: 50
End: 2020-12-21

## 2020-12-21 ENCOUNTER — TELEPHONE (OUTPATIENT)
Dept: FAMILY MEDICINE | Facility: CLINIC | Age: 50
End: 2020-12-21

## 2020-12-21 ENCOUNTER — OFFICE VISIT (OUTPATIENT)
Dept: URGENT CARE | Facility: CLINIC | Age: 50
End: 2020-12-21
Payer: COMMERCIAL

## 2020-12-21 VITALS
TEMPERATURE: 99 F | OXYGEN SATURATION: 98 % | HEART RATE: 87 BPM | DIASTOLIC BLOOD PRESSURE: 82 MMHG | RESPIRATION RATE: 18 BRPM | SYSTOLIC BLOOD PRESSURE: 139 MMHG | WEIGHT: 140.19 LBS | HEIGHT: 62 IN | BODY MASS INDEX: 25.8 KG/M2

## 2020-12-21 DIAGNOSIS — R06.02 SHORTNESS OF BREATH: ICD-10-CM

## 2020-12-21 DIAGNOSIS — J18.9 PNEUMONIA OF LEFT LOWER LOBE DUE TO INFECTIOUS ORGANISM: Primary | ICD-10-CM

## 2020-12-21 DIAGNOSIS — U07.1 LAB TEST POSITIVE FOR DETECTION OF COVID-19 VIRUS: Primary | ICD-10-CM

## 2020-12-21 DIAGNOSIS — U07.1 PNEUMONIA DUE TO COVID-19 VIRUS: ICD-10-CM

## 2020-12-21 DIAGNOSIS — J12.82 PNEUMONIA DUE TO COVID-19 VIRUS: ICD-10-CM

## 2020-12-21 PROCEDURE — 99214 OFFICE O/P EST MOD 30 MIN: CPT | Mod: S$GLB,,, | Performed by: NURSE PRACTITIONER

## 2020-12-21 PROCEDURE — 71046 X-RAY EXAM CHEST 2 VIEWS: CPT | Mod: TC,S$GLB,, | Performed by: EMERGENCY MEDICINE

## 2020-12-21 PROCEDURE — 3008F PR BODY MASS INDEX (BMI) DOCUMENTED: ICD-10-PCS | Mod: CPTII,S$GLB,, | Performed by: NURSE PRACTITIONER

## 2020-12-21 PROCEDURE — 3008F BODY MASS INDEX DOCD: CPT | Mod: CPTII,S$GLB,, | Performed by: NURSE PRACTITIONER

## 2020-12-21 PROCEDURE — 71046 PR XRAY, CHEST, 2 VIEWS: ICD-10-PCS | Mod: TC,S$GLB,, | Performed by: EMERGENCY MEDICINE

## 2020-12-21 PROCEDURE — 99214 PR OFFICE/OUTPT VISIT, EST, LEVL IV, 30-39 MIN: ICD-10-PCS | Mod: S$GLB,,, | Performed by: NURSE PRACTITIONER

## 2020-12-21 RX ORDER — ONDANSETRON 4 MG/1
4 TABLET, ORALLY DISINTEGRATING ORAL EVERY 8 HOURS PRN
Qty: 15 TABLET | Refills: 0 | OUTPATIENT
Start: 2020-12-21 | End: 2021-09-02

## 2020-12-21 RX ORDER — AZITHROMYCIN 250 MG/1
TABLET, FILM COATED ORAL
Qty: 6 TABLET | Refills: 0 | Status: SHIPPED | OUTPATIENT
Start: 2020-12-21 | End: 2021-08-26

## 2020-12-21 RX ORDER — AMOXICILLIN 500 MG/1
1000 CAPSULE ORAL EVERY 8 HOURS
Qty: 30 CAPSULE | Refills: 0 | Status: SHIPPED | OUTPATIENT
Start: 2020-12-21 | End: 2020-12-26

## 2020-12-21 NOTE — PATIENT INSTRUCTIONS
When You Have Pneumonia  You have been diagnosed with pneumonia. This is a serious lung infection. Most cases of pneumonia are caused by bacteria. Pneumonia most often occurs in older adults, young children, and people with chronic health problems.  Home care  · Take your medicine exactly as directed. Dont skip doses. Continue taking your antibiotics as until they are all gone, even if you start to feel better. This will prevent the pneumonia from coming back.  · Drink at least 8 glasses of water daily, unless directed otherwise. This helps to loosen and thin secretions so that you can cough them up.  · Use a cool-mist humidifier in your bedroom. Be sure to clean the humidifier daily.  · Dont use medicines to suppress your cough unless your cough is dry, painful, or interferes with your sleep. Coughing up mucus is normal. You may use an expectorant if your healthcare provider says its okay.  · You can use warm compresses or a heating pad on the lowest setting to relieve chest discomfort. Use several times a day for 15-20 minutes at a time. To prevent injury to your skin, set the temperature to warm, not hot. Dont put the compress or pad directly on your skin. Make certain it has a cover or wrap it in a towel. This is to prevent skin burns.  · Get plenty of rest until your fever, shortness of breath, and chest pain go away.  · Plan to get a flu shot every year. The flu is a common cause of pneumonia. Getting a flu shot every year can help prevent both the flu and pneumonia.  Getting the pneumococcal vaccine  Talk with your healthcare provider about getting the pneumococcalvaccine. Pneumococcal pneumonia is caused by bacteria that spread from person to person. It can cause minor problems, such as ear infections. But it can also turn into life-threatening illnesses of the lungs (pneumonia), the covering of the brain and spinal cord (meningitis), and the blood (bacteremia).  Children under 2 years of age, adults  over age 65, people with certain health conditions, and smokers are at the highest risk of pneumococcal disease. This vaccine can help prevent pneumococcal disease in both adults and children. Some people should not have the vaccine. Make sure to ask your healthcare provider if you should have the vaccine.   Follow-up care  Make a follow-up appointment as directed by our staff.  When to call your healthcare provider  Call your healthcare provider if you have any of the following:  · Fever above 100.4°F (38°C), or as directed by your healthcare provider  · Mucus from the lungs (sputum) thats yellow, green, bloody, or smells bad  · A large amount of sputum  · Vomiting  · Symptoms that get worse  When to call 911  Call 911 right away if you have any of the following:  · Chest pain  · Trouble breathing  · Blue lips or fingernails   Date Last Reviewed: 11/1/2016  © 6009-2165 [employer]. [address] [place] [address], [place] [address]. All rights reserved. This information is not intended as a substitute for professional medical care. Always follow your healthcare professional's instructions.

## 2020-12-21 NOTE — TELEPHONE ENCOUNTER
Patient tested positive for Covid on 12-. Spoke to patient who states she was under the impression an order for xray of chest was going to be ordered on 12-. Writer spoke to Mrs Escamilla verbally regarding. Advised patient was instructed to to to ER on Friday 12-. Patient notified. Verbalized understanding.            ----- Message from Milagros Nix sent at 12/21/2020 11:51 AM CST -----  Contact: Pt  Patient states she had confirmed case of covid 12/14 and states she was supposed to have a chest xray done but there are no orders for xray and no appointment set up. Please call pt back to advise. Thank you She can be reached at [phone]

## 2020-12-21 NOTE — PROGRESS NOTES
"Subjective:       Patient ID: Jacqui Cruz is a 50 y.o. female.    Vitals:  height is 5' 2" (1.575 m) and weight is 63.6 kg (140 lb 3.2 oz). Her temperature is 98.5 °F (36.9 °C). Her blood pressure is 139/82 and her pulse is 87. Her respiration is 18 and oxygen saturation is 98%.     Chief Complaint: Cough (chest x-ray)    Jacqui Cruz is a 50 year old female presenting to the clinic for SOB and cough. She tested positive for covid one week ago and developed these symptoms two days ago. She also reports continued fever and diarrhea with one episode of blood noted in toilet after straining.     Cough  This is a recurrent problem. The current episode started 1 to 4 weeks ago. The problem has been unchanged. The problem occurs constantly. The cough is productive of sputum. Associated symptoms include shortness of breath. Pertinent negatives include no chest pain, chills, fever, headaches, myalgias, rash or sore throat. The symptoms are aggravated by lying down. She has tried OTC cough suppressant for the symptoms. The treatment provided no relief.       Constitution: Negative for chills, fatigue and fever.   HENT: Negative for congestion and sore throat.    Neck: Negative for painful lymph nodes.   Cardiovascular: Negative for chest pain and leg swelling.   Eyes: Negative for double vision and blurred vision.   Respiratory: Positive for cough and shortness of breath.    Gastrointestinal: Negative for nausea, vomiting and diarrhea.   Genitourinary: Negative for dysuria, frequency, urgency and history of kidney stones.   Musculoskeletal: Negative for joint pain, joint swelling, muscle cramps and muscle ache.   Skin: Negative for color change, pale, rash and bruising.   Allergic/Immunologic: Negative for seasonal allergies.   Neurological: Negative for dizziness, history of vertigo, light-headedness, passing out and headaches.   Hematologic/Lymphatic: Negative for swollen lymph nodes.   Psychiatric/Behavioral: " Negative for nervous/anxious, sleep disturbance and depression. The patient is not nervous/anxious.        Objective:      Physical Exam   Constitutional: She is oriented to person, place, and time. She appears well-developed. She is cooperative.  Non-toxic appearance. She does not appear ill. No distress.   HENT:   Head: Normocephalic and atraumatic.   Ears:   Right Ear: Hearing, tympanic membrane, external ear and ear canal normal.   Left Ear: Hearing, tympanic membrane, external ear and ear canal normal.   Nose: Nose normal. No mucosal edema, rhinorrhea or nasal deformity. No epistaxis. Right sinus exhibits no maxillary sinus tenderness and no frontal sinus tenderness. Left sinus exhibits no maxillary sinus tenderness and no frontal sinus tenderness.   Mouth/Throat: Uvula is midline, oropharynx is clear and moist and mucous membranes are normal. No trismus in the jaw. Normal dentition. No uvula swelling. No posterior oropharyngeal erythema.   Eyes: Conjunctivae and lids are normal. Right eye exhibits no discharge. Left eye exhibits no discharge. No scleral icterus.   Neck: Trachea normal, normal range of motion, full passive range of motion without pain and phonation normal. Neck supple.   Cardiovascular: Normal rate, regular rhythm, normal heart sounds and normal pulses.   Pulmonary/Chest: Effort normal and breath sounds normal. No respiratory distress.   Abdominal: Soft. Normal appearance and bowel sounds are normal. She exhibits no distension, no pulsatile midline mass and no mass. There is no abdominal tenderness.   Musculoskeletal: Normal range of motion.         General: No deformity.   Neurological: She is alert and oriented to person, place, and time. She exhibits normal muscle tone. Coordination normal.   Skin: Skin is warm, dry, intact, not diaphoretic and not pale. Psychiatric: Her speech is normal and behavior is normal. Judgment and thought content normal.   Nursing note and vitals reviewed.         Assessment:       1. Pneumonia of left lower lobe due to infectious organism    2. Shortness of breath    3. Pneumonia due to COVID-19 virus        Plan:       The patient appears well hydrated and nontoxic. In no distress while in clinic and speaking in complete sentences. Oxygen sats 98%. Early infiltrate noted in left lung which may indicate early pneumonia. No need for ED/hospital transfer at this time but did discuss very strict ED precautions with the patient. Advised her to go to the ER for any additional blood in stool as well.  Will start amoxicillin and azithromycin for pneumonia. Follow up as needed.      Pneumonia of left lower lobe due to infectious organism    Shortness of breath  -     XR CHEST PA AND LATERAL; Future; Expected date: 12/21/2020    Pneumonia due to COVID-19 virus    Other orders  -     azithromycin (ZITHROMAX Z-EFREM) 250 MG tablet; Take 2 tablets PO QD on day one; take 1 tablet PO QD on days 2-5.  Dispense: 6 tablet; Refill: 0  -     amoxicillin (AMOXIL) 500 MG capsule; Take 2 capsules (1,000 mg total) by mouth every 8 (eight) hours. for 5 days  Dispense: 30 capsule; Refill: 0  -     ondansetron (ZOFRAN ODT) 4 MG TbDL; Take 1 tablet (4 mg total) by mouth every 8 (eight) hours as needed (nausea).  Dispense: 15 tablet; Refill: 0

## 2020-12-22 ENCOUNTER — PATIENT MESSAGE (OUTPATIENT)
Dept: FAMILY MEDICINE | Facility: CLINIC | Age: 50
End: 2020-12-22

## 2021-01-29 ENCOUNTER — TELEPHONE (OUTPATIENT)
Dept: ORTHOPEDICS | Facility: CLINIC | Age: 51
End: 2021-01-29

## 2021-01-29 ENCOUNTER — PATIENT MESSAGE (OUTPATIENT)
Dept: ADMINISTRATIVE | Facility: HOSPITAL | Age: 51
End: 2021-01-29

## 2021-04-30 ENCOUNTER — OFFICE VISIT (OUTPATIENT)
Dept: OBSTETRICS AND GYNECOLOGY | Facility: CLINIC | Age: 51
End: 2021-04-30
Payer: COMMERCIAL

## 2021-04-30 VITALS
DIASTOLIC BLOOD PRESSURE: 86 MMHG | BODY MASS INDEX: 27.22 KG/M2 | WEIGHT: 147.94 LBS | SYSTOLIC BLOOD PRESSURE: 126 MMHG | HEIGHT: 62 IN

## 2021-04-30 DIAGNOSIS — N76.0 BACTERIAL VAGINOSIS: ICD-10-CM

## 2021-04-30 DIAGNOSIS — Z11.3 SCREEN FOR STD (SEXUALLY TRANSMITTED DISEASE): ICD-10-CM

## 2021-04-30 DIAGNOSIS — N76.0 ACUTE VAGINITIS: Primary | ICD-10-CM

## 2021-04-30 DIAGNOSIS — B96.89 BACTERIAL VAGINOSIS: ICD-10-CM

## 2021-04-30 PROCEDURE — 99999 PR PBB SHADOW E&M-EST. PATIENT-LVL III: CPT | Mod: PBBFAC,,, | Performed by: REGISTERED NURSE

## 2021-04-30 PROCEDURE — 87660 TRICHOMONAS VAGIN DIR PROBE: CPT | Performed by: REGISTERED NURSE

## 2021-04-30 PROCEDURE — 87510 GARDNER VAG DNA DIR PROBE: CPT | Performed by: REGISTERED NURSE

## 2021-04-30 PROCEDURE — 99214 PR OFFICE/OUTPT VISIT, EST, LEVL IV, 30-39 MIN: ICD-10-PCS | Mod: S$GLB,,, | Performed by: REGISTERED NURSE

## 2021-04-30 PROCEDURE — 99999 PR PBB SHADOW E&M-EST. PATIENT-LVL III: ICD-10-PCS | Mod: PBBFAC,,, | Performed by: REGISTERED NURSE

## 2021-04-30 PROCEDURE — 3008F BODY MASS INDEX DOCD: CPT | Mod: CPTII,S$GLB,, | Performed by: REGISTERED NURSE

## 2021-04-30 PROCEDURE — 1126F AMNT PAIN NOTED NONE PRSNT: CPT | Mod: S$GLB,,, | Performed by: REGISTERED NURSE

## 2021-04-30 PROCEDURE — 1126F PR PAIN SEVERITY QUANTIFIED, NO PAIN PRESENT: ICD-10-PCS | Mod: S$GLB,,, | Performed by: REGISTERED NURSE

## 2021-04-30 PROCEDURE — 99214 OFFICE O/P EST MOD 30 MIN: CPT | Mod: S$GLB,,, | Performed by: REGISTERED NURSE

## 2021-04-30 PROCEDURE — 3008F PR BODY MASS INDEX (BMI) DOCUMENTED: ICD-10-PCS | Mod: CPTII,S$GLB,, | Performed by: REGISTERED NURSE

## 2021-04-30 RX ORDER — FLUCONAZOLE 150 MG/1
150 TABLET ORAL ONCE
Qty: 1 TABLET | Refills: 1 | Status: SHIPPED | OUTPATIENT
Start: 2021-04-30 | End: 2021-04-30

## 2021-04-30 RX ORDER — TINIDAZOLE 500 MG/1
2 TABLET ORAL DAILY
Qty: 8 TABLET | Refills: 0 | Status: SHIPPED | OUTPATIENT
Start: 2021-04-30 | End: 2021-05-02

## 2021-04-30 RX ORDER — CLOTRIMAZOLE AND BETAMETHASONE DIPROPIONATE 10; .64 MG/G; MG/G
CREAM TOPICAL
Qty: 15 G | Refills: 1 | Status: SHIPPED | OUTPATIENT
Start: 2021-04-30 | End: 2022-04-30

## 2021-05-01 ENCOUNTER — PATIENT MESSAGE (OUTPATIENT)
Dept: OBSTETRICS AND GYNECOLOGY | Facility: CLINIC | Age: 51
End: 2021-05-01

## 2021-05-01 LAB
CANDIDA RRNA VAG QL PROBE: NEGATIVE
G VAGINALIS RRNA GENITAL QL PROBE: POSITIVE
T VAGINALIS RRNA GENITAL QL PROBE: NEGATIVE

## 2021-05-02 ENCOUNTER — PATIENT MESSAGE (OUTPATIENT)
Dept: OBSTETRICS AND GYNECOLOGY | Facility: CLINIC | Age: 51
End: 2021-05-02

## 2021-05-03 LAB
C TRACH RRNA SPEC QL NAA+PROBE: NEGATIVE
N GONORRHOEA RRNA SPEC QL NAA+PROBE: NEGATIVE

## 2021-05-05 ENCOUNTER — PATIENT MESSAGE (OUTPATIENT)
Dept: OBSTETRICS AND GYNECOLOGY | Facility: CLINIC | Age: 51
End: 2021-05-05

## 2021-05-05 ENCOUNTER — PATIENT MESSAGE (OUTPATIENT)
Dept: ORTHOPEDICS | Facility: CLINIC | Age: 51
End: 2021-05-05

## 2021-05-06 ENCOUNTER — PATIENT MESSAGE (OUTPATIENT)
Dept: ORTHOPEDICS | Facility: CLINIC | Age: 51
End: 2021-05-06

## 2021-05-06 RX ORDER — METRONIDAZOLE 500 MG/1
500 TABLET ORAL 2 TIMES DAILY
Qty: 14 TABLET | Refills: 0 | Status: SHIPPED | OUTPATIENT
Start: 2021-05-06 | End: 2021-05-13

## 2021-05-06 RX ORDER — IBUPROFEN 800 MG/1
800 TABLET ORAL 3 TIMES DAILY
Qty: 90 TABLET | Refills: 0 | Status: SHIPPED | OUTPATIENT
Start: 2021-05-06 | End: 2022-08-22 | Stop reason: ALTCHOICE

## 2021-05-07 ENCOUNTER — PATIENT MESSAGE (OUTPATIENT)
Dept: OBSTETRICS AND GYNECOLOGY | Facility: CLINIC | Age: 51
End: 2021-05-07

## 2021-06-22 ENCOUNTER — DOCUMENTATION ONLY (OUTPATIENT)
Dept: REHABILITATION | Facility: HOSPITAL | Age: 51
End: 2021-06-22

## 2021-07-02 ENCOUNTER — PATIENT MESSAGE (OUTPATIENT)
Dept: ORTHOPEDICS | Facility: CLINIC | Age: 51
End: 2021-07-02

## 2021-07-02 RX ORDER — DICLOFENAC SODIUM 20 MG/G
SOLUTION TOPICAL
Qty: 112 G | Refills: 0 | Status: SHIPPED | OUTPATIENT
Start: 2021-07-02 | End: 2021-09-27 | Stop reason: SDUPTHER

## 2021-07-06 ENCOUNTER — PATIENT MESSAGE (OUTPATIENT)
Dept: OBSTETRICS AND GYNECOLOGY | Facility: CLINIC | Age: 51
End: 2021-07-06

## 2021-08-13 ENCOUNTER — TELEPHONE (OUTPATIENT)
Dept: ORTHOPEDICS | Facility: CLINIC | Age: 51
End: 2021-08-13

## 2021-08-13 ENCOUNTER — PATIENT MESSAGE (OUTPATIENT)
Dept: ORTHOPEDICS | Facility: CLINIC | Age: 51
End: 2021-08-13

## 2021-08-17 ENCOUNTER — IMMUNIZATION (OUTPATIENT)
Dept: PRIMARY CARE CLINIC | Facility: CLINIC | Age: 51
End: 2021-08-17
Payer: COMMERCIAL

## 2021-08-17 DIAGNOSIS — Z23 NEED FOR VACCINATION: Primary | ICD-10-CM

## 2021-08-17 PROCEDURE — 0001A COVID-19, MRNA, LNP-S, PF, 30 MCG/0.3 ML DOSE VACCINE: CPT | Mod: CV19,S$GLB,, | Performed by: FAMILY MEDICINE

## 2021-08-17 PROCEDURE — 0001A COVID-19, MRNA, LNP-S, PF, 30 MCG/0.3 ML DOSE VACCINE: ICD-10-PCS | Mod: CV19,S$GLB,, | Performed by: FAMILY MEDICINE

## 2021-08-17 PROCEDURE — 91300 COVID-19, MRNA, LNP-S, PF, 30 MCG/0.3 ML DOSE VACCINE: ICD-10-PCS | Mod: S$GLB,,, | Performed by: FAMILY MEDICINE

## 2021-08-17 PROCEDURE — 91300 COVID-19, MRNA, LNP-S, PF, 30 MCG/0.3 ML DOSE VACCINE: CPT | Mod: S$GLB,,, | Performed by: FAMILY MEDICINE

## 2021-08-24 ENCOUNTER — OFFICE VISIT (OUTPATIENT)
Dept: FAMILY MEDICINE | Facility: CLINIC | Age: 51
End: 2021-08-24
Payer: COMMERCIAL

## 2021-08-24 ENCOUNTER — TELEPHONE (OUTPATIENT)
Dept: FAMILY MEDICINE | Facility: CLINIC | Age: 51
End: 2021-08-24

## 2021-08-24 DIAGNOSIS — J32.9 BACTERIAL SINUSITIS: Primary | ICD-10-CM

## 2021-08-24 DIAGNOSIS — B96.89 BACTERIAL SINUSITIS: Primary | ICD-10-CM

## 2021-08-24 PROCEDURE — 1160F PR REVIEW ALL MEDS BY PRESCRIBER/CLIN PHARMACIST DOCUMENTED: ICD-10-PCS | Mod: CPTII,,, | Performed by: PHYSICIAN ASSISTANT

## 2021-08-24 PROCEDURE — 99213 OFFICE O/P EST LOW 20 MIN: CPT | Mod: 95,,, | Performed by: PHYSICIAN ASSISTANT

## 2021-08-24 PROCEDURE — 1160F RVW MEDS BY RX/DR IN RCRD: CPT | Mod: CPTII,,, | Performed by: PHYSICIAN ASSISTANT

## 2021-08-24 PROCEDURE — 1159F PR MEDICATION LIST DOCUMENTED IN MEDICAL RECORD: ICD-10-PCS | Mod: CPTII,,, | Performed by: PHYSICIAN ASSISTANT

## 2021-08-24 PROCEDURE — 99213 PR OFFICE/OUTPT VISIT, EST, LEVL III, 20-29 MIN: ICD-10-PCS | Mod: 95,,, | Performed by: PHYSICIAN ASSISTANT

## 2021-08-24 PROCEDURE — 1159F MED LIST DOCD IN RCRD: CPT | Mod: CPTII,,, | Performed by: PHYSICIAN ASSISTANT

## 2021-08-24 RX ORDER — METHYLPREDNISOLONE 4 MG/1
TABLET ORAL
Qty: 1 PACKAGE | Refills: 0 | Status: SHIPPED | OUTPATIENT
Start: 2021-08-24 | End: 2021-09-14

## 2021-08-24 RX ORDER — AMOXICILLIN 500 MG/1
TABLET, FILM COATED ORAL
Qty: 40 TABLET | Refills: 0 | Status: SHIPPED | OUTPATIENT
Start: 2021-08-24 | End: 2022-12-14 | Stop reason: ALTCHOICE

## 2021-08-26 ENCOUNTER — TELEPHONE (OUTPATIENT)
Dept: ORTHOPEDICS | Facility: CLINIC | Age: 51
End: 2021-08-26

## 2021-08-26 ENCOUNTER — PATIENT MESSAGE (OUTPATIENT)
Dept: FAMILY MEDICINE | Facility: CLINIC | Age: 51
End: 2021-08-26

## 2021-08-26 ENCOUNTER — PATIENT MESSAGE (OUTPATIENT)
Dept: ORTHOPEDICS | Facility: CLINIC | Age: 51
End: 2021-08-26

## 2021-09-02 ENCOUNTER — HOSPITAL ENCOUNTER (EMERGENCY)
Facility: HOSPITAL | Age: 51
Discharge: HOME | End: 2021-09-02
Attending: EMERGENCY MEDICINE
Payer: COMMERCIAL

## 2021-09-02 VITALS
OXYGEN SATURATION: 99 % | DIASTOLIC BLOOD PRESSURE: 105 MMHG | SYSTOLIC BLOOD PRESSURE: 199 MMHG | HEART RATE: 81 BPM | TEMPERATURE: 98 F | BODY MASS INDEX: 27.06 KG/M2 | RESPIRATION RATE: 18 BRPM | HEIGHT: 61 IN | WEIGHT: 143.31 LBS

## 2021-09-02 DIAGNOSIS — K52.9 GASTROENTERITIS: ICD-10-CM

## 2021-09-02 DIAGNOSIS — K92.2 GASTROINTESTINAL HEMORRHAGE, UNSPECIFIED GASTROINTESTINAL HEMORRHAGE TYPE: Primary | ICD-10-CM

## 2021-09-02 LAB
ALBUMIN SERPL BCP-MCNC: 4 G/DL (ref 3.5–5.2)
ALP SERPL-CCNC: 68 U/L (ref 55–135)
ALT SERPL W/O P-5'-P-CCNC: 44 U/L (ref 10–44)
ANION GAP SERPL CALC-SCNC: 9 MMOL/L (ref 8–16)
AST SERPL-CCNC: 54 U/L (ref 10–40)
BASOPHILS # BLD AUTO: 0.05 K/UL (ref 0–0.2)
BASOPHILS NFR BLD: 0.6 % (ref 0–1.9)
BILIRUB SERPL-MCNC: 0.4 MG/DL (ref 0.1–1)
BUN SERPL-MCNC: 9 MG/DL (ref 6–20)
CALCIUM SERPL-MCNC: 9.9 MG/DL (ref 8.7–10.5)
CHLORIDE SERPL-SCNC: 104 MMOL/L (ref 95–110)
CO2 SERPL-SCNC: 28 MMOL/L (ref 23–29)
CREAT SERPL-MCNC: 1.1 MG/DL (ref 0.5–1.4)
DIFFERENTIAL METHOD: ABNORMAL
EOSINOPHIL # BLD AUTO: 0.3 K/UL (ref 0–0.5)
EOSINOPHIL NFR BLD: 3.4 % (ref 0–8)
ERYTHROCYTE [DISTWIDTH] IN BLOOD BY AUTOMATED COUNT: 13.7 % (ref 11.5–14.5)
EST. GFR  (AFRICAN AMERICAN): >60 ML/MIN/1.73 M^2
EST. GFR  (NON AFRICAN AMERICAN): 57 ML/MIN/1.73 M^2
GLUCOSE SERPL-MCNC: 122 MG/DL (ref 70–110)
HCT VFR BLD AUTO: 42.1 % (ref 37–48.5)
HGB BLD-MCNC: 13.1 G/DL (ref 12–16)
IMM GRANULOCYTES # BLD AUTO: 0.02 K/UL (ref 0–0.04)
IMM GRANULOCYTES NFR BLD AUTO: 0.3 % (ref 0–0.5)
LIPASE SERPL-CCNC: 31 U/L (ref 4–60)
LYMPHOCYTES # BLD AUTO: 2.6 K/UL (ref 1–4.8)
LYMPHOCYTES NFR BLD: 33.3 % (ref 18–48)
MCH RBC QN AUTO: 25.8 PG (ref 27–31)
MCHC RBC AUTO-ENTMCNC: 31.1 G/DL (ref 32–36)
MCV RBC AUTO: 83 FL (ref 82–98)
MONOCYTES # BLD AUTO: 0.6 K/UL (ref 0.3–1)
MONOCYTES NFR BLD: 8 % (ref 4–15)
NEUTROPHILS # BLD AUTO: 4.3 K/UL (ref 1.8–7.7)
NEUTROPHILS NFR BLD: 54.4 % (ref 38–73)
NRBC BLD-RTO: 0 /100 WBC
PLATELET # BLD AUTO: 231 K/UL (ref 150–450)
PMV BLD AUTO: 11.6 FL (ref 9.2–12.9)
POTASSIUM SERPL-SCNC: 4.3 MMOL/L (ref 3.5–5.1)
PROT SERPL-MCNC: 8.7 G/DL (ref 6–8.4)
RBC # BLD AUTO: 5.07 M/UL (ref 4–5.4)
SODIUM SERPL-SCNC: 141 MMOL/L (ref 136–145)
WBC # BLD AUTO: 7.87 K/UL (ref 3.9–12.7)

## 2021-09-02 PROCEDURE — 80053 COMPREHEN METABOLIC PANEL: CPT | Performed by: PHYSICIAN ASSISTANT

## 2021-09-02 PROCEDURE — 36415 COLL VENOUS BLD VENIPUNCTURE: CPT | Performed by: PHYSICIAN ASSISTANT

## 2021-09-02 PROCEDURE — 83690 ASSAY OF LIPASE: CPT | Performed by: PHYSICIAN ASSISTANT

## 2021-09-02 PROCEDURE — 85025 COMPLETE CBC W/AUTO DIFF WBC: CPT | Performed by: PHYSICIAN ASSISTANT

## 2021-09-02 PROCEDURE — 99283 EMERGENCY DEPT VISIT LOW MDM: CPT

## 2021-09-02 RX ORDER — ONDANSETRON 4 MG/1
8 TABLET, ORALLY DISINTEGRATING ORAL EVERY 6 HOURS PRN
Qty: 30 TABLET | Refills: 0 | Status: SHIPPED | OUTPATIENT
Start: 2021-09-02 | End: 2022-03-21

## 2021-09-03 ENCOUNTER — TELEPHONE (OUTPATIENT)
Dept: GASTROENTEROLOGY | Facility: CLINIC | Age: 51
End: 2021-09-03

## 2021-09-04 ENCOUNTER — HOSPITAL ENCOUNTER (EMERGENCY)
Facility: HOSPITAL | Age: 51
Discharge: HOME | End: 2021-09-04
Attending: EMERGENCY MEDICINE
Payer: COMMERCIAL

## 2021-09-04 ENCOUNTER — NURSE TRIAGE (OUTPATIENT)
Dept: ADMINISTRATIVE | Facility: CLINIC | Age: 51
End: 2021-09-04

## 2021-09-04 VITALS
OXYGEN SATURATION: 100 % | RESPIRATION RATE: 16 BRPM | SYSTOLIC BLOOD PRESSURE: 141 MMHG | BODY MASS INDEX: 27.38 KG/M2 | TEMPERATURE: 98 F | HEART RATE: 58 BPM | HEIGHT: 61 IN | DIASTOLIC BLOOD PRESSURE: 78 MMHG | WEIGHT: 145 LBS

## 2021-09-04 DIAGNOSIS — R51.9 NONINTRACTABLE EPISODIC HEADACHE, UNSPECIFIED HEADACHE TYPE: ICD-10-CM

## 2021-09-04 DIAGNOSIS — I10 HYPERTENSION, UNSPECIFIED TYPE: Primary | ICD-10-CM

## 2021-09-04 PROCEDURE — 99284 EMERGENCY DEPT VISIT MOD MDM: CPT

## 2021-09-04 PROCEDURE — 25000003 PHARM REV CODE 250: Performed by: EMERGENCY MEDICINE

## 2021-09-04 RX ORDER — BUTALBITAL, ACETAMINOPHEN AND CAFFEINE 50; 325; 40 MG/1; MG/1; MG/1
1 TABLET ORAL EVERY 4 HOURS PRN
Qty: 12 TABLET | Refills: 0 | Status: SHIPPED | OUTPATIENT
Start: 2021-09-04 | End: 2021-10-04

## 2021-09-04 RX ORDER — CLONIDINE HYDROCHLORIDE 0.1 MG/1
0.1 TABLET ORAL EVERY 6 HOURS PRN
Qty: 30 TABLET | Refills: 0 | Status: SHIPPED | OUTPATIENT
Start: 2021-09-04 | End: 2023-06-29

## 2021-09-04 RX ORDER — BUTALBITAL, ACETAMINOPHEN AND CAFFEINE 50; 325; 40 MG/1; MG/1; MG/1
2 TABLET ORAL
Status: COMPLETED | OUTPATIENT
Start: 2021-09-04 | End: 2021-09-04

## 2021-09-04 RX ORDER — CLONIDINE HYDROCHLORIDE 0.2 MG/1
0.2 TABLET ORAL
Status: COMPLETED | OUTPATIENT
Start: 2021-09-04 | End: 2021-09-04

## 2021-09-04 RX ADMIN — BUTALBITAL, ACETAMINOPHEN, AND CAFFEINE 2 TABLET: 50; 325; 40 TABLET ORAL at 05:09

## 2021-09-04 RX ADMIN — CLONIDINE HYDROCHLORIDE 0.2 MG: 0.2 TABLET ORAL at 05:09

## 2021-09-07 ENCOUNTER — PATIENT MESSAGE (OUTPATIENT)
Dept: GASTROENTEROLOGY | Facility: CLINIC | Age: 51
End: 2021-09-07

## 2021-09-08 ENCOUNTER — OFFICE VISIT (OUTPATIENT)
Dept: GASTROENTEROLOGY | Facility: CLINIC | Age: 51
End: 2021-09-08
Payer: COMMERCIAL

## 2021-09-08 VITALS
WEIGHT: 149.5 LBS | RESPIRATION RATE: 18 BRPM | SYSTOLIC BLOOD PRESSURE: 183 MMHG | DIASTOLIC BLOOD PRESSURE: 87 MMHG | HEIGHT: 61 IN | BODY MASS INDEX: 28.22 KG/M2 | HEART RATE: 68 BPM

## 2021-09-08 DIAGNOSIS — R10.9 ABDOMINAL PAIN, UNSPECIFIED ABDOMINAL LOCATION: Primary | ICD-10-CM

## 2021-09-08 DIAGNOSIS — Z01.818 PRE-OP TESTING: ICD-10-CM

## 2021-09-08 DIAGNOSIS — R79.89 ELEVATED LFTS: ICD-10-CM

## 2021-09-08 DIAGNOSIS — K92.1 BLOOD IN STOOL: ICD-10-CM

## 2021-09-08 PROCEDURE — 1160F PR REVIEW ALL MEDS BY PRESCRIBER/CLIN PHARMACIST DOCUMENTED: ICD-10-PCS | Mod: CPTII,S$GLB,, | Performed by: INTERNAL MEDICINE

## 2021-09-08 PROCEDURE — 1159F PR MEDICATION LIST DOCUMENTED IN MEDICAL RECORD: ICD-10-PCS | Mod: CPTII,S$GLB,, | Performed by: INTERNAL MEDICINE

## 2021-09-08 PROCEDURE — 99999 PR PBB SHADOW E&M-EST. PATIENT-LVL IV: CPT | Mod: PBBFAC,,, | Performed by: INTERNAL MEDICINE

## 2021-09-08 PROCEDURE — 99999 PR PBB SHADOW E&M-EST. PATIENT-LVL IV: ICD-10-PCS | Mod: PBBFAC,,, | Performed by: INTERNAL MEDICINE

## 2021-09-08 PROCEDURE — 3008F BODY MASS INDEX DOCD: CPT | Mod: CPTII,S$GLB,, | Performed by: INTERNAL MEDICINE

## 2021-09-08 PROCEDURE — 1160F RVW MEDS BY RX/DR IN RCRD: CPT | Mod: CPTII,S$GLB,, | Performed by: INTERNAL MEDICINE

## 2021-09-08 PROCEDURE — 3008F PR BODY MASS INDEX (BMI) DOCUMENTED: ICD-10-PCS | Mod: CPTII,S$GLB,, | Performed by: INTERNAL MEDICINE

## 2021-09-08 PROCEDURE — 1159F MED LIST DOCD IN RCRD: CPT | Mod: CPTII,S$GLB,, | Performed by: INTERNAL MEDICINE

## 2021-09-08 PROCEDURE — 3077F SYST BP >= 140 MM HG: CPT | Mod: CPTII,S$GLB,, | Performed by: INTERNAL MEDICINE

## 2021-09-08 PROCEDURE — 3079F DIAST BP 80-89 MM HG: CPT | Mod: CPTII,S$GLB,, | Performed by: INTERNAL MEDICINE

## 2021-09-08 PROCEDURE — 3079F PR MOST RECENT DIASTOLIC BLOOD PRESSURE 80-89 MM HG: ICD-10-PCS | Mod: CPTII,S$GLB,, | Performed by: INTERNAL MEDICINE

## 2021-09-08 PROCEDURE — 3077F PR MOST RECENT SYSTOLIC BLOOD PRESSURE >= 140 MM HG: ICD-10-PCS | Mod: CPTII,S$GLB,, | Performed by: INTERNAL MEDICINE

## 2021-09-08 PROCEDURE — 99204 OFFICE O/P NEW MOD 45 MIN: CPT | Mod: S$GLB,,, | Performed by: INTERNAL MEDICINE

## 2021-09-08 PROCEDURE — 99204 PR OFFICE/OUTPT VISIT, NEW, LEVL IV, 45-59 MIN: ICD-10-PCS | Mod: S$GLB,,, | Performed by: INTERNAL MEDICINE

## 2021-09-13 ENCOUNTER — PATIENT MESSAGE (OUTPATIENT)
Dept: FAMILY MEDICINE | Facility: CLINIC | Age: 51
End: 2021-09-13

## 2021-09-14 ENCOUNTER — IMMUNIZATION (OUTPATIENT)
Dept: PRIMARY CARE CLINIC | Facility: CLINIC | Age: 51
End: 2021-09-14
Payer: COMMERCIAL

## 2021-09-14 DIAGNOSIS — Z23 NEED FOR VACCINATION: Primary | ICD-10-CM

## 2021-09-14 PROCEDURE — 91300 COVID-19, MRNA, LNP-S, PF, 30 MCG/0.3 ML DOSE VACCINE: CPT | Mod: S$GLB,,, | Performed by: FAMILY MEDICINE

## 2021-09-14 PROCEDURE — 0002A COVID-19, MRNA, LNP-S, PF, 30 MCG/0.3 ML DOSE VACCINE: ICD-10-PCS | Mod: CV19,S$GLB,, | Performed by: FAMILY MEDICINE

## 2021-09-14 PROCEDURE — 0002A COVID-19, MRNA, LNP-S, PF, 30 MCG/0.3 ML DOSE VACCINE: CPT | Mod: CV19,S$GLB,, | Performed by: FAMILY MEDICINE

## 2021-09-14 PROCEDURE — 91300 COVID-19, MRNA, LNP-S, PF, 30 MCG/0.3 ML DOSE VACCINE: ICD-10-PCS | Mod: S$GLB,,, | Performed by: FAMILY MEDICINE

## 2021-09-15 ENCOUNTER — PATIENT MESSAGE (OUTPATIENT)
Dept: FAMILY MEDICINE | Facility: CLINIC | Age: 51
End: 2021-09-15

## 2021-09-15 RX ORDER — LISINOPRIL 10 MG/1
20 TABLET ORAL DAILY
Qty: 30 TABLET | Refills: 0 | OUTPATIENT
Start: 2021-09-15

## 2021-09-16 ENCOUNTER — OFFICE VISIT (OUTPATIENT)
Dept: FAMILY MEDICINE | Facility: CLINIC | Age: 51
End: 2021-09-16
Payer: COMMERCIAL

## 2021-09-16 ENCOUNTER — TELEPHONE (OUTPATIENT)
Dept: FAMILY MEDICINE | Facility: CLINIC | Age: 51
End: 2021-09-16

## 2021-09-16 DIAGNOSIS — I10 ESSENTIAL HYPERTENSION: Primary | ICD-10-CM

## 2021-09-16 PROCEDURE — 99213 PR OFFICE/OUTPT VISIT, EST, LEVL III, 20-29 MIN: ICD-10-PCS | Mod: 95,,, | Performed by: NURSE PRACTITIONER

## 2021-09-16 PROCEDURE — 4010F ACE/ARB THERAPY RXD/TAKEN: CPT | Mod: CPTII,95,, | Performed by: NURSE PRACTITIONER

## 2021-09-16 PROCEDURE — 99213 OFFICE O/P EST LOW 20 MIN: CPT | Mod: 95,,, | Performed by: NURSE PRACTITIONER

## 2021-09-16 PROCEDURE — 4010F PR ACE/ARB THEARPY RXD/TAKEN: ICD-10-PCS | Mod: CPTII,95,, | Performed by: NURSE PRACTITIONER

## 2021-09-16 RX ORDER — TINIDAZOLE 500 MG/1
2000 TABLET ORAL ONCE
COMMUNITY
Start: 2021-05-04 | End: 2022-05-04

## 2021-09-16 RX ORDER — LISINOPRIL 20 MG/1
20 TABLET ORAL DAILY
Qty: 90 TABLET | Refills: 3 | Status: SHIPPED | OUTPATIENT
Start: 2021-09-16 | End: 2022-10-11 | Stop reason: SDUPTHER

## 2021-09-16 RX ORDER — FLUCONAZOLE 150 MG/1
150 TABLET ORAL ONCE
COMMUNITY
Start: 2021-05-01 | End: 2022-05-04

## 2021-09-17 ENCOUNTER — PATIENT MESSAGE (OUTPATIENT)
Dept: OBSTETRICS AND GYNECOLOGY | Facility: CLINIC | Age: 51
End: 2021-09-17

## 2021-09-18 ENCOUNTER — HOSPITAL ENCOUNTER (OUTPATIENT)
Dept: RADIOLOGY | Facility: HOSPITAL | Age: 51
Discharge: HOME | End: 2021-09-18
Attending: INTERNAL MEDICINE
Payer: COMMERCIAL

## 2021-09-18 DIAGNOSIS — R79.89 ELEVATED LFTS: ICD-10-CM

## 2021-09-18 PROCEDURE — 76705 ECHO EXAM OF ABDOMEN: CPT | Mod: TC

## 2021-09-18 PROCEDURE — 76705 US ABDOMEN LIMITED: ICD-10-PCS | Mod: 26,,, | Performed by: RADIOLOGY

## 2021-09-18 PROCEDURE — 76705 ECHO EXAM OF ABDOMEN: CPT | Mod: 26,,, | Performed by: RADIOLOGY

## 2021-09-20 ENCOUNTER — PATIENT OUTREACH (OUTPATIENT)
Dept: ADMINISTRATIVE | Facility: OTHER | Age: 51
End: 2021-09-20

## 2021-09-20 ENCOUNTER — HOSPITAL ENCOUNTER (OUTPATIENT)
Dept: RADIOLOGY | Facility: HOSPITAL | Age: 51
Discharge: HOME | End: 2021-09-20
Attending: ORTHOPAEDIC SURGERY
Payer: COMMERCIAL

## 2021-09-20 ENCOUNTER — OFFICE VISIT (OUTPATIENT)
Dept: ORTHOPEDICS | Facility: CLINIC | Age: 51
End: 2021-09-20
Payer: COMMERCIAL

## 2021-09-20 VITALS — WEIGHT: 149 LBS | HEIGHT: 61 IN | BODY MASS INDEX: 28.13 KG/M2 | RESPIRATION RATE: 18 BRPM

## 2021-09-20 DIAGNOSIS — Z12.31 ENCOUNTER FOR SCREENING MAMMOGRAM FOR MALIGNANT NEOPLASM OF BREAST: Primary | ICD-10-CM

## 2021-09-20 DIAGNOSIS — M25.511 RIGHT SHOULDER PAIN, UNSPECIFIED CHRONICITY: Primary | ICD-10-CM

## 2021-09-20 DIAGNOSIS — M25.511 RIGHT SHOULDER PAIN, UNSPECIFIED CHRONICITY: ICD-10-CM

## 2021-09-20 DIAGNOSIS — M25.511 ACUTE PAIN OF RIGHT SHOULDER: Primary | ICD-10-CM

## 2021-09-20 PROCEDURE — 1160F RVW MEDS BY RX/DR IN RCRD: CPT | Mod: CPTII,S$GLB,, | Performed by: ORTHOPAEDIC SURGERY

## 2021-09-20 PROCEDURE — 73030 X-RAY EXAM OF SHOULDER: CPT | Mod: TC,PN,RT

## 2021-09-20 PROCEDURE — 4010F PR ACE/ARB THEARPY RXD/TAKEN: ICD-10-PCS | Mod: CPTII,S$GLB,, | Performed by: ORTHOPAEDIC SURGERY

## 2021-09-20 PROCEDURE — 99999 PR PBB SHADOW E&M-EST. PATIENT-LVL III: CPT | Mod: PBBFAC,,, | Performed by: ORTHOPAEDIC SURGERY

## 2021-09-20 PROCEDURE — 1159F MED LIST DOCD IN RCRD: CPT | Mod: CPTII,S$GLB,, | Performed by: ORTHOPAEDIC SURGERY

## 2021-09-20 PROCEDURE — 1159F PR MEDICATION LIST DOCUMENTED IN MEDICAL RECORD: ICD-10-PCS | Mod: CPTII,S$GLB,, | Performed by: ORTHOPAEDIC SURGERY

## 2021-09-20 PROCEDURE — 4010F ACE/ARB THERAPY RXD/TAKEN: CPT | Mod: CPTII,S$GLB,, | Performed by: ORTHOPAEDIC SURGERY

## 2021-09-20 PROCEDURE — 3008F BODY MASS INDEX DOCD: CPT | Mod: CPTII,S$GLB,, | Performed by: ORTHOPAEDIC SURGERY

## 2021-09-20 PROCEDURE — 99214 PR OFFICE/OUTPT VISIT, EST, LEVL IV, 30-39 MIN: ICD-10-PCS | Mod: S$GLB,,, | Performed by: ORTHOPAEDIC SURGERY

## 2021-09-20 PROCEDURE — 73030 X-RAY EXAM OF SHOULDER: CPT | Mod: 26,RT,, | Performed by: RADIOLOGY

## 2021-09-20 PROCEDURE — 99214 OFFICE O/P EST MOD 30 MIN: CPT | Mod: S$GLB,,, | Performed by: ORTHOPAEDIC SURGERY

## 2021-09-20 PROCEDURE — 3008F PR BODY MASS INDEX (BMI) DOCUMENTED: ICD-10-PCS | Mod: CPTII,S$GLB,, | Performed by: ORTHOPAEDIC SURGERY

## 2021-09-20 PROCEDURE — 1160F PR REVIEW ALL MEDS BY PRESCRIBER/CLIN PHARMACIST DOCUMENTED: ICD-10-PCS | Mod: CPTII,S$GLB,, | Performed by: ORTHOPAEDIC SURGERY

## 2021-09-20 PROCEDURE — 73030 XR SHOULDER TRAUMA 3 VIEW RIGHT: ICD-10-PCS | Mod: 26,RT,, | Performed by: RADIOLOGY

## 2021-09-20 PROCEDURE — 99999 PR PBB SHADOW E&M-EST. PATIENT-LVL III: ICD-10-PCS | Mod: PBBFAC,,, | Performed by: ORTHOPAEDIC SURGERY

## 2021-09-21 ENCOUNTER — TELEPHONE (OUTPATIENT)
Dept: FAMILY MEDICINE | Facility: CLINIC | Age: 51
End: 2021-09-21

## 2021-09-22 ENCOUNTER — HOSPITAL ENCOUNTER (OUTPATIENT)
Dept: RADIOLOGY | Facility: HOSPITAL | Age: 51
Discharge: HOME | End: 2021-09-22
Attending: ORTHOPAEDIC SURGERY
Payer: COMMERCIAL

## 2021-09-22 ENCOUNTER — PATIENT MESSAGE (OUTPATIENT)
Dept: ORTHOPEDICS | Facility: CLINIC | Age: 51
End: 2021-09-22

## 2021-09-22 DIAGNOSIS — M25.511 RIGHT SHOULDER PAIN, UNSPECIFIED CHRONICITY: ICD-10-CM

## 2021-09-22 PROCEDURE — 73221 MRI JOINT UPR EXTREM W/O DYE: CPT | Mod: 26,RT,, | Performed by: RADIOLOGY

## 2021-09-22 PROCEDURE — 73221 MRI SHOULDER WITHOUT CONTRAST RIGHT: ICD-10-PCS | Mod: 26,RT,, | Performed by: RADIOLOGY

## 2021-09-22 PROCEDURE — 73221 MRI JOINT UPR EXTREM W/O DYE: CPT | Mod: TC,RT

## 2021-09-27 ENCOUNTER — OFFICE VISIT (OUTPATIENT)
Dept: ORTHOPEDICS | Facility: CLINIC | Age: 51
End: 2021-09-27
Payer: COMMERCIAL

## 2021-09-27 VITALS — BODY MASS INDEX: 28.13 KG/M2 | HEIGHT: 61 IN | WEIGHT: 149 LBS | RESPIRATION RATE: 18 BRPM

## 2021-09-27 DIAGNOSIS — M25.511 ACUTE PAIN OF RIGHT SHOULDER: Primary | ICD-10-CM

## 2021-09-27 DIAGNOSIS — M25.511 RIGHT SHOULDER PAIN, UNSPECIFIED CHRONICITY: Primary | ICD-10-CM

## 2021-09-27 PROCEDURE — 99999 PR PBB SHADOW E&M-EST. PATIENT-LVL III: ICD-10-PCS | Mod: PBBFAC,,, | Performed by: ORTHOPAEDIC SURGERY

## 2021-09-27 PROCEDURE — 99999 PR PBB SHADOW E&M-EST. PATIENT-LVL III: CPT | Mod: PBBFAC,,, | Performed by: ORTHOPAEDIC SURGERY

## 2021-09-27 PROCEDURE — 1160F PR REVIEW ALL MEDS BY PRESCRIBER/CLIN PHARMACIST DOCUMENTED: ICD-10-PCS | Mod: CPTII,S$GLB,, | Performed by: ORTHOPAEDIC SURGERY

## 2021-09-27 PROCEDURE — 4010F ACE/ARB THERAPY RXD/TAKEN: CPT | Mod: CPTII,S$GLB,, | Performed by: ORTHOPAEDIC SURGERY

## 2021-09-27 PROCEDURE — 1160F RVW MEDS BY RX/DR IN RCRD: CPT | Mod: CPTII,S$GLB,, | Performed by: ORTHOPAEDIC SURGERY

## 2021-09-27 PROCEDURE — 20610 DRAIN/INJ JOINT/BURSA W/O US: CPT | Mod: RT,S$GLB,, | Performed by: ORTHOPAEDIC SURGERY

## 2021-09-27 PROCEDURE — 20610 LARGE JOINT ASPIRATION/INJECTION: R SUBACROMIAL BURSA: ICD-10-PCS | Mod: RT,S$GLB,, | Performed by: ORTHOPAEDIC SURGERY

## 2021-09-27 PROCEDURE — 99213 PR OFFICE/OUTPT VISIT, EST, LEVL III, 20-29 MIN: ICD-10-PCS | Mod: 25,S$GLB,, | Performed by: ORTHOPAEDIC SURGERY

## 2021-09-27 PROCEDURE — 3008F BODY MASS INDEX DOCD: CPT | Mod: CPTII,S$GLB,, | Performed by: ORTHOPAEDIC SURGERY

## 2021-09-27 PROCEDURE — 4010F PR ACE/ARB THEARPY RXD/TAKEN: ICD-10-PCS | Mod: CPTII,S$GLB,, | Performed by: ORTHOPAEDIC SURGERY

## 2021-09-27 PROCEDURE — 1159F PR MEDICATION LIST DOCUMENTED IN MEDICAL RECORD: ICD-10-PCS | Mod: CPTII,S$GLB,, | Performed by: ORTHOPAEDIC SURGERY

## 2021-09-27 PROCEDURE — 99213 OFFICE O/P EST LOW 20 MIN: CPT | Mod: 25,S$GLB,, | Performed by: ORTHOPAEDIC SURGERY

## 2021-09-27 PROCEDURE — 1159F MED LIST DOCD IN RCRD: CPT | Mod: CPTII,S$GLB,, | Performed by: ORTHOPAEDIC SURGERY

## 2021-09-27 PROCEDURE — 3008F PR BODY MASS INDEX (BMI) DOCUMENTED: ICD-10-PCS | Mod: CPTII,S$GLB,, | Performed by: ORTHOPAEDIC SURGERY

## 2021-09-27 RX ADMIN — TRIAMCINOLONE ACETONIDE 40 MG: 40 INJECTION, SUSPENSION INTRA-ARTICULAR; INTRAMUSCULAR at 01:09

## 2021-09-28 RX ORDER — TIZANIDINE 2 MG/1
2 TABLET ORAL EVERY 8 HOURS PRN
Qty: 30 TABLET | Refills: 0 | Status: SHIPPED | OUTPATIENT
Start: 2021-09-28 | End: 2022-05-04

## 2021-09-28 RX ORDER — DICLOFENAC SODIUM 20 MG/G
SOLUTION TOPICAL
Qty: 112 G | Refills: 0 | Status: SHIPPED | OUTPATIENT
Start: 2021-09-28 | End: 2021-09-29 | Stop reason: SDUPTHER

## 2021-09-29 ENCOUNTER — PATIENT MESSAGE (OUTPATIENT)
Dept: ORTHOPEDICS | Facility: CLINIC | Age: 51
End: 2021-09-29

## 2021-09-29 DIAGNOSIS — M25.511 ACUTE PAIN OF RIGHT SHOULDER: ICD-10-CM

## 2021-09-29 RX ORDER — DICLOFENAC SODIUM 20 MG/G
SOLUTION TOPICAL
Qty: 112 G | Refills: 0 | Status: CANCELLED | OUTPATIENT
Start: 2021-09-29

## 2021-09-29 RX ORDER — TRIAMCINOLONE ACETONIDE 40 MG/ML
40 INJECTION, SUSPENSION INTRA-ARTICULAR; INTRAMUSCULAR
Status: DISCONTINUED | OUTPATIENT
Start: 2021-09-27 | End: 2021-09-29 | Stop reason: HOSPADM

## 2021-09-29 RX ORDER — DICLOFENAC SODIUM 20 MG/G
SOLUTION TOPICAL
Qty: 112 G | Refills: 0 | Status: SHIPPED | OUTPATIENT
Start: 2021-09-29 | End: 2021-11-03

## 2021-09-30 ENCOUNTER — TELEPHONE (OUTPATIENT)
Dept: ORTHOPEDICS | Facility: CLINIC | Age: 51
End: 2021-09-30

## 2021-10-04 ENCOUNTER — PATIENT MESSAGE (OUTPATIENT)
Dept: ORTHOPEDICS | Facility: CLINIC | Age: 51
End: 2021-10-04

## 2021-10-04 ENCOUNTER — OFFICE VISIT (OUTPATIENT)
Dept: OBSTETRICS AND GYNECOLOGY | Facility: CLINIC | Age: 51
End: 2021-10-04
Payer: COMMERCIAL

## 2021-10-04 ENCOUNTER — TELEPHONE (OUTPATIENT)
Dept: ORTHOPEDICS | Facility: CLINIC | Age: 51
End: 2021-10-04

## 2021-10-04 VITALS
WEIGHT: 147.06 LBS | BODY MASS INDEX: 27.77 KG/M2 | DIASTOLIC BLOOD PRESSURE: 90 MMHG | SYSTOLIC BLOOD PRESSURE: 142 MMHG | HEIGHT: 61 IN

## 2021-10-04 DIAGNOSIS — Z01.419 WELL WOMAN EXAM: Primary | ICD-10-CM

## 2021-10-04 DIAGNOSIS — N95.2 VAGINAL ATROPHY: ICD-10-CM

## 2021-10-04 DIAGNOSIS — R23.2 HOT FLASHES: ICD-10-CM

## 2021-10-04 PROCEDURE — 99396 PREV VISIT EST AGE 40-64: CPT | Mod: S$GLB,,, | Performed by: OBSTETRICS & GYNECOLOGY

## 2021-10-04 PROCEDURE — 4010F ACE/ARB THERAPY RXD/TAKEN: CPT | Mod: CPTII,S$GLB,, | Performed by: OBSTETRICS & GYNECOLOGY

## 2021-10-04 PROCEDURE — 99396 PR PREVENTIVE VISIT,EST,40-64: ICD-10-PCS | Mod: S$GLB,,, | Performed by: OBSTETRICS & GYNECOLOGY

## 2021-10-04 PROCEDURE — 3008F BODY MASS INDEX DOCD: CPT | Mod: CPTII,S$GLB,, | Performed by: OBSTETRICS & GYNECOLOGY

## 2021-10-04 PROCEDURE — 3077F PR MOST RECENT SYSTOLIC BLOOD PRESSURE >= 140 MM HG: ICD-10-PCS | Mod: CPTII,S$GLB,, | Performed by: OBSTETRICS & GYNECOLOGY

## 2021-10-04 PROCEDURE — 4010F PR ACE/ARB THEARPY RXD/TAKEN: ICD-10-PCS | Mod: CPTII,S$GLB,, | Performed by: OBSTETRICS & GYNECOLOGY

## 2021-10-04 PROCEDURE — 1159F PR MEDICATION LIST DOCUMENTED IN MEDICAL RECORD: ICD-10-PCS | Mod: CPTII,S$GLB,, | Performed by: OBSTETRICS & GYNECOLOGY

## 2021-10-04 PROCEDURE — 3077F SYST BP >= 140 MM HG: CPT | Mod: CPTII,S$GLB,, | Performed by: OBSTETRICS & GYNECOLOGY

## 2021-10-04 PROCEDURE — 88175 CYTOPATH C/V AUTO FLUID REDO: CPT

## 2021-10-04 PROCEDURE — 1159F MED LIST DOCD IN RCRD: CPT | Mod: CPTII,S$GLB,, | Performed by: OBSTETRICS & GYNECOLOGY

## 2021-10-04 PROCEDURE — 99999 PR PBB SHADOW E&M-EST. PATIENT-LVL III: ICD-10-PCS | Mod: PBBFAC,,, | Performed by: OBSTETRICS & GYNECOLOGY

## 2021-10-04 PROCEDURE — 87625 HPV TYPES 16 & 18 ONLY: CPT | Mod: 59

## 2021-10-04 PROCEDURE — 3080F PR MOST RECENT DIASTOLIC BLOOD PRESSURE >= 90 MM HG: ICD-10-PCS | Mod: CPTII,S$GLB,, | Performed by: OBSTETRICS & GYNECOLOGY

## 2021-10-04 PROCEDURE — 87624 HPV HI-RISK TYP POOLED RSLT: CPT

## 2021-10-04 PROCEDURE — 3008F PR BODY MASS INDEX (BMI) DOCUMENTED: ICD-10-PCS | Mod: CPTII,S$GLB,, | Performed by: OBSTETRICS & GYNECOLOGY

## 2021-10-04 PROCEDURE — 3080F DIAST BP >= 90 MM HG: CPT | Mod: CPTII,S$GLB,, | Performed by: OBSTETRICS & GYNECOLOGY

## 2021-10-04 PROCEDURE — 99999 PR PBB SHADOW E&M-EST. PATIENT-LVL III: CPT | Mod: PBBFAC,,, | Performed by: OBSTETRICS & GYNECOLOGY

## 2021-10-04 RX ORDER — ESTRADIOL 0.1 MG/G
CREAM VAGINAL
Qty: 42.5 G | Refills: 2 | Status: SHIPPED | OUTPATIENT
Start: 2021-10-04 | End: 2024-02-12

## 2021-10-13 ENCOUNTER — PATIENT MESSAGE (OUTPATIENT)
Dept: OBSTETRICS AND GYNECOLOGY | Facility: CLINIC | Age: 51
End: 2021-10-13
Payer: COMMERCIAL

## 2021-10-13 LAB
CLINICAL INFO: ABNORMAL
CYTO CVX: ABNORMAL
CYTOLOGIST CVX/VAG CYTO: ABNORMAL
CYTOLOGIST CVX/VAG CYTO: ABNORMAL
CYTOLOGY CMNT CVX/VAG CYTO-IMP: ABNORMAL
CYTOLOGY PAP THIN PREP EXPLANATION: ABNORMAL
DATE OF PREVIOUS PAP: ABNORMAL
DATE PREVIOUS BX: NO
GEN CATEG CVX/VAG CYTO-IMP: ABNORMAL
HPV I/H RISK 4 DNA CVX QL NAA+PROBE: DETECTED
HPV16 DNA CVX QL PROBE+SIG AMP: NOT DETECTED
HPV18 DNA CVX QL PROBE+SIG AMP: NOT DETECTED
LMP START DATE: ABNORMAL
MICROORGANISM CVX/VAG CYTO: ABNORMAL
PATHOLOGIST CVX/VAG CYTO: ABNORMAL
SERVICE CMNT-IMP: ABNORMAL
SPECIMEN SOURCE CVX/VAG CYTO: ABNORMAL
STAT OF ADQ CVX/VAG CYTO-IMP: ABNORMAL

## 2021-10-14 ENCOUNTER — PATIENT MESSAGE (OUTPATIENT)
Dept: OBSTETRICS AND GYNECOLOGY | Facility: CLINIC | Age: 51
End: 2021-10-14
Payer: COMMERCIAL

## 2021-10-14 ENCOUNTER — CLINICAL SUPPORT (OUTPATIENT)
Dept: REHABILITATION | Facility: HOSPITAL | Age: 51
End: 2021-10-14
Attending: ORTHOPAEDIC SURGERY
Payer: COMMERCIAL

## 2021-10-14 DIAGNOSIS — R53.1 DECREASED STRENGTH: ICD-10-CM

## 2021-10-14 DIAGNOSIS — M25.511 RIGHT SHOULDER PAIN, UNSPECIFIED CHRONICITY: ICD-10-CM

## 2021-10-14 DIAGNOSIS — R26.89 DECREASED FUNCTIONAL MOBILITY: ICD-10-CM

## 2021-10-14 DIAGNOSIS — M25.60 DECREASED RANGE OF MOTION: ICD-10-CM

## 2021-10-14 PROCEDURE — 97110 THERAPEUTIC EXERCISES: CPT | Mod: PN

## 2021-10-14 PROCEDURE — 97161 PT EVAL LOW COMPLEX 20 MIN: CPT | Mod: PN

## 2021-10-15 ENCOUNTER — PATIENT MESSAGE (OUTPATIENT)
Dept: ORTHOPEDICS | Facility: CLINIC | Age: 51
End: 2021-10-15
Payer: COMMERCIAL

## 2021-10-18 ENCOUNTER — PATIENT MESSAGE (OUTPATIENT)
Dept: ORTHOPEDICS | Facility: CLINIC | Age: 51
End: 2021-10-18
Payer: COMMERCIAL

## 2021-10-22 ENCOUNTER — PATIENT MESSAGE (OUTPATIENT)
Dept: OBSTETRICS AND GYNECOLOGY | Facility: CLINIC | Age: 51
End: 2021-10-22
Payer: COMMERCIAL

## 2021-10-22 ENCOUNTER — CLINICAL SUPPORT (OUTPATIENT)
Dept: REHABILITATION | Facility: HOSPITAL | Age: 51
End: 2021-10-22
Attending: ORTHOPAEDIC SURGERY
Payer: COMMERCIAL

## 2021-10-22 DIAGNOSIS — R26.89 DECREASED FUNCTIONAL MOBILITY: ICD-10-CM

## 2021-10-22 DIAGNOSIS — M25.60 DECREASED RANGE OF MOTION: ICD-10-CM

## 2021-10-22 DIAGNOSIS — R53.1 DECREASED STRENGTH: ICD-10-CM

## 2021-10-22 PROCEDURE — 97140 MANUAL THERAPY 1/> REGIONS: CPT | Mod: PN

## 2021-10-22 PROCEDURE — 97110 THERAPEUTIC EXERCISES: CPT | Mod: PN

## 2021-10-22 PROCEDURE — 97112 NEUROMUSCULAR REEDUCATION: CPT | Mod: PN

## 2021-10-26 ENCOUNTER — HOSPITAL ENCOUNTER (OUTPATIENT)
Dept: RADIOLOGY | Facility: HOSPITAL | Age: 51
Discharge: HOME | End: 2021-10-26
Payer: COMMERCIAL

## 2021-10-26 DIAGNOSIS — Z01.419 WELL WOMAN EXAM: ICD-10-CM

## 2021-10-26 PROCEDURE — 77067 SCR MAMMO BI INCL CAD: CPT | Mod: TC

## 2021-10-26 PROCEDURE — 77067 SCR MAMMO BI INCL CAD: CPT | Mod: 26,,, | Performed by: RADIOLOGY

## 2021-10-26 PROCEDURE — 77063 MAMMO DIGITAL SCREENING BILAT WITH TOMO: ICD-10-PCS | Mod: 26,,, | Performed by: RADIOLOGY

## 2021-10-26 PROCEDURE — 77067 MAMMO DIGITAL SCREENING BILAT WITH TOMO: ICD-10-PCS | Mod: 26,,, | Performed by: RADIOLOGY

## 2021-10-26 PROCEDURE — 77063 BREAST TOMOSYNTHESIS BI: CPT | Mod: 26,,, | Performed by: RADIOLOGY

## 2021-10-27 ENCOUNTER — PROCEDURE VISIT (OUTPATIENT)
Dept: OBSTETRICS AND GYNECOLOGY | Facility: CLINIC | Age: 51
End: 2021-10-27
Payer: COMMERCIAL

## 2021-10-27 VITALS
HEIGHT: 61 IN | SYSTOLIC BLOOD PRESSURE: 150 MMHG | WEIGHT: 145.94 LBS | BODY MASS INDEX: 27.55 KG/M2 | DIASTOLIC BLOOD PRESSURE: 80 MMHG

## 2021-10-27 DIAGNOSIS — R87.610 ASCUS WITH POSITIVE HIGH RISK HPV CERVICAL: Primary | ICD-10-CM

## 2021-10-27 DIAGNOSIS — R87.810 ASCUS WITH POSITIVE HIGH RISK HPV CERVICAL: Primary | ICD-10-CM

## 2021-10-27 DIAGNOSIS — Z01.812 PRE-PROCEDURE LAB EXAM: ICD-10-CM

## 2021-10-27 LAB
B-HCG UR QL: NEGATIVE
CTP QC/QA: YES

## 2021-10-27 PROCEDURE — 57454 BX/CURETT OF CERVIX W/SCOPE: CPT | Mod: S$GLB,,, | Performed by: STUDENT IN AN ORGANIZED HEALTH CARE EDUCATION/TRAINING PROGRAM

## 2021-10-27 PROCEDURE — 88305 TISSUE EXAM BY PATHOLOGIST: CPT | Performed by: PATHOLOGY

## 2021-10-27 PROCEDURE — 88305 TISSUE EXAM BY PATHOLOGIST: ICD-10-PCS | Mod: 26,,, | Performed by: PATHOLOGY

## 2021-10-27 PROCEDURE — 81025 URINE PREGNANCY TEST: CPT | Mod: S$GLB,,, | Performed by: OBSTETRICS & GYNECOLOGY

## 2021-10-27 PROCEDURE — 57454 COLPOSCOPY: ICD-10-PCS | Mod: S$GLB,,, | Performed by: STUDENT IN AN ORGANIZED HEALTH CARE EDUCATION/TRAINING PROGRAM

## 2021-10-27 PROCEDURE — 81025 POCT URINE PREGNANCY: ICD-10-PCS | Mod: S$GLB,,, | Performed by: OBSTETRICS & GYNECOLOGY

## 2021-10-27 PROCEDURE — 88305 TISSUE EXAM BY PATHOLOGIST: CPT | Mod: 26,,, | Performed by: PATHOLOGY

## 2021-10-28 ENCOUNTER — PATIENT MESSAGE (OUTPATIENT)
Dept: OBSTETRICS AND GYNECOLOGY | Facility: CLINIC | Age: 51
End: 2021-10-28
Payer: COMMERCIAL

## 2021-10-29 ENCOUNTER — CLINICAL SUPPORT (OUTPATIENT)
Dept: REHABILITATION | Facility: HOSPITAL | Age: 51
End: 2021-10-29
Attending: ORTHOPAEDIC SURGERY
Payer: COMMERCIAL

## 2021-10-29 DIAGNOSIS — R53.1 DECREASED STRENGTH: ICD-10-CM

## 2021-10-29 DIAGNOSIS — M25.60 DECREASED RANGE OF MOTION: ICD-10-CM

## 2021-10-29 DIAGNOSIS — R26.89 DECREASED FUNCTIONAL MOBILITY: ICD-10-CM

## 2021-10-29 PROCEDURE — 97110 THERAPEUTIC EXERCISES: CPT | Mod: PN

## 2021-10-29 PROCEDURE — 97112 NEUROMUSCULAR REEDUCATION: CPT | Mod: PN

## 2021-11-01 ENCOUNTER — TELEPHONE (OUTPATIENT)
Dept: ORTHOPEDICS | Facility: CLINIC | Age: 51
End: 2021-11-01
Payer: COMMERCIAL

## 2021-11-02 LAB
FINAL PATHOLOGIC DIAGNOSIS: NORMAL
GROSS: NORMAL
Lab: NORMAL

## 2021-11-03 ENCOUNTER — TELEPHONE (OUTPATIENT)
Dept: GASTROENTEROLOGY | Facility: CLINIC | Age: 51
End: 2021-11-03
Payer: COMMERCIAL

## 2021-11-05 ENCOUNTER — PATIENT MESSAGE (OUTPATIENT)
Dept: ORTHOPEDICS | Facility: CLINIC | Age: 51
End: 2021-11-05
Payer: COMMERCIAL

## 2021-11-12 ENCOUNTER — CLINICAL SUPPORT (OUTPATIENT)
Dept: REHABILITATION | Facility: HOSPITAL | Age: 51
End: 2021-11-12
Attending: ORTHOPAEDIC SURGERY
Payer: COMMERCIAL

## 2021-11-12 DIAGNOSIS — R26.89 DECREASED FUNCTIONAL MOBILITY: ICD-10-CM

## 2021-11-12 DIAGNOSIS — R53.1 DECREASED STRENGTH: ICD-10-CM

## 2021-11-12 DIAGNOSIS — M25.60 DECREASED RANGE OF MOTION: ICD-10-CM

## 2021-11-12 PROCEDURE — 97112 NEUROMUSCULAR REEDUCATION: CPT | Mod: PN

## 2021-11-12 PROCEDURE — 97110 THERAPEUTIC EXERCISES: CPT | Mod: PN

## 2021-11-12 PROCEDURE — 97140 MANUAL THERAPY 1/> REGIONS: CPT | Mod: PN

## 2021-11-15 ENCOUNTER — TELEPHONE (OUTPATIENT)
Dept: ORTHOPEDICS | Facility: CLINIC | Age: 51
End: 2021-11-15
Payer: COMMERCIAL

## 2021-11-18 ENCOUNTER — TELEPHONE (OUTPATIENT)
Dept: GASTROENTEROLOGY | Facility: CLINIC | Age: 51
End: 2021-11-18
Payer: COMMERCIAL

## 2021-11-29 ENCOUNTER — PATIENT MESSAGE (OUTPATIENT)
Dept: ORTHOPEDICS | Facility: CLINIC | Age: 51
End: 2021-11-29
Payer: COMMERCIAL

## 2021-12-08 ENCOUNTER — PATIENT MESSAGE (OUTPATIENT)
Dept: ORTHOPEDICS | Facility: CLINIC | Age: 51
End: 2021-12-08
Payer: COMMERCIAL

## 2021-12-16 ENCOUNTER — PATIENT MESSAGE (OUTPATIENT)
Dept: ORTHOPEDICS | Facility: CLINIC | Age: 51
End: 2021-12-16
Payer: COMMERCIAL

## 2021-12-17 ENCOUNTER — PATIENT MESSAGE (OUTPATIENT)
Dept: ORTHOPEDICS | Facility: CLINIC | Age: 51
End: 2021-12-17
Payer: COMMERCIAL

## 2022-01-07 ENCOUNTER — PATIENT MESSAGE (OUTPATIENT)
Dept: ORTHOPEDICS | Facility: CLINIC | Age: 52
End: 2022-01-07
Payer: COMMERCIAL

## 2022-01-10 ENCOUNTER — PATIENT OUTREACH (OUTPATIENT)
Dept: ADMINISTRATIVE | Facility: OTHER | Age: 52
End: 2022-01-10
Payer: COMMERCIAL

## 2022-01-10 ENCOUNTER — PATIENT MESSAGE (OUTPATIENT)
Dept: ORTHOPEDICS | Facility: CLINIC | Age: 52
End: 2022-01-10
Payer: COMMERCIAL

## 2022-01-10 ENCOUNTER — OFFICE VISIT (OUTPATIENT)
Dept: ORTHOPEDICS | Facility: CLINIC | Age: 52
End: 2022-01-10
Payer: COMMERCIAL

## 2022-01-10 VITALS — WEIGHT: 145.94 LBS | BODY MASS INDEX: 27.55 KG/M2 | HEIGHT: 61 IN

## 2022-01-10 DIAGNOSIS — M25.511 CHRONIC RIGHT SHOULDER PAIN: Primary | ICD-10-CM

## 2022-01-10 DIAGNOSIS — G89.29 CHRONIC RIGHT SHOULDER PAIN: Primary | ICD-10-CM

## 2022-01-10 PROCEDURE — 1159F MED LIST DOCD IN RCRD: CPT | Mod: CPTII,S$GLB,, | Performed by: ORTHOPAEDIC SURGERY

## 2022-01-10 PROCEDURE — 99213 PR OFFICE/OUTPT VISIT, EST, LEVL III, 20-29 MIN: ICD-10-PCS | Mod: 25,S$GLB,, | Performed by: ORTHOPAEDIC SURGERY

## 2022-01-10 PROCEDURE — 20610 DRAIN/INJ JOINT/BURSA W/O US: CPT | Mod: RT,S$GLB,, | Performed by: ORTHOPAEDIC SURGERY

## 2022-01-10 PROCEDURE — 1159F PR MEDICATION LIST DOCUMENTED IN MEDICAL RECORD: ICD-10-PCS | Mod: CPTII,S$GLB,, | Performed by: ORTHOPAEDIC SURGERY

## 2022-01-10 PROCEDURE — 3008F PR BODY MASS INDEX (BMI) DOCUMENTED: ICD-10-PCS | Mod: CPTII,S$GLB,, | Performed by: ORTHOPAEDIC SURGERY

## 2022-01-10 PROCEDURE — 1160F PR REVIEW ALL MEDS BY PRESCRIBER/CLIN PHARMACIST DOCUMENTED: ICD-10-PCS | Mod: CPTII,S$GLB,, | Performed by: ORTHOPAEDIC SURGERY

## 2022-01-10 PROCEDURE — 4010F ACE/ARB THERAPY RXD/TAKEN: CPT | Mod: CPTII,S$GLB,, | Performed by: ORTHOPAEDIC SURGERY

## 2022-01-10 PROCEDURE — 20610 LARGE JOINT ASPIRATION/INJECTION: R SUBACROMIAL BURSA: ICD-10-PCS | Mod: RT,S$GLB,, | Performed by: ORTHOPAEDIC SURGERY

## 2022-01-10 PROCEDURE — 99213 OFFICE O/P EST LOW 20 MIN: CPT | Mod: 25,S$GLB,, | Performed by: ORTHOPAEDIC SURGERY

## 2022-01-10 PROCEDURE — 99999 PR PBB SHADOW E&M-EST. PATIENT-LVL II: ICD-10-PCS | Mod: PBBFAC,,, | Performed by: ORTHOPAEDIC SURGERY

## 2022-01-10 PROCEDURE — 4010F PR ACE/ARB THEARPY RXD/TAKEN: ICD-10-PCS | Mod: CPTII,S$GLB,, | Performed by: ORTHOPAEDIC SURGERY

## 2022-01-10 PROCEDURE — 1160F RVW MEDS BY RX/DR IN RCRD: CPT | Mod: CPTII,S$GLB,, | Performed by: ORTHOPAEDIC SURGERY

## 2022-01-10 PROCEDURE — 3008F BODY MASS INDEX DOCD: CPT | Mod: CPTII,S$GLB,, | Performed by: ORTHOPAEDIC SURGERY

## 2022-01-10 PROCEDURE — 99999 PR PBB SHADOW E&M-EST. PATIENT-LVL II: CPT | Mod: PBBFAC,,, | Performed by: ORTHOPAEDIC SURGERY

## 2022-01-10 RX ORDER — TRIAMCINOLONE ACETONIDE 40 MG/ML
40 INJECTION, SUSPENSION INTRA-ARTICULAR; INTRAMUSCULAR
Status: DISCONTINUED | OUTPATIENT
Start: 2022-01-10 | End: 2022-01-10 | Stop reason: HOSPADM

## 2022-01-10 RX ADMIN — TRIAMCINOLONE ACETONIDE 40 MG: 40 INJECTION, SUSPENSION INTRA-ARTICULAR; INTRAMUSCULAR at 02:01

## 2022-01-10 NOTE — PROCEDURES
Large Joint Aspiration/Injection: R subacromial bursa    Date/Time: 1/10/2022 2:00 PM  Performed by: Lev Gresham MD  Authorized by: Lev Gresham MD     Consent Done?:  Yes (Verbal)  Indications:  Pain  Timeout: prior to procedure the correct patient, procedure, and site was verified    Approach:  Lateral  Location:  Shoulder  Site:  R subacromial bursa  Medications:  40 mg triamcinolone acetonide 40 mg/mL
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
## 2022-01-10 NOTE — PROGRESS NOTES
Chart was reviewed for overdue [facility]active [facility] ([facility])  topics  Updates were requested from care everywhere  Health Maintenance has been updated  LINKS immunization registry triggered  Referral to endo located in chart

## 2022-01-12 ENCOUNTER — PATIENT MESSAGE (OUTPATIENT)
Dept: ORTHOPEDICS | Facility: CLINIC | Age: 52
End: 2022-01-12
Payer: COMMERCIAL

## 2022-01-17 ENCOUNTER — PATIENT MESSAGE (OUTPATIENT)
Dept: ORTHOPEDICS | Facility: CLINIC | Age: 52
End: 2022-01-17
Payer: COMMERCIAL

## 2022-01-17 NOTE — LETTER
January 19, 2022      [facility]  [address] [place] [address] [address]  [facility] [address]  Phone: [phone]       Patient: Jacqui Cruz   YOB: 1970  Date of Visit: 01/19/2022    To Whom It May Concern:    Jh Cruz is under my care at [facility]. Please excuse the patient from work on Sunday(1/16/22)  and Monday(1/17/22); allowing her to return to work on Tuesday 1/18/22. If you have any questions or concerns, or if I can be of further assistance, please do not hesitate to contact me.    Sincerely,    MD Raeann Velazquez LPN

## 2022-01-17 NOTE — LETTER
January 18, 2022      [facility]  [address] [place] [address] [address]  [facility] [address]  Phone: [phone]       Patient: Jacqui Cruz   YOB: 1970  Date of Visit: 01/18/2022    To Whom It May Concern:    Jh Cruz is under my care at [facility]. Please excuse the patient from work on Sunday and today 1/18/2022. Please patient may return to work tomorrow 1/19/2022. If you have any questions or concerns, or if I can be of further assistance, please do not hesitate to contact me.    Sincerely,    MD Raeann Velazquez LPN

## 2022-01-18 NOTE — TELEPHONE ENCOUNTER
Pt last had excuse written 01/12/2022, please advise. Also, wants to know if okay to use pensaid at injection location.

## 2022-01-19 ENCOUNTER — PATIENT MESSAGE (OUTPATIENT)
Dept: ORTHOPEDICS | Facility: CLINIC | Age: 52
End: 2022-01-19
Payer: COMMERCIAL

## 2022-01-28 ENCOUNTER — PATIENT MESSAGE (OUTPATIENT)
Dept: ORTHOPEDICS | Facility: CLINIC | Age: 52
End: 2022-01-28
Payer: COMMERCIAL

## 2022-01-28 DIAGNOSIS — B96.89 BACTERIAL SINUSITIS: ICD-10-CM

## 2022-01-28 DIAGNOSIS — J32.9 BACTERIAL SINUSITIS: ICD-10-CM

## 2022-01-28 RX ORDER — AMOXICILLIN 500 MG/1
TABLET, FILM COATED ORAL
Qty: 40 TABLET | Refills: 0 | Status: CANCELLED | OUTPATIENT
Start: 2022-01-28

## 2022-02-07 ENCOUNTER — PATIENT MESSAGE (OUTPATIENT)
Dept: ORTHOPEDICS | Facility: CLINIC | Age: 52
End: 2022-02-07
Payer: COMMERCIAL

## 2022-02-17 ENCOUNTER — TELEPHONE (OUTPATIENT)
Dept: SPINE | Facility: CLINIC | Age: 52
End: 2022-02-17
Payer: COMMERCIAL

## 2022-02-17 ENCOUNTER — TELEPHONE (OUTPATIENT)
Dept: ORTHOPEDICS | Facility: CLINIC | Age: 52
End: 2022-02-17
Payer: COMMERCIAL

## 2022-02-17 DIAGNOSIS — M54.9 BACK PAIN, UNSPECIFIED BACK LOCATION, UNSPECIFIED BACK PAIN LATERALITY, UNSPECIFIED CHRONICITY: Primary | ICD-10-CM
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
## 2022-02-17 NOTE — TELEPHONE ENCOUNTER
----- Message from Alex Garcia sent at 2/17/2022  9:14 AM CST -----  Regarding: pt wants to speak to staff, call pt   Contact: pt   pt wants to speak to staff, call pt , Mri results and needs a referral to back and spine

## 2022-03-13 ENCOUNTER — PATIENT OUTREACH (OUTPATIENT)
Dept: ADMINISTRATIVE | Facility: OTHER | Age: 52
End: 2022-03-13
Payer: COMMERCIAL

## 2022-03-14 DIAGNOSIS — M25.579 ANKLE PAIN, UNSPECIFIED CHRONICITY, UNSPECIFIED LATERALITY: Primary | ICD-10-CM

## 2022-03-14 NOTE — PROGRESS NOTES
Chart was reviewed for overdue [facility] ([facility])  topics  Updates were requested from care everywhere  Health Maintenance has been updated  LINKS immunization registry triggered  EGD case request in chart/ patient has a hx of rectal bleeding per info found in chart

## 2022-03-15 DIAGNOSIS — M25.562 LEFT KNEE PAIN, UNSPECIFIED CHRONICITY: Primary | ICD-10-CM

## 2022-03-21 ENCOUNTER — HOSPITAL ENCOUNTER (OUTPATIENT)
Dept: RADIOLOGY | Facility: HOSPITAL | Age: 52
Discharge: HOME | End: 2022-03-21
Attending: ORTHOPAEDIC SURGERY
Payer: COMMERCIAL

## 2022-03-21 ENCOUNTER — OFFICE VISIT (OUTPATIENT)
Dept: ORTHOPEDICS | Facility: CLINIC | Age: 52
End: 2022-03-21
Payer: COMMERCIAL

## 2022-03-21 VITALS — BODY MASS INDEX: 27.38 KG/M2 | HEIGHT: 61 IN | WEIGHT: 145 LBS | RESPIRATION RATE: 18 BRPM

## 2022-03-21 DIAGNOSIS — M25.562 LEFT KNEE PAIN, UNSPECIFIED CHRONICITY: ICD-10-CM

## 2022-03-21 DIAGNOSIS — M17.10 ARTHRITIS OF KNEE: ICD-10-CM

## 2022-03-21 DIAGNOSIS — M25.562 LEFT KNEE PAIN, UNSPECIFIED CHRONICITY: Primary | ICD-10-CM

## 2022-03-21 PROCEDURE — 4010F PR ACE/ARB THEARPY RXD/TAKEN: ICD-10-PCS | Mod: CPTII,S$GLB,, | Performed by: ORTHOPAEDIC SURGERY

## 2022-03-21 PROCEDURE — 1159F PR MEDICATION LIST DOCUMENTED IN MEDICAL RECORD: ICD-10-PCS | Mod: CPTII,S$GLB,, | Performed by: ORTHOPAEDIC SURGERY

## 2022-03-21 PROCEDURE — 4010F ACE/ARB THERAPY RXD/TAKEN: CPT | Mod: CPTII,S$GLB,, | Performed by: ORTHOPAEDIC SURGERY

## 2022-03-21 PROCEDURE — 99213 PR OFFICE/OUTPT VISIT, EST, LEVL III, 20-29 MIN: ICD-10-PCS | Mod: 25,S$GLB,, | Performed by: ORTHOPAEDIC SURGERY

## 2022-03-21 PROCEDURE — 20610 DRAIN/INJ JOINT/BURSA W/O US: CPT | Mod: LT,S$GLB,, | Performed by: ORTHOPAEDIC SURGERY

## 2022-03-21 PROCEDURE — 73564 X-RAY EXAM KNEE 4 OR MORE: CPT | Mod: 26,LT,, | Performed by: RADIOLOGY

## 2022-03-21 PROCEDURE — 1160F PR REVIEW ALL MEDS BY PRESCRIBER/CLIN PHARMACIST DOCUMENTED: ICD-10-PCS | Mod: CPTII,S$GLB,, | Performed by: ORTHOPAEDIC SURGERY

## 2022-03-21 PROCEDURE — 73562 X-RAY EXAM OF KNEE 3: CPT | Mod: 26,RT,, | Performed by: RADIOLOGY

## 2022-03-21 PROCEDURE — 3008F BODY MASS INDEX DOCD: CPT | Mod: CPTII,S$GLB,, | Performed by: ORTHOPAEDIC SURGERY

## 2022-03-21 PROCEDURE — 1160F RVW MEDS BY RX/DR IN RCRD: CPT | Mod: CPTII,S$GLB,, | Performed by: ORTHOPAEDIC SURGERY

## 2022-03-21 PROCEDURE — 1159F MED LIST DOCD IN RCRD: CPT | Mod: CPTII,S$GLB,, | Performed by: ORTHOPAEDIC SURGERY

## 2022-03-21 PROCEDURE — 3008F PR BODY MASS INDEX (BMI) DOCUMENTED: ICD-10-PCS | Mod: CPTII,S$GLB,, | Performed by: ORTHOPAEDIC SURGERY

## 2022-03-21 PROCEDURE — 73564 XR KNEE ORTHO LEFT WITH FLEXION: ICD-10-PCS | Mod: 26,LT,, | Performed by: RADIOLOGY

## 2022-03-21 PROCEDURE — 99213 OFFICE O/P EST LOW 20 MIN: CPT | Mod: 25,S$GLB,, | Performed by: ORTHOPAEDIC SURGERY

## 2022-03-21 PROCEDURE — 20610 LARGE JOINT ASPIRATION/INJECTION: L KNEE: ICD-10-PCS | Mod: LT,S$GLB,, | Performed by: ORTHOPAEDIC SURGERY

## 2022-03-21 PROCEDURE — 99999 PR PBB SHADOW E&M-EST. PATIENT-LVL III: CPT | Mod: PBBFAC,,, | Performed by: ORTHOPAEDIC SURGERY

## 2022-03-21 PROCEDURE — 73562 X-RAY EXAM OF KNEE 3: CPT | Mod: TC,PN,RT

## 2022-03-21 PROCEDURE — 73562 XR KNEE ORTHO LEFT WITH FLEXION: ICD-10-PCS | Mod: 26,RT,, | Performed by: RADIOLOGY

## 2022-03-21 PROCEDURE — 99999 PR PBB SHADOW E&M-EST. PATIENT-LVL III: ICD-10-PCS | Mod: PBBFAC,,, | Performed by: ORTHOPAEDIC SURGERY

## 2022-03-21 RX ORDER — TRIAMCINOLONE ACETONIDE 40 MG/ML
40 INJECTION, SUSPENSION INTRA-ARTICULAR; INTRAMUSCULAR
Status: DISCONTINUED | OUTPATIENT
Start: 2022-03-21 | End: 2022-03-21 | Stop reason: HOSPADM

## 2022-03-21 RX ADMIN — TRIAMCINOLONE ACETONIDE 40 MG: 40 INJECTION, SUSPENSION INTRA-ARTICULAR; INTRAMUSCULAR at 03:03

## 2022-03-21 NOTE — PROGRESS NOTES
Past Medical History:   Diagnosis Date    Asthma     Hypertension        Past Surgical History:   Procedure Laterality Date    BREAST BIOPSY Left 2012    Benign calcifications    BREAST SURGERY      biopsy    DILATION AND CURETTAGE OF UTERUS         Current Outpatient Medications   Medication Sig    albuterol (ACCUNEB) 1.25 mg/3 mL Nebu Take 3 mLs (1.25 mg total) by nebulization every 6 (six) hours as needed.    amoxicillin (AMOXIL) 500 MG Tab 2 tabs twice daily x 10 days    cloNIDine (CATAPRES) 0.1 MG tablet Take 1 tablet (0.1 mg total) by mouth every 6 (six) hours as needed (blood pressure >185 systolic).    clotrimazole-betamethasone 1-0.05% (LOTRISONE) cream Apply to affected area 2 times daily    estradioL (ESTRACE) 0.01 % (0.1 mg/gram) vaginal cream Place pea sized amount vaginally every day for 2 weeks then twice a week after that    fluconazole (DIFLUCAN) 150 MG Tab Take 150 mg by mouth once.    ibuprofen (ADVIL,MOTRIN) 800 MG tablet Take 1 tablet (800 mg total) by mouth 3 (three) times daily.    lisinopriL (PRINIVIL,ZESTRIL) 20 MG tablet Take 1 tablet (20 mg total) by mouth once daily.    PENNSAID 20 mg/gram /actuation(2 %) sopm APPLY 2 PUMPS TO THE AFFECTED AREA TWICE DAILY    tinidazole (TINDAMAX) 500 MG tablet Take 2,000 mg by mouth once.    tiZANidine (ZANAFLEX) 2 MG tablet Take 1 tablet (2 mg total) by mouth every 8 (eight) hours as needed (prn muscle spasms).    traMADoL (ULTRAM) 50 mg tablet Take 1 tablet (50 mg total) by mouth every 6 (six) hours as needed for Pain.    beclomethasone (QVAR) 40 mcg/actuation Aero Inhale 1 puff into the lungs 2 (two) times daily.    fluticasone propionate (FLONASE) 50 mcg/actuation nasal spray USE 2 SPRAY(S) IN EACH NOSTRIL TWICE DAILY    lidocaine (LIDODERM) 5 % Place 1 patch onto the skin once daily. Remove & Discard patch within 12 hours or as directed by MD    methylPREDNISolone (MEDROL DOSEPACK) 4 mg tablet use as directed    ondansetron  (ZOFRAN-ODT) 4 MG TbDL Take 2 tablets (8 mg total) by mouth every 6 (six) hours as needed (nausea).    sodium chloride (SALINE NASAL MIST) 3 % Mist 1 spray by Nasal route every 4 (four) hours as needed.     No current facility-administered medications for this visit.       Review of patient's allergies indicates:   Allergen Reactions    Omnicef [cefdinir] Diarrhea    Doxycycline hyclate Itching       Family History   Problem Relation Age of Onset    Hypertension Mother     Cancer Maternal Aunt         cervical       Social History     Socioeconomic History    Marital status:    Tobacco Use    Smoking status: Never Smoker    Smokeless tobacco: Never Used   Substance and Sexual Activity    Alcohol use: No    Drug use: No    Sexual activity: Not Currently     Partners: Male     Birth control/protection: None     Social Determinants of Health     Financial Resource Strain: Medium Risk    Difficulty of Paying Living Expenses: Somewhat hard   Food Insecurity: Food Insecurity Present    Worried About Running Out of Food in the Last Year: Sometimes true    Ran Out of Food in the Last Year: Sometimes true   Transportation Needs: No Transportation Needs    Lack of Transportation (Medical): No    Lack of Transportation (Non-Medical): No   Physical Activity: Unknown    Days of Exercise per Week: 1 day   Stress: Stress Concern Present    Feeling of Stress : To some extent   Social Connections: Unknown    Frequency of Communication with Friends and Family: Three times a week    Frequency of Social Gatherings with Friends and Family: Never    Active Member of Clubs or Organizations: No    Attends Club or Organization Meetings: Never    Marital Status:        Chief Complaint:   Chief Complaint   Patient presents with    Left Knee - Pain       History of present illness:  51-year-old patient of Dr. Gresham seen for left knee pain.  Patient has had 2 previous injections.  Last injection was over  a year ago.  Pain over last couple months.  No injury or trauma.  Just a little achiness.  Pain along the medial compartment.      Review of Systems:    Constitution: Negative for chills, fever, and sweats.  Negative for unexplained weight loss.    HENT:  Negative for headaches and blurry vision.    Cardiovascular:Negative for chest pain or irregular heart beat. Negative for hypertension.    Respiratory:  Negative for cough and shortness of breath.    Gastrointestinal: Negative for abdominal pain, heartburn, melena, nausea, and vomitting.    Genitourinary:  Negative bladder incontinence and dysuria.    Musculoskeletal:  See HPI    Neurological: Negative for numbness.    Psychiatric/Behavioral: Negative for depression.  The patient is not nervous/anxious.      Endocrine: Negative for polyuria    Hematologic/Lymphatic: Negative for bleeding problem.  Does not bruise/bleed easily.    Skin: Negative for poor would healing and rash      Physical Examination:    Vital Signs:    Vitals:    03/21/22 1536   Resp: 18       Body mass index is 27.4 kg/m².    This a well-developed, well nourished patient in no acute distress.  They are alert and oriented and cooperative to examination.  Pt. walks without an antalgic gait.      Examination of the left knee shows no rashes or erythema. There are no masses ecchymosis or effusion. Patient has full range of motion from 0-130°. Patient is nontender to palpation over lateral joint line and minimally tender to palpation over the medial joint line. Patient has a - Lachman exam, - anterior drawer exam, and - posterior drawer exam. - Orquidea's exam. Knee is stable to varus and valgus stress. 5 out of 5 motor strength. Palpable distal pulses. Intact light touch sensation. Negative Patellofemoral crepitus      X-rays:  X-rays of the left knee is ordered and reviewed which show well-maintained joint space.  Possible mild arthritic change     Assessment::  Left mild knee arthritis    Plan:   Reviewed the findings with her today.  We talked about treatment options.  Patient is already on a number of oral and topical NSAIDs.  I recommended a cortisone injection then the help try and calm her knee down.  Follow-up as needed.    This note was created using [employer] voice recognition software that occasionally misinterpreted phrases or words.    Consult note is delivered via Epic messaging service.

## 2022-03-21 NOTE — PROCEDURES
Large Joint Aspiration/Injection: L knee    Date/Time: 3/21/2022 3:45 PM  Performed by: Etienne Pat MD  Authorized by: Etienne Pat MD     Consent Done?:  Yes (Verbal)  Indications:  Pain  Site marked: the procedure site was marked    Timeout: prior to procedure the correct patient, procedure, and site was verified    Local anesthetic:  Lidocaine 1% without epinephrine and bupivacaine 0.25% without epinephrine  Anesthetic total (ml):  6      Details:  Needle Size:  20 G  Approach:  Anterolateral  Location:  Knee  Site:  L knee  Medications:  40 mg triamcinolone acetonide 40 mg/mL  Patient tolerance:  Patient tolerated the procedure well with no immediate complications

## 2022-03-23 ENCOUNTER — TELEPHONE (OUTPATIENT)
Dept: FAMILY MEDICINE | Facility: CLINIC | Age: 52
End: 2022-03-23
Payer: COMMERCIAL

## 2022-03-23 NOTE — TELEPHONE ENCOUNTER
----- Message from Jennifer Mina sent at 3/23/2022  2:12 PM CDT -----  Contact: pt  Type: Needs Medical Advice  Who Called:  pt   Symptoms (please be specific):  sinus issues  How long has patient had these symptoms:  1 wk  Pharmacy name and phone #:    [id number]  [place] - [address]  [address]  [facility] [address]  Phone: [phone] Fax: [phone]    Best Call Back Number: [phone]    Additional Information: please call pt, requesting virtual visit today, will see any provider

## 2022-03-23 NOTE — TELEPHONE ENCOUNTER
Returned patients call in regards to wanting a virtual appointment. No answer , left voicemail to return call.

## 2022-03-25 ENCOUNTER — OFFICE VISIT (OUTPATIENT)
Dept: FAMILY MEDICINE | Facility: CLINIC | Age: 52
End: 2022-03-25
Payer: COMMERCIAL

## 2022-03-25 DIAGNOSIS — B96.89 BACTERIAL SINUSITIS: Primary | ICD-10-CM

## 2022-03-25 DIAGNOSIS — J32.9 BACTERIAL SINUSITIS: Primary | ICD-10-CM

## 2022-03-25 PROCEDURE — 1160F RVW MEDS BY RX/DR IN RCRD: CPT | Mod: CPTII,95,, | Performed by: NURSE PRACTITIONER

## 2022-03-25 PROCEDURE — 4010F ACE/ARB THERAPY RXD/TAKEN: CPT | Mod: CPTII,95,, | Performed by: NURSE PRACTITIONER

## 2022-03-25 PROCEDURE — 1160F PR REVIEW ALL MEDS BY PRESCRIBER/CLIN PHARMACIST DOCUMENTED: ICD-10-PCS | Mod: CPTII,95,, | Performed by: NURSE PRACTITIONER

## 2022-03-25 PROCEDURE — 99213 OFFICE O/P EST LOW 20 MIN: CPT | Mod: 95,,, | Performed by: NURSE PRACTITIONER

## 2022-03-25 PROCEDURE — 4010F PR ACE/ARB THEARPY RXD/TAKEN: ICD-10-PCS | Mod: CPTII,95,, | Performed by: NURSE PRACTITIONER

## 2022-03-25 PROCEDURE — 1159F MED LIST DOCD IN RCRD: CPT | Mod: CPTII,95,, | Performed by: NURSE PRACTITIONER

## 2022-03-25 PROCEDURE — 99213 PR OFFICE/OUTPT VISIT, EST, LEVL III, 20-29 MIN: ICD-10-PCS | Mod: 95,,, | Performed by: NURSE PRACTITIONER

## 2022-03-25 PROCEDURE — 1159F PR MEDICATION LIST DOCUMENTED IN MEDICAL RECORD: ICD-10-PCS | Mod: CPTII,95,, | Performed by: NURSE PRACTITIONER

## 2022-03-25 RX ORDER — CETIRIZINE HYDROCHLORIDE 5 MG/1
5 TABLET ORAL DAILY
Qty: 30 TABLET | Refills: 11 | Status: SHIPPED | OUTPATIENT
Start: 2022-03-25 | End: 2022-12-14 | Stop reason: ALTCHOICE

## 2022-03-25 RX ORDER — METHYLPREDNISOLONE 4 MG/1
TABLET ORAL
Qty: 1 EACH | Refills: 0 | Status: SHIPPED | OUTPATIENT
Start: 2022-03-25 | End: 2022-04-15

## 2022-03-25 RX ORDER — AMOXICILLIN 500 MG/1
500 CAPSULE ORAL EVERY 12 HOURS
Qty: 20 CAPSULE | Refills: 0 | Status: SHIPPED | OUTPATIENT
Start: 2022-03-25 | End: 2022-04-04

## 2022-03-25 NOTE — PROGRESS NOTES
Subjective:       Patient ID: Jacqui Cruz is a 51 y.o. female.    Chief Complaint: No chief complaint on file.    HPI    The patient location is: [place], [place]  The chief complaint leading to consultation is: sinus problems    Visit type: audiovisual    Face to Face time with patient: 20 minutes of total time spent on the encounter, which includes face to face time and non-face to face time preparing to see the patient (eg, review of tests), Obtaining and/or reviewing separately obtained history, Documenting clinical information in the electronic or other health record, Independently interpreting results (not separately reported) and communicating results to the patient/family/caregiver, or Care coordination (not separately reported).         Each patient to whom he or she provides medical services by telemedicine is:  (1) informed of the relationship between the physician and patient and the respective role of any other health care provider with respect to management of the patient; and (2) notified that he or she may decline to receive medical services by telemedicine and may withdraw from such care at any time.    Notes:   Past Medical History:   Diagnosis Date    Asthma     Hypertension        Review of patient's allergies indicates:   Allergen Reactions    Omnicef [cefdinir] Diarrhea    Doxycycline hyclate Itching         Current Outpatient Medications:     albuterol (ACCUNEB) 1.25 mg/3 mL Nebu, Take 3 mLs (1.25 mg total) by nebulization every 6 (six) hours as needed., Disp: 25 vial, Rfl: 11    amoxicillin (AMOXIL) 500 MG capsule, Take 1 capsule (500 mg total) by mouth every 12 (twelve) hours. for 10 days, Disp: 20 capsule, Rfl: 0    amoxicillin (AMOXIL) 500 MG Tab, 2 tabs twice daily x 10 days, Disp: 40 tablet, Rfl: 0    cetirizine (ZYRTEC) 5 MG tablet, Take 1 tablet (5 mg total) by mouth once daily., Disp: 30 tablet, Rfl: 11    cloNIDine (CATAPRES) 0.1 MG tablet, Take 1 tablet (0.1 mg total) by  mouth every 6 (six) hours as needed (blood pressure >185 systolic)., Disp: 30 tablet, Rfl: 0    clotrimazole-betamethasone 1-0.05% (LOTRISONE) cream, Apply to affected area 2 times daily, Disp: 15 g, Rfl: 1    estradioL (ESTRACE) 0.01 % (0.1 mg/gram) vaginal cream, Place pea sized amount vaginally every day for 2 weeks then twice a week after that, Disp: 42.5 g, Rfl: 2    fluconazole (DIFLUCAN) 150 MG Tab, Take 150 mg by mouth once., Disp: , Rfl:     ibuprofen (ADVIL,MOTRIN) 800 MG tablet, Take 1 tablet (800 mg total) by mouth 3 (three) times daily., Disp: 90 tablet, Rfl: 0    lisinopriL (PRINIVIL,ZESTRIL) 20 MG tablet, Take 1 tablet (20 mg total) by mouth once daily., Disp: 90 tablet, Rfl: 3    methylPREDNISolone (MEDROL DOSEPACK) 4 mg tablet, use as directed, Disp: 1 each, Rfl: 0    PENNSAID 20 mg/gram /actuation(2 %) sopm, APPLY 2 PUMPS TO THE AFFECTED AREA TWICE DAILY, Disp: 112 g, Rfl: 0    tinidazole (TINDAMAX) 500 MG tablet, Take 2,000 mg by mouth once., Disp: , Rfl:     tiZANidine (ZANAFLEX) 2 MG tablet, Take 1 tablet (2 mg total) by mouth every 8 (eight) hours as needed (prn muscle spasms)., Disp: 30 tablet, Rfl: 0    traMADoL (ULTRAM) 50 mg tablet, Take 1 tablet (50 mg total) by mouth every 6 (six) hours as needed for Pain., Disp: 12 tablet, Rfl: 0    Review of Systems   Constitutional: Negative for fever and unexpected weight change.   HENT: Positive for ear pain, sinus pressure and sinus pain. Negative for trouble swallowing.    Eyes: Negative for visual disturbance.   Respiratory: Negative for shortness of breath.    Cardiovascular: Negative for chest pain, palpitations and leg swelling.   Gastrointestinal: Negative for blood in stool.   Genitourinary: Negative for hematuria.   Skin: Negative for rash.   Allergic/Immunologic: Negative for immunocompromised state.   Neurological: Positive for headaches.   Hematological: Does not bruise/bleed easily.   Psychiatric/Behavioral: Negative for  agitation. The patient is not nervous/anxious.        Objective:      There were no vitals taken for this visit.  Physical Exam  Constitutional:       Appearance: She is well-developed.   Pulmonary:      Effort: Pulmonary effort is normal.   Neurological:      Mental Status: She is alert and oriented to person, place, and time.   Psychiatric:         Behavior: Behavior normal.         Thought Content: Thought content normal.         Judgment: Judgment normal.         Assessment:       1. Bacterial sinusitis        Plan:       Bacterial sinusitis  -     amoxicillin (AMOXIL) 500 MG capsule; Take 1 capsule (500 mg total) by mouth every 12 (twelve) hours. for 10 days  Dispense: 20 capsule; Refill: 0  -     cetirizine (ZYRTEC) 5 MG tablet; Take 1 tablet (5 mg total) by mouth once daily.  Dispense: 30 tablet; Refill: 11  -     methylPREDNISolone (MEDROL DOSEPACK) 4 mg tablet; use as directed  Dispense: 1 each; Refill: 0          Time spent with patient: 20 minutes    Patient with be reevaluated in 3 months or sooner prn    Greater than 50% of this visit was spent counseling as described in above documentation:Yes

## 2022-03-28 DIAGNOSIS — M25.572 ACUTE LEFT ANKLE PAIN: Primary | ICD-10-CM

## 2022-04-14 ENCOUNTER — PATIENT MESSAGE (OUTPATIENT)
Dept: ORTHOPEDICS | Facility: CLINIC | Age: 52
End: 2022-04-14
Payer: COMMERCIAL

## 2022-05-04 ENCOUNTER — OFFICE VISIT (OUTPATIENT)
Dept: FAMILY MEDICINE | Facility: CLINIC | Age: 52
End: 2022-05-04
Payer: COMMERCIAL

## 2022-05-04 ENCOUNTER — HOSPITAL ENCOUNTER (EMERGENCY)
Facility: HOSPITAL | Age: 52
Discharge: HOME | End: 2022-05-04
Attending: EMERGENCY MEDICINE
Payer: COMMERCIAL

## 2022-05-04 ENCOUNTER — TELEPHONE (OUTPATIENT)
Dept: FAMILY MEDICINE | Facility: CLINIC | Age: 52
End: 2022-05-04
Payer: COMMERCIAL

## 2022-05-04 VITALS
OXYGEN SATURATION: 100 % | HEART RATE: 67 BPM | BODY MASS INDEX: 28.7 KG/M2 | DIASTOLIC BLOOD PRESSURE: 74 MMHG | WEIGHT: 152 LBS | HEIGHT: 61 IN | TEMPERATURE: 98 F | RESPIRATION RATE: 16 BRPM | SYSTOLIC BLOOD PRESSURE: 138 MMHG

## 2022-05-04 VITALS
RESPIRATION RATE: 17 BRPM | HEART RATE: 74 BPM | DIASTOLIC BLOOD PRESSURE: 78 MMHG | BODY MASS INDEX: 28.72 KG/M2 | WEIGHT: 152.13 LBS | SYSTOLIC BLOOD PRESSURE: 122 MMHG | HEIGHT: 61 IN | OXYGEN SATURATION: 99 % | TEMPERATURE: 99 F

## 2022-05-04 DIAGNOSIS — R10.9 ABDOMINAL CRAMPING: ICD-10-CM

## 2022-05-04 DIAGNOSIS — K52.9 PROCTOCOLITIS: Primary | ICD-10-CM

## 2022-05-04 DIAGNOSIS — K62.5 RECTAL BLEEDING: Primary | ICD-10-CM

## 2022-05-04 LAB
ALBUMIN SERPL BCP-MCNC: 3.6 G/DL (ref 3.5–5.2)
ALP SERPL-CCNC: 72 U/L (ref 55–135)
ALT SERPL W/O P-5'-P-CCNC: 27 U/L (ref 10–44)
ANION GAP SERPL CALC-SCNC: 12 MMOL/L (ref 8–16)
AST SERPL-CCNC: 19 U/L (ref 10–40)
B-HCG UR QL: NEGATIVE
BACTERIA #/AREA URNS HPF: NORMAL /HPF
BASOPHILS # BLD AUTO: 0.04 K/UL (ref 0–0.2)
BASOPHILS NFR BLD: 0.3 % (ref 0–1.9)
BILIRUB SERPL-MCNC: 0.4 MG/DL (ref 0.1–1)
BILIRUB UR QL STRIP: NEGATIVE
BUN SERPL-MCNC: 10 MG/DL (ref 6–20)
CALCIUM SERPL-MCNC: 9.2 MG/DL (ref 8.7–10.5)
CHLORIDE SERPL-SCNC: 106 MMOL/L (ref 95–110)
CLARITY UR: CLEAR
CO2 SERPL-SCNC: 23 MMOL/L (ref 23–29)
COLOR UR: YELLOW
CREAT SERPL-MCNC: 0.8 MG/DL (ref 0.5–1.4)
DIFFERENTIAL METHOD: ABNORMAL
EOSINOPHIL # BLD AUTO: 0.2 K/UL (ref 0–0.5)
EOSINOPHIL NFR BLD: 1.5 % (ref 0–8)
ERYTHROCYTE [DISTWIDTH] IN BLOOD BY AUTOMATED COUNT: 14.2 % (ref 11.5–14.5)
EST. GFR  (AFRICAN AMERICAN): >60 ML/MIN/1.73 M^2
EST. GFR  (NON AFRICAN AMERICAN): >60 ML/MIN/1.73 M^2
GLUCOSE SERPL-MCNC: 106 MG/DL (ref 70–110)
GLUCOSE UR QL STRIP: NEGATIVE
HCT VFR BLD AUTO: 37.5 % (ref 37–48.5)
HGB BLD-MCNC: 11.8 G/DL (ref 12–16)
HGB UR QL STRIP: NEGATIVE
IMM GRANULOCYTES # BLD AUTO: 0.05 K/UL (ref 0–0.04)
IMM GRANULOCYTES NFR BLD AUTO: 0.4 % (ref 0–0.5)
KETONES UR QL STRIP: NEGATIVE
LEUKOCYTE ESTERASE UR QL STRIP: ABNORMAL
LIPASE SERPL-CCNC: 34 U/L (ref 4–60)
LYMPHOCYTES # BLD AUTO: 2.1 K/UL (ref 1–4.8)
LYMPHOCYTES NFR BLD: 18 % (ref 18–48)
MCH RBC QN AUTO: 24.8 PG (ref 27–31)
MCHC RBC AUTO-ENTMCNC: 31.5 G/DL (ref 32–36)
MCV RBC AUTO: 79 FL (ref 82–98)
MICROSCOPIC COMMENT: NORMAL
MONOCYTES # BLD AUTO: 0.8 K/UL (ref 0.3–1)
MONOCYTES NFR BLD: 6.5 % (ref 4–15)
NEUTROPHILS # BLD AUTO: 8.5 K/UL (ref 1.8–7.7)
NEUTROPHILS NFR BLD: 73.3 % (ref 38–73)
NITRITE UR QL STRIP: NEGATIVE
NRBC BLD-RTO: 0 /100 WBC
PH UR STRIP: 6 [PH] (ref 5–8)
PLATELET # BLD AUTO: 257 K/UL (ref 150–450)
PLATELET BLD QL SMEAR: ABNORMAL
PMV BLD AUTO: 11.8 FL (ref 9.2–12.9)
POTASSIUM SERPL-SCNC: 3.6 MMOL/L (ref 3.5–5.1)
PROT SERPL-MCNC: 8.2 G/DL (ref 6–8.4)
PROT UR QL STRIP: NEGATIVE
RBC # BLD AUTO: 4.75 M/UL (ref 4–5.4)
RBC #/AREA URNS HPF: 1 /HPF (ref 0–4)
SODIUM SERPL-SCNC: 141 MMOL/L (ref 136–145)
SP GR UR STRIP: 1.02 (ref 1–1.03)
SQUAMOUS #/AREA URNS HPF: 8 /HPF
URN SPEC COLLECT METH UR: ABNORMAL
UROBILINOGEN UR STRIP-ACNC: NEGATIVE EU/DL
WBC # BLD AUTO: 11.65 K/UL (ref 3.9–12.7)
WBC #/AREA URNS HPF: 2 /HPF (ref 0–5)

## 2022-05-04 PROCEDURE — 3008F BODY MASS INDEX DOCD: CPT | Mod: CPTII,S$GLB,, | Performed by: NURSE PRACTITIONER

## 2022-05-04 PROCEDURE — 81025 URINE PREGNANCY TEST: CPT | Performed by: EMERGENCY MEDICINE

## 2022-05-04 PROCEDURE — 4010F ACE/ARB THERAPY RXD/TAKEN: CPT | Mod: CPTII,S$GLB,, | Performed by: NURSE PRACTITIONER

## 2022-05-04 PROCEDURE — 25500020 PHARM REV CODE 255

## 2022-05-04 PROCEDURE — 99214 PR OFFICE/OUTPT VISIT, EST, LEVL IV, 30-39 MIN: ICD-10-PCS | Mod: S$GLB,,, | Performed by: NURSE PRACTITIONER

## 2022-05-04 PROCEDURE — 96361 HYDRATE IV INFUSION ADD-ON: CPT

## 2022-05-04 PROCEDURE — 83690 ASSAY OF LIPASE: CPT | Performed by: PHYSICIAN ASSISTANT

## 2022-05-04 PROCEDURE — 36415 COLL VENOUS BLD VENIPUNCTURE: CPT | Performed by: PHYSICIAN ASSISTANT

## 2022-05-04 PROCEDURE — 1159F PR MEDICATION LIST DOCUMENTED IN MEDICAL RECORD: ICD-10-PCS | Mod: CPTII,S$GLB,, | Performed by: NURSE PRACTITIONER

## 2022-05-04 PROCEDURE — 80053 COMPREHEN METABOLIC PANEL: CPT | Performed by: PHYSICIAN ASSISTANT

## 2022-05-04 PROCEDURE — 96374 THER/PROPH/DIAG INJ IV PUSH: CPT

## 2022-05-04 PROCEDURE — 63600175 PHARM REV CODE 636 W HCPCS: Performed by: EMERGENCY MEDICINE

## 2022-05-04 PROCEDURE — 99999 PR PBB SHADOW E&M-EST. PATIENT-LVL V: CPT | Mod: PBBFAC,,, | Performed by: NURSE PRACTITIONER

## 2022-05-04 PROCEDURE — 3074F PR MOST RECENT SYSTOLIC BLOOD PRESSURE < 130 MM HG: ICD-10-PCS | Mod: CPTII,S$GLB,, | Performed by: NURSE PRACTITIONER

## 2022-05-04 PROCEDURE — 81000 URINALYSIS NONAUTO W/SCOPE: CPT | Performed by: PHYSICIAN ASSISTANT

## 2022-05-04 PROCEDURE — 85025 COMPLETE CBC W/AUTO DIFF WBC: CPT | Performed by: PHYSICIAN ASSISTANT

## 2022-05-04 PROCEDURE — 3078F DIAST BP <80 MM HG: CPT | Mod: CPTII,S$GLB,, | Performed by: NURSE PRACTITIONER

## 2022-05-04 PROCEDURE — 99999 PR PBB SHADOW E&M-EST. PATIENT-LVL V: ICD-10-PCS | Mod: PBBFAC,,, | Performed by: NURSE PRACTITIONER

## 2022-05-04 PROCEDURE — 25000003 PHARM REV CODE 250: Performed by: EMERGENCY MEDICINE

## 2022-05-04 PROCEDURE — 1159F MED LIST DOCD IN RCRD: CPT | Mod: CPTII,S$GLB,, | Performed by: NURSE PRACTITIONER

## 2022-05-04 PROCEDURE — 99214 OFFICE O/P EST MOD 30 MIN: CPT | Mod: S$GLB,,, | Performed by: NURSE PRACTITIONER

## 2022-05-04 PROCEDURE — 3008F PR BODY MASS INDEX (BMI) DOCUMENTED: ICD-10-PCS | Mod: CPTII,S$GLB,, | Performed by: NURSE PRACTITIONER

## 2022-05-04 PROCEDURE — 3078F PR MOST RECENT DIASTOLIC BLOOD PRESSURE < 80 MM HG: ICD-10-PCS | Mod: CPTII,S$GLB,, | Performed by: NURSE PRACTITIONER

## 2022-05-04 PROCEDURE — 99285 EMERGENCY DEPT VISIT HI MDM: CPT | Mod: 25

## 2022-05-04 PROCEDURE — 3074F SYST BP LT 130 MM HG: CPT | Mod: CPTII,S$GLB,, | Performed by: NURSE PRACTITIONER

## 2022-05-04 PROCEDURE — 4010F PR ACE/ARB THEARPY RXD/TAKEN: ICD-10-PCS | Mod: CPTII,S$GLB,, | Performed by: NURSE PRACTITIONER

## 2022-05-04 RX ORDER — DICYCLOMINE HYDROCHLORIDE 10 MG/1
20 CAPSULE ORAL
Status: COMPLETED | OUTPATIENT
Start: 2022-05-04 | End: 2022-05-04

## 2022-05-04 RX ORDER — METRONIDAZOLE 500 MG/1
500 TABLET ORAL 3 TIMES DAILY
Qty: 21 TABLET | Refills: 0 | Status: SHIPPED | OUTPATIENT
Start: 2022-05-04 | End: 2022-05-11

## 2022-05-04 RX ORDER — CIPROFLOXACIN 500 MG/1
500 TABLET ORAL
Status: COMPLETED | OUTPATIENT
Start: 2022-05-04 | End: 2022-05-04

## 2022-05-04 RX ORDER — METRONIDAZOLE 500 MG/1
500 TABLET ORAL
Status: COMPLETED | OUTPATIENT
Start: 2022-05-04 | End: 2022-05-04

## 2022-05-04 RX ORDER — CIPROFLOXACIN 500 MG/1
500 TABLET ORAL 2 TIMES DAILY
Qty: 14 TABLET | Refills: 0 | Status: SHIPPED | OUTPATIENT
Start: 2022-05-04 | End: 2022-05-11

## 2022-05-04 RX ORDER — MELOXICAM 15 MG/1
15 TABLET ORAL DAILY
COMMUNITY
Start: 2022-03-28 | End: 2022-08-22 | Stop reason: SDUPTHER

## 2022-05-04 RX ORDER — ONDANSETRON 2 MG/ML
4 INJECTION INTRAMUSCULAR; INTRAVENOUS
Status: COMPLETED | OUTPATIENT
Start: 2022-05-04 | End: 2022-05-04

## 2022-05-04 RX ADMIN — DICYCLOMINE HYDROCHLORIDE 20 MG: 10 CAPSULE ORAL at 08:05

## 2022-05-04 RX ADMIN — METRONIDAZOLE 500 MG: 500 TABLET ORAL at 10:05

## 2022-05-04 RX ADMIN — ONDANSETRON 4 MG: 2 INJECTION INTRAMUSCULAR; INTRAVENOUS at 09:05

## 2022-05-04 RX ADMIN — IOHEXOL 75 ML: 350 INJECTION, SOLUTION INTRAVENOUS at 09:05

## 2022-05-04 RX ADMIN — SODIUM CHLORIDE 1000 ML: 0.9 INJECTION, SOLUTION INTRAVENOUS at 08:05

## 2022-05-04 RX ADMIN — CIPROFLOXACIN 500 MG: 500 TABLET, FILM COATED ORAL at 10:05

## 2022-05-04 NOTE — TELEPHONE ENCOUNTER
Returned patients call in regards to stomach pain. States she is having bowel movements that is making her feel like she is about to pass out. Advised patient of over the counter medications she can try such as stool softeners and drinking water , and a liquid for the next few days.    She preferred to come in and be seen.   scheduled appointment per patient request.

## 2022-05-04 NOTE — PROGRESS NOTES
Subjective:       Patient ID: Jacqui Cruz is a 52 y.o. female.    Chief Complaint: Abdominal Pain, GI Problem, Diarrhea, and Rectal Bleeding     52-year-old female presents to the clinic today with complaint of abdominal cramping and rectal bleeding that started since last night.  She has had 2 episodes of brown diarrhea stool mixed with blood and 1 episode of bright red blood in the toilet while here in the office.  She has mild nausea.  She feels a constant pressure to defecate and sometimes is unable to pass any stool or blood.  Earlier today while she was trying to have a bowel movement she felt like she was nearly about to pass out.  She denies any fever, chills, vomiting, cardiac chest pain, heart palpitations, shortness of breath, headaches, dizziness, or blurred vision.  She has a history of hypertension and asthma.  Her blood pressure today is 122/78.    Past Medical History:   Diagnosis Date    Asthma     Hypertension      Past Surgical History:   Procedure Laterality Date    BREAST BIOPSY Left 2012    Benign calcifications    BREAST SURGERY      biopsy    DILATION AND CURETTAGE OF UTERUS        reports that she has never smoked. She has never used smokeless tobacco. She reports that she does not drink alcohol and does not use drugs.  Review of Systems   Constitutional: Negative for chills and fever.   Respiratory: Negative for cough, shortness of breath and wheezing.    Cardiovascular: Negative for chest pain, palpitations and leg swelling.   Gastrointestinal: Positive for abdominal pain, blood in stool, diarrhea and nausea. Negative for vomiting.   Musculoskeletal: Negative for gait problem.   Neurological: Negative for dizziness, light-headedness and headaches.       Objective:      Physical Exam  Vitals and nursing note reviewed.   Constitutional:       General: She is not in acute distress.     Appearance: Normal appearance. She is not ill-appearing, toxic-appearing or diaphoretic.    Cardiovascular:      Rate and Rhythm: Normal rate and regular rhythm.      Heart sounds: Normal heart sounds. No murmur heard.    No friction rub. No gallop.   Pulmonary:      Effort: Pulmonary effort is normal.      Breath sounds: Normal breath sounds. No wheezing or rhonchi.   Abdominal:      General: Bowel sounds are normal.      Palpations: Abdomen is soft.      Tenderness: There is abdominal tenderness. There is no guarding or rebound.      Comments: Mild tenderness left lower quadrant of abdomen no rebound or guarding noted    Musculoskeletal:         General: No swelling. Normal range of motion.   Skin:     General: Skin is warm and dry.   Neurological:      Mental Status: She is alert and oriented to person, place, and time.   Psychiatric:         Mood and Affect: Mood normal.         Behavior: Behavior normal.         Thought Content: Thought content normal.         Judgment: Judgment normal.         Assessment:       1. Rectal bleeding    2. Abdominal cramping        Plan:         Rectal bleeding  -  To ER now for further evaluation     Abdominal cramping        No follow-ups on file.

## 2022-05-04 NOTE — Clinical Note
"Jacqui Mancusofoster Cruz was seen and treated in our emergency department on 5/4/2022.  She may return to work on 05/09/2022.       If you have any questions or concerns, please don't hesitate to call.      SARAH Duke LPN"
"Jacqui Tobiasgiovany Cruz was seen and treated in our emergency department on 5/4/2022.  She may return to work on 05/11/2022.       If you have any questions or concerns, please don't hesitate to call.      SARAH Duke LPN"
"Jacqui"Florence Cruz was seen and treated in our emergency department on 5/4/2022.  She may return to work on 05/09/2022.       If you have any questions or concerns, please don't hesitate to call.      SARAH Duke MD    "
Patent

## 2022-05-04 NOTE — TELEPHONE ENCOUNTER
----- Message from Lou Fallon sent at 5/4/2022 11:41 AM CDT -----  Type:  Same Day Appointment Request    Caller is requesting a same day appointment.  Caller declined first available appointment listed below.      Name of Caller:  Jacqui  When is the first available appointment?  5/6  Symptoms:  had trouble having a bowel movement last night and after she did pass stool she had some blood.  She had cramps and while trying to pass stool, she almost passed out.    Best Call Back Number:  [phone]  Additional Information:   thank you

## 2022-05-04 NOTE — FIRST PROVIDER EVALUATION
Emergency Department TeleTriage Encounter Note      CHIEF COMPLAINT    Chief Complaint   Patient presents with    Abdominal Pain     Lower abd pain since yesterday    Rectal Bleeding     Also reports bright red blood mixed in stool today       VITAL SIGNS   Initial Vitals [05/04/22 1728]   BP Pulse Resp Temp SpO2   (!) 150/81 77 18 98.3 °F (36.8 °C) 100 %      MAP       --            ALLERGIES    Review of patient's allergies indicates:   Allergen Reactions    Omnicef [cefdinir] Diarrhea    Doxycycline hyclate Itching       PROVIDER TRIAGE NOTE  This is a teletriage evaluation of a 52 y.o. female presenting to the ED complaining of generalized abdominal cramping, rectal bleeding after straining to have a bowel movement x 1 today.  She does take Mobic.       Initial orders will be placed and care will be transferred to an alternate provider when patient is roomed for a full evaluation. Any additional orders and the final disposition will be determined by that provider.           ORDERS  Labs Reviewed - No data to display    ED Orders (720h ago, onward)    None            Virtual Visit Note: The provider triage portion of this emergency department evaluation and documentation was performed via [employer] a HIPAA-compliant telemedicine application, in concert with a tele-presenter in the room. A face to face patient evaluation with one of my colleagues will occur once the patient is placed in an emergency department room.      DISCLAIMER: This note was prepared with [employer] voice recognition transcription software. Garbled syntax, mangled pronouns, and other bizarre constructions may be attributed to that software system.

## 2022-05-04 NOTE — PATIENT INSTRUCTIONS
Florentin Osman,     If you are due for any health screening(s) below please notify me so we can arrange them to be ordered and scheduled to maintain your health. Most healthy patients complete it. Don't lose out on improving your health.     Health Maintenance   Topic Date Due    Hepatitis C Screening  Never done    TETANUS VACCINE  01/02/2019    Mammogram  10/26/2022    Lipid Panel  08/31/2023          Colon Cancer Screening    Of cancers affecting both men and women, colorectal cancer is the third leading cancer killer in the [place]. But it doesnt have to be. Screening can prevent colorectal cancer or find it at an early stage when treatment often leads to a cure.    A colonoscopy is the preferred test for detecting colon cancer. It is needed only once every 10 years if results are negative. While sedated, a flexible, lighted tube with a tiny camera is inserted into the rectum and advanced through the colon to look for cancers. An alternative screening test that is used at home and returned to the lab may also be used. It detects hidden blood in bowel movements which could indicate cancer in the colon. If results are positive, you will need a colonoscopy to determine if the blood is a sign of cancer. This type of follow up (diagnostic) colonoscopy usually requires additional copays as required by your insurance provider. Please contact your PCP if you have any questions.    Although you are still overdue for this important screening, due to the COVID-19 pandemic, we recommend scheduling it in the near future.        - To ER now for further evaluation

## 2022-05-05 NOTE — ED PROVIDER NOTES
Encounter Date: 5/4/2022    SCRIBE #1 NOTE: I, Genny Alvarado, am scribing for, and in the presence of, Ari Gutierrez MD.       History     Chief Complaint   Patient presents with    Abdominal Pain     Lower abd pain since yesterday    Rectal Bleeding     Also reports bright red blood mixed in stool today     Time seen by provider: 8:04 PM on 05/04/2022    Jacqui Cruz is a 52 y.o. female who presents to the ED with an onset of abdominal cramping with associated tenesmus and small amounts of diarrhea since yesterday.  She reports an episode last night in which she was straining 2/2 attempting to have a BM without success and with near syncope.  She took Imodium for management of diarrhea without improvement to Sx.  The patient denies N/V or any other symptoms at this time.  Patient has a PMHx of asthma and HTN.  PSHx includes D&C.      The history is provided by the patient.     Review of patient's allergies indicates:   Allergen Reactions    Omnicef [cefdinir] Diarrhea    Doxycycline hyclate Itching     Past Medical History:   Diagnosis Date    Asthma     Hypertension      Past Surgical History:   Procedure Laterality Date    BREAST BIOPSY Left 2012    Benign calcifications    BREAST SURGERY      biopsy    DILATION AND CURETTAGE OF UTERUS       Family History   Problem Relation Age of Onset    Hypertension Mother     Cancer Maternal Aunt         cervical     Social History     Tobacco Use    Smoking status: Never Smoker    Smokeless tobacco: Never Used   Substance Use Topics    Alcohol use: No    Drug use: No     Review of Systems   Constitutional: Negative for fever.   HENT: Negative for congestion.    Eyes: Negative for visual disturbance.   Respiratory: Negative for wheezing.    Cardiovascular: Negative for chest pain.   Gastrointestinal: Positive for abdominal pain (cramping) and diarrhea. Negative for nausea and vomiting.   Genitourinary: Negative for dysuria.   Musculoskeletal: Negative  for joint swelling.   Skin: Negative for rash.   Neurological: Negative for syncope.   Hematological: Does not bruise/bleed easily.   Psychiatric/Behavioral: Negative for confusion.       Physical Exam     Initial Vitals [05/04/22 1728]   BP Pulse Resp Temp SpO2   (!) 150/81 77 18 98.3 °F (36.8 °C) 100 %      MAP       --         Physical Exam    Nursing note and vitals reviewed.  Constitutional: She appears well-nourished.   HENT:   Head: Normocephalic and atraumatic.   Eyes: Conjunctivae and EOM are normal.   Neck: Neck supple. No thyroid mass present.   Normal range of motion.  Cardiovascular: Normal rate, regular rhythm and normal heart sounds. Exam reveals no gallop and no friction rub.    No murmur heard.  Pulmonary/Chest: Breath sounds normal. She has no wheezes. She has no rhonchi. She has no rales.   Abdominal: Abdomen is soft. Bowel sounds are increased. There is abdominal tenderness in the periumbilical area.   Right and left periumbilical tenderness to palpation with hypermobile bowel sounds.     Musculoskeletal:      Cervical back: Normal range of motion and neck supple.     Neurological: She is alert and oriented to person, place, and time. She has normal strength. No cranial nerve deficit or sensory deficit.   Skin: Skin is warm and dry. No rash noted. No erythema.   Psychiatric: She has a normal mood and affect. Her speech is normal. Cognition and memory are normal.         ED Course   Procedures  Labs Reviewed   CBC W/ AUTO DIFFERENTIAL - Abnormal; Notable for the following components:       Result Value    Hemoglobin 11.8 (*)     MCV 79 (*)     MCH 24.8 (*)     MCHC 31.5 (*)     Gran # (ANC) 8.5 (*)     Immature Grans (Abs) 0.05 (*)     Gran % 73.3 (*)     All other components within normal limits   URINALYSIS, REFLEX TO URINE CULTURE - Abnormal; Notable for the following components:    Leukocytes, UA 1+ (*)     All other components within normal limits    Narrative:     Specimen Source->Urine    COMPREHENSIVE METABOLIC PANEL   LIPASE   URINALYSIS MICROSCOPIC    Narrative:     Specimen Source->Urine   PREGNANCY TEST, URINE RAPID    Narrative:     Specimen Source->Urine          Imaging Results          CT Abdomen Pelvis With Contrast (Final result)  Result time 05/04/22 22:12:19    Final result by Ritchie Mcallister DO (05/04/22 22:12:19)                 Impression:      Wall thickening of the descending and sigmoid colon and the rectum compatible with acute proctocolitis.  Recommend further evaluation with colonoscopy if not recently performed to exclude underlying neoplastic process.      Electronically signed by: Ritchie Mcallister  Date:    05/04/2022  Time:    22:12             Narrative:    EXAMINATION:  CT ABDOMEN PELVIS WITH CONTRAST    CLINICAL HISTORY:  LLQ abdominal pain;    TECHNIQUE:  Axial CT images with sagittal and coronal reformats were obtained of the abdomen and pelvis from the hemidiaphragms through the symphysis pubis after the administration of 75mL Omnipaque 350.    COMPARISON:  None available.    FINDINGS:  Lung Bases: Clear.    Heart: Heart size is normal.  No pericardial effusion.    Liver: There is a too small to characterize hypodensity in the right hepatic lobe.  Otherwise the liver is normal in size without large focal lesion.  The portal vasculature is patent.    Biliary tract: No intrahepatic or extrahepatic biliary ductal dilatation.    Gallbladder: The gallbladder is decompressed and demonstrates mild wall thickening which is likely related to decompressed status.  There is no radiopaque gallstone.    Pancreas: Normal. No pancreatic ductal dilatation.    Spleen: Normal size without focal lesion.    Adrenals: Unremarkable.    Kidneys and urinary collecting systems: Normal.  No hydronephrosis or urolithiasis.    Lymph nodes: None enlarged.    Stomach and bowel: The stomach is normal.  There is wall thickening and mucosal enhancement of the descending and sigmoid colon and rectum  compatible with acute colitis.  There are no colonic diverticula.  The appendix is not definitively seen however there is no evidence of right lower quadrant inflammation.    Peritoneum and mesentery: No ascites or free intraperitoneal air.  No abdominal fluid collection.    Vasculature: No aneurysm or significant atherosclerosis.    Urinary bladder: No wall thickening.    Reproductive organs: The uterus and adnexae are unremarkable.  There is mild free fluid in the pelvis which may be physiologic.    Body wall: No abnormality.    Musculoskeletal: No aggressive osseous lesion.                               X-Ray Abdomen Flat And Erect (Final result)  Result time 05/04/22 18:37:33    Final result by Choco Nolasco MD (05/04/22 18:37:33)                 Impression:      No acute radiographic findings in the abdomen.      Electronically signed by: Choco Nolasco  Date:    05/04/2022  Time:    18:37             Narrative:    EXAMINATION:  XR ABDOMEN FLAT AND ERECT    CLINICAL HISTORY:  Abdominal Pain;    TECHNIQUE:  Flat and erect AP views of the abdomen were performed.    COMPARISON:  None    FINDINGS:  The bowel gas pattern is nonobstructive.No evidence of free intraperitoneal air.  No airspace consolidation at the lung bases.No acute bony abnormality.No abnormal soft tissue masses.No abnormal calcific densities overlying the kidneys identified.                                 Medications   dicyclomine capsule 20 mg (20 mg Oral Given 5/4/22 2047)   ondansetron injection 4 mg (4 mg Intravenous Given 5/4/22 2105)   sodium chloride 0.9% bolus 1,000 mL (0 mLs Intravenous Stopped 5/4/22 2148)   iohexoL (OMNIPAQUE 350) 350 mg iodine/mL injection (75 mLs Intravenous Given 5/4/22 2136)   ciprofloxacin HCl tablet 500 mg (500 mg Oral Given 5/4/22 2249)   metroNIDAZOLE tablet 500 mg (500 mg Oral Given 5/4/22 2249)     Medical Decision Making:   History:   Old Medical Records: I decided to obtain old medical records.  Independently  Interpreted Test(s):   I have ordered and independently interpreted X-rays - see prior notes.  Clinical Tests:   Lab Tests: Ordered and Reviewed  Radiological Study: Ordered and Reviewed  ED Management:    This patient was emergently assessed shortly after arrival.  Initial vital signs are stable.  Patient reporting acute symptoms including mild hematochezia.  Screening labs found to be largely unremarkable.  CT scan of the abdomen and pelvis with IV contrast reveal wall thickening of the descending and sigmoid colon and the rectum compatible with acute proctocolitis.   it is recommend she receive further evaluation with colonoscopy if not recently performed to exclude underlying neoplastic process.  I have low suspicion for other acute life-threatening process including ischemic gut, occult severe diverticular disease, sepsis.  She is educated about supportive care and will be initiated on Flagyl and ciprofloxacin.  She is educated about potential black box side effects associated with Cipro.  She will be asked to follow up with the gastroenterologist provided as soon as possible for further diagnosis and management.  She is asked to return to the ER immediately for any new, concerning, or worsening symptoms.  Patient was agreeable with this plan and was discharged in stable condition.            Scribe Attestation:   Scribe #1: I performed the above scribed service and the documentation accurately describes the services I performed. I attest to the accuracy of the note.               I, Dr. Ari Gutierrez, personally performed the services described in this documentation. All medical record entries made by the scribe were at my direction and in my presence.  I have reviewed the chart and agree that the record reflects my personal performance and is accurate and complete. Ari Gutierrez MD.  5:01 AM 05/05/2022      Clinical Impression:   Final diagnoses:  [K52.9] Proctocolitis (Primary)          ED Disposition  Condition    Discharge Stable        ED Prescriptions     Medication Sig Dispense Start Date End Date Auth. Provider    metroNIDAZOLE (FLAGYL) 500 MG tablet Take 1 tablet (500 mg total) by mouth 3 (three) times daily. for 7 days 21 tablet 5/4/2022 5/11/2022 Ari Gutierrez MD    ciprofloxacin HCl (CIPRO) 500 MG tablet Take 1 tablet (500 mg total) by mouth 2 (two) times daily. for 7 days 14 tablet 5/4/2022 5/11/2022 Ari Gutierrez MD        Follow-up Information     Follow up With Specialties Details Why Contact Info    Josephine Marquez MD Family Medicine Schedule an appointment as soon as possible for a visit   [address] [facility]  [place] [address]  [phone]      Darci Link MD Gastroenterology Schedule an appointment as soon as possible for a visit   [address] [facility]  [address]  [place] [address]  [phone]             Ari Gutierrez MD  05/05/22 [id number]

## 2022-05-05 NOTE — ED NOTES
Pt AAOx4, Abc's intact. NADN. No adverse reaction to medication given. D/C instructions and Rx in pt possession along with belongings. No other needs at this time.Pt ambulatory to ED Lobby without assistance, steady gait. Pt provided with work excuse x1 week.

## 2022-05-10 ENCOUNTER — TELEPHONE (OUTPATIENT)
Dept: FAMILY MEDICINE | Facility: CLINIC | Age: 52
End: 2022-05-10
Payer: COMMERCIAL

## 2022-05-10 DIAGNOSIS — R11.0 NAUSEA: Primary | ICD-10-CM

## 2022-05-10 RX ORDER — ONDANSETRON 4 MG/1
8 TABLET, ORALLY DISINTEGRATING ORAL EVERY 8 HOURS PRN
Qty: 30 TABLET | Refills: 0 | Status: SHIPPED | OUTPATIENT
Start: 2022-05-10 | End: 2022-05-20

## 2022-05-10 NOTE — TELEPHONE ENCOUNTER
Spoke with pt via phone. LOV 5/4/2022 with ER visit after that visit.   She was prescribed antibiotics at the ER visit which she started taking on Friday. She states that they are giving her Upset stomach and Nausea. She denies SOB, swelling, difficulty breathing. Pt is requesting letter to return to work on 5/15 or 5/16. Last day of meds will be 5/14. Please advise.

## 2022-05-10 NOTE — TELEPHONE ENCOUNTER
----- Message from Zakiya Joiner sent at 5/10/2022 10:41 AM CDT -----  Contact: patient  Type: Needs Medical Advice  Who Called:  patient  Best Call Back Number: [phone] (home)   Additional Information: patient was seen in office on 5/4- she was given a dr note to return to work but she is now taking 2 antibiotics and they are making her sick so she is requesting her dr note be extended to return to work on 5/15 or 5/16- her last day of antibiotics is 5/14. Please advise. Dr note can be uploaded to [employer].

## 2022-05-10 NOTE — TELEPHONE ENCOUNTER
Patient is having nausea and upset stomach when taking medications that were prescribed at the ER. I will send a prescription of Zofran for nausea to try taking with the medications. I will have my staff put in a work excuse for you to print from the computer.

## 2022-05-17 ENCOUNTER — PATIENT MESSAGE (OUTPATIENT)
Dept: FAMILY MEDICINE | Facility: CLINIC | Age: 52
End: 2022-05-17
Payer: COMMERCIAL

## 2022-05-20 ENCOUNTER — OFFICE VISIT (OUTPATIENT)
Dept: URGENT CARE | Facility: CLINIC | Age: 52
End: 2022-05-20
Payer: COMMERCIAL

## 2022-05-20 VITALS
OXYGEN SATURATION: 98 % | BODY MASS INDEX: 29.27 KG/M2 | RESPIRATION RATE: 16 BRPM | TEMPERATURE: 98 F | HEART RATE: 77 BPM | SYSTOLIC BLOOD PRESSURE: 150 MMHG | DIASTOLIC BLOOD PRESSURE: 89 MMHG | HEIGHT: 61 IN | WEIGHT: 155 LBS

## 2022-05-20 DIAGNOSIS — H10.9 CONJUNCTIVITIS OF RIGHT EYE, UNSPECIFIED CONJUNCTIVITIS TYPE: Primary | ICD-10-CM

## 2022-05-20 PROCEDURE — 3008F BODY MASS INDEX DOCD: CPT | Mod: CPTII,S$GLB,, | Performed by: NURSE PRACTITIONER

## 2022-05-20 PROCEDURE — 1159F PR MEDICATION LIST DOCUMENTED IN MEDICAL RECORD: ICD-10-PCS | Mod: CPTII,S$GLB,, | Performed by: NURSE PRACTITIONER

## 2022-05-20 PROCEDURE — 4010F PR ACE/ARB THEARPY RXD/TAKEN: ICD-10-PCS | Mod: CPTII,S$GLB,, | Performed by: NURSE PRACTITIONER

## 2022-05-20 PROCEDURE — 99213 PR OFFICE/OUTPT VISIT, EST, LEVL III, 20-29 MIN: ICD-10-PCS | Mod: S$GLB,,, | Performed by: NURSE PRACTITIONER

## 2022-05-20 PROCEDURE — 3079F DIAST BP 80-89 MM HG: CPT | Mod: CPTII,S$GLB,, | Performed by: NURSE PRACTITIONER

## 2022-05-20 PROCEDURE — 3077F PR MOST RECENT SYSTOLIC BLOOD PRESSURE >= 140 MM HG: ICD-10-PCS | Mod: CPTII,S$GLB,, | Performed by: NURSE PRACTITIONER

## 2022-05-20 PROCEDURE — 1159F MED LIST DOCD IN RCRD: CPT | Mod: CPTII,S$GLB,, | Performed by: NURSE PRACTITIONER

## 2022-05-20 PROCEDURE — 3079F PR MOST RECENT DIASTOLIC BLOOD PRESSURE 80-89 MM HG: ICD-10-PCS | Mod: CPTII,S$GLB,, | Performed by: NURSE PRACTITIONER

## 2022-05-20 PROCEDURE — 3077F SYST BP >= 140 MM HG: CPT | Mod: CPTII,S$GLB,, | Performed by: NURSE PRACTITIONER

## 2022-05-20 PROCEDURE — 3008F PR BODY MASS INDEX (BMI) DOCUMENTED: ICD-10-PCS | Mod: CPTII,S$GLB,, | Performed by: NURSE PRACTITIONER

## 2022-05-20 PROCEDURE — 4010F ACE/ARB THERAPY RXD/TAKEN: CPT | Mod: CPTII,S$GLB,, | Performed by: NURSE PRACTITIONER

## 2022-05-20 PROCEDURE — 99213 OFFICE O/P EST LOW 20 MIN: CPT | Mod: S$GLB,,, | Performed by: NURSE PRACTITIONER

## 2022-05-20 RX ORDER — POLYMYXIN B SULFATE AND TRIMETHOPRIM 1; 10000 MG/ML; [USP'U]/ML
1 SOLUTION OPHTHALMIC EVERY 6 HOURS
Qty: 10 ML | Refills: 0 | Status: SHIPPED | OUTPATIENT
Start: 2022-05-20 | End: 2024-02-12

## 2022-05-21 NOTE — PROGRESS NOTES
"Subjective:       Patient ID: Jacqui Cruz is a 52 y.o. female.    Vitals:  height is 5' 1" (1.549 m) and weight is 70.3 kg (155 lb). Her temperature is 97.6 °F (36.4 °C). Her blood pressure is 150/89 (abnormal) and her pulse is 77. Her respiration is 16 and oxygen saturation is 98%.     Chief Complaint: Eye Pain    Pt. States before she went to bed last night she had itching in her right eye and awoke this morning her right eye was still itching. Pt. Is now having symptoms of burning in her right eye, wateryness in her right eye, pain in her right eye, no reported dryness, no reported discharge. Pt. States she now feel the itch going to her left eye as well. Treatments tried are visine, pt. States it burned her eye, last put on about 3 hours ago.       Eyes: Positive for eye discharge, eye itching, eye redness and eyelid swelling.         Objective:      Physical Exam   Constitutional: She is oriented to person, place, and time. She appears well-developed.   HENT:   Head: Normocephalic and atraumatic.   Ears:   Right Ear: External ear normal.   Left Ear: External ear normal.   Nose: Nose normal.   Mouth/Throat: Oropharynx is clear and moist.   Eyes: EOM and lids are normal. Pupils are equal, round, and reactive to light. Right eye exhibits exudate. Right conjunctiva is injected. vision grossly intact      Comments: Mascara noted to right conjunctiva   Reports itching   Neck: Trachea normal and phonation normal. Neck supple.   Musculoskeletal: Normal range of motion.         General: Normal range of motion.   Neurological: She is alert and oriented to person, place, and time.   Skin: Skin is warm, dry and intact.   Psychiatric: Her speech is normal and behavior is normal. Judgment and thought content normal.   Nursing note and vitals reviewed.        Assessment:       1. Conjunctivitis of right eye, unspecified conjunctivitis type          Plan:         Conjunctivitis of right eye, unspecified conjunctivitis " type  -     polymyxin B sulf-trimethoprim (POLYTRIM) 10,000 unit- 1 mg/mL Drop; Place 1 drop into the right eye every 6 (six) hours.  Dispense: 10 mL; Refill: 0               Patient Instructions                                      Conjunctivitis  If your condition worsens or fails to improve we recommend that you receive another evaluation at the ER immediately or contact your PCP to discuss your concerns or return here. You must understand that you've received an urgent care treatment only and that you may be released before all your medical problems are known or treated. You the patient will arrange for followup care as instructed.   Keep eyes cleaned.  Use the eye drops as prescribed.    Do not wear your contact lens ( if you use them) for at least 5 days after you stop having symptoms and are rechecked by your doctor.   Avoid sharing towels while infection persist.  Cool compresses to affected eye.   Throw away the contacts, contact solution and carrying case you were using and start with new material.   If you develop increase eye symptoms or change in your vision seek medical care immediately either with your ophthalomologist or the ER or return here.

## 2022-05-21 NOTE — PATIENT INSTRUCTIONS
Conjunctivitis  If your condition worsens or fails to improve we recommend that you receive another evaluation at the ER immediately or contact your PCP to discuss your concerns or return here. You must understand that you've received an urgent care treatment only and that you may be released before all your medical problems are known or treated. You the patient will arrange for followup care as instructed.   Keep eyes cleaned.  Use the eye drops as prescribed.    Do not wear your contact lens ( if you use them) for at least 5 days after you stop having symptoms and are rechecked by your doctor.   Avoid sharing towels while infection persist.  Cool compresses to affected eye.   Throw away the contacts, contact solution and carrying case you were using and start with new material.   If you develop increase eye symptoms or change in your vision seek medical care immediately either with your ophthalomologist or the ER or return here.

## 2022-06-20 NOTE — TELEPHONE ENCOUNTER
Called pt back. Pt states that she did not receive an order to start PT. Also states that Dr. Chaney was going to order an MRI for her. Advised that notes state for pt to start PT 2 x week x 6 weeks. Pt was to follow up after PT has been completed. Pt also asking for more time off of work due to her foot still hurting. Advised pt that Dr. Chaney is in surgery today, that I would speak to her tomorrow and call pt back with response. Canceled appt for tomorrow as pt has not gone to PT yet. Thanks, Noav   20-Jun-2022 17:20

## 2022-08-01 DIAGNOSIS — M79.672 LEFT FOOT PAIN: ICD-10-CM

## 2022-08-01 DIAGNOSIS — M54.9 BACK PAIN, UNSPECIFIED BACK LOCATION, UNSPECIFIED BACK PAIN LATERALITY, UNSPECIFIED CHRONICITY: Primary | ICD-10-CM

## 2022-08-02 ENCOUNTER — TELEPHONE (OUTPATIENT)
Dept: FAMILY MEDICINE | Facility: CLINIC | Age: 52
End: 2022-08-02
Payer: COMMERCIAL

## 2022-08-11 ENCOUNTER — TELEPHONE (OUTPATIENT)
Dept: SPINE | Facility: CLINIC | Age: 52
End: 2022-08-11

## 2022-08-11 ENCOUNTER — OFFICE VISIT (OUTPATIENT)
Dept: SPINE | Facility: CLINIC | Age: 52
End: 2022-08-11
Payer: COMMERCIAL

## 2022-08-11 VITALS — BODY MASS INDEX: 29.27 KG/M2 | HEIGHT: 61 IN | WEIGHT: 155 LBS

## 2022-08-11 DIAGNOSIS — G89.29 CHRONIC BILATERAL LOW BACK PAIN WITH LEFT-SIDED SCIATICA: Primary | ICD-10-CM

## 2022-08-11 DIAGNOSIS — M54.42 CHRONIC BILATERAL LOW BACK PAIN WITH LEFT-SIDED SCIATICA: Primary | ICD-10-CM

## 2022-08-11 DIAGNOSIS — M54.9 BACK PAIN, UNSPECIFIED BACK LOCATION, UNSPECIFIED BACK PAIN LATERALITY, UNSPECIFIED CHRONICITY: ICD-10-CM

## 2022-08-11 PROCEDURE — 1160F RVW MEDS BY RX/DR IN RCRD: CPT | Mod: CPTII,S$GLB,, | Performed by: PHYSICAL MEDICINE & REHABILITATION

## 2022-08-11 PROCEDURE — 99204 OFFICE O/P NEW MOD 45 MIN: CPT | Mod: S$GLB,,, | Performed by: PHYSICAL MEDICINE & REHABILITATION

## 2022-08-11 PROCEDURE — 3008F PR BODY MASS INDEX (BMI) DOCUMENTED: ICD-10-PCS | Mod: CPTII,S$GLB,, | Performed by: PHYSICAL MEDICINE & REHABILITATION

## 2022-08-11 PROCEDURE — 4010F ACE/ARB THERAPY RXD/TAKEN: CPT | Mod: CPTII,S$GLB,, | Performed by: PHYSICAL MEDICINE & REHABILITATION

## 2022-08-11 PROCEDURE — 1159F MED LIST DOCD IN RCRD: CPT | Mod: CPTII,S$GLB,, | Performed by: PHYSICAL MEDICINE & REHABILITATION

## 2022-08-11 PROCEDURE — 4010F PR ACE/ARB THEARPY RXD/TAKEN: ICD-10-PCS | Mod: CPTII,S$GLB,, | Performed by: PHYSICAL MEDICINE & REHABILITATION

## 2022-08-11 PROCEDURE — 99204 PR OFFICE/OUTPT VISIT, NEW, LEVL IV, 45-59 MIN: ICD-10-PCS | Mod: S$GLB,,, | Performed by: PHYSICAL MEDICINE & REHABILITATION

## 2022-08-11 PROCEDURE — 1160F PR REVIEW ALL MEDS BY PRESCRIBER/CLIN PHARMACIST DOCUMENTED: ICD-10-PCS | Mod: CPTII,S$GLB,, | Performed by: PHYSICAL MEDICINE & REHABILITATION

## 2022-08-11 PROCEDURE — 3008F BODY MASS INDEX DOCD: CPT | Mod: CPTII,S$GLB,, | Performed by: PHYSICAL MEDICINE & REHABILITATION

## 2022-08-11 PROCEDURE — 1159F PR MEDICATION LIST DOCUMENTED IN MEDICAL RECORD: ICD-10-PCS | Mod: CPTII,S$GLB,, | Performed by: PHYSICAL MEDICINE & REHABILITATION

## 2022-08-11 NOTE — TELEPHONE ENCOUNTER
----- Message from Ramses Cheema MD sent at 8/11/2022  3:19 PM CDT -----  Please let her know I reviewed her medical records and we can get her scheduled for an epidural steroid injection, as recommended, at her discretion
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
## 2022-08-22 ENCOUNTER — TELEPHONE (OUTPATIENT)
Dept: PAIN MEDICINE | Facility: CLINIC | Age: 52
End: 2022-08-22
Payer: COMMERCIAL

## 2022-08-22 ENCOUNTER — OFFICE VISIT (OUTPATIENT)
Dept: SPINE | Facility: CLINIC | Age: 52
End: 2022-08-22
Payer: COMMERCIAL

## 2022-08-22 VITALS — RESPIRATION RATE: 18 BRPM | BODY MASS INDEX: 29.27 KG/M2 | WEIGHT: 155 LBS | HEIGHT: 61 IN

## 2022-08-22 DIAGNOSIS — M54.42 CHRONIC BILATERAL LOW BACK PAIN WITH LEFT-SIDED SCIATICA: Primary | ICD-10-CM

## 2022-08-22 DIAGNOSIS — G89.29 CHRONIC BILATERAL LOW BACK PAIN WITH LEFT-SIDED SCIATICA: Primary | ICD-10-CM

## 2022-08-22 DIAGNOSIS — M51.36 DDD (DEGENERATIVE DISC DISEASE), LUMBAR: Primary | ICD-10-CM

## 2022-08-22 PROCEDURE — 99203 OFFICE O/P NEW LOW 30 MIN: CPT | Mod: S$GLB,,, | Performed by: PHYSICAL MEDICINE & REHABILITATION

## 2022-08-22 PROCEDURE — 4010F ACE/ARB THERAPY RXD/TAKEN: CPT | Mod: CPTII,S$GLB,, | Performed by: PHYSICAL MEDICINE & REHABILITATION

## 2022-08-22 PROCEDURE — 99203 PR OFFICE/OUTPT VISIT, NEW, LEVL III, 30-44 MIN: ICD-10-PCS | Mod: S$GLB,,, | Performed by: PHYSICAL MEDICINE & REHABILITATION

## 2022-08-22 PROCEDURE — 1160F RVW MEDS BY RX/DR IN RCRD: CPT | Mod: CPTII,S$GLB,, | Performed by: PHYSICAL MEDICINE & REHABILITATION

## 2022-08-22 PROCEDURE — 1160F PR REVIEW ALL MEDS BY PRESCRIBER/CLIN PHARMACIST DOCUMENTED: ICD-10-PCS | Mod: CPTII,S$GLB,, | Performed by: PHYSICAL MEDICINE & REHABILITATION

## 2022-08-22 PROCEDURE — 4010F PR ACE/ARB THEARPY RXD/TAKEN: ICD-10-PCS | Mod: CPTII,S$GLB,, | Performed by: PHYSICAL MEDICINE & REHABILITATION

## 2022-08-22 PROCEDURE — 1159F PR MEDICATION LIST DOCUMENTED IN MEDICAL RECORD: ICD-10-PCS | Mod: CPTII,S$GLB,, | Performed by: PHYSICAL MEDICINE & REHABILITATION

## 2022-08-22 PROCEDURE — 3008F BODY MASS INDEX DOCD: CPT | Mod: CPTII,S$GLB,, | Performed by: PHYSICAL MEDICINE & REHABILITATION

## 2022-08-22 PROCEDURE — 1159F MED LIST DOCD IN RCRD: CPT | Mod: CPTII,S$GLB,, | Performed by: PHYSICAL MEDICINE & REHABILITATION

## 2022-08-22 PROCEDURE — 3008F PR BODY MASS INDEX (BMI) DOCUMENTED: ICD-10-PCS | Mod: CPTII,S$GLB,, | Performed by: PHYSICAL MEDICINE & REHABILITATION

## 2022-08-22 RX ORDER — MELOXICAM 15 MG/1
15 TABLET ORAL DAILY
Qty: 30 TABLET | Refills: 2 | Status: SHIPPED | OUTPATIENT
Start: 2022-08-22 | End: 2022-11-10

## 2022-08-22 RX ORDER — TRAMADOL HYDROCHLORIDE 50 MG/1
50 TABLET ORAL EVERY 6 HOURS PRN
Qty: 28 TABLET | Refills: 0 | Status: SHIPPED | OUTPATIENT
Start: 2022-08-22 | End: 2022-10-13 | Stop reason: SDUPTHER

## 2022-08-22 RX ORDER — GABAPENTIN 300 MG/1
300 CAPSULE ORAL 2 TIMES DAILY
Qty: 60 CAPSULE | Refills: 1 | Status: SHIPPED | OUTPATIENT
Start: 2022-08-22 | End: 2022-11-10

## 2022-08-22 NOTE — PROGRESS NOTES
SUBJECTIVE:    Patient ID: Jacqui Cruz is a 52 y.o. female.    Chief Complaint: Follow-up    She is here for follow-up with ongoing complaints of low back pain and discomfort radiating down the left leg.  Those symptoms have not changed.  I had the opportunity to review medical records from Dr. Stewart her previous pain management provider who had recommended transforaminal injections on left at L4-5 and L5-S1.  She wishes to proceed.  She needs refills on Mobic gabapentin and tramadol.  Pain level is 9/10        Past Medical History:   Diagnosis Date    Asthma     Hypertension      Social History     Socioeconomic History    Marital status:    Tobacco Use    Smoking status: Never Smoker    Smokeless tobacco: Never Used   Substance and Sexual Activity    Alcohol use: No    Drug use: No    Sexual activity: Not Currently     Partners: Male     Birth control/protection: None     Social Determinants of Health     Financial Resource Strain: Medium Risk    Difficulty of Paying Living Expenses: Somewhat hard   Food Insecurity: Food Insecurity Present    Worried About Running Out of Food in the Last Year: Sometimes true    Ran Out of Food in the Last Year: Never true   Transportation Needs: Unmet Transportation Needs    Lack of Transportation (Medical): Yes    Lack of Transportation (Non-Medical): Yes   Physical Activity: Insufficiently Active    Days of Exercise per Week: 3 days    Minutes of Exercise per Session: 30 min   Stress: No Stress Concern Present    Feeling of Stress : Not at all   Social Connections: Unknown    Frequency of Communication with Friends and Family: Three times a week    Frequency of Social Gatherings with Friends and Family: Once a week    Active Member of Clubs or Organizations: No    Attends Club or Organization Meetings: Never    Marital Status:    Housing Stability: High Risk    Unable to Pay for Housing in the Last Year: Yes    Number of Places  "Lived in the Last Year: 1    Unstable Housing in the Last Year: No     Past Surgical History:   Procedure Laterality Date    BREAST BIOPSY Left 2012    Benign calcifications    BREAST SURGERY      biopsy    DILATION AND CURETTAGE OF UTERUS       Family History   Problem Relation Age of Onset    Hypertension Mother     Cancer Maternal Aunt         cervical     Vitals:    08/22/22 0854   Resp: 18   Weight: 70.3 kg (155 lb)   Height: 5' 1" (1.549 m)       Review of Systems   Constitutional: Negative for chills, diaphoresis, fatigue, fever and unexpected weight change.   HENT: Negative for trouble swallowing.    Eyes: Negative for visual disturbance.   Respiratory: Negative for shortness of breath.    Cardiovascular: Negative for chest pain.   Gastrointestinal: Negative for abdominal pain, constipation, nausea and vomiting.   Genitourinary: Negative for difficulty urinating.   Musculoskeletal: Negative for arthralgias, back pain, gait problem, joint swelling, myalgias, neck pain and neck stiffness.   Neurological: Negative for dizziness, speech difficulty, weakness, light-headedness, numbness and headaches.          Objective:      Physical Exam  Neurological:      Mental Status: She is alert and oriented to person, place, and time.             Assessment:       1. Chronic bilateral low back pain with left-sided sciatica           Plan:     we will proceed with transforaminal injections left L4-5 and L5-S1.  Follow-up with me after the procedure.  Consider interlaminar injection L4-5.  Consider medial branch block/radiofrequency ablation L4-5 and L5-S1.  Follow-up with me after the procedure.  Refill her medications      Chronic bilateral low back pain with left-sided sciatica          "

## 2022-08-25 ENCOUNTER — PATIENT MESSAGE (OUTPATIENT)
Dept: SPINE | Facility: CLINIC | Age: 52
End: 2022-08-25
Payer: COMMERCIAL

## 2022-08-25 NOTE — TELEPHONE ENCOUNTER
Paperwork done Breathing spontaneous and unlabored. Breath sounds clear and equal bilaterally with regular rhythm.

## 2022-09-07 ENCOUNTER — PATIENT MESSAGE (OUTPATIENT)
Dept: SURGERY | Facility: AMBULARY SURGERY CENTER | Age: 52
End: 2022-09-07
Payer: COMMERCIAL

## 2022-09-09 ENCOUNTER — PATIENT MESSAGE (OUTPATIENT)
Dept: SPINE | Facility: CLINIC | Age: 52
End: 2022-09-09
Payer: COMMERCIAL

## 2022-09-12 ENCOUNTER — OFFICE VISIT (OUTPATIENT)
Dept: SPINE | Facility: CLINIC | Age: 52
End: 2022-09-12
Payer: COMMERCIAL

## 2022-09-12 DIAGNOSIS — G89.29 CHRONIC BILATERAL LOW BACK PAIN WITH LEFT-SIDED SCIATICA: Primary | ICD-10-CM

## 2022-09-12 DIAGNOSIS — M54.42 CHRONIC BILATERAL LOW BACK PAIN WITH LEFT-SIDED SCIATICA: Primary | ICD-10-CM

## 2022-09-12 PROCEDURE — 1159F MED LIST DOCD IN RCRD: CPT | Mod: CPTII,S$GLB,, | Performed by: PHYSICAL MEDICINE & REHABILITATION

## 2022-09-12 PROCEDURE — 1159F PR MEDICATION LIST DOCUMENTED IN MEDICAL RECORD: ICD-10-PCS | Mod: CPTII,S$GLB,, | Performed by: PHYSICAL MEDICINE & REHABILITATION

## 2022-09-12 PROCEDURE — 4010F PR ACE/ARB THEARPY RXD/TAKEN: ICD-10-PCS | Mod: CPTII,S$GLB,, | Performed by: PHYSICAL MEDICINE & REHABILITATION

## 2022-09-12 PROCEDURE — 1160F RVW MEDS BY RX/DR IN RCRD: CPT | Mod: CPTII,S$GLB,, | Performed by: PHYSICAL MEDICINE & REHABILITATION

## 2022-09-12 PROCEDURE — 99213 OFFICE O/P EST LOW 20 MIN: CPT | Mod: S$GLB,,, | Performed by: PHYSICAL MEDICINE & REHABILITATION

## 2022-09-12 PROCEDURE — 4010F ACE/ARB THERAPY RXD/TAKEN: CPT | Mod: CPTII,S$GLB,, | Performed by: PHYSICAL MEDICINE & REHABILITATION

## 2022-09-12 PROCEDURE — 99213 PR OFFICE/OUTPT VISIT, EST, LEVL III, 20-29 MIN: ICD-10-PCS | Mod: S$GLB,,, | Performed by: PHYSICAL MEDICINE & REHABILITATION

## 2022-09-12 PROCEDURE — 1160F PR REVIEW ALL MEDS BY PRESCRIBER/CLIN PHARMACIST DOCUMENTED: ICD-10-PCS | Mod: CPTII,S$GLB,, | Performed by: PHYSICAL MEDICINE & REHABILITATION

## 2022-09-12 NOTE — PROGRESS NOTES
SUBJECTIVE:    Patient ID: Jacqui Cruz is a 52 y.o. female.    Chief Complaint: Low-back Pain, Hip Pain, and Leg Pain    She presents with ongoing complaints of left leg discomfort.  I note she is scheduled for transforaminal injections on the left at L4-5 and L5-S1.  She presents today wondering what else can be done.  She is interested in neurosurgical consultation.  Her pain level is 10/10        Past Medical History:   Diagnosis Date    Asthma     Hypertension      Social History     Socioeconomic History    Marital status:    Tobacco Use    Smoking status: Never    Smokeless tobacco: Never   Substance and Sexual Activity    Alcohol use: No    Drug use: No    Sexual activity: Not Currently     Partners: Male     Birth control/protection: None     Social Determinants of Health     Financial Resource Strain: Medium Risk    Difficulty of Paying Living Expenses: Somewhat hard   Food Insecurity: Food Insecurity Present    Worried About Running Out of Food in the Last Year: Sometimes true    Ran Out of Food in the Last Year: Never true   Transportation Needs: Unmet Transportation Needs    Lack of Transportation (Medical): Yes    Lack of Transportation (Non-Medical): Yes   Physical Activity: Insufficiently Active    Days of Exercise per Week: 3 days    Minutes of Exercise per Session: 30 min   Stress: No Stress Concern Present    Feeling of Stress : Not at all   Social Connections: Unknown    Frequency of Communication with Friends and Family: Three times a week    Frequency of Social Gatherings with Friends and Family: Once a week    Active Member of Clubs or Organizations: No    Attends Club or Organization Meetings: Never    Marital Status:    Housing Stability: High Risk    Unable to Pay for Housing in the Last Year: Yes    Number of Places Lived in the Last Year: 1    Unstable Housing in the Last Year: No     Past Surgical History:   Procedure Laterality Date    BREAST BIOPSY Left 2012     Benign calcifications    BREAST SURGERY      biopsy    DILATION AND CURETTAGE OF UTERUS       Family History   Problem Relation Age of Onset    Hypertension Mother     Cancer Maternal Aunt         cervical     There were no vitals filed for this visit.    Review of Systems   Constitutional:  Negative for chills, diaphoresis, fatigue, fever and unexpected weight change.   HENT:  Negative for trouble swallowing.    Eyes:  Negative for visual disturbance.   Respiratory:  Negative for shortness of breath.    Cardiovascular:  Negative for chest pain.   Gastrointestinal:  Negative for abdominal pain, constipation, nausea and vomiting.   Genitourinary:  Negative for difficulty urinating.   Musculoskeletal:  Negative for arthralgias, back pain, gait problem, joint swelling, myalgias, neck pain and neck stiffness.   Neurological:  Negative for dizziness, speech difficulty, weakness, light-headedness, numbness and headaches.        Objective:      Physical Exam  Neurological:      Mental Status: She is alert and oriented to person, place, and time.           Assessment:       1. Chronic bilateral low back pain with left-sided sciatica             Plan:     She should proceed with the transforaminal injection as scheduled.  Per her request we will send her for a neurosurgical opinion.  Follow-up with me after the procedure      Chronic bilateral low back pain with left-sided sciatica  -     Ambulatory referral/consult to Neurosurgery; Future; Expected date: 09/19/2022

## 2022-09-13 ENCOUNTER — TELEPHONE (OUTPATIENT)
Dept: FAMILY MEDICINE | Facility: CLINIC | Age: 52
End: 2022-09-13
Payer: COMMERCIAL

## 2022-09-15 ENCOUNTER — HOSPITAL ENCOUNTER (OUTPATIENT)
Facility: AMBULARY SURGERY CENTER | Age: 52
Discharge: HOME | End: 2022-09-15
Attending: ANESTHESIOLOGY | Admitting: ANESTHESIOLOGY
Payer: COMMERCIAL

## 2022-09-15 DIAGNOSIS — M54.16 LUMBAR RADICULITIS: ICD-10-CM

## 2022-09-15 PROCEDURE — 64483 NJX AA&/STRD TFRM EPI L/S 1: CPT | Mod: LT,,, | Performed by: ANESTHESIOLOGY

## 2022-09-15 PROCEDURE — 64483 PR EPIDURAL INJ, ANES/STEROID, TRANSFORAMINAL, LUMB/SACR, SNGL LEVL: ICD-10-PCS | Mod: LT,,, | Performed by: ANESTHESIOLOGY

## 2022-09-15 PROCEDURE — 64484 PRA INJECT ANES/STEROID FORAMEN LUMBAR/SACRAL W IMG GUIDE ,EA ADD LEVEL: ICD-10-PCS | Mod: LT,,, | Performed by: ANESTHESIOLOGY

## 2022-09-15 PROCEDURE — 64484 NJX AA&/STRD TFRM EPI L/S EA: CPT | Mod: LT,,, | Performed by: ANESTHESIOLOGY

## 2022-09-15 PROCEDURE — 64484 NJX AA&/STRD TFRM EPI L/S EA: CPT | Performed by: ANESTHESIOLOGY

## 2022-09-15 PROCEDURE — 64483 NJX AA&/STRD TFRM EPI L/S 1: CPT | Performed by: ANESTHESIOLOGY

## 2022-09-15 RX ORDER — BUPIVACAINE HYDROCHLORIDE 2.5 MG/ML
INJECTION, SOLUTION EPIDURAL; INFILTRATION; INTRACAUDAL
Status: DISCONTINUED | OUTPATIENT
Start: 2022-09-15 | End: 2022-09-15 | Stop reason: HOSPADM

## 2022-09-15 RX ORDER — SODIUM CHLORIDE, SODIUM LACTATE, POTASSIUM CHLORIDE, CALCIUM CHLORIDE 600; 310; 30; 20 MG/100ML; MG/100ML; MG/100ML; MG/100ML
INJECTION, SOLUTION INTRAVENOUS ONCE AS NEEDED
Status: COMPLETED | OUTPATIENT
Start: 2022-09-15 | End: 2022-09-15

## 2022-09-15 RX ORDER — DEXAMETHASONE SODIUM PHOSPHATE 10 MG/ML
INJECTION INTRAMUSCULAR; INTRAVENOUS
Status: DISCONTINUED | OUTPATIENT
Start: 2022-09-15 | End: 2022-09-15 | Stop reason: HOSPADM

## 2022-09-15 RX ORDER — FENTANYL CITRATE 50 UG/ML
INJECTION, SOLUTION INTRAMUSCULAR; INTRAVENOUS
Status: DISCONTINUED | OUTPATIENT
Start: 2022-09-15 | End: 2022-09-15 | Stop reason: HOSPADM

## 2022-09-15 RX ORDER — LIDOCAINE HYDROCHLORIDE 20 MG/ML
INJECTION, SOLUTION EPIDURAL; INFILTRATION; INTRACAUDAL; PERINEURAL
Status: DISCONTINUED | OUTPATIENT
Start: 2022-09-15 | End: 2022-09-15 | Stop reason: HOSPADM

## 2022-09-15 RX ORDER — MIDAZOLAM HYDROCHLORIDE 1 MG/ML
INJECTION INTRAMUSCULAR; INTRAVENOUS
Status: DISCONTINUED | OUTPATIENT
Start: 2022-09-15 | End: 2022-09-15 | Stop reason: HOSPADM

## 2022-09-15 RX ADMIN — SODIUM CHLORIDE, SODIUM LACTATE, POTASSIUM CHLORIDE, CALCIUM CHLORIDE: 600; 310; 30; 20 INJECTION, SOLUTION INTRAVENOUS at 12:09

## 2022-09-15 NOTE — OP NOTE
PROCEDURE DATE: 9/15/2022    PROCEDURE: Left L4-5 and L5-S1 transforaminal epidural steroid injection under fluoroscopy    DIAGNOSIS: Lumbar radiculitis    Post op diagnosis: Same    PHYSICIAN: Alex Shepard MD    MEDICATIONS INJECTED:  Dexamethasone 5mg (0.5ml) and 1.5ml 0.25% bupivicaine at each nerve root.     LOCAL ANESTHETIC INJECTED:  Lidocaine 1%. 2 ml per site.    SEDATION MEDICATIONS: RN IV sedation    ESTIMATED BLOOD LOSS:  None    COMPLICATIONS:  NOne    TECHNIQUE:   A time-out was taken to identify patient and procedure side prior to starting the procedure. The patient was placed in a prone position, prepped and draped in the usual sterile fashion using ChloraPrep and sterile towels.  The area to be injected was determined under fluoroscopic guidance in AP and oblique view.  Local anesthetic was given by raising a wheal and going down to the hub of a 25-gauge 1.5 inch needle.  In oblique view, a 3.5 inch 22-gauge bent-tip spinal needle was introduced towards 6 oclock position of the pedicle of each above named nerve root level.  The needle was walked medially then hinged into the neural foramen and position was confirmed in AP and lateral views.  1ml contrast dye was injected to confirm appropriate placement and that there was no vascular uptake.  After negative aspiration for blood or CSF, the medication was then injected. This was performed at the left L4-5 and L5-S1 level(s). The patient tolerated the procedure well.    The patient was monitored after the procedure.  Patient was given post procedure and discharge instructions to follow at home. The patient was discharged in a stable condition.

## 2022-09-15 NOTE — DISCHARGE SUMMARY
[facility]  Discharge Note  Short Stay    Procedure(s) (LRB):  Injection,steroid,epidural,transforaminal approach (Left)    OUTCOME: Patient tolerated treatment/procedure well without complication and is now ready for discharge.    DISPOSITION: Home or Self Care    FINAL DIAGNOSIS:  <principal problem not specified>    FOLLOWUP: In clinic    DISCHARGE INSTRUCTIONS:    Discharge Procedure Orders   Notify your health care provider if you experience any of the following:  temperature >100.4     Notify your health care provider if you experience any of the following:  severe uncontrolled pain     Notify your health care provider if you experience any of the following:  redness, tenderness, or signs of infection (pain, swelling, redness, odor or green/yellow discharge around incision site)     Activity as tolerated        TIME SPENT ON DISCHARGE: 30 minutes

## 2022-09-16 VITALS
RESPIRATION RATE: 18 BRPM | HEART RATE: 76 BPM | HEIGHT: 61 IN | OXYGEN SATURATION: 97 % | WEIGHT: 155 LBS | BODY MASS INDEX: 29.27 KG/M2 | SYSTOLIC BLOOD PRESSURE: 159 MMHG | TEMPERATURE: 98 F | DIASTOLIC BLOOD PRESSURE: 75 MMHG

## 2022-09-19 ENCOUNTER — PATIENT MESSAGE (OUTPATIENT)
Dept: SPINE | Facility: CLINIC | Age: 52
End: 2022-09-19
Payer: COMMERCIAL

## 2022-09-21 ENCOUNTER — PATIENT MESSAGE (OUTPATIENT)
Dept: FAMILY MEDICINE | Facility: CLINIC | Age: 52
End: 2022-09-21
Payer: COMMERCIAL

## 2022-09-21 ENCOUNTER — PATIENT MESSAGE (OUTPATIENT)
Dept: FAMILY MEDICINE | Facility: CLINIC | Age: 52
End: 2022-09-21

## 2022-09-21 ENCOUNTER — OFFICE VISIT (OUTPATIENT)
Dept: FAMILY MEDICINE | Facility: CLINIC | Age: 52
End: 2022-09-21
Payer: COMMERCIAL

## 2022-09-21 DIAGNOSIS — B96.89 BACTERIAL SINUSITIS: ICD-10-CM

## 2022-09-21 DIAGNOSIS — J32.9 BACTERIAL SINUSITIS: ICD-10-CM

## 2022-09-21 PROCEDURE — 4010F ACE/ARB THERAPY RXD/TAKEN: CPT | Mod: CPTII,95,, | Performed by: NURSE PRACTITIONER

## 2022-09-21 PROCEDURE — 99213 PR OFFICE/OUTPT VISIT, EST, LEVL III, 20-29 MIN: ICD-10-PCS | Mod: 95,,, | Performed by: NURSE PRACTITIONER

## 2022-09-21 PROCEDURE — 1160F PR REVIEW ALL MEDS BY PRESCRIBER/CLIN PHARMACIST DOCUMENTED: ICD-10-PCS | Mod: CPTII,95,, | Performed by: NURSE PRACTITIONER

## 2022-09-21 PROCEDURE — 99213 OFFICE O/P EST LOW 20 MIN: CPT | Mod: 95,,, | Performed by: NURSE PRACTITIONER

## 2022-09-21 PROCEDURE — 1160F RVW MEDS BY RX/DR IN RCRD: CPT | Mod: CPTII,95,, | Performed by: NURSE PRACTITIONER

## 2022-09-21 PROCEDURE — 4010F PR ACE/ARB THEARPY RXD/TAKEN: ICD-10-PCS | Mod: CPTII,95,, | Performed by: NURSE PRACTITIONER

## 2022-09-21 PROCEDURE — 1159F PR MEDICATION LIST DOCUMENTED IN MEDICAL RECORD: ICD-10-PCS | Mod: CPTII,95,, | Performed by: NURSE PRACTITIONER

## 2022-09-21 PROCEDURE — 1159F MED LIST DOCD IN RCRD: CPT | Mod: CPTII,95,, | Performed by: NURSE PRACTITIONER

## 2022-09-21 RX ORDER — AMOXICILLIN 500 MG/1
500 TABLET, FILM COATED ORAL EVERY 12 HOURS
Qty: 20 TABLET | Refills: 0 | Status: SHIPPED | OUTPATIENT
Start: 2022-09-21 | End: 2022-10-01

## 2022-09-21 NOTE — PROGRESS NOTES
The patient location is: [place]  The chief complaint leading to consultation is: Sinus infection    Visit type: audiovisual    Face to Face time with patient: 20 minutes  20 minutes of total time spent on the encounter, which includes face to face time and non-face to face time preparing to see the patient (eg, review of tests), Obtaining and/or reviewing separately obtained history, Documenting clinical information in the electronic or other health record, Independently interpreting results (not separately reported) and communicating results to the patient/family/caregiver, or Care coordination (not separately reported).     Each patient to whom he or she provides medical services by telemedicine is:  (1) informed of the relationship between the physician and patient and the respective role of any other health care provider with respect to management of the patient; and (2) notified that he or she may decline to receive medical services by telemedicine and may withdraw from such care at any time.    Notes:  51 y/o female patient with medical problems listed below presents for frontal and maxillary sinus pain, nasal congestion, yellowish greenish nasal discharges, discharges, postnasal dripping, ear fullness for 4 days. Patient states has used saline nasal spray. Denies cough, chest pain, sob, nausea, abdominal pain, fever, chills, generalized body ache.     Patient Active Problem List   Diagnosis    Microcalcifications of the breast    Mild persistent asthma without complication    Strain of tendon of right rotator cuff    Essential hypertension    Right ovarian cyst    Unsatisfactory pap smear, HPV Neg    Tibial tendonitis, posterior, left    Decreased range of motion of left ankle    Left leg weakness    Antalgic gait    Decreased strength    Decreased functional mobility    Decreased range of motion      Past Surgical History:   Procedure Laterality Date    BREAST BIOPSY Left 2012    Benign calcifications    BREAST  SURGERY      biopsy    DILATION AND CURETTAGE OF UTERUS      TRANSFORAMINAL EPIDURAL INJECTION OF STEROID Left 9/15/2022    Procedure: Injection,steroid,epidural,transforaminal approach;  Surgeon: Alex Shepard MD;  Location: [facility] OR;  Service: Pain Management;  Laterality: Left;  l4-5 and L5-s1         Current Outpatient Medications:     albuterol (ACCUNEB) 1.25 mg/3 mL Nebu, Take 3 mLs (1.25 mg total) by nebulization every 6 (six) hours as needed., Disp: 25 vial, Rfl: 11    amoxicillin (AMOXIL) 500 MG Tab, 2 tabs twice daily x 10 days (Patient not taking: No sig reported), Disp: 40 tablet, Rfl: 0    amoxicillin (AMOXIL) 500 MG Tab, Take 1 tablet (500 mg total) by mouth every 12 (twelve) hours. for 10 days, Disp: 20 tablet, Rfl: 0    cetirizine (ZYRTEC) 5 MG tablet, Take 1 tablet (5 mg total) by mouth once daily. (Patient not taking: No sig reported), Disp: 30 tablet, Rfl: 11    cloNIDine (CATAPRES) 0.1 MG tablet, Take 1 tablet (0.1 mg total) by mouth every 6 (six) hours as needed (blood pressure >185 systolic)., Disp: 30 tablet, Rfl: 0    estradioL (ESTRACE) 0.01 % (0.1 mg/gram) vaginal cream, Place pea sized amount vaginally every day for 2 weeks then twice a week after that, Disp: 42.5 g, Rfl: 2    gabapentin (NEURONTIN) 300 MG capsule, Take 1 capsule (300 mg total) by mouth 2 (two) times daily., Disp: 60 capsule, Rfl: 1    lisinopriL (PRINIVIL,ZESTRIL) 20 MG tablet, Take 1 tablet (20 mg total) by mouth once daily., Disp: 90 tablet, Rfl: 3    meloxicam (MOBIC) 15 MG tablet, Take 1 tablet (15 mg total) by mouth once daily., Disp: 30 tablet, Rfl: 2    PENNSAID 20 mg/gram /actuation(2 %) sopm, APPLY 2 PUMPS TO THE AFFECTED AREA TWICE DAILY, Disp: 112 g, Rfl: 0    polymyxin B sulf-trimethoprim (POLYTRIM) 10,000 unit- 1 mg/mL Drop, Place 1 drop into the right eye every 6 (six) hours., Disp: 10 mL, Rfl: 0    traMADoL (ULTRAM) 50 mg tablet, Take 1 tablet (50 mg total) by mouth every 6 (six) hours as needed for Pain.,  Disp: 28 tablet, Rfl: 0     Review of System:   Review of Systems   Constitutional:  Negative for chills and fever.   HENT:  Positive for congestion and sinus pain. Negative for sore throat.    Respiratory:  Negative for cough and shortness of breath.    Cardiovascular:  Negative for chest pain.     Objective:  Physical Exam  Constitutional:       General: She is not in acute distress.     Appearance: Normal appearance.      Assessment/Plan:  1. Bacterial sinusitis         Plan:  1. Bacterial sinusitis  - amoxicillin (AMOXIL) 500 MG Tab; Take 1 tablet (500 mg total) by mouth every 12 (twelve) hours. for 10 days  Dispense: 20 tablet; Refill: 0   - advised to continue with saline nasal spray    RTC: as needed  Chrsitiana BEAL

## 2022-10-11 ENCOUNTER — PATIENT MESSAGE (OUTPATIENT)
Dept: FAMILY MEDICINE | Facility: CLINIC | Age: 52
End: 2022-10-11
Payer: COMMERCIAL

## 2022-10-11 DIAGNOSIS — I10 ESSENTIAL HYPERTENSION: ICD-10-CM

## 2022-10-11 RX ORDER — LISINOPRIL 20 MG/1
20 TABLET ORAL DAILY
Qty: 90 TABLET | Refills: 3 | Status: CANCELLED | OUTPATIENT
Start: 2022-10-11 | End: 2023-10-11

## 2022-10-11 NOTE — TELEPHONE ENCOUNTER
No new care gaps identified.  [facility] Embedded Care Gaps. Reference number: [id number]. 10/11/2022   3:00:59 PM CDT

## 2022-10-11 NOTE — TELEPHONE ENCOUNTER
----- Message from Diane Burns sent at 10/11/2022  2:21 PM CDT -----  Contact: pt  Type:  RX Refill Request    Who Called:  pt  Refill or New Rx:  refill  RX Name and Strength:  lisinopriL (PRINIVIL,ZESTRIL) 20 MG tablet  How is the patient currently taking it? (ex. 1XDay):  As Directed  Is this a 30 day or 90 day RX:  30  Preferred Pharmacy with phone number:  [facility] [address] [place] [phone] [employer] [place] (Ph: [phone]  Local or Mail Order:  local  Ordering Provider:  Jimmy Cameron Call Back Number:  [phone]    Additional Information:  Please contact pt upon completion-Thank you~

## 2022-10-12 RX ORDER — LISINOPRIL 20 MG/1
20 TABLET ORAL DAILY
Qty: 30 TABLET | Refills: 0 | Status: SHIPPED | OUTPATIENT
Start: 2022-10-12 | End: 2022-11-18 | Stop reason: SDUPTHER

## 2022-10-13 ENCOUNTER — OFFICE VISIT (OUTPATIENT)
Dept: SPINE | Facility: CLINIC | Age: 52
End: 2022-10-13
Payer: COMMERCIAL

## 2022-10-13 ENCOUNTER — TELEPHONE (OUTPATIENT)
Dept: PAIN MEDICINE | Facility: CLINIC | Age: 52
End: 2022-10-13
Payer: COMMERCIAL

## 2022-10-13 VITALS — WEIGHT: 156.5 LBS | HEIGHT: 61 IN | BODY MASS INDEX: 29.55 KG/M2 | RESPIRATION RATE: 13 BRPM

## 2022-10-13 DIAGNOSIS — M47.816 LUMBAR SPONDYLOSIS: Primary | ICD-10-CM

## 2022-10-13 DIAGNOSIS — G89.29 CHRONIC RIGHT-SIDED LOW BACK PAIN WITH LEFT-SIDED SCIATICA: Primary | ICD-10-CM

## 2022-10-13 DIAGNOSIS — M54.42 CHRONIC RIGHT-SIDED LOW BACK PAIN WITH LEFT-SIDED SCIATICA: Primary | ICD-10-CM

## 2022-10-13 DIAGNOSIS — G89.29 CHRONIC BILATERAL LOW BACK PAIN WITH LEFT-SIDED SCIATICA: ICD-10-CM

## 2022-10-13 DIAGNOSIS — M54.42 CHRONIC BILATERAL LOW BACK PAIN WITH LEFT-SIDED SCIATICA: ICD-10-CM

## 2022-10-13 PROCEDURE — 4010F ACE/ARB THERAPY RXD/TAKEN: CPT | Mod: CPTII,S$GLB,, | Performed by: PHYSICAL MEDICINE & REHABILITATION

## 2022-10-13 PROCEDURE — 99213 PR OFFICE/OUTPT VISIT, EST, LEVL III, 20-29 MIN: ICD-10-PCS | Mod: S$GLB,,, | Performed by: PHYSICAL MEDICINE & REHABILITATION

## 2022-10-13 PROCEDURE — 1159F PR MEDICATION LIST DOCUMENTED IN MEDICAL RECORD: ICD-10-PCS | Mod: CPTII,S$GLB,, | Performed by: PHYSICAL MEDICINE & REHABILITATION

## 2022-10-13 PROCEDURE — 4010F PR ACE/ARB THEARPY RXD/TAKEN: ICD-10-PCS | Mod: CPTII,S$GLB,, | Performed by: PHYSICAL MEDICINE & REHABILITATION

## 2022-10-13 PROCEDURE — 99213 OFFICE O/P EST LOW 20 MIN: CPT | Mod: S$GLB,,, | Performed by: PHYSICAL MEDICINE & REHABILITATION

## 2022-10-13 PROCEDURE — 1160F PR REVIEW ALL MEDS BY PRESCRIBER/CLIN PHARMACIST DOCUMENTED: ICD-10-PCS | Mod: CPTII,S$GLB,, | Performed by: PHYSICAL MEDICINE & REHABILITATION

## 2022-10-13 PROCEDURE — 1159F MED LIST DOCD IN RCRD: CPT | Mod: CPTII,S$GLB,, | Performed by: PHYSICAL MEDICINE & REHABILITATION

## 2022-10-13 PROCEDURE — 1160F RVW MEDS BY RX/DR IN RCRD: CPT | Mod: CPTII,S$GLB,, | Performed by: PHYSICAL MEDICINE & REHABILITATION

## 2022-10-13 RX ORDER — TRAMADOL HYDROCHLORIDE 50 MG/1
50 TABLET ORAL EVERY 6 HOURS PRN
Qty: 28 TABLET | Refills: 0 | Status: SHIPPED | OUTPATIENT
Start: 2022-10-13 | End: 2022-10-25 | Stop reason: SDUPTHER

## 2022-10-13 NOTE — PROGRESS NOTES
SUBJECTIVE:    Patient ID: Jacqui Cruz is a 52 y.o. female.    Chief Complaint: Follow-up    She is here for follow-up status post transforaminal injection left L4-5 and L5-S1 on 09/15/2022 with Dr. Shepard.  About 30% better from that procedure.  She has less discomfort radiating down the left posterior thigh.  Primary complaint at this time is right-sided low back pain at the lumbosacral junction.  Pain level is 8/10.  She needs refills on tramadol.        Past Medical History:   Diagnosis Date    Asthma     Hypertension      Social History     Socioeconomic History    Marital status:    Tobacco Use    Smoking status: Never    Smokeless tobacco: Never   Substance and Sexual Activity    Alcohol use: No    Drug use: No    Sexual activity: Not Currently     Partners: Male     Birth control/protection: None     Social Determinants of Health     Financial Resource Strain: Low Risk     Difficulty of Paying Living Expenses: Not hard at all   Food Insecurity: No Food Insecurity    Worried About Running Out of Food in the Last Year: Never true    Ran Out of Food in the Last Year: Never true   Transportation Needs: No Transportation Needs    Lack of Transportation (Medical): No    Lack of Transportation (Non-Medical): No   Physical Activity: Sufficiently Active    Days of Exercise per Week: 5 days    Minutes of Exercise per Session: 30 min   Stress: No Stress Concern Present    Feeling of Stress : Not at all   Social Connections: Unknown    Frequency of Communication with Friends and Family: Twice a week    Frequency of Social Gatherings with Friends and Family: Twice a week    Active Member of Clubs or Organizations: No    Attends Club or Organization Meetings: Never    Marital Status:    Housing Stability: Low Risk     Unable to Pay for Housing in the Last Year: No    Number of Places Lived in the Last Year: 1    Unstable Housing in the Last Year: No     Past Surgical History:   Procedure Laterality Date  "   BREAST BIOPSY Left 2012    Benign calcifications    BREAST SURGERY      biopsy    DILATION AND CURETTAGE OF UTERUS      TRANSFORAMINAL EPIDURAL INJECTION OF STEROID Left 9/15/2022    Procedure: Injection,steroid,epidural,transforaminal approach;  Surgeon: Alex Shepard MD;  Location: [facility];  Service: Pain Management;  Laterality: Left;  l4-5 and L5-s1      Family History   Problem Relation Age of Onset    Hypertension Mother     Cancer Maternal Aunt         cervical     Vitals:    10/13/22 1056   Resp: 13   Weight: 71 kg (156 lb 8.4 oz)   Height: 5' 1" (1.549 m)       Review of Systems   Constitutional:  Negative for chills, diaphoresis, fatigue, fever and unexpected weight change.   HENT:  Negative for trouble swallowing.    Eyes:  Negative for visual disturbance.   Respiratory:  Negative for shortness of breath.    Cardiovascular:  Negative for chest pain.   Gastrointestinal:  Negative for abdominal pain, constipation, nausea and vomiting.   Genitourinary:  Negative for difficulty urinating.   Musculoskeletal:  Negative for arthralgias, back pain, gait problem, joint swelling, myalgias, neck pain and neck stiffness.   Neurological:  Negative for dizziness, speech difficulty, weakness, light-headedness, numbness and headaches.        Objective:      Physical Exam  Neurological:      Mental Status: She is alert and oriented to person, place, and time.           Assessment:       1. Chronic right-sided low back pain with left-sided sciatica    2. Chronic bilateral low back pain with left-sided sciatica             Plan:     Modest improvement from transforaminal injection left L4-5 and L5-S1.  At this point right-sided low back pain is her primary complaint.  I recommend medial branch block/radiofrequency ablation of the L4-5 and L5-S1 facet joints on the right.  She still interested in a neurosurgical opinion which we will arrange.  She can follow up with me after the procedure.  I refilled her tramadol as well   "    Chronic right-sided low back pain with left-sided sciatica    Chronic bilateral low back pain with left-sided sciatica  -     traMADoL (ULTRAM) 50 mg tablet; Take 1 tablet (50 mg total) by mouth every 6 (six) hours as needed for Pain.  Dispense: 28 tablet; Refill: 0

## 2022-10-14 ENCOUNTER — TELEPHONE (OUTPATIENT)
Dept: NEUROSURGERY | Facility: CLINIC | Age: 52
End: 2022-10-14
Payer: COMMERCIAL

## 2022-10-14 NOTE — TELEPHONE ENCOUNTER
Attempted to contact pt to schedule new pt appt with Dr. Ellis from referral from Dr. Cheema. [facility].

## 2022-10-25 DIAGNOSIS — G89.29 CHRONIC BILATERAL LOW BACK PAIN WITH LEFT-SIDED SCIATICA: ICD-10-CM

## 2022-10-25 DIAGNOSIS — M54.42 CHRONIC BILATERAL LOW BACK PAIN WITH LEFT-SIDED SCIATICA: ICD-10-CM

## 2022-10-25 RX ORDER — TRAMADOL HYDROCHLORIDE 50 MG/1
50 TABLET ORAL EVERY 6 HOURS PRN
Qty: 28 TABLET | Refills: 0 | Status: SHIPPED | OUTPATIENT
Start: 2022-10-25 | End: 2022-11-07 | Stop reason: ALTCHOICE

## 2022-11-04 ENCOUNTER — PATIENT MESSAGE (OUTPATIENT)
Dept: GASTROENTEROLOGY | Facility: CLINIC | Age: 52
End: 2022-11-04
Payer: COMMERCIAL

## 2022-11-07 ENCOUNTER — PATIENT MESSAGE (OUTPATIENT)
Dept: PAIN MEDICINE | Facility: CLINIC | Age: 52
End: 2022-11-07
Payer: COMMERCIAL

## 2022-11-07 ENCOUNTER — TELEPHONE (OUTPATIENT)
Dept: PAIN MEDICINE | Facility: CLINIC | Age: 52
End: 2022-11-07
Payer: COMMERCIAL

## 2022-11-07 DIAGNOSIS — M47.816 LUMBAR SPONDYLOSIS: Primary | ICD-10-CM

## 2022-11-07 RX ORDER — HYDROCODONE BITARTRATE AND ACETAMINOPHEN 5; 325 MG/1; MG/1
1 TABLET ORAL EVERY 6 HOURS PRN
Qty: 28 TABLET | Refills: 0 | Status: SHIPPED | OUTPATIENT
Start: 2022-11-07 | End: 2022-11-23 | Stop reason: SDUPTHER

## 2022-11-07 NOTE — TELEPHONE ENCOUNTER
----- Message from Lyndsey Levi sent at 11/7/2022  8:32 AM CST -----  Contact: pt  Type: Needs Medical Advice  Who Called:  pt   Best Call Back Number: [phone]    Additional Information: pt is trying to cancel surgery and will like to speak with a nurse also. Please advise.

## 2022-11-07 NOTE — TELEPHONE ENCOUNTER
Spoke with pt and cancelled for 11/17 due to insurance co pay being too ryan for upcoming mbb procedure. Cancelled until she can reapply for FA. On 1/17. Dr Cheema , pt states medications are not helping her. Tramadol and gabapentin, she has to also take IBU in between and ut doesn't help. Please advise on medications, please do not send back to me send to your staff. Thank you.

## 2022-11-07 NOTE — TELEPHONE ENCOUNTER
I sent a prescription for hydrocodone to her pharmacy.  I recommend against long-term use of this medication

## 2022-11-07 NOTE — TELEPHONE ENCOUNTER
----- Message from Marla Orta sent at 11/7/2022  9:24 AM CST -----  Regarding: pt return call  Type:  Patient Returning Call    Who Called: SHARON SWIFT [[id number]]    Who Left Message for Patient: Geri    Does the patient know what this is regarding? Reschedule surgery and speak with nurse about pain meds.     Best Call Back Number: [phone]   Additional Information:

## 2022-11-09 DIAGNOSIS — Z12.31 OTHER SCREENING MAMMOGRAM: ICD-10-CM

## 2022-11-10 ENCOUNTER — TELEPHONE (OUTPATIENT)
Dept: PAIN MEDICINE | Facility: CLINIC | Age: 52
End: 2022-11-10
Payer: COMMERCIAL

## 2022-11-10 DIAGNOSIS — M47.816 LUMBAR SPONDYLOSIS: Primary | ICD-10-CM

## 2022-11-10 RX ORDER — MELOXICAM 15 MG/1
15 TABLET ORAL DAILY PRN
Qty: 90 TABLET | Refills: 0 | Status: SHIPPED | OUTPATIENT
Start: 2022-11-10

## 2022-11-10 RX ORDER — GABAPENTIN 300 MG/1
300 CAPSULE ORAL 2 TIMES DAILY
Qty: 180 CAPSULE | Refills: 0 | Status: SHIPPED | OUTPATIENT
Start: 2022-11-10

## 2022-11-10 NOTE — TELEPHONE ENCOUNTER
Marilee from [employer] states that pt is approved already for 11/17 # [id number]. Approval 11/17-12/02 . Will call pt to [facility] as soon as time permits I know there was an issue from billing standpoint as well.

## 2022-11-10 NOTE — TELEPHONE ENCOUNTER
----- Message from Yesica Castro sent at 11/10/2022  9:08 AM CST -----  Contact: self  Patient states that her insurance was sending the approval letter for her next nerve block and she wants to see if you received and if this is what you needed to get her scheduled.  Call back at [phone] to advise and thanks

## 2022-11-14 ENCOUNTER — PATIENT MESSAGE (OUTPATIENT)
Dept: ADMINISTRATIVE | Facility: HOSPITAL | Age: 52
End: 2022-11-14
Payer: COMMERCIAL

## 2022-11-15 ENCOUNTER — TELEPHONE (OUTPATIENT)
Dept: PAIN MEDICINE | Facility: CLINIC | Age: 52
End: 2022-11-15
Payer: COMMERCIAL

## 2022-11-15 NOTE — TELEPHONE ENCOUNTER
Spoke with pt scheduled for 11/22/2022 she is already cleared from insurance standpoint but needs to see about payments due to out of pocket cost

## 2022-11-15 NOTE — TELEPHONE ENCOUNTER
----- Message from Nora Murcia sent at 11/15/2022 10:29 AM CST -----  Contact: called at [phone]  Type: Needs Medical Advice  Who Called:  Pt  Best Call Back Number: [phone]  Additional Information: Pt is returning a call to Dr. Shepard's nurse to schedule an appt. Please call back and advice.

## 2022-11-18 DIAGNOSIS — I10 ESSENTIAL HYPERTENSION: ICD-10-CM

## 2022-11-18 RX ORDER — LISINOPRIL 20 MG/1
20 TABLET ORAL DAILY
Qty: 30 TABLET | Refills: 0 | Status: SHIPPED | OUTPATIENT
Start: 2022-11-18 | End: 2023-01-12 | Stop reason: SDUPTHER

## 2022-11-18 NOTE — DISCHARGE INSTRUCTIONS
Nerve Block  This was a test, not a treatment. Your pain may return.  Keep your pain journal. The Doctor's office will call to check your pain levels within 3 days   Perform activities that normally cause you pain during this testing period    Home care instructions  You may apply ice pack to the injection sites over the next 2 days to injection sites at 20 minute intervals,apply for 20 minutes, then remove for 20 minutes.  NO HEAT for the next 2 full days to injection sites. No hot packs, heating pads, saunas tub baths or swimming for the next 2 Full days.  You may take a shower but do not allow shower water to beat on injection sites for the next 2 Full days.  Resume Aspirin, Plavix, or Coumadin the day after the procedure unless otherwise instructed.        SEEK  IMMEDIATE MEDICAL HELP FOR:  Severe increase in your usual pain or appearance of new pain  Prolonged  ( more than 8 hours)or increasing weakness or numbness in the legs or arms  Drainage, redness, active bleeding, or increased swelling at the injection site  Temperature over 100.4 degrees degrees F.  Headache that increases when your head is upright and decreases when you lie flat  Shortness of breath, chest pain, or problems breathing      After Surgery:  Always be aware that any surgery can cause these symptoms:    Pain- After this  test, we expect your pain to decrease but  then it may return as the local anesthetic wears off. Try to NOT take your pain medicine during this testing period. Ice and rest can help with pain relief.    Bleeding- a little bleeding after a surgery is usually within normal.  If there is a lot of blood you need to notify your MD.  Emergency treatments of bleeding are cold application, elevation of the bleeding site and compression.    Infection- Infection after surgery is NOT a normal occurrence.  Signs of infection are fever, swelling, hot to touch the incision.  If this occurs notify your MD immediately.    Nausea- this can  be common after a surgery especially if you have had anesthesia medicine or are taking pain medicine.  Staying on clear liquids, bland foods, gingerale, or over the counter anti nausea medicines can help.  If you vomit more than once, notify your MD.  Anti Nausea medicines can be prescribed.

## 2022-11-21 ENCOUNTER — TELEPHONE (OUTPATIENT)
Dept: PAIN MEDICINE | Facility: CLINIC | Age: 52
End: 2022-11-21
Payer: COMMERCIAL

## 2022-11-21 ENCOUNTER — PATIENT MESSAGE (OUTPATIENT)
Dept: PAIN MEDICINE | Facility: CLINIC | Age: 52
End: 2022-11-21
Payer: COMMERCIAL

## 2022-11-21 NOTE — TELEPHONE ENCOUNTER
----- Message from Ashley Brown sent at 11/21/2022  1:20 PM CST -----  Contact: pt  Type: Needs Medical Advice         Who Called: pt  Best Call Back Number:[phone]   Additional Information: Requesting a call back regarding  pt is asking for office to call her. She has a few questions about tomorrow   Please Advise- Thank you

## 2022-11-22 ENCOUNTER — HOSPITAL ENCOUNTER (OUTPATIENT)
Facility: AMBULARY SURGERY CENTER | Age: 52
Discharge: HOME | End: 2022-11-22
Attending: ANESTHESIOLOGY | Admitting: ANESTHESIOLOGY
Payer: COMMERCIAL

## 2022-11-22 DIAGNOSIS — M47.896 OTHER SPONDYLOSIS, LUMBAR REGION: ICD-10-CM

## 2022-11-22 PROCEDURE — 64493 PR INJ DX/THER AGNT PARAVERT FACET JOINT,IMG GUIDE,LUMBAR/SAC,1ST LVL: ICD-10-PCS | Mod: RT,,, | Performed by: ANESTHESIOLOGY

## 2022-11-22 PROCEDURE — 64493 INJ PARAVERT F JNT L/S 1 LEV: CPT | Mod: RT,,, | Performed by: ANESTHESIOLOGY

## 2022-11-22 PROCEDURE — 64494 INJ PARAVERT F JNT L/S 2 LEV: CPT | Mod: RT | Performed by: ANESTHESIOLOGY

## 2022-11-22 PROCEDURE — 64494 PR INJ DX/THER AGNT PARAVERT FACET JOINT,IMG GUIDE,LUMBAR/SAC, 2ND LEVEL: ICD-10-PCS | Mod: RT,,, | Performed by: ANESTHESIOLOGY

## 2022-11-22 PROCEDURE — 64494 INJ PARAVERT F JNT L/S 2 LEV: CPT | Mod: RT,,, | Performed by: ANESTHESIOLOGY

## 2022-11-22 PROCEDURE — 64493 INJ PARAVERT F JNT L/S 1 LEV: CPT | Mod: RT | Performed by: ANESTHESIOLOGY

## 2022-11-22 PROCEDURE — 77002 NEEDLE LOCALIZATION BY XRAY: CPT | Performed by: ANESTHESIOLOGY

## 2022-11-22 RX ORDER — SODIUM CHLORIDE, SODIUM LACTATE, POTASSIUM CHLORIDE, CALCIUM CHLORIDE 600; 310; 30; 20 MG/100ML; MG/100ML; MG/100ML; MG/100ML
INJECTION, SOLUTION INTRAVENOUS ONCE AS NEEDED
Status: ACTIVE | OUTPATIENT
Start: 2022-11-22 | End: 2034-04-20

## 2022-11-22 RX ORDER — BUPIVACAINE HYDROCHLORIDE 5 MG/ML
INJECTION, SOLUTION EPIDURAL; INTRACAUDAL
Status: DISCONTINUED | OUTPATIENT
Start: 2022-11-22 | End: 2022-11-22 | Stop reason: HOSPADM

## 2022-11-22 NOTE — PLAN OF CARE
Patient is awake and alert and says she is ready to go home; patient is driving herself home per MD. Patient's vital signs and injection sites stable. Patient denies pain, nausea, weakness dizziness or weakness. Patient is able to ambulate and denies any limb, hand or feet weakness, numbness or tingling. All patient's belongings have been returned to patient. Patient is in stable condition.

## 2022-11-22 NOTE — OP NOTE
PROCEDURE DATE: 11/22/2022    PROCEDURE:  Right L3,4,5 medial branch nerve blocks under fluoroscopy (corresponds to right L4/5 and L5/S1 facets)    DIAGNOSIS:  Other lumbar spondylosis    Post Op diagnosis: Same    PHYSICIAN: Alex Shepard MD    MEDICATIONS INJECTED: 0.5% bupivicaine, 0.5 ml at each level    SEDATION MEDICATIONS:None    LOCAL ANESTHETIC USED: none    ESTIMATED BLOOD LOSS:  none    COMPLICATIONS:  None    TECHNIQUE: A time out was taken to identify the patient, procedure and side of the procedure. The patient was placed in a prone position, then prepped and draped in the usual sterile fashion using ChloraPrep and sterile towels.  The levels were determined under fluoroscopic guidance and then marked.  A 25-gauge 3.5 inch needle was introduced to the anatomic location of the L3,4,5 medial branch nerves on the right side. The above medication was then injected. The patient tolerated the procedure well.     The patient was monitored after the procedure. Patient was given pain diary to record pain levels at home.     If found to have greater than a 50% recovery and so will be scheduled for a radiofrequency ablation of the corresponding nerves.  Patient was given post procedure and discharge instructions to follow at home.  The patient was discharged in a stable condition.

## 2022-11-22 NOTE — H&P
CC: low back pain    HPI: The patient is a 52 y.o. female with a history of low back apin here for MBB. There are no major changes in history and physical from 10/13/22 by Deni.    Past Medical History:   Diagnosis Date    Asthma     Hypertension        Past Surgical History:   Procedure Laterality Date    BREAST BIOPSY Left 2012    Benign calcifications    BREAST SURGERY      biopsy    DILATION AND CURETTAGE OF UTERUS      TRANSFORAMINAL EPIDURAL INJECTION OF STEROID Left 9/15/2022    Procedure: Injection,steroid,epidural,transforaminal approach;  Surgeon: Alex Shepard MD;  Location: [facility];  Service: Pain Management;  Laterality: Left;  l4-5 and L5-s1        Family History   Problem Relation Age of Onset    Hypertension Mother     Cancer Maternal Aunt         cervical       Social History     Socioeconomic History    Marital status:    Tobacco Use    Smoking status: Never    Smokeless tobacco: Never   Substance and Sexual Activity    Alcohol use: No    Drug use: No    Sexual activity: Not Currently     Partners: Male     Birth control/protection: None     Social Determinants of Health     Financial Resource Strain: Low Risk     Difficulty of Paying Living Expenses: Not hard at all   Food Insecurity: No Food Insecurity    Worried About Running Out of Food in the Last Year: Never true    Ran Out of Food in the Last Year: Never true   Transportation Needs: No Transportation Needs    Lack of Transportation (Medical): No    Lack of Transportation (Non-Medical): No   Physical Activity: Sufficiently Active    Days of Exercise per Week: 5 days    Minutes of Exercise per Session: 30 min   Stress: No Stress Concern Present    Feeling of Stress : Not at all   Social Connections: Unknown    Frequency of Communication with Friends and Family: Twice a week    Frequency of Social Gatherings with Friends and Family: Twice a week    Active Member of Clubs or Organizations: No    Attends Club or Organization Meetings:  "Never    Marital Status:    Housing Stability: Low Risk     Unable to Pay for Housing in the Last Year: No    Number of Places Lived in the Last Year: 1    Unstable Housing in the Last Year: No       No current facility-administered medications for this encounter.       Review of patient's allergies indicates:   Allergen Reactions    Doxycycline hyclate Itching    Omnicef [cefdinir] Diarrhea       Vitals:    11/17/22 0644   Weight: 71 kg (156 lb 8.4 oz)   Height: 5' 1" (1.549 m)       REVIEW OF SYSTEMS:     GENERAL: No weight loss, malaise or fevers.  HEENT:  No recent changes in vision or hearing  NECK: Negative for lumps, no difficulty with swallowing.  RESPIRATORY: Negative for cough, wheezing or shortness of breath, patient denies any recent URI.  CARDIOVASCULAR: Negative for chest pain, leg swelling or palpitations.  GI: Negative for abdominal discomfort, blood in stools or black stools or change in bowel habits.  MUSCULOSKELETAL: See HPI.  SKIN: Negative for lesions, rash, and itching.  PSYCH: No suicidal or homicidal ideations, no current mood disturbances.  HEMATOLOGY/LYMPHOLOGY: Negative for prolonged bleeding, bruising easily or swollen nodes. Patient is not currently taking any anti-coagulants  ENDO: No history of diabetes or thyroid dysfunction  NEURO: No history of syncope, paralysis, seizures or tremors.All other reviewed and negative other than HPI.    Physical exam:  Gen: A and O x3, pleasant, well-groomed  Skin: No rashes or obvious lesions  HEENT: PERRLA, no obvious deformities on ears or in canals. No thyroid masses, trachea midline, no palpable lymph nodes in neck, axilla.  CVS: Regular rate and rhythm, normal S1 and S2, no murmurs.  Resp: Clear to auscultation bilaterally.  Abdomen: Soft, NT/ND, normal bowel sounds present.  Musculoskeletal/Neuro: Moving all extremities    Assessment:  Other spondylosis, lumbar region          PLAN: MBB      This patient has been cleared for surgery in an " ambulatory surgical facility    ASA 3,  Mallampatti Score 3  No history of anesthetic complications  Plan for RN IV sedation

## 2022-11-22 NOTE — DISCHARGE SUMMARY
[facility]  Discharge Note  Short Stay    Procedure(s) (LRB):  Block-nerve-medial branch-lumbar (Right)      OUTCOME: Patient tolerated treatment/procedure well without complication and is now ready for discharge.    DISPOSITION: Home or Self Care    FINAL DIAGNOSIS:  <principal problem not specified>    FOLLOWUP: In clinic    DISCHARGE INSTRUCTIONS:    Discharge Procedure Orders   Notify your health care provider if you experience any of the following:  temperature >100.4     Notify your health care provider if you experience any of the following:  severe uncontrolled pain     Notify your health care provider if you experience any of the following:  redness, tenderness, or signs of infection (pain, swelling, redness, odor or green/yellow discharge around incision site)     Activity as tolerated        TIME SPENT ON DISCHARGE:   30 minutes

## 2022-11-23 ENCOUNTER — TELEPHONE (OUTPATIENT)
Dept: PAIN MEDICINE | Facility: CLINIC | Age: 52
End: 2022-11-23
Payer: COMMERCIAL

## 2022-11-23 VITALS
OXYGEN SATURATION: 100 % | HEIGHT: 61 IN | HEART RATE: 76 BPM | TEMPERATURE: 98 F | WEIGHT: 156.5 LBS | BODY MASS INDEX: 29.55 KG/M2 | DIASTOLIC BLOOD PRESSURE: 94 MMHG | SYSTOLIC BLOOD PRESSURE: 178 MMHG | RESPIRATION RATE: 18 BRPM

## 2022-11-23 DIAGNOSIS — M51.36 DDD (DEGENERATIVE DISC DISEASE), LUMBAR: ICD-10-CM

## 2022-11-23 DIAGNOSIS — M47.816 LUMBAR SPONDYLOSIS: Primary | ICD-10-CM

## 2022-11-23 NOTE — TELEPHONE ENCOUNTER
Patient is requesting a refill on her pain medication. She had MBB yesterday and are in the process of scheduling her for the second one. Thank you

## 2022-11-28 ENCOUNTER — OFFICE VISIT (OUTPATIENT)
Dept: NEUROSURGERY | Facility: CLINIC | Age: 52
End: 2022-11-28
Payer: COMMERCIAL

## 2022-11-28 VITALS
BODY MASS INDEX: 27.47 KG/M2 | HEIGHT: 61 IN | DIASTOLIC BLOOD PRESSURE: 87 MMHG | WEIGHT: 145.5 LBS | SYSTOLIC BLOOD PRESSURE: 122 MMHG | HEART RATE: 88 BPM

## 2022-11-28 DIAGNOSIS — M54.16 LUMBAR RADICULOPATHY: ICD-10-CM

## 2022-11-28 DIAGNOSIS — M51.36 DDD (DEGENERATIVE DISC DISEASE), LUMBAR: Primary | ICD-10-CM

## 2022-11-28 PROCEDURE — 4010F ACE/ARB THERAPY RXD/TAKEN: CPT | Mod: CPTII,S$GLB,, | Performed by: NEUROLOGICAL SURGERY

## 2022-11-28 PROCEDURE — 3079F DIAST BP 80-89 MM HG: CPT | Mod: CPTII,S$GLB,, | Performed by: NEUROLOGICAL SURGERY

## 2022-11-28 PROCEDURE — 3079F PR MOST RECENT DIASTOLIC BLOOD PRESSURE 80-89 MM HG: ICD-10-PCS | Mod: CPTII,S$GLB,, | Performed by: NEUROLOGICAL SURGERY

## 2022-11-28 PROCEDURE — 1159F MED LIST DOCD IN RCRD: CPT | Mod: CPTII,S$GLB,, | Performed by: NEUROLOGICAL SURGERY

## 2022-11-28 PROCEDURE — 3074F PR MOST RECENT SYSTOLIC BLOOD PRESSURE < 130 MM HG: ICD-10-PCS | Mod: CPTII,S$GLB,, | Performed by: NEUROLOGICAL SURGERY

## 2022-11-28 PROCEDURE — 3074F SYST BP LT 130 MM HG: CPT | Mod: CPTII,S$GLB,, | Performed by: NEUROLOGICAL SURGERY

## 2022-11-28 PROCEDURE — 99205 PR OFFICE/OUTPT VISIT, NEW, LEVL V, 60-74 MIN: ICD-10-PCS | Mod: S$GLB,,, | Performed by: NEUROLOGICAL SURGERY

## 2022-11-28 PROCEDURE — 3008F PR BODY MASS INDEX (BMI) DOCUMENTED: ICD-10-PCS | Mod: CPTII,S$GLB,, | Performed by: NEUROLOGICAL SURGERY

## 2022-11-28 PROCEDURE — 1159F PR MEDICATION LIST DOCUMENTED IN MEDICAL RECORD: ICD-10-PCS | Mod: CPTII,S$GLB,, | Performed by: NEUROLOGICAL SURGERY

## 2022-11-28 PROCEDURE — 4010F PR ACE/ARB THEARPY RXD/TAKEN: ICD-10-PCS | Mod: CPTII,S$GLB,, | Performed by: NEUROLOGICAL SURGERY

## 2022-11-28 PROCEDURE — 99205 OFFICE O/P NEW HI 60 MIN: CPT | Mod: S$GLB,,, | Performed by: NEUROLOGICAL SURGERY

## 2022-11-28 PROCEDURE — 3008F BODY MASS INDEX DOCD: CPT | Mod: CPTII,S$GLB,, | Performed by: NEUROLOGICAL SURGERY

## 2022-11-28 NOTE — PROGRESS NOTES
I appreciate this referral from Dr. Cheema    HPI:  This is a very pleasant 52-year-old woman with history of hypertension asthma who presents with 1 history of worsening sacral pain radiation the posterior calf.  She reports aching stabbing pain lumbosacral region cramping involving the left foot.  Her symptoms are worse with prolonged standing, sitting or driving.  She denies right leg pain.  She reports the back and leg pain is equivalent.  She denies weakness, paresthesias, saddle anesthesia, sphincter dysfunction.  She is taking gabapentin meloxicam tramadol and Goody powders for symptom management.  She is undergone left L4-5 and L5-S1 TESI with temporary relief for her symptoms.  She notes that a lumbar MBB improved her symptoms for a period of 6 hours.    Smoking status:  Nonsmoker  Past Medical History:   Diagnosis Date    Asthma     Hypertension       Past Surgical History:   Procedure Laterality Date    BREAST BIOPSY Left 2012    Benign calcifications    BREAST SURGERY      biopsy    DILATION AND CURETTAGE OF UTERUS      INJECTION OF ANESTHETIC AGENT AROUND MEDIAL BRANCH NERVES INNERVATING LUMBAR FACET JOINT Right 11/22/2022    Procedure: Block-nerve-medial branch-lumbar;  Surgeon: Alex Shepard MD;  Location: [facility];  Service: Pain Management;  Laterality: Right;  L3,4,5 MBB    TRANSFORAMINAL EPIDURAL INJECTION OF STEROID Left 9/15/2022    Procedure: Injection,steroid,epidural,transforaminal approach;  Surgeon: Alex Shepard MD;  Location: [facility];  Service: Pain Management;  Laterality: Left;  l4-5 and L5-s1      Social History     Tobacco Use    Smoking status: Never    Smokeless tobacco: Never   Substance Use Topics    Alcohol use: No    Drug use: No       Review of Systems: All systems reviewed and are as above or otherwise negative.    General: well developed, well nourished, no distress.   Head: normocephalic, atraumatic  Eyes: pupils equal, round, reactive to light with accomodation, EOMI.   Neck:  Virtual Telephone Check-In    Iva Préez verbally consents to a Virtual/Telephone Check-In visit on 08/11/22. Patient has been referred to the [facility] website at [email or web address] to review the yearly Consent to Treat document. Patient understands and accepts financial responsibility for any deductible, co-insurance and/or co-pays associated with this service. Duration of the service: 13 minutes      Summary of topics discussed: Patient had fever cough on Monday to the temperature is running 100 degrees. Took a COVID test yesterday was positive. Patient has had in all 3 doses of his vaccine. Currently does have some body ache and low-grade fever. Patient advised to stay in isolation for a total of 5 days that would be ending on Saturday after which she can go out provided he masks himself. He should get in touch with us if there is any further worsening of his symptoms or signs      Diagnoses and all orders for this visit:    COVID  -     nirmatrelvir & ritonavir 20 x 150 MG & 10 x 100MG Oral Tablet Therapy Pack; Take two nirmatrelvir tablets (300 mg) with one ritonavir tablet (100 mg) together twice daily for 5 days      To call us if there is any further worsening of his symptoms and signs.     Vivian Power MD trachea midline. No JVD  Cardiovascular: No LE edema   Pulmonary: normal respirations, no signs of respiratory distress  Abdomen: soft, non-distended, not tender to palpation  Sensory: intact to light touch throughout  Extremities: No bruising, deformity  Skin: Skin is warm, dry and intact.    Motor Strength: Moves all extremities spontaneously with good tone.  Full strength upper and lower extremities. No abnormal movements seen.     Strength  Deltoids Triceps Biceps Wrist Extension Wrist Flexion Hand    Upper: R 5/5 5/5 5/5 5/5 5/5 5/5    L 5/5 5/5 5/5 5/5 5/5 5/5     Iliopsoas Quadriceps Knee  Flexion Tibialis  anterior Gastro- cnemius EHL   Lower: R 5/5 5/5 5/5 5/5 5/5 5/5    L 5/5 5/5 5/5 5/5 5/5 5/5     Neurologic: Alert and oriented. Thought content appropriate.  GCS: Motor: 6/Verbal: 5/Eyes: 4 GCS Total: 15  Mental Status: Awake, Alert, Oriented x 4  Language: No aphasia  Speech: No dysarthria  Cranial nerves: face symmetric, tongue midline, CN II-XII grossly intact.     Pronator Drift: no drift noted  Finger-to-nose: Intact bilaterally  DTR's: 2 + and symmetric in UE and LE  Bernabe: absent  Clonus: absent  Babinski: absent    Gait: normal    Tandem Gait: No difficulty           Able to walk on heels & toes    Cervical Spine  ROM: full    Nontender to palpation    Lumbar Spine   ROM: full   Nontender to palpation    Pain on Hip ROM: Negative  Straight leg raise: negative bilaterally     SI Joint tenderness: Negative bilaterally   ALPHONSE: Negative bilaterally  Thigh Thrust: Negative bilaterally  Gaenslen: Negative bilaterally    Significant Exam Findings:  Motor and sensory intact.  Straight leg raising positive on the left at 90°.    Significant Radiology Findings:  I reviewed MRI lumbar without 02/14/2022 in detail with the patient.  Pertinent findings include advanced lumbar degenerative disc disease L4-5 L5-S1 with marked disc desiccation, near disc space collapse at L5-S1, pronounced Modic endplate  changes, and endplate erosions.  At L5-S1 there is central, right paracentral broad-based disc protrusion which abuts the traversing S1 nerve roots, right greater than left.  No significant canal or foraminal stenosis at L4-5.  Alignment is maintained.    Analysis:  Her symptoms have been refractory to multiple forms of nonoperative management.  I believe she would benefit from surgical intervention.  I recommended a left L4-5, L5-S1 TLIF using MIS and robotic guidance.  I outlined the goals of surgery, material risks, and expected postoperative course.  We discussed the option of continued nonoperative measures as well.  She voiced understanding and wants to think about surgery.  We will be glad to see her back for further discussion.    [unfilled]   Jacqui was seen today for back pain and leg pain.    Diagnoses and all orders for this visit:    DDD (degenerative disc disease), lumbar    Lumbar radiculopathy         I spent a total of 60 minutes on the day of the visit.This includes face to face time and non-face to face time preparing to see the patient (eg, review of tests), obtaining and/or reviewing separately obtained history, documenting clinical information in the electronic or other health record, independently interpreting results and communicating results to the patient/family/caregiver, or care coordinator.

## 2022-12-05 DIAGNOSIS — M47.816 LUMBAR SPONDYLOSIS: ICD-10-CM

## 2022-12-05 RX ORDER — HYDROCODONE BITARTRATE AND ACETAMINOPHEN 5; 325 MG/1; MG/1
1 TABLET ORAL EVERY 6 HOURS PRN
Qty: 28 TABLET | Refills: 0 | Status: SHIPPED | OUTPATIENT
Start: 2022-12-05 | End: 2022-12-20 | Stop reason: SDUPTHER

## 2022-12-14 ENCOUNTER — OFFICE VISIT (OUTPATIENT)
Dept: FAMILY MEDICINE | Facility: CLINIC | Age: 52
End: 2022-12-14
Payer: COMMERCIAL

## 2022-12-14 ENCOUNTER — TELEPHONE (OUTPATIENT)
Dept: PAIN MEDICINE | Facility: CLINIC | Age: 52
End: 2022-12-14
Payer: COMMERCIAL

## 2022-12-14 DIAGNOSIS — J32.9 SINUSITIS, UNSPECIFIED CHRONICITY, UNSPECIFIED LOCATION: Primary | ICD-10-CM

## 2022-12-14 PROCEDURE — 4010F PR ACE/ARB THEARPY RXD/TAKEN: ICD-10-PCS | Mod: CPTII,95,, | Performed by: PHYSICIAN ASSISTANT

## 2022-12-14 PROCEDURE — 4010F ACE/ARB THERAPY RXD/TAKEN: CPT | Mod: CPTII,95,, | Performed by: PHYSICIAN ASSISTANT

## 2022-12-14 PROCEDURE — 1159F MED LIST DOCD IN RCRD: CPT | Mod: CPTII,95,, | Performed by: PHYSICIAN ASSISTANT

## 2022-12-14 PROCEDURE — 1160F PR REVIEW ALL MEDS BY PRESCRIBER/CLIN PHARMACIST DOCUMENTED: ICD-10-PCS | Mod: CPTII,95,, | Performed by: PHYSICIAN ASSISTANT

## 2022-12-14 PROCEDURE — 99213 PR OFFICE/OUTPT VISIT, EST, LEVL III, 20-29 MIN: ICD-10-PCS | Mod: 95,,, | Performed by: PHYSICIAN ASSISTANT

## 2022-12-14 PROCEDURE — 1159F PR MEDICATION LIST DOCUMENTED IN MEDICAL RECORD: ICD-10-PCS | Mod: CPTII,95,, | Performed by: PHYSICIAN ASSISTANT

## 2022-12-14 PROCEDURE — 99213 OFFICE O/P EST LOW 20 MIN: CPT | Mod: 95,,, | Performed by: PHYSICIAN ASSISTANT

## 2022-12-14 PROCEDURE — 1160F RVW MEDS BY RX/DR IN RCRD: CPT | Mod: CPTII,95,, | Performed by: PHYSICIAN ASSISTANT

## 2022-12-14 RX ORDER — AMOXICILLIN AND CLAVULANATE POTASSIUM 875; 125 MG/1; MG/1
1 TABLET, FILM COATED ORAL EVERY 12 HOURS
Qty: 20 TABLET | Refills: 0 | Status: SHIPPED | OUTPATIENT
Start: 2022-12-14 | End: 2022-12-24

## 2022-12-14 RX ORDER — ACETAMINOPHEN 500 MG
500 TABLET ORAL EVERY 6 HOURS PRN
COMMUNITY

## 2022-12-14 NOTE — TELEPHONE ENCOUNTER
cyndi Cruz   [id number]  #2 MBB Rt l3,4,5  Deni fw   cancelled will reschedule later d/t  in hospital

## 2022-12-14 NOTE — TELEPHONE ENCOUNTER
----- Message from Valarie Sanchez sent at 12/14/2022  9:13 AM CST -----  Contact: PATIENT  Type:  Needs Medical Advice    Who Called: PT   Would the patient rather a call back or a response via [facility]? CALL   Best Call Back Number: [phone]  Additional Information: PT WILL DISCUSS QUESTION/ISSUE WHEN HER CALL IS RET

## 2022-12-14 NOTE — PROGRESS NOTES
The patient location is: [place]  The chief complaint leading to consultation is: sinusitis    Visit type: audiovisual    Face to Face time with patient: 15 minutes of total time spent on the encounter, which includes face to face time and non-face to face time preparing to see the patient (eg, review of tests), Obtaining and/or reviewing separately obtained history, Documenting clinical information in the electronic or other health record, Independently interpreting results (not separately reported) and communicating results to the patient/family/caregiver, or Care coordination (not separately reported).         Each patient to whom he or she provides medical services by telemedicine is:  (1) informed of the relationship between the physician and patient and the respective role of any other health care provider with respect to management of the patient; and (2) notified that he or she may decline to receive medical services by telemedicine and may withdraw from such care at any time.    Notes:  Patient presents with an 8 day history of sinus pain and pressure, headache and colored mucus.  She has been unable to relieve her symptoms with saline nasal spray and Flonase.  She does get frequent sinus infections.  She is been on several rounds of amoxicillin this year.  She states that is the only antibiotic that works for her.  She denies any cough or shortness of breath.  She is scared to take over-the-counter cold medicine due to blood pressure.    Jacqui was seen today for sinusitis.    Diagnoses and all orders for this visit:    Sinusitis, unspecified chronicity, unspecified location  -     amoxicillin-clavulanate 875-125mg (AUGMENTIN) 875-125 mg per tablet; Take 1 tablet by mouth every 12 (twelve) hours. for 10 days     Advised patient to get Coricidin HBP to help with symptoms.  Call if symptoms worsen or new symptoms develop.  Answers submitted by the patient for this visit:  Ear Pain Questionnaire (Submitted  on 12/14/2022)  Chief Complaint: Ear pain  Affected ear: both  Chronicity: recurrent  Onset: in the past 7 days  Progression since onset: gradually worsening  Frequency: hourly  Fever: no fever  Pain - numeric: 8/10  abdominal pain: No  ear discharge: No  rash: No  cough: No  headaches: Yes  rhinorrhea: Yes  diarrhea: No  hearing loss: No  sore throat: No  neck pain: No  vomiting: No  drainage: No  Treatments tried: acetaminophen, ear drops  Improvement on treatment: no relief  Pain severity: severe  chronic ear infection: Yes  hearing loss: No  tympanostomy tube: No

## 2022-12-20 DIAGNOSIS — M47.816 LUMBAR SPONDYLOSIS: ICD-10-CM

## 2022-12-20 RX ORDER — HYDROCODONE BITARTRATE AND ACETAMINOPHEN 5; 325 MG/1; MG/1
1 TABLET ORAL EVERY 6 HOURS PRN
Qty: 28 TABLET | Refills: 0 | Status: SHIPPED | OUTPATIENT
Start: 2022-12-20 | End: 2023-01-02 | Stop reason: SDUPTHER

## 2023-01-02 DIAGNOSIS — M47.816 LUMBAR SPONDYLOSIS: ICD-10-CM

## 2023-01-04 ENCOUNTER — PATIENT MESSAGE (OUTPATIENT)
Dept: SPINE | Facility: CLINIC | Age: 53
End: 2023-01-04
Payer: COMMERCIAL

## 2023-01-04 RX ORDER — HYDROCODONE BITARTRATE AND ACETAMINOPHEN 5; 325 MG/1; MG/1
1 TABLET ORAL EVERY 6 HOURS PRN
Qty: 28 TABLET | Refills: 0 | Status: SHIPPED | OUTPATIENT
Start: 2023-01-04 | End: 2023-01-12 | Stop reason: SDUPTHER

## 2023-01-12 ENCOUNTER — TELEPHONE (OUTPATIENT)
Dept: FAMILY MEDICINE | Facility: CLINIC | Age: 53
End: 2023-01-12
Payer: COMMERCIAL

## 2023-01-12 ENCOUNTER — PATIENT MESSAGE (OUTPATIENT)
Dept: PAIN MEDICINE | Facility: CLINIC | Age: 53
End: 2023-01-12
Payer: COMMERCIAL

## 2023-01-12 DIAGNOSIS — M47.816 LUMBAR SPONDYLOSIS: ICD-10-CM

## 2023-01-12 DIAGNOSIS — I10 ESSENTIAL HYPERTENSION: ICD-10-CM

## 2023-01-12 RX ORDER — HYDROCODONE BITARTRATE AND ACETAMINOPHEN 5; 325 MG/1; MG/1
1 TABLET ORAL EVERY 6 HOURS PRN
Qty: 28 TABLET | Refills: 0 | Status: SHIPPED | OUTPATIENT
Start: 2023-01-12 | End: 2023-01-19 | Stop reason: SDUPTHER

## 2023-01-12 NOTE — TELEPHONE ENCOUNTER
----- Message from Carly Mcallister sent at 1/12/2023 10:28 AM CST -----  Contact: Self  Type:  Patient Requesting A Return Call    Who Called:  Patient  Who Left Message for Patient:  N/A- would like to speak to the nurse  Does the patient know what this is regarding?:  Needs to r/s her nerve block  Best Call Back Number:  [phone]  Additional Information:  Thank You

## 2023-01-12 NOTE — TELEPHONE ENCOUNTER
----- Message from Zakiya Joiner sent at 1/12/2023  2:54 PM CST -----  Contact: patient  Type:  Same Day Appointment Request    Caller is requesting a same day appointment.  Caller declined first available appointment listed below.      Name of Caller:  patient  When is the first available appointment?  N/a  Symptoms:  med refill- needs medication today  Best Call Back Number:  [phone] (home)   Additional Information:   needs lisinopriL (PRINIVIL,ZESTRIL) 20 MG tablet filled today     [facility] [address] [place] [id number] [facility] [address] [address]  Phone: [phone] Fax: [phone]

## 2023-01-12 NOTE — TELEPHONE ENCOUNTER
----- Message from Carly Mcallister sent at 1/12/2023 10:32 AM CST -----  Contact: Self  Type:  Same Day Appointment Request    Caller is requesting a same day appointment.  Caller declined first available appointment listed below.      Name of Caller:  Patient  When is the first available appointment?  01/18  Symptoms:  Needs her BP Meds refilled  Best Call Back Number:  [phone]  Additional Information:   Pt stated has been out and needs to come in for her refill. Thank You.

## 2023-01-14 RX ORDER — LISINOPRIL 20 MG/1
20 TABLET ORAL DAILY
Qty: 30 TABLET | Refills: 0 | Status: SHIPPED | OUTPATIENT
Start: 2023-01-14 | End: 2023-02-26 | Stop reason: SDUPTHER

## 2023-01-17 ENCOUNTER — TELEPHONE (OUTPATIENT)
Dept: PAIN MEDICINE | Facility: CLINIC | Age: 53
End: 2023-01-17
Payer: COMMERCIAL

## 2023-01-17 DIAGNOSIS — M47.816 LUMBAR SPONDYLOSIS: Primary | ICD-10-CM

## 2023-01-24 ENCOUNTER — HOSPITAL ENCOUNTER (OUTPATIENT)
Dept: RADIOLOGY | Facility: HOSPITAL | Age: 53
Discharge: HOME | End: 2023-01-24
Attending: FAMILY MEDICINE
Payer: COMMERCIAL

## 2023-01-24 DIAGNOSIS — Z12.31 OTHER SCREENING MAMMOGRAM: ICD-10-CM

## 2023-01-24 PROCEDURE — 77067 MAMMO DIGITAL SCREENING BILAT WITH TOMO: ICD-10-PCS | Mod: 26,,, | Performed by: RADIOLOGY

## 2023-01-24 PROCEDURE — 77063 BREAST TOMOSYNTHESIS BI: CPT | Mod: 26,,, | Performed by: RADIOLOGY

## 2023-01-24 PROCEDURE — 77063 MAMMO DIGITAL SCREENING BILAT WITH TOMO: ICD-10-PCS | Mod: 26,,, | Performed by: RADIOLOGY

## 2023-01-24 PROCEDURE — 77067 SCR MAMMO BI INCL CAD: CPT | Mod: 26,,, | Performed by: RADIOLOGY

## 2023-01-24 PROCEDURE — 77067 SCR MAMMO BI INCL CAD: CPT | Mod: TC

## 2023-01-25 ENCOUNTER — PATIENT MESSAGE (OUTPATIENT)
Dept: FAMILY MEDICINE | Facility: CLINIC | Age: 53
End: 2023-01-25
Payer: COMMERCIAL

## 2023-01-25 ENCOUNTER — TELEPHONE (OUTPATIENT)
Dept: FAMILY MEDICINE | Facility: CLINIC | Age: 53
End: 2023-01-25
Payer: COMMERCIAL

## 2023-01-25 DIAGNOSIS — M47.816 LUMBAR SPONDYLOSIS: ICD-10-CM

## 2023-01-25 RX ORDER — HYDROCODONE BITARTRATE AND ACETAMINOPHEN 5; 325 MG/1; MG/1
1 TABLET ORAL EVERY 6 HOURS PRN
Qty: 28 TABLET | Refills: 0 | Status: CANCELLED | OUTPATIENT
Start: 2023-01-25

## 2023-01-25 NOTE — TELEPHONE ENCOUNTER
----- Message from Deacon Melton sent at 1/25/2023 10:58 AM CST -----  Contact: self  Type: Patient Returning Call        Who Called: Patient   Who Left Message for Patient: Nurse Lillian   Does the patient know what this is regarding?: Yes test results   Best Call Back Number: [phone]  Additional Information: Plz call pt to discuss results. Thanks

## 2023-01-25 NOTE — TELEPHONE ENCOUNTER
Spoke to Pamella [facility] pt mammo results not yet available to review; pt notified that she will be notified after results are provided pt voiced understanding.

## 2023-01-25 NOTE — TELEPHONE ENCOUNTER
----- Message from Jennifer Juarez sent at 1/25/2023  9:26 AM CST -----  Contact: Pt  Pt is calling to speak with staff about Mammo test results.  Pt would like a call back.       Confirmed contact below:   Contact Name:Jacqui Cruz  Phone Number: [phone]

## 2023-01-25 NOTE — TELEPHONE ENCOUNTER
----- Message from Vee Noyola sent at 1/25/2023 10:11 AM CST -----  Type: Needs Medical Advice  Who Called:  pt     Best Call Back Number: [phone] (home)     Additional Information: pt wants a call back to discussed results from Mammo please advise thank you

## 2023-01-30 ENCOUNTER — PATIENT MESSAGE (OUTPATIENT)
Dept: FAMILY MEDICINE | Facility: CLINIC | Age: 53
End: 2023-01-30
Payer: COMMERCIAL

## 2023-01-30 ENCOUNTER — TELEPHONE (OUTPATIENT)
Dept: RADIOLOGY | Facility: HOSPITAL | Age: 53
End: 2023-01-30
Payer: COMMERCIAL

## 2023-01-30 DIAGNOSIS — R92.8 ABNORMAL MAMMOGRAM OF RIGHT BREAST: Primary | ICD-10-CM

## 2023-01-30 NOTE — TELEPHONE ENCOUNTER
----- Message from Grisel Dominguez, Patient Care Assistant sent at 1/30/2023  8:08 AM CST -----  Regarding: orders  Contact: pt  Type: Needs Medical Advice  Who Called:  pt   Best Call Back Number: [phone] (home)     Additional Information: pt states she would like a callback regarding orders for an US on right breast. Please call pt to advise. Thanks!

## 2023-01-30 NOTE — TELEPHONE ENCOUNTER
Spoke with patient and explained mammogram findings.Patient expressed understanding of results. Patient scheduled abnormal mammogram follow up appointment at [facility] on 1/31/2023.

## 2023-01-31 ENCOUNTER — HOSPITAL ENCOUNTER (OUTPATIENT)
Dept: RADIOLOGY | Facility: HOSPITAL | Age: 53
Discharge: HOME | End: 2023-01-31
Attending: FAMILY MEDICINE
Payer: COMMERCIAL

## 2023-01-31 DIAGNOSIS — R92.8 ABNORMAL FINDING ON BREAST IMAGING: ICD-10-CM

## 2023-01-31 PROCEDURE — 76642 ULTRASOUND BREAST LIMITED: CPT | Mod: TC,RT

## 2023-01-31 PROCEDURE — 77065 DX MAMMO INCL CAD UNI: CPT | Mod: 26,RT,, | Performed by: RADIOLOGY

## 2023-01-31 PROCEDURE — 77065 DX MAMMO INCL CAD UNI: CPT | Mod: TC,RT

## 2023-01-31 PROCEDURE — 76642 US BREAST RIGHT LIMITED: ICD-10-PCS | Mod: 26,RT,, | Performed by: RADIOLOGY

## 2023-01-31 PROCEDURE — 77061 MAMMO DIGITAL DIAGNOSTIC RIGHT WITH TOMO: ICD-10-PCS | Mod: 26,RT,, | Performed by: RADIOLOGY

## 2023-01-31 PROCEDURE — 76642 ULTRASOUND BREAST LIMITED: CPT | Mod: 26,RT,, | Performed by: RADIOLOGY

## 2023-01-31 PROCEDURE — 77065 MAMMO DIGITAL DIAGNOSTIC RIGHT WITH TOMO: ICD-10-PCS | Mod: 26,RT,, | Performed by: RADIOLOGY

## 2023-01-31 PROCEDURE — 77061 BREAST TOMOSYNTHESIS UNI: CPT | Mod: 26,RT,, | Performed by: RADIOLOGY

## 2023-01-31 NOTE — DISCHARGE INSTRUCTIONS
Nerve Block  This was a test, not a treatment. Your pain may return.  Keep your pain journal - The Dr needs to see if you have some pain relief - even if the pain returns. Dr office will call to check your pain levels within 3 days or message you on the [facility] portal.  Most patients have to have 2 tests before the Radiofrequency procedure.   Perform activities that normally cause you pain during this testing period    Home care instructions  You may apply ice pack to the injection site for 2 days , NO HEAT for 2 days  You may take a shower but no soaking above level of injection in tub or pool for 2 days  Resume Aspirin, Plavix, or Coumadin the day after the procedure unless otherwise instructed.  Do not drive for 24 hr      SEEK  IMMEDIATE MEDICAL HELP FOR:  Severe increase in your usual pain or appearance of new pain  Prolonged  ( more than 8 hours)or increasing weakness or numbness in the legs or arms  Drainage, redness, active bleeding, or increased swelling at the injection site  Temperature over 100.0 degrees F.  Headache that increases when your head is upright and decreases when you lie flat  Shortness of breath, chest pain, or problems breathing      After Surgery:  Always be aware that any surgery can cause these symptoms:    Pain- After this  test, we expect your pain to decrease but  then it may return as the local anesthetic wears off. Try to NOT take your pain medicine during this testing period. Ice and rest can help with pain relief.    Bleeding- a little bleeding after a surgery is usually within normal.  If there is a lot of blood you need to notify your MD.  Emergency treatments of bleeding are cold application, elevation of the bleeding site and compression.    Infection- Infection after surgery is NOT a normal occurrence.  Signs of infection are fever, swelling, hot to touch the incision.  If this occurs notify your MD immediately.    Nausea- this can be common after a surgery especially if you  have had anesthesia medicine or are taking pain medicine.  Staying on clear liquids, bland foods, gingerale, or over the counter anti nausea medicines can help.  If you vomit more than once, notify your MD.  Anti Nausea medicines can be prescribed.

## 2023-02-01 DIAGNOSIS — M47.816 LUMBAR SPONDYLOSIS: ICD-10-CM

## 2023-02-01 RX ORDER — HYDROCODONE BITARTRATE AND ACETAMINOPHEN 5; 325 MG/1; MG/1
1 TABLET ORAL EVERY 6 HOURS PRN
Qty: 28 TABLET | Refills: 0 | Status: SHIPPED | OUTPATIENT
Start: 2023-02-01 | End: 2023-02-06 | Stop reason: ALTCHOICE

## 2023-02-02 ENCOUNTER — HOSPITAL ENCOUNTER (OUTPATIENT)
Facility: AMBULARY SURGERY CENTER | Age: 53
Discharge: HOME | End: 2023-02-02
Attending: ANESTHESIOLOGY | Admitting: ANESTHESIOLOGY
Payer: COMMERCIAL

## 2023-02-02 DIAGNOSIS — M47.896 OTHER SPONDYLOSIS, LUMBAR REGION: ICD-10-CM

## 2023-02-02 PROCEDURE — 64493 PR INJ DX/THER AGNT PARAVERT FACET JOINT,IMG GUIDE,LUMBAR/SAC,1ST LVL: ICD-10-PCS | Mod: 50,,, | Performed by: ANESTHESIOLOGY

## 2023-02-02 PROCEDURE — 64493 INJ PARAVERT F JNT L/S 1 LEV: CPT | Mod: 50 | Performed by: ANESTHESIOLOGY

## 2023-02-02 PROCEDURE — 64494 INJ PARAVERT F JNT L/S 2 LEV: CPT | Mod: LT

## 2023-02-02 PROCEDURE — 64493 INJ PARAVERT F JNT L/S 1 LEV: CPT | Mod: 50,,, | Performed by: ANESTHESIOLOGY

## 2023-02-02 PROCEDURE — 64494 INJ PARAVERT F JNT L/S 2 LEV: CPT | Mod: 50,,, | Performed by: ANESTHESIOLOGY

## 2023-02-02 PROCEDURE — 64494 INJ PARAVERT F JNT L/S 2 LEV: CPT | Mod: RT | Performed by: ANESTHESIOLOGY

## 2023-02-02 PROCEDURE — 64494 PR INJ DX/THER AGNT PARAVERT FACET JOINT,IMG GUIDE,LUMBAR/SAC, 2ND LEVEL: ICD-10-PCS | Mod: 50,,, | Performed by: ANESTHESIOLOGY

## 2023-02-02 RX ORDER — BUPIVACAINE HYDROCHLORIDE 5 MG/ML
INJECTION, SOLUTION EPIDURAL; INTRACAUDAL
Status: DISCONTINUED | OUTPATIENT
Start: 2023-02-02 | End: 2023-02-02 | Stop reason: HOSPADM

## 2023-02-02 RX ORDER — SODIUM CHLORIDE, SODIUM LACTATE, POTASSIUM CHLORIDE, CALCIUM CHLORIDE 600; 310; 30; 20 MG/100ML; MG/100ML; MG/100ML; MG/100ML
INJECTION, SOLUTION INTRAVENOUS ONCE AS NEEDED
Status: ACTIVE | OUTPATIENT
Start: 2023-02-02 | End: 2034-07-01

## 2023-02-02 NOTE — OP NOTE
PROCEDURE DATE: 2/2/2023    PROCEDURE:  Bilateral L3,4,5 medial branch nerve blocks under fluoroscopy (corresponds to bilateral L4/5 and L5/S1 facets)    DIAGNOSIS:  Other lumbar spondylosis    Post Op diagnosis: Same    PHYSICIAN: Alex Shepard MD    MEDICATIONS INJECTED: 0.5% bupivicaine, 0.5 ml at each level    SEDATION MEDICATIONS:None    LOCAL ANESTHETIC USED: None    ESTIMATED BLOOD LOSS:  None    COMPLICATIONS:  None    TECHNIQUE: A time out was taken to identify the patient, procedure and side of the procedure. The patient was placed in a prone position, then prepped and draped in the usual sterile fashion using ChloraPrep and sterile towels.  The levels were determined under fluoroscopic guidance and then marked.  A 25-gauge 3.5 inch needle was introduced to the anatomic location of the L3,4,5 medial branch nerves on the bilateral side. The above medication was then injected. The patient tolerated the procedure well.     The patient was monitored after the procedure. Patient was given pain diary to record pain levels at home.     If found to have greater than a 50% recovery and so will be scheduled for a radiofrequency ablation of the corresponding nerves.  Patient was given post procedure and discharge instructions to follow at home.  The patient was discharged in a stable condition.

## 2023-02-02 NOTE — H&P
CC: low back pain    HPI: The patient is a 52 y.o. female with a history of low back pain here for MBB. There are no major changes in history and physical from 10/2022 by Deni.    Past Medical History:   Diagnosis Date    Asthma     Hypertension        Past Surgical History:   Procedure Laterality Date    BREAST BIOPSY Left 2012    Benign calcifications    BREAST SURGERY      biopsy    DILATION AND CURETTAGE OF UTERUS      INJECTION OF ANESTHETIC AGENT AROUND MEDIAL BRANCH NERVES INNERVATING LUMBAR FACET JOINT Right 11/22/2022    Procedure: Block-nerve-medial branch-lumbar;  Surgeon: Alex Shepard MD;  Location: [facility] OR;  Service: Pain Management;  Laterality: Right;  L3,4,5 MBB    TRANSFORAMINAL EPIDURAL INJECTION OF STEROID Left 9/15/2022    Procedure: Injection,steroid,epidural,transforaminal approach;  Surgeon: Alex Shepard MD;  Location: [facility] OR;  Service: Pain Management;  Laterality: Left;  l4-5 and L5-s1        Family History   Problem Relation Age of Onset    Hypertension Mother     Cancer Maternal Aunt         cervical       Social History     Socioeconomic History    Marital status:    Tobacco Use    Smoking status: Never    Smokeless tobacco: Never   Substance and Sexual Activity    Alcohol use: No    Drug use: No    Sexual activity: Not Currently     Partners: Male     Birth control/protection: None     Social Determinants of Health     Financial Resource Strain: Medium Risk    Difficulty of Paying Living Expenses: Somewhat hard   Food Insecurity: Food Insecurity Present    Worried About Running Out of Food in the Last Year: Sometimes true    Ran Out of Food in the Last Year: Often true   Transportation Needs: No Transportation Needs    Lack of Transportation (Medical): No    Lack of Transportation (Non-Medical): No   Physical Activity: Insufficiently Active    Days of Exercise per Week: 2 days    Minutes of Exercise per Session: 20 min   Stress: No Stress Concern Present    Feeling of Stress :  "Not at all   Social Connections: Unknown    Frequency of Communication with Friends and Family: Twice a week    Frequency of Social Gatherings with Friends and Family: Once a week    Active Member of Clubs or Organizations: No    Attends Club or Organization Meetings: Never    Marital Status:    Housing Stability: High Risk    Unable to Pay for Housing in the Last Year: Yes    Number of Places Lived in the Last Year: 1    Unstable Housing in the Last Year: No       No current facility-administered medications for this encounter.     Facility-Administered Medications Ordered in Other Encounters   Medication Dose Route Frequency Provider Last Rate Last Admin    lactated ringers infusion   Intravenous Once PRN Alex Shepard MD           Review of patient's allergies indicates:   Allergen Reactions    Azithromycin Hives    Doxycycline hyclate Itching    Omnicef [cefdinir] Diarrhea       Vitals:    01/26/23 0640   Weight: 70.8 kg (156 lb)   Height: 5' 1" (1.549 m)       REVIEW OF SYSTEMS:     GENERAL: No weight loss, malaise or fevers.  HEENT:  No recent changes in vision or hearing  NECK: Negative for lumps, no difficulty with swallowing.  RESPIRATORY: Negative for cough, wheezing or shortness of breath, patient denies any recent URI.  CARDIOVASCULAR: Negative for chest pain, leg swelling or palpitations.  GI: Negative for abdominal discomfort, blood in stools or black stools or change in bowel habits.  MUSCULOSKELETAL: See HPI.  SKIN: Negative for lesions, rash, and itching.  PSYCH: No suicidal or homicidal ideations, no current mood disturbances.  HEMATOLOGY/LYMPHOLOGY: Negative for prolonged bleeding, bruising easily or swollen nodes. Patient is not currently taking any anti-coagulants  ENDO: No history of diabetes or thyroid dysfunction  NEURO: No history of syncope, paralysis, seizures or tremors.All other reviewed and negative other than HPI.    Physical exam:  Gen: A and O x3, pleasant, well-groomed  Skin: " No rashes or obvious lesions  HEENT: PERRLA, no obvious deformities on ears or in canals. No thyroid masses, trachea midline, no palpable lymph nodes in neck, axilla.  CVS: Regular rate and rhythm, normal S1 and S2, no murmurs.  Resp: Clear to auscultation bilaterally.  Abdomen: Soft, NT/ND, normal bowel sounds present.  Musculoskeletal/Neuro: Moving all extremities    Assessment:  Other spondylosis, lumbar region          PLAN: MBB      This patient has been cleared for surgery in an ambulatory surgical facility    ASA 3,  Mallampatti Score 3  No history of anesthetic complications  Plan for RN IV sedation

## 2023-02-03 ENCOUNTER — TELEPHONE (OUTPATIENT)
Dept: PAIN MEDICINE | Facility: CLINIC | Age: 53
End: 2023-02-03
Payer: COMMERCIAL

## 2023-02-03 VITALS
DIASTOLIC BLOOD PRESSURE: 69 MMHG | TEMPERATURE: 98 F | OXYGEN SATURATION: 98 % | WEIGHT: 156 LBS | RESPIRATION RATE: 20 BRPM | BODY MASS INDEX: 29.45 KG/M2 | HEIGHT: 61 IN | HEART RATE: 88 BPM | SYSTOLIC BLOOD PRESSURE: 143 MMHG

## 2023-02-03 DIAGNOSIS — M47.816 LUMBAR SPONDYLOSIS: Primary | ICD-10-CM

## 2023-02-03 NOTE — TELEPHONE ENCOUNTER
----- Message from Joe Tilley sent at 2/3/2023  1:09 PM CST -----  Type: Needs Medical Advice  Who Called:  Patient    Best Call Back Number: [phone]  Additional Information: Patient states that she fell down the steps and would like a callback regarding extending her short term disability.

## 2023-02-03 NOTE — TELEPHONE ENCOUNTER
Fell today on left leg 2/3/23, left leg is numb, had MBB#2 yesterday. Has RFA scheduled. Told her Dr Cheema's office would be in MON next week.

## 2023-02-03 NOTE — TELEPHONE ENCOUNTER
Calling for MBB#2 results, said got 95% relief for 8 hours before pain started to increase.  RFA  scheduled 3/9/23

## 2023-02-06 ENCOUNTER — PATIENT MESSAGE (OUTPATIENT)
Dept: SPINE | Facility: CLINIC | Age: 53
End: 2023-02-06

## 2023-02-06 ENCOUNTER — OFFICE VISIT (OUTPATIENT)
Dept: SPINE | Facility: CLINIC | Age: 53
End: 2023-02-06
Payer: COMMERCIAL

## 2023-02-06 VITALS — BODY MASS INDEX: 29.45 KG/M2 | HEIGHT: 61 IN | WEIGHT: 156 LBS

## 2023-02-06 DIAGNOSIS — M54.42 CHRONIC BILATERAL LOW BACK PAIN WITH LEFT-SIDED SCIATICA: Primary | ICD-10-CM

## 2023-02-06 DIAGNOSIS — G89.29 CHRONIC BILATERAL LOW BACK PAIN WITH LEFT-SIDED SCIATICA: Primary | ICD-10-CM

## 2023-02-06 DIAGNOSIS — M75.111 INCOMPLETE TEAR OF RIGHT ROTATOR CUFF: ICD-10-CM

## 2023-02-06 DIAGNOSIS — M47.816 LUMBAR SPONDYLOSIS: ICD-10-CM

## 2023-02-06 PROCEDURE — 99213 OFFICE O/P EST LOW 20 MIN: CPT | Mod: S$GLB,,, | Performed by: PHYSICAL MEDICINE & REHABILITATION

## 2023-02-06 PROCEDURE — 1159F PR MEDICATION LIST DOCUMENTED IN MEDICAL RECORD: ICD-10-PCS | Mod: CPTII,S$GLB,, | Performed by: PHYSICAL MEDICINE & REHABILITATION

## 2023-02-06 PROCEDURE — 99213 PR OFFICE/OUTPT VISIT, EST, LEVL III, 20-29 MIN: ICD-10-PCS | Mod: S$GLB,,, | Performed by: PHYSICAL MEDICINE & REHABILITATION

## 2023-02-06 PROCEDURE — 3008F PR BODY MASS INDEX (BMI) DOCUMENTED: ICD-10-PCS | Mod: CPTII,S$GLB,, | Performed by: PHYSICAL MEDICINE & REHABILITATION

## 2023-02-06 PROCEDURE — 3008F BODY MASS INDEX DOCD: CPT | Mod: CPTII,S$GLB,, | Performed by: PHYSICAL MEDICINE & REHABILITATION

## 2023-02-06 PROCEDURE — 1159F MED LIST DOCD IN RCRD: CPT | Mod: CPTII,S$GLB,, | Performed by: PHYSICAL MEDICINE & REHABILITATION

## 2023-02-06 PROCEDURE — 4010F ACE/ARB THERAPY RXD/TAKEN: CPT | Mod: CPTII,S$GLB,, | Performed by: PHYSICAL MEDICINE & REHABILITATION

## 2023-02-06 PROCEDURE — 1160F RVW MEDS BY RX/DR IN RCRD: CPT | Mod: CPTII,S$GLB,, | Performed by: PHYSICAL MEDICINE & REHABILITATION

## 2023-02-06 PROCEDURE — 4010F PR ACE/ARB THEARPY RXD/TAKEN: ICD-10-PCS | Mod: CPTII,S$GLB,, | Performed by: PHYSICAL MEDICINE & REHABILITATION

## 2023-02-06 PROCEDURE — 1160F PR REVIEW ALL MEDS BY PRESCRIBER/CLIN PHARMACIST DOCUMENTED: ICD-10-PCS | Mod: CPTII,S$GLB,, | Performed by: PHYSICAL MEDICINE & REHABILITATION

## 2023-02-06 RX ORDER — OXYCODONE AND ACETAMINOPHEN 5; 325 MG/1; MG/1
1 TABLET ORAL EVERY 6 HOURS PRN
Qty: 28 TABLET | Refills: 0 | Status: SHIPPED | OUTPATIENT
Start: 2023-02-06 | End: 2023-02-13 | Stop reason: SDUPTHER

## 2023-02-06 RX ORDER — HYDROCODONE BITARTRATE AND ACETAMINOPHEN 5; 325 MG/1; MG/1
1 TABLET ORAL EVERY 6 HOURS PRN
Qty: 28 TABLET | Refills: 0 | OUTPATIENT
Start: 2023-02-06

## 2023-02-06 RX ORDER — TIZANIDINE 2 MG/1
4 TABLET ORAL EVERY 6 HOURS PRN
Qty: 90 TABLET | Refills: 3 | Status: SHIPPED | OUTPATIENT
Start: 2023-02-06 | End: 2023-02-16

## 2023-02-06 NOTE — PROGRESS NOTES
SUBJECTIVE:    Patient ID: Jacqui Cruz is a 52 y.o. female.    Chief Complaint: Back Pain    She presents today with a primary complaint of left anterior leg discomfort.  She fell down several stairs at the end of last week and suffered an abrasion to the left anterior leg below the knee.  Her back pain has not worsened since the fall.  She is able to bear weight on the left leg.  She is scheduled to have radiofrequency ablation the lower lumbar facet joints next month.  She says hydrocodone is not controlling her pain and it causes an itch.  She is been able to tolerate Percocet in the past without the itching.  She also needs refills on Zanaflex.  Pain level is 10/10        Past Medical History:   Diagnosis Date    Asthma     Hypertension      Social History     Socioeconomic History    Marital status:    Tobacco Use    Smoking status: Never    Smokeless tobacco: Never   Substance and Sexual Activity    Alcohol use: No    Drug use: No    Sexual activity: Not Currently     Partners: Male     Birth control/protection: None     Social Determinants of Health     Financial Resource Strain: Medium Risk    Difficulty of Paying Living Expenses: Somewhat hard   Food Insecurity: Food Insecurity Present    Worried About Running Out of Food in the Last Year: Sometimes true    Ran Out of Food in the Last Year: Often true   Transportation Needs: No Transportation Needs    Lack of Transportation (Medical): No    Lack of Transportation (Non-Medical): No   Physical Activity: Insufficiently Active    Days of Exercise per Week: 2 days    Minutes of Exercise per Session: 20 min   Stress: No Stress Concern Present    Feeling of Stress : Not at all   Social Connections: Unknown    Frequency of Communication with Friends and Family: Twice a week    Frequency of Social Gatherings with Friends and Family: Once a week    Active Member of Clubs or Organizations: No    Attends Club or Organization Meetings: Never    Marital  "Status:    Housing Stability: High Risk    Unable to Pay for Housing in the Last Year: Yes    Number of Places Lived in the Last Year: 1    Unstable Housing in the Last Year: No     Past Surgical History:   Procedure Laterality Date    BREAST BIOPSY Left 2012    Benign calcifications    BREAST SURGERY      biopsy    DILATION AND CURETTAGE OF UTERUS      INJECTION OF ANESTHETIC AGENT AROUND MEDIAL BRANCH NERVES INNERVATING LUMBAR FACET JOINT Right 11/22/2022    Procedure: Block-nerve-medial branch-lumbar;  Surgeon: Alex Shepard MD;  Location: [facility] OR;  Service: Pain Management;  Laterality: Right;  L3,4,5 MBB    INJECTION OF ANESTHETIC AGENT AROUND MEDIAL BRANCH NERVES INNERVATING LUMBAR FACET JOINT Right 2/2/2023    Procedure: Block-nerve-medial branch-lumbar;  Surgeon: Alex Shepard MD;  Location: [facility] OR;  Service: Pain Management;  Laterality: Right;  L3,4,5  #2 MBB Rt  Deni    TRANSFORAMINAL EPIDURAL INJECTION OF STEROID Left 9/15/2022    Procedure: Injection,steroid,epidural,transforaminal approach;  Surgeon: Alex Shepard MD;  Location: [facility] OR;  Service: Pain Management;  Laterality: Left;  l4-5 and L5-s1      Family History   Problem Relation Age of Onset    Hypertension Mother     Cancer Maternal Aunt         cervical     Vitals:    02/06/23 1023   Weight: 70.8 kg (156 lb)   Height: 5' 1" (1.549 m)       Review of Systems   Constitutional:  Negative for chills, diaphoresis, fatigue, fever and unexpected weight change.   HENT:  Negative for trouble swallowing.    Eyes:  Negative for visual disturbance.   Respiratory:  Negative for shortness of breath.    Cardiovascular:  Negative for chest pain.   Gastrointestinal:  Negative for abdominal pain, constipation, nausea and vomiting.   Genitourinary:  Negative for difficulty urinating.   Musculoskeletal:  Negative for arthralgias, back pain, gait problem, joint swelling, myalgias, neck pain and neck stiffness.   Neurological:  Negative for dizziness, speech " difficulty, weakness, light-headedness, numbness and headaches.        Objective:      Physical Exam  Neurological:      Mental Status: She is alert and oriented to person, place, and time.      Comments: With the nurse in the room we looked at her left leg.  She has a superficial abrasion anterior portion of the left leg and some mild bruising over the shin.  No evidence of infection.           Assessment:       1. Chronic bilateral low back pain with left-sided sciatica    2. Incomplete tear of right rotator cuff             Plan:     The abrasion can be managed with topical antibiotic ointment.  Proceed with radiofrequency ablation as scheduled.  I agreed to change her pain medication to Percocet 5 mg and refilled Zanaflex.  I continue to discourage long-term use of Percocet or any other controlled substance for back pain      Chronic bilateral low back pain with left-sided sciatica  -     oxyCODONE-acetaminophen (PERCOCET) 5-325 mg per tablet; Take 1 tablet by mouth every 6 (six) hours as needed for Pain.  Dispense: 28 tablet; Refill: 0    Incomplete tear of right rotator cuff  -     tiZANidine (ZANAFLEX) 2 MG tablet; Take 2 tablets (4 mg total) by mouth every 6 (six) hours as needed (Pain).  Dispense: 90 tablet; Refill: 3

## 2023-02-07 ENCOUNTER — PATIENT OUTREACH (OUTPATIENT)
Dept: ADMINISTRATIVE | Facility: HOSPITAL | Age: 53
End: 2023-02-07
Payer: COMMERCIAL

## 2023-02-07 DIAGNOSIS — Z12.11 SCREEN FOR COLON CANCER: Primary | ICD-10-CM

## 2023-02-07 NOTE — PROGRESS NOTES
Received message via [facility] in box that patient would like to schedule colonoscopy.   Case request entered for colonoscopy

## 2023-02-07 NOTE — TELEPHONE ENCOUNTER
----- Message from Telma Duncan MA sent at 2/7/2023  8:43 AM CST -----  Contact: patient  Type: Needs Medical Advice  Who Called:  patient    Best Call Back Number: [phone]    Additional Information: Patient would like for a nurse to call her back regarding her upcoming procedure. She is concerned that she may need to reschedule this and would like to talk to a nurse. Thanks!

## 2023-02-07 NOTE — TELEPHONE ENCOUNTER
Pt rescheduled procedure per pt request to 3/21/23. Surgical center notified . Wants Dr Cheema's office to call her , wants to discuss getting short term disablilty extended

## 2023-02-09 ENCOUNTER — PATIENT MESSAGE (OUTPATIENT)
Dept: GASTROENTEROLOGY | Facility: CLINIC | Age: 53
End: 2023-02-09
Payer: COMMERCIAL

## 2023-02-09 ENCOUNTER — TELEPHONE (OUTPATIENT)
Dept: GASTROENTEROLOGY | Facility: CLINIC | Age: 53
End: 2023-02-09
Payer: COMMERCIAL

## 2023-02-14 ENCOUNTER — PATIENT MESSAGE (OUTPATIENT)
Dept: SPINE | Facility: CLINIC | Age: 53
End: 2023-02-14
Payer: COMMERCIAL

## 2023-02-16 ENCOUNTER — TELEPHONE (OUTPATIENT)
Dept: SPINE | Facility: CLINIC | Age: 53
End: 2023-02-16
Payer: COMMERCIAL

## 2023-02-17 ENCOUNTER — PATIENT MESSAGE (OUTPATIENT)
Dept: SPINE | Facility: CLINIC | Age: 53
End: 2023-02-17
Payer: COMMERCIAL

## 2023-02-17 DIAGNOSIS — G89.29 CHRONIC BILATERAL LOW BACK PAIN WITH LEFT-SIDED SCIATICA: ICD-10-CM

## 2023-02-17 DIAGNOSIS — M54.42 CHRONIC BILATERAL LOW BACK PAIN WITH LEFT-SIDED SCIATICA: ICD-10-CM

## 2023-02-17 RX ORDER — OXYCODONE AND ACETAMINOPHEN 5; 325 MG/1; MG/1
1 TABLET ORAL EVERY 6 HOURS PRN
Qty: 28 TABLET | Refills: 0 | Status: SHIPPED | OUTPATIENT
Start: 2023-02-20 | End: 2023-02-26 | Stop reason: SDUPTHER

## 2023-02-20 ENCOUNTER — PATIENT MESSAGE (OUTPATIENT)
Dept: SPINE | Facility: CLINIC | Age: 53
End: 2023-02-20
Payer: COMMERCIAL

## 2023-02-28 ENCOUNTER — OFFICE VISIT (OUTPATIENT)
Dept: URGENT CARE | Facility: CLINIC | Age: 53
End: 2023-02-28
Payer: COMMERCIAL

## 2023-02-28 VITALS
BODY MASS INDEX: 26.75 KG/M2 | HEART RATE: 101 BPM | DIASTOLIC BLOOD PRESSURE: 71 MMHG | OXYGEN SATURATION: 98 % | RESPIRATION RATE: 20 BRPM | HEIGHT: 63 IN | WEIGHT: 151 LBS | SYSTOLIC BLOOD PRESSURE: 100 MMHG | TEMPERATURE: 98 F

## 2023-02-28 DIAGNOSIS — U07.1 COVID-19: ICD-10-CM

## 2023-02-28 DIAGNOSIS — R05.9 COUGH, UNSPECIFIED TYPE: Primary | ICD-10-CM

## 2023-02-28 LAB
CTP QC/QA: YES
CTP QC/QA: YES
FLUAV AG NPH QL: NEGATIVE
FLUBV AG NPH QL: NEGATIVE
SARS-COV-2 AG RESP QL IA.RAPID: POSITIVE

## 2023-02-28 PROCEDURE — 4010F PR ACE/ARB THEARPY RXD/TAKEN: ICD-10-PCS | Mod: CPTII,S$GLB,, | Performed by: STUDENT IN AN ORGANIZED HEALTH CARE EDUCATION/TRAINING PROGRAM

## 2023-02-28 PROCEDURE — 87804 INFLUENZA ASSAY W/OPTIC: CPT | Mod: QW,,, | Performed by: STUDENT IN AN ORGANIZED HEALTH CARE EDUCATION/TRAINING PROGRAM

## 2023-02-28 PROCEDURE — 99214 OFFICE O/P EST MOD 30 MIN: CPT | Mod: S$GLB,,, | Performed by: STUDENT IN AN ORGANIZED HEALTH CARE EDUCATION/TRAINING PROGRAM

## 2023-02-28 PROCEDURE — 3074F PR MOST RECENT SYSTOLIC BLOOD PRESSURE < 130 MM HG: ICD-10-PCS | Mod: CPTII,S$GLB,, | Performed by: STUDENT IN AN ORGANIZED HEALTH CARE EDUCATION/TRAINING PROGRAM

## 2023-02-28 PROCEDURE — 3008F BODY MASS INDEX DOCD: CPT | Mod: CPTII,S$GLB,, | Performed by: STUDENT IN AN ORGANIZED HEALTH CARE EDUCATION/TRAINING PROGRAM

## 2023-02-28 PROCEDURE — 87811 SARS-COV-2 COVID19 W/OPTIC: CPT | Mod: QW,S$GLB,, | Performed by: STUDENT IN AN ORGANIZED HEALTH CARE EDUCATION/TRAINING PROGRAM

## 2023-02-28 PROCEDURE — 1159F MED LIST DOCD IN RCRD: CPT | Mod: CPTII,S$GLB,, | Performed by: STUDENT IN AN ORGANIZED HEALTH CARE EDUCATION/TRAINING PROGRAM

## 2023-02-28 PROCEDURE — 3074F SYST BP LT 130 MM HG: CPT | Mod: CPTII,S$GLB,, | Performed by: STUDENT IN AN ORGANIZED HEALTH CARE EDUCATION/TRAINING PROGRAM

## 2023-02-28 PROCEDURE — 87804 POCT INFLUENZA A/B: ICD-10-PCS | Mod: QW,,, | Performed by: STUDENT IN AN ORGANIZED HEALTH CARE EDUCATION/TRAINING PROGRAM

## 2023-02-28 PROCEDURE — 4010F ACE/ARB THERAPY RXD/TAKEN: CPT | Mod: CPTII,S$GLB,, | Performed by: STUDENT IN AN ORGANIZED HEALTH CARE EDUCATION/TRAINING PROGRAM

## 2023-02-28 PROCEDURE — 3008F PR BODY MASS INDEX (BMI) DOCUMENTED: ICD-10-PCS | Mod: CPTII,S$GLB,, | Performed by: STUDENT IN AN ORGANIZED HEALTH CARE EDUCATION/TRAINING PROGRAM

## 2023-02-28 PROCEDURE — 1159F PR MEDICATION LIST DOCUMENTED IN MEDICAL RECORD: ICD-10-PCS | Mod: CPTII,S$GLB,, | Performed by: STUDENT IN AN ORGANIZED HEALTH CARE EDUCATION/TRAINING PROGRAM

## 2023-02-28 PROCEDURE — 3078F PR MOST RECENT DIASTOLIC BLOOD PRESSURE < 80 MM HG: ICD-10-PCS | Mod: CPTII,S$GLB,, | Performed by: STUDENT IN AN ORGANIZED HEALTH CARE EDUCATION/TRAINING PROGRAM

## 2023-02-28 PROCEDURE — 3078F DIAST BP <80 MM HG: CPT | Mod: CPTII,S$GLB,, | Performed by: STUDENT IN AN ORGANIZED HEALTH CARE EDUCATION/TRAINING PROGRAM

## 2023-02-28 PROCEDURE — 99214 PR OFFICE/OUTPT VISIT, EST, LEVL IV, 30-39 MIN: ICD-10-PCS | Mod: S$GLB,,, | Performed by: STUDENT IN AN ORGANIZED HEALTH CARE EDUCATION/TRAINING PROGRAM

## 2023-02-28 PROCEDURE — 87811 SARS CORONAVIRUS 2 ANTIGEN POCT, MANUAL READ: ICD-10-PCS | Mod: QW,S$GLB,, | Performed by: STUDENT IN AN ORGANIZED HEALTH CARE EDUCATION/TRAINING PROGRAM

## 2023-02-28 RX ORDER — LEVOCETIRIZINE DIHYDROCHLORIDE 5 MG/1
5 TABLET, FILM COATED ORAL NIGHTLY
Qty: 30 TABLET | Refills: 0 | Status: SHIPPED | OUTPATIENT
Start: 2023-02-28 | End: 2024-02-28

## 2023-02-28 RX ORDER — GUAIFENESIN 600 MG/1
1200 TABLET, EXTENDED RELEASE ORAL 2 TIMES DAILY
Qty: 30 TABLET | Refills: 0 | Status: SHIPPED | OUTPATIENT
Start: 2023-02-28 | End: 2024-02-12

## 2023-02-28 RX ORDER — PROMETHAZINE HYDROCHLORIDE AND DEXTROMETHORPHAN HYDROBROMIDE 6.25; 15 MG/5ML; MG/5ML
5 SYRUP ORAL EVERY 4 HOURS PRN
Qty: 118 ML | Refills: 0 | Status: SHIPPED | OUTPATIENT
Start: 2023-02-28 | End: 2023-03-10

## 2023-02-28 NOTE — PROGRESS NOTES
"Subjective:       Patient ID: Jacqui Cruz is a 52 y.o. female.    Vitals:  height is 5' 3" (1.6 m) and weight is 68.5 kg (151 lb). Her temperature is 98.1 °F (36.7 °C). Her blood pressure is 100/71 and her pulse is 101. Her respiration is 20 and oxygen saturation is 98%.     Chief Complaint: Cough    Pt states" c/o fever, sweats, cough hurts throat, bilateral ear pain that has been going on for 3 days."  Ambulatory to room with complaints of subjective fever, night sweats, cough congestion bilateral ear pain sore throat and sinus pressure.  States had COVID vaccine and booster.  Denies nausea vomiting diarrhea, denies chest pain or palpitations, denies exacerbating or remitting factors.      Constitution: Negative.   HENT:  Positive for ear pain, congestion, sinus pressure and sore throat.    Neck: neck negative.   Cardiovascular: Negative.    Eyes: Negative.    Respiratory:  Positive for cough.    Gastrointestinal: Negative.    Genitourinary: Negative.    Musculoskeletal: Negative.    Skin: Negative.    Allergic/Immunologic: Negative.    Neurological: Negative.    Psychiatric/Behavioral: Negative.       Objective:      Physical Exam   Constitutional: She is oriented to person, place, and time. She appears well-developed. She is cooperative.  Non-toxic appearance. She does not appear ill. No distress.   HENT:   Head: Normocephalic and atraumatic.   Ears:   Right Ear: Hearing, tympanic membrane, external ear and ear canal normal.   Left Ear: Hearing, tympanic membrane, external ear and ear canal normal.   Nose: Rhinorrhea and congestion present. No mucosal edema or nasal deformity. No epistaxis. Right sinus exhibits no maxillary sinus tenderness and no frontal sinus tenderness. Left sinus exhibits no maxillary sinus tenderness and no frontal sinus tenderness.   Mouth/Throat: Uvula is midline and mucous membranes are normal. No trismus in the jaw. Normal dentition. No uvula swelling. Posterior oropharyngeal " erythema present. No oropharyngeal exudate or posterior oropharyngeal edema.   Eyes: Conjunctivae and lids are normal. No scleral icterus.   Neck: Trachea normal and phonation normal. Neck supple. No edema present. No erythema present. No neck rigidity present.   Cardiovascular: Normal rate, regular rhythm, normal heart sounds and normal pulses.   Pulmonary/Chest: Effort normal and breath sounds normal. No respiratory distress. She has no decreased breath sounds. She has no rhonchi.   Abdominal: Normal appearance.   Musculoskeletal: Normal range of motion.         General: No deformity. Normal range of motion.   Neurological: She is alert and oriented to person, place, and time. She exhibits normal muscle tone. Coordination normal.   Skin: Skin is warm, dry, intact, not diaphoretic and not pale.   Psychiatric: Her speech is normal and behavior is normal. Judgment and thought content normal.   Nursing note and vitals reviewed.      Assessment:       1. Cough, unspecified type    2. COVID-19          Plan:         Cough, unspecified type  -     SARS Coronavirus 2 Antigen, POCT Manual Read  -     POCT Influenza A/B Rapid Antigen    COVID-19    Other orders  -     levocetirizine (XYZAL) 5 MG tablet; Take 1 tablet (5 mg total) by mouth every evening.  Dispense: 30 tablet; Refill: 0  -     guaiFENesin (MUCINEX) 600 mg 12 hr tablet; Take 2 tablets (1,200 mg total) by mouth 2 (two) times daily.  Dispense: 30 tablet; Refill: 0  -     promethazine-dextromethorphan (PROMETHAZINE-DM) 6.25-15 mg/5 mL Syrp; Take 5 mLs by mouth every 4 (four) hours as needed.  Dispense: 118 mL; Refill: 0         Flu negative, COVID positive.  Discussed symptomatic treatment.

## 2023-02-28 NOTE — PATIENT INSTRUCTIONS
Thankyou for the opportunity to care for you today.  Please take all medications as directed, continue any previous prescribed medications unless we specifically discussed holding them.  If your symptoms do not resolve or worsen please return to the clinic for re-evaluation, if your situation becomes emergent please present to to the nearest emergency department.  Follow-up with your PCP for continued evaluation and management.  You tested positive for COVID-19.  Increase your fluid intake.  You should self quarantine and avoid contact with anyone outside of your household for 5 days from symptom onset or 24 hours past your last fever, whichever is longer.

## 2023-03-05 ENCOUNTER — PATIENT MESSAGE (OUTPATIENT)
Dept: SPINE | Facility: CLINIC | Age: 53
End: 2023-03-05
Payer: COMMERCIAL

## 2023-03-16 ENCOUNTER — PATIENT MESSAGE (OUTPATIENT)
Dept: SPINE | Facility: CLINIC | Age: 53
End: 2023-03-16
Payer: COMMERCIAL

## 2023-03-20 ENCOUNTER — TELEPHONE (OUTPATIENT)
Dept: PAIN MEDICINE | Facility: CLINIC | Age: 53
End: 2023-03-20
Payer: COMMERCIAL

## 2023-03-20 NOTE — TELEPHONE ENCOUNTER
----- Message from Catherine Dudley sent at 3/20/2023 10:20 AM CDT -----  Contact: pt at [phone]  Type: Needs Medical Advice  Who Called:  patient  Symptoms (please be specific):  procedure for tomorrow    Best Call Back Number: [phone]    Additional Information: Patient is calling to verify that the procedure done tomorrow is authorized to be paid in her network. Please call back to advise. Thank you.

## 2023-03-20 NOTE — TELEPHONE ENCOUNTER
Asking financial dept if pt is good to go she is authorized but I am unsure about her out of network

## 2023-03-21 ENCOUNTER — HOSPITAL ENCOUNTER (OUTPATIENT)
Facility: AMBULARY SURGERY CENTER | Age: 53
Discharge: HOME | End: 2023-03-21
Attending: ANESTHESIOLOGY | Admitting: ANESTHESIOLOGY
Payer: COMMERCIAL

## 2023-03-21 DIAGNOSIS — M47.896 OTHER SPONDYLOSIS, LUMBAR REGION: ICD-10-CM

## 2023-03-21 PROCEDURE — 64636 DESTROY L/S FACET JNT ADDL: CPT | Mod: RT,,, | Performed by: ANESTHESIOLOGY

## 2023-03-21 PROCEDURE — 64635 DESTROY LUMB/SAC FACET JNT: CPT | Mod: RT | Performed by: ANESTHESIOLOGY

## 2023-03-21 PROCEDURE — 64635 PR DESTROY LUMB/SAC FACET JNT: ICD-10-PCS | Mod: RT,,, | Performed by: ANESTHESIOLOGY

## 2023-03-21 PROCEDURE — 64636 DESTROY L/S FACET JNT ADDL: CPT | Mod: RT | Performed by: ANESTHESIOLOGY

## 2023-03-21 PROCEDURE — 64635 DESTROY LUMB/SAC FACET JNT: CPT | Mod: RT,,, | Performed by: ANESTHESIOLOGY

## 2023-03-21 PROCEDURE — 64636 PR DESTROY L/S FACET JNT ADDL: ICD-10-PCS | Mod: RT,,, | Performed by: ANESTHESIOLOGY

## 2023-03-21 RX ORDER — BUPIVACAINE HYDROCHLORIDE 2.5 MG/ML
INJECTION, SOLUTION EPIDURAL; INFILTRATION; INTRACAUDAL
Status: DISCONTINUED | OUTPATIENT
Start: 2023-03-21 | End: 2023-03-21 | Stop reason: HOSPADM

## 2023-03-21 RX ORDER — FENTANYL CITRATE 50 UG/ML
INJECTION, SOLUTION INTRAMUSCULAR; INTRAVENOUS
Status: DISCONTINUED | OUTPATIENT
Start: 2023-03-21 | End: 2023-03-21 | Stop reason: HOSPADM

## 2023-03-21 RX ORDER — METHYLPREDNISOLONE ACETATE 80 MG/ML
INJECTION, SUSPENSION INTRA-ARTICULAR; INTRALESIONAL; INTRAMUSCULAR; SOFT TISSUE
Status: DISCONTINUED | OUTPATIENT
Start: 2023-03-21 | End: 2023-03-21 | Stop reason: HOSPADM

## 2023-03-21 RX ORDER — LIDOCAINE HYDROCHLORIDE 10 MG/ML
INJECTION, SOLUTION EPIDURAL; INFILTRATION; INTRACAUDAL; PERINEURAL
Status: DISCONTINUED | OUTPATIENT
Start: 2023-03-21 | End: 2023-03-21 | Stop reason: HOSPADM

## 2023-03-21 RX ORDER — LIDOCAINE HYDROCHLORIDE 20 MG/ML
INJECTION, SOLUTION EPIDURAL; INFILTRATION; INTRACAUDAL; PERINEURAL
Status: DISCONTINUED | OUTPATIENT
Start: 2023-03-21 | End: 2023-03-21 | Stop reason: HOSPADM

## 2023-03-21 RX ORDER — SODIUM CHLORIDE, SODIUM LACTATE, POTASSIUM CHLORIDE, CALCIUM CHLORIDE 600; 310; 30; 20 MG/100ML; MG/100ML; MG/100ML; MG/100ML
INJECTION, SOLUTION INTRAVENOUS ONCE AS NEEDED
Status: COMPLETED | OUTPATIENT
Start: 2023-03-21 | End: 2023-03-21

## 2023-03-21 RX ORDER — MIDAZOLAM HYDROCHLORIDE 1 MG/ML
INJECTION INTRAMUSCULAR; INTRAVENOUS
Status: DISCONTINUED | OUTPATIENT
Start: 2023-03-21 | End: 2023-03-21 | Stop reason: HOSPADM

## 2023-03-21 RX ADMIN — SODIUM CHLORIDE, SODIUM LACTATE, POTASSIUM CHLORIDE, CALCIUM CHLORIDE: 600; 310; 30; 20 INJECTION, SOLUTION INTRAVENOUS at 09:03

## 2023-03-21 NOTE — DISCHARGE SUMMARY
[facility]  Discharge Note  Short Stay    Procedure(s) (LRB):  Radiofrequency Ablation, Nerve, Spinal, Lumbar, Medial Branch, 1 Level (Right)      OUTCOME: Patient tolerated treatment/procedure well without complication and is now ready for discharge.    DISPOSITION: Home or Self Care    FINAL DIAGNOSIS:  <principal problem not specified>    FOLLOWUP: In clinic    DISCHARGE INSTRUCTIONS:    Discharge Procedure Orders   Notify your health care provider if you experience any of the following:  temperature >100.4     Notify your health care provider if you experience any of the following:  severe uncontrolled pain     Notify your health care provider if you experience any of the following:  redness, tenderness, or signs of infection (pain, swelling, redness, odor or green/yellow discharge around incision site)     Activity as tolerated        TIME SPENT ON DISCHARGE:   30 minutes

## 2023-03-21 NOTE — OP NOTE
PROCEDURE DATE: 3/21/2023    PROCEDURE:  Radiofrequency ablation of the L3,4,5 medial branch nerves on the right-side utilizing fluoroscopy (corresponds to right L4/5 and L5/S1 facets)    DIAGNOSIS:  Other lumbar spondylosis  Post op Diagnosis: Same    PHYSICIAN: Alex Shepard MD    MEDICATIONS INJECTED:  From a mixture of 6ml of 0.25% bupivicaine and 80mg of methylprednisone,  1ml of this solution was injected at each level.    LOCAL ANESTHETIC USED: Lidocaine 1%, 2 ml given at each site.    SEDATION MEDICATIONS: RN IV sedation    ESTIMATED BLOOD LOSS:  none    COMPLICATIONS:  None    TECHNIQUE:  A time out was taken to identify patient and procedure side prior to starting the procedure. Laying in a prone position, the patient was prepped and draped in the usual sterile fashion using ChloraPrep and sterile towels.  The levels were determined under fluoroscopic guidance and then marked.  Local anesthetic was given by raising a wheal at the skin over each site and then infiltrated approximately 2cm deeper.  A 20-gauge  100 mm RF needle was introduced to the anatomic location of the right L3,4,5 medial branch nerves. Motor stimulation up to 2 Volts at each level confirmed no motor nerve involvement.  Impedance was less than 800 ohms at each level.  1ml of 2% lidocaine was instilled prior to lesioning.  Ablation was performed per level utilizing radiofrequency generator 80°C for 60 seconds.  Needles were then rotated 90° and a second lesion was performed.  The above noted medication was then injected slowly. The patient tolerated the procedure well.     The patient was monitored after the procedure.  Patient was given post procedure and discharge instructions to follow at home.  The patient was discharged in a stable condition

## 2023-03-22 VITALS
DIASTOLIC BLOOD PRESSURE: 74 MMHG | BODY MASS INDEX: 26.75 KG/M2 | TEMPERATURE: 98 F | RESPIRATION RATE: 20 BRPM | HEART RATE: 97 BPM | WEIGHT: 151 LBS | HEIGHT: 63 IN | OXYGEN SATURATION: 99 % | SYSTOLIC BLOOD PRESSURE: 157 MMHG

## 2023-03-22 NOTE — PROGRESS NOTES
Stated still having some pain in soreness. Instructed on waiting on nerves to settle down and steroid to kick in. Pt stated understanding.

## 2023-04-03 ENCOUNTER — PATIENT MESSAGE (OUTPATIENT)
Dept: PAIN MEDICINE | Facility: CLINIC | Age: 53
End: 2023-04-03
Payer: COMMERCIAL

## 2023-04-11 ENCOUNTER — PATIENT MESSAGE (OUTPATIENT)
Dept: ADMINISTRATIVE | Facility: HOSPITAL | Age: 53
End: 2023-04-11
Payer: COMMERCIAL

## 2023-04-17 ENCOUNTER — TELEPHONE (OUTPATIENT)
Dept: PAIN MEDICINE | Facility: CLINIC | Age: 53
End: 2023-04-17
Payer: COMMERCIAL

## 2023-04-17 ENCOUNTER — PATIENT MESSAGE (OUTPATIENT)
Dept: PAIN MEDICINE | Facility: CLINIC | Age: 53
End: 2023-04-17
Payer: COMMERCIAL

## 2023-04-17 DIAGNOSIS — M54.42 CHRONIC BILATERAL LOW BACK PAIN WITH LEFT-SIDED SCIATICA: ICD-10-CM

## 2023-04-17 DIAGNOSIS — G89.29 CHRONIC BILATERAL LOW BACK PAIN WITH LEFT-SIDED SCIATICA: ICD-10-CM

## 2023-04-17 RX ORDER — OXYCODONE AND ACETAMINOPHEN 5; 325 MG/1; MG/1
1 TABLET ORAL EVERY 6 HOURS PRN
Qty: 28 TABLET | Refills: 0 | Status: SHIPPED | OUTPATIENT
Start: 2023-04-17 | End: 2023-04-24 | Stop reason: SDUPTHER

## 2023-04-17 NOTE — TELEPHONE ENCOUNTER
----- Message from Sabra Perez sent at 4/17/2023 12:59 PM CDT -----  Contact: [phone]  Type:  RX Refill Request    Who Called:  Pt   Refill or New Rx:  refill  RX Name and Strength:  oxyCODONE-acetaminophen (PERCOCET) 5-325 mg per tablet  How is the patient currently taking it? (ex. 1XDay):  4xday   Is this a 30 day or 90 day RX:  30  Preferred Pharmacy with phone number:    [facility] [address] [place] [address] [facility] [address] [facility]  [address] [facility] [address]  Phone: [phone] Fax: [phone]      Local or Mail Order:  local   Ordering Provider:  Deni Cameron Call Back Number:  [phone]

## 2023-04-24 DIAGNOSIS — M54.42 CHRONIC BILATERAL LOW BACK PAIN WITH LEFT-SIDED SCIATICA: ICD-10-CM

## 2023-04-24 DIAGNOSIS — G89.29 CHRONIC BILATERAL LOW BACK PAIN WITH LEFT-SIDED SCIATICA: ICD-10-CM

## 2023-04-24 RX ORDER — OXYCODONE AND ACETAMINOPHEN 5; 325 MG/1; MG/1
1 TABLET ORAL EVERY 6 HOURS PRN
Qty: 28 TABLET | Refills: 0 | Status: SHIPPED | OUTPATIENT
Start: 2023-04-24 | End: 2023-05-01 | Stop reason: SDUPTHER

## 2023-05-01 ENCOUNTER — PATIENT MESSAGE (OUTPATIENT)
Dept: FAMILY MEDICINE | Facility: CLINIC | Age: 53
End: 2023-05-01
Payer: COMMERCIAL

## 2023-05-01 ENCOUNTER — OFFICE VISIT (OUTPATIENT)
Dept: SPINE | Facility: CLINIC | Age: 53
End: 2023-05-01
Payer: COMMERCIAL

## 2023-05-01 ENCOUNTER — PATIENT MESSAGE (OUTPATIENT)
Dept: FAMILY MEDICINE | Facility: CLINIC | Age: 53
End: 2023-05-01

## 2023-05-01 ENCOUNTER — E-VISIT (OUTPATIENT)
Dept: FAMILY MEDICINE | Facility: CLINIC | Age: 53
End: 2023-05-01
Payer: COMMERCIAL

## 2023-05-01 ENCOUNTER — TELEPHONE (OUTPATIENT)
Dept: PAIN MEDICINE | Facility: CLINIC | Age: 53
End: 2023-05-01
Payer: COMMERCIAL

## 2023-05-01 VITALS — WEIGHT: 151 LBS | BODY MASS INDEX: 26.75 KG/M2 | HEIGHT: 63 IN

## 2023-05-01 DIAGNOSIS — G89.29 CHRONIC BILATERAL LOW BACK PAIN WITH LEFT-SIDED SCIATICA: Primary | ICD-10-CM

## 2023-05-01 DIAGNOSIS — M54.42 CHRONIC BILATERAL LOW BACK PAIN WITH LEFT-SIDED SCIATICA: ICD-10-CM

## 2023-05-01 DIAGNOSIS — M54.16 LUMBAR RADICULOPATHY: Primary | ICD-10-CM

## 2023-05-01 DIAGNOSIS — M54.42 CHRONIC BILATERAL LOW BACK PAIN WITH LEFT-SIDED SCIATICA: Primary | ICD-10-CM

## 2023-05-01 DIAGNOSIS — B00.1 COLD SORE: Primary | ICD-10-CM

## 2023-05-01 DIAGNOSIS — G89.29 CHRONIC BILATERAL LOW BACK PAIN WITH LEFT-SIDED SCIATICA: ICD-10-CM

## 2023-05-01 PROCEDURE — 1159F PR MEDICATION LIST DOCUMENTED IN MEDICAL RECORD: ICD-10-PCS | Mod: CPTII,S$GLB,, | Performed by: PHYSICAL MEDICINE & REHABILITATION

## 2023-05-01 PROCEDURE — 3008F PR BODY MASS INDEX (BMI) DOCUMENTED: ICD-10-PCS | Mod: CPTII,S$GLB,, | Performed by: PHYSICAL MEDICINE & REHABILITATION

## 2023-05-01 PROCEDURE — 1160F RVW MEDS BY RX/DR IN RCRD: CPT | Mod: CPTII,S$GLB,, | Performed by: PHYSICAL MEDICINE & REHABILITATION

## 2023-05-01 PROCEDURE — 3008F BODY MASS INDEX DOCD: CPT | Mod: CPTII,S$GLB,, | Performed by: PHYSICAL MEDICINE & REHABILITATION

## 2023-05-01 PROCEDURE — 99421 PR E&M, ONLINE DIGIT, EST, < 7 DAYS, 5-10 MINS: ICD-10-PCS | Mod: 95,,, | Performed by: PHYSICIAN ASSISTANT

## 2023-05-01 PROCEDURE — 99421 OL DIG E/M SVC 5-10 MIN: CPT | Mod: 95,,, | Performed by: PHYSICIAN ASSISTANT

## 2023-05-01 PROCEDURE — 1160F PR REVIEW ALL MEDS BY PRESCRIBER/CLIN PHARMACIST DOCUMENTED: ICD-10-PCS | Mod: CPTII,S$GLB,, | Performed by: PHYSICAL MEDICINE & REHABILITATION

## 2023-05-01 PROCEDURE — 4010F ACE/ARB THERAPY RXD/TAKEN: CPT | Mod: CPTII,S$GLB,, | Performed by: PHYSICAL MEDICINE & REHABILITATION

## 2023-05-01 PROCEDURE — 1159F MED LIST DOCD IN RCRD: CPT | Mod: CPTII,S$GLB,, | Performed by: PHYSICAL MEDICINE & REHABILITATION

## 2023-05-01 PROCEDURE — 4010F PR ACE/ARB THEARPY RXD/TAKEN: ICD-10-PCS | Mod: CPTII,S$GLB,, | Performed by: PHYSICAL MEDICINE & REHABILITATION

## 2023-05-01 PROCEDURE — 99213 PR OFFICE/OUTPT VISIT, EST, LEVL III, 20-29 MIN: ICD-10-PCS | Mod: S$GLB,,, | Performed by: PHYSICAL MEDICINE & REHABILITATION

## 2023-05-01 PROCEDURE — 99213 OFFICE O/P EST LOW 20 MIN: CPT | Mod: S$GLB,,, | Performed by: PHYSICAL MEDICINE & REHABILITATION

## 2023-05-01 RX ORDER — VALACYCLOVIR HYDROCHLORIDE 1 G/1
TABLET, FILM COATED ORAL
Qty: 4 TABLET | Refills: 3 | Status: SHIPPED | OUTPATIENT
Start: 2023-05-01

## 2023-05-01 RX ORDER — OXYCODONE AND ACETAMINOPHEN 5; 325 MG/1; MG/1
1 TABLET ORAL EVERY 6 HOURS PRN
Qty: 28 TABLET | Refills: 0 | Status: SHIPPED | OUTPATIENT
Start: 2023-05-01 | End: 2023-05-07 | Stop reason: SDUPTHER

## 2023-05-01 NOTE — H&P (VIEW-ONLY)
SUBJECTIVE:    Patient ID: Jacqui Cruz is a 53 y.o. female.    Chief Complaint: Follow-up (RFA 3/21/2023)    She is here for follow-up status post radiofrequency ablation right L4-5 and L5-S1 facet joints on 03/21/2023 with Dr. Shepard.  Modest outcome from that procedure.  She describes about 40-50% improvement in her low back pain but she has persistent pain radiating down the left leg.  That has not changed.  Pain level with regards to the lower back is 5/10 and 8/10 as it pertains to the left leg.        Past Medical History:   Diagnosis Date    Asthma     Hypertension      Social History     Socioeconomic History    Marital status:    Tobacco Use    Smoking status: Never    Smokeless tobacco: Never   Substance and Sexual Activity    Alcohol use: No    Drug use: No    Sexual activity: Not Currently     Partners: Male     Birth control/protection: None     Social Determinants of Health     Financial Resource Strain: Medium Risk    Difficulty of Paying Living Expenses: Somewhat hard   Food Insecurity: Food Insecurity Present    Worried About Running Out of Food in the Last Year: Sometimes true    Ran Out of Food in the Last Year: Often true   Transportation Needs: No Transportation Needs    Lack of Transportation (Medical): No    Lack of Transportation (Non-Medical): No   Physical Activity: Insufficiently Active    Days of Exercise per Week: 2 days    Minutes of Exercise per Session: 20 min   Stress: No Stress Concern Present    Feeling of Stress : Not at all   Social Connections: Unknown    Frequency of Communication with Friends and Family: Twice a week    Frequency of Social Gatherings with Friends and Family: Once a week    Active Member of Clubs or Organizations: No    Attends Club or Organization Meetings: Never    Marital Status:    Housing Stability: High Risk    Unable to Pay for Housing in the Last Year: Yes    Number of Places Lived in the Last Year: 1    Unstable Housing in the Last  "Year: No     Past Surgical History:   Procedure Laterality Date    BREAST BIOPSY Left 2012    Benign calcifications    BREAST SURGERY      biopsy    DILATION AND CURETTAGE OF UTERUS      INJECTION OF ANESTHETIC AGENT AROUND MEDIAL BRANCH NERVES INNERVATING LUMBAR FACET JOINT Right 11/22/2022    Procedure: Block-nerve-medial branch-lumbar;  Surgeon: Alex Shepard MD;  Location: [facility] OR;  Service: Pain Management;  Laterality: Right;  L3,4,5 MBB    INJECTION OF ANESTHETIC AGENT AROUND MEDIAL BRANCH NERVES INNERVATING LUMBAR FACET JOINT Right 2/2/2023    Procedure: Block-nerve-medial branch-lumbar;  Surgeon: Alex Shepard MD;  Location: [facility] OR;  Service: Pain Management;  Laterality: Right;  L3,4,5  #2 MBB Rt  [place]    RADIOFREQUENCY ABLATION OF LUMBAR MEDIAL BRANCH NERVE AT SINGLE LEVEL Right 3/21/2023    Procedure: Radiofrequency Ablation, Nerve, Spinal, Lumbar, Medial Branch, 1 Level;  Surgeon: Alex Shepard MD;  Location: [facility] OR;  Service: Pain Management;  Laterality: Right;  L3,4,5 Deni  Burned at 80 degrees C. for 60 seconds x 2 each site    TRANSFORAMINAL EPIDURAL INJECTION OF STEROID Left 9/15/2022    Procedure: Injection,steroid,epidural,transforaminal approach;  Surgeon: Alex Shepard MD;  Location: [facility] OR;  Service: Pain Management;  Laterality: Left;  l4-5 and L5-s1      Family History   Problem Relation Age of Onset    Hypertension Mother     Cancer Maternal Aunt         cervical     Vitals:    05/01/23 1040   Weight: 68.5 kg (151 lb)   Height: 5' 3" (1.6 m)       Review of Systems   Constitutional:  Negative for chills, diaphoresis, fatigue, fever and unexpected weight change.   HENT:  Negative for trouble swallowing.    Eyes:  Negative for visual disturbance.   Respiratory:  Negative for shortness of breath.    Cardiovascular:  Negative for chest pain.   Gastrointestinal:  Negative for abdominal pain, constipation, nausea and vomiting.   Genitourinary:  Negative for difficulty urinating. "   Musculoskeletal:  Negative for arthralgias, back pain, gait problem, joint swelling, myalgias, neck pain and neck stiffness.   Neurological:  Negative for dizziness, speech difficulty, weakness, light-headedness, numbness and headaches.        Objective:      Physical Exam  Neurological:      Mental Status: She is alert and oriented to person, place, and time.           Assessment:       1. Chronic bilateral low back pain with left-sided sciatica           Plan:     Modest improvement from radiofrequency ablation right L4-5 and L5-S1.  Her primary complaint at this point is ongoing left leg discomfort.  I recommend interlaminar injection at L4-5.  She can follow up with me afterwards.      Chronic bilateral low back pain with left-sided sciatica

## 2023-05-01 NOTE — TELEPHONE ENCOUNTER
----- Message from Ramses Cheema MD sent at 5/1/2023 10:55 AM CDT -----  Please schedule for interlaminar injection L5-S1

## 2023-05-01 NOTE — TELEPHONE ENCOUNTER
----- Message from Ramses Cheema MD sent at 5/1/2023 11:02 AM CDT -----  Correction:    Please schedule for interlaminar injection at L4-5.  Cancel interlaminar injection at L5-S1

## 2023-05-01 NOTE — PROGRESS NOTES
SUBJECTIVE:    Patient ID: Jacqui Cruz is a 53 y.o. female.    Chief Complaint: Follow-up (RFA 3/21/2023)    She is here for follow-up status post radiofrequency ablation right L4-5 and L5-S1 facet joints on 03/21/2023 with Dr. Shepard.  Modest outcome from that procedure.  She describes about 40-50% improvement in her low back pain but she has persistent pain radiating down the left leg.  That has not changed.  Pain level with regards to the lower back is 5/10 and 8/10 as it pertains to the left leg.        Past Medical History:   Diagnosis Date    Asthma     Hypertension      Social History     Socioeconomic History    Marital status:    Tobacco Use    Smoking status: Never    Smokeless tobacco: Never   Substance and Sexual Activity    Alcohol use: No    Drug use: No    Sexual activity: Not Currently     Partners: Male     Birth control/protection: None     Social Determinants of Health     Financial Resource Strain: Medium Risk    Difficulty of Paying Living Expenses: Somewhat hard   Food Insecurity: Food Insecurity Present    Worried About Running Out of Food in the Last Year: Sometimes true    Ran Out of Food in the Last Year: Often true   Transportation Needs: No Transportation Needs    Lack of Transportation (Medical): No    Lack of Transportation (Non-Medical): No   Physical Activity: Insufficiently Active    Days of Exercise per Week: 2 days    Minutes of Exercise per Session: 20 min   Stress: No Stress Concern Present    Feeling of Stress : Not at all   Social Connections: Unknown    Frequency of Communication with Friends and Family: Twice a week    Frequency of Social Gatherings with Friends and Family: Once a week    Active Member of Clubs or Organizations: No    Attends Club or Organization Meetings: Never    Marital Status:    Housing Stability: High Risk    Unable to Pay for Housing in the Last Year: Yes    Number of Places Lived in the Last Year: 1    Unstable Housing in the Last  "Year: No     Past Surgical History:   Procedure Laterality Date    BREAST BIOPSY Left 2012    Benign calcifications    BREAST SURGERY      biopsy    DILATION AND CURETTAGE OF UTERUS      INJECTION OF ANESTHETIC AGENT AROUND MEDIAL BRANCH NERVES INNERVATING LUMBAR FACET JOINT Right 11/22/2022    Procedure: Block-nerve-medial branch-lumbar;  Surgeon: Alex Shepard MD;  Location: [facility] OR;  Service: Pain Management;  Laterality: Right;  L3,4,5 MBB    INJECTION OF ANESTHETIC AGENT AROUND MEDIAL BRANCH NERVES INNERVATING LUMBAR FACET JOINT Right 2/2/2023    Procedure: Block-nerve-medial branch-lumbar;  Surgeon: Alex Shepard MD;  Location: [facility] OR;  Service: Pain Management;  Laterality: Right;  L3,4,5  #2 MBB Rt  [place]    RADIOFREQUENCY ABLATION OF LUMBAR MEDIAL BRANCH NERVE AT SINGLE LEVEL Right 3/21/2023    Procedure: Radiofrequency Ablation, Nerve, Spinal, Lumbar, Medial Branch, 1 Level;  Surgeon: Alex Shepard MD;  Location: [facility] OR;  Service: Pain Management;  Laterality: Right;  L3,4,5 Deni  Burned at 80 degrees C. for 60 seconds x 2 each site    TRANSFORAMINAL EPIDURAL INJECTION OF STEROID Left 9/15/2022    Procedure: Injection,steroid,epidural,transforaminal approach;  Surgeon: Alex Shepard MD;  Location: [facility] OR;  Service: Pain Management;  Laterality: Left;  l4-5 and L5-s1      Family History   Problem Relation Age of Onset    Hypertension Mother     Cancer Maternal Aunt         cervical     Vitals:    05/01/23 1040   Weight: 68.5 kg (151 lb)   Height: 5' 3" (1.6 m)       Review of Systems   Constitutional:  Negative for chills, diaphoresis, fatigue, fever and unexpected weight change.   HENT:  Negative for trouble swallowing.    Eyes:  Negative for visual disturbance.   Respiratory:  Negative for shortness of breath.    Cardiovascular:  Negative for chest pain.   Gastrointestinal:  Negative for abdominal pain, constipation, nausea and vomiting.   Genitourinary:  Negative for difficulty urinating. "   Musculoskeletal:  Negative for arthralgias, back pain, gait problem, joint swelling, myalgias, neck pain and neck stiffness.   Neurological:  Negative for dizziness, speech difficulty, weakness, light-headedness, numbness and headaches.        Objective:      Physical Exam  Neurological:      Mental Status: She is alert and oriented to person, place, and time.           Assessment:       1. Chronic bilateral low back pain with left-sided sciatica           Plan:     Modest improvement from radiofrequency ablation right L4-5 and L5-S1.  Her primary complaint at this point is ongoing left leg discomfort.  I recommend interlaminar injection at L4-5.  She can follow up with me afterwards.      Chronic bilateral low back pain with left-sided sciatica

## 2023-05-01 NOTE — PROGRESS NOTES
Patient ID: Jacqui Cruz is a 53 y.o. female.    Chief Complaint: Mouth Lesions    The patient initiated a request through [employer] on 5/1/2023 for evaluation and management with a chief complaint of Mouth Lesions     I evaluated the questionnaire submission on 5/1/23  .    Ohs Peq Evisit Rash    5/1/2023 11:02 AM CDT - Filed by Patient   Do you agree to participate in an E-Visit? Yes   If you have any of the following symptoms, please present to your local ER or call 911:  I acknowledge   What is the main issue that you would like for your doctor to address today? Cold sore medication that was prescribed before   Are you able to take your vital signs? Yes   Systolic Blood Pressure: 130   Diastolic Blood Pressure: 85   Weight: 151   Height:    Pulse:    Temp:    Resp:    Pulse Ox:    Are you currently pregnant, could you be pregnant, or are you breast feeding? None of the above   How would you describe your skin problem? Lump or bump   When did your symptoms first appear? 5/1/2023   Where is it located?  Face;  Other   Does it itch? Yes   Does it hurt? No   Is there discharge or drainage? No   Is there bleeding? No   Describe the character Blistered   Describe the color Pink   Has it changed over time? No change   Frequency of skin problem Seasonally   Duration of the skin problem (how long does it stay when it is present) Weeks   I have had a new exposure to No new exposures   What have you used to treat the skin problem? Abreva   If you have used anything for treatment, has it helped the symptoms? No   Other generalized symptoms that you associate with the rash No other symptoms   At least one photo is required for treatment to be provided. You can upload a maximum of three photos of the affected area.           Recent Labs Obtained:  No visits with results within 7 Day(s) from this visit.   Latest known visit with results is:   Office Visit on 02/28/2023   Component Date Value Ref Range Status    SARS  Coronavirus 2 Antigen 02/28/2023 Positive (A)  Negative Final     Acceptable 02/28/2023 Yes   Final    Rapid Influenza A Ag 02/28/2023 Negative  Negative Final    Rapid Influenza B Ag 02/28/2023 Negative  Negative Final     Acceptable 02/28/2023 Yes   Final       Encounter Diagnosis   Name Primary?    Cold sore Yes        No orders of the defined types were placed in this encounter.     Medications Ordered This Encounter   Medications    valACYclovir (VALTREX) 1000 MG tablet     Sig: Take 1 tablet twice a day for 2 days.     Dispense:  4 tablet     Refill:  3        No follow-ups on file.      E-Visit Time Tracking:    Day 1 Time (in minutes): 5     Total Time (in minutes): 5

## 2023-05-07 DIAGNOSIS — G89.29 CHRONIC BILATERAL LOW BACK PAIN WITH LEFT-SIDED SCIATICA: ICD-10-CM

## 2023-05-07 DIAGNOSIS — M54.42 CHRONIC BILATERAL LOW BACK PAIN WITH LEFT-SIDED SCIATICA: ICD-10-CM

## 2023-05-08 RX ORDER — OXYCODONE AND ACETAMINOPHEN 5; 325 MG/1; MG/1
1 TABLET ORAL EVERY 6 HOURS PRN
Qty: 28 TABLET | Refills: 0 | Status: SHIPPED | OUTPATIENT
Start: 2023-05-08 | End: 2023-05-15 | Stop reason: SDUPTHER

## 2023-05-10 DIAGNOSIS — I10 ESSENTIAL HYPERTENSION: ICD-10-CM

## 2023-05-15 DIAGNOSIS — G89.29 CHRONIC BILATERAL LOW BACK PAIN WITH LEFT-SIDED SCIATICA: ICD-10-CM

## 2023-05-15 DIAGNOSIS — M54.42 CHRONIC BILATERAL LOW BACK PAIN WITH LEFT-SIDED SCIATICA: ICD-10-CM

## 2023-05-15 RX ORDER — OXYCODONE AND ACETAMINOPHEN 5; 325 MG/1; MG/1
1 TABLET ORAL EVERY 6 HOURS PRN
Qty: 28 TABLET | Refills: 0 | Status: SHIPPED | OUTPATIENT
Start: 2023-05-15 | End: 2023-05-22 | Stop reason: SDUPTHER

## 2023-05-19 ENCOUNTER — PATIENT MESSAGE (OUTPATIENT)
Dept: SPINE | Facility: CLINIC | Age: 53
End: 2023-05-19
Payer: COMMERCIAL

## 2023-05-22 DIAGNOSIS — G89.29 CHRONIC BILATERAL LOW BACK PAIN WITH LEFT-SIDED SCIATICA: ICD-10-CM

## 2023-05-22 DIAGNOSIS — M54.42 CHRONIC BILATERAL LOW BACK PAIN WITH LEFT-SIDED SCIATICA: ICD-10-CM

## 2023-05-22 RX ORDER — OXYCODONE AND ACETAMINOPHEN 5; 325 MG/1; MG/1
1 TABLET ORAL EVERY 6 HOURS PRN
Qty: 28 TABLET | Refills: 0 | Status: SHIPPED | OUTPATIENT
Start: 2023-05-22 | End: 2023-05-30 | Stop reason: SDUPTHER

## 2023-05-30 ENCOUNTER — HOSPITAL ENCOUNTER (OUTPATIENT)
Facility: AMBULARY SURGERY CENTER | Age: 53
Discharge: HOME | End: 2023-05-30
Attending: ANESTHESIOLOGY | Admitting: ANESTHESIOLOGY
Payer: COMMERCIAL

## 2023-05-30 DIAGNOSIS — G89.29 CHRONIC BILATERAL LOW BACK PAIN WITH LEFT-SIDED SCIATICA: ICD-10-CM

## 2023-05-30 DIAGNOSIS — M54.16 LUMBAR RADICULITIS: ICD-10-CM

## 2023-05-30 DIAGNOSIS — M54.42 CHRONIC BILATERAL LOW BACK PAIN WITH LEFT-SIDED SCIATICA: ICD-10-CM

## 2023-05-30 PROCEDURE — 62323 NJX INTERLAMINAR LMBR/SAC: CPT | Performed by: ANESTHESIOLOGY

## 2023-05-30 PROCEDURE — 62323 PR INJ LUMBAR/SACRAL, W/IMAGING GUIDANCE: ICD-10-PCS | Mod: ,,, | Performed by: ANESTHESIOLOGY

## 2023-05-30 PROCEDURE — 62323 NJX INTERLAMINAR LMBR/SAC: CPT | Mod: ,,, | Performed by: ANESTHESIOLOGY

## 2023-05-30 RX ORDER — LIDOCAINE HYDROCHLORIDE 10 MG/ML
INJECTION, SOLUTION EPIDURAL; INFILTRATION; INTRACAUDAL; PERINEURAL
Status: DISCONTINUED | OUTPATIENT
Start: 2023-05-30 | End: 2023-05-30 | Stop reason: HOSPADM

## 2023-05-30 RX ORDER — SODIUM CHLORIDE 9 MG/ML
INJECTION, SOLUTION INTRAMUSCULAR; INTRAVENOUS; SUBCUTANEOUS
Status: DISCONTINUED | OUTPATIENT
Start: 2023-05-30 | End: 2023-05-30 | Stop reason: HOSPADM

## 2023-05-30 RX ORDER — DEXAMETHASONE SODIUM PHOSPHATE 10 MG/ML
INJECTION INTRAMUSCULAR; INTRAVENOUS
Status: DISCONTINUED | OUTPATIENT
Start: 2023-05-30 | End: 2023-05-30 | Stop reason: HOSPADM

## 2023-05-30 RX ORDER — SODIUM CHLORIDE, SODIUM LACTATE, POTASSIUM CHLORIDE, CALCIUM CHLORIDE 600; 310; 30; 20 MG/100ML; MG/100ML; MG/100ML; MG/100ML
INJECTION, SOLUTION INTRAVENOUS ONCE AS NEEDED
Status: ACTIVE | OUTPATIENT
Start: 2023-05-30 | End: 2034-10-26

## 2023-05-30 RX ORDER — OXYCODONE AND ACETAMINOPHEN 5; 325 MG/1; MG/1
1 TABLET ORAL EVERY 6 HOURS PRN
Qty: 28 TABLET | Refills: 0 | Status: SHIPPED | OUTPATIENT
Start: 2023-05-30 | End: 2023-06-05 | Stop reason: SDUPTHER

## 2023-05-30 NOTE — OP NOTE
PROCEDURE DATE: 5/30/2023    Procedure:   Interlaminar epidural steroid injection at L4-5 under fluoroscopic guidance.    Diagnosis: lUMBAR radiculitis  pOSTOP DIAGNOSIS: sAME    Physician: Alex Shepard M.D.    Medications injected:10 mg dexamethasone with 4 ml of preservative free NaCl    Local anesthetic injected:    Lidocaine 1% 2 ml total    Sedation Medications: None    Estimated blood loss:  NOne    Complications:  None    Technique:  Time-out taken to identify patient and procedure prior to starting the procedure.  With the patient laying in a prone position, the area was prepped and draped in the usual sterile fashion using ChloraPrep and a fenestrated drape.  After determining the target level with an AP fluoroscopic view, local anesthetic was given using a 25-gauge 1.5 inch needle by raising a wheal and then infiltrating toward the interlaminar entry space.  A 3.5inch 20-gauge Touhy needle was introduced under AP fluoroscopic guidance to the interlaminar space of L4-5. Once the trajectory was established, the needle was visualized in the lateral view and advanced using loss of resistance technique. Once in the desired position, 1ml contrast was injected to confirm placement and there was no vascular uptake nor intrathecal spread.  The medication was then injected slowly. The patient tolerated the procedure well.      The patient was monitored after the procedure.   They were given post-procedure and discharge instructions to follow at home.  The patient was discharged in a stable condition.

## 2023-05-30 NOTE — DISCHARGE SUMMARY
[facility]  Discharge Note  Short Stay    Procedure(s) (LRB):  Injection-steroid-epidural-lumbar (N/A)      OUTCOME: Patient tolerated treatment/procedure well without complication and is now ready for discharge.    DISPOSITION: Home or Self Care    FINAL DIAGNOSIS:  <principal problem not specified>    FOLLOWUP: In clinic    DISCHARGE INSTRUCTIONS:    Discharge Procedure Orders   Notify your health care provider if you experience any of the following:  temperature >100.4     Notify your health care provider if you experience any of the following:  severe uncontrolled pain     Notify your health care provider if you experience any of the following:  redness, tenderness, or signs of infection (pain, swelling, redness, odor or green/yellow discharge around incision site)     Activity as tolerated        TIME SPENT ON DISCHARGE: 30 minutes

## 2023-05-31 VITALS
DIASTOLIC BLOOD PRESSURE: 91 MMHG | HEART RATE: 81 BPM | SYSTOLIC BLOOD PRESSURE: 151 MMHG | RESPIRATION RATE: 18 BRPM | BODY MASS INDEX: 26.75 KG/M2 | OXYGEN SATURATION: 100 % | TEMPERATURE: 98 F | WEIGHT: 151 LBS | HEIGHT: 63 IN

## 2023-06-01 ENCOUNTER — TELEPHONE (OUTPATIENT)
Dept: NEUROSURGERY | Facility: CLINIC | Age: 53
End: 2023-06-01
Payer: COMMERCIAL

## 2023-06-01 NOTE — TELEPHONE ENCOUNTER
----- Message from Bea Neri RN sent at 5/31/2023  8:35 AM CDT -----    ----- Message -----  From: Stacey M Lefort  Sent: 5/31/2023   8:24 AM CDT  To: Caleb Todd    [place] needs a callback for an update. The callback is [phone] X [phone]. Use reference number [id number]. Thank you.

## 2023-06-01 NOTE — TELEPHONE ENCOUNTER
Returned call to [place] representative. She requested Dr. Cheema's last visit note be faxed. Informed I will fax today.

## 2023-06-02 ENCOUNTER — TELEPHONE (OUTPATIENT)
Dept: SPINE | Facility: CLINIC | Age: 53
End: 2023-06-02
Payer: COMMERCIAL

## 2023-06-05 ENCOUNTER — PATIENT MESSAGE (OUTPATIENT)
Dept: SPINE | Facility: CLINIC | Age: 53
End: 2023-06-05
Payer: COMMERCIAL

## 2023-06-05 DIAGNOSIS — M54.42 CHRONIC BILATERAL LOW BACK PAIN WITH LEFT-SIDED SCIATICA: ICD-10-CM

## 2023-06-05 DIAGNOSIS — G89.29 CHRONIC BILATERAL LOW BACK PAIN WITH LEFT-SIDED SCIATICA: ICD-10-CM

## 2023-06-06 RX ORDER — OXYCODONE AND ACETAMINOPHEN 5; 325 MG/1; MG/1
1 TABLET ORAL EVERY 6 HOURS PRN
Qty: 28 TABLET | Refills: 0 | Status: SHIPPED | OUTPATIENT
Start: 2023-06-06 | End: 2023-06-12 | Stop reason: SDUPTHER

## 2023-06-12 DIAGNOSIS — M54.42 CHRONIC BILATERAL LOW BACK PAIN WITH LEFT-SIDED SCIATICA: ICD-10-CM

## 2023-06-12 DIAGNOSIS — G89.29 CHRONIC BILATERAL LOW BACK PAIN WITH LEFT-SIDED SCIATICA: ICD-10-CM

## 2023-06-13 RX ORDER — OXYCODONE AND ACETAMINOPHEN 5; 325 MG/1; MG/1
1 TABLET ORAL EVERY 6 HOURS PRN
Qty: 28 TABLET | Refills: 0 | Status: SHIPPED | OUTPATIENT
Start: 2023-06-13 | End: 2023-06-18 | Stop reason: SDUPTHER

## 2023-06-15 ENCOUNTER — TELEPHONE (OUTPATIENT)
Dept: NEUROSURGERY | Facility: CLINIC | Age: 53
End: 2023-06-15
Payer: COMMERCIAL

## 2023-06-15 NOTE — TELEPHONE ENCOUNTER
----- Message from Stacey M Lefort sent at 6/15/2023  1:41 PM CDT -----  Agustín from the [facility] called to say that they are calling to find out the status of the Attending Physicians Statement that was faxed over on 06/01/23. You can fax the information to [phone] or if you need to speak to them the number is [phone]. Please use ref# [id number]. Thank you.

## 2023-06-18 DIAGNOSIS — M54.42 CHRONIC BILATERAL LOW BACK PAIN WITH LEFT-SIDED SCIATICA: ICD-10-CM

## 2023-06-18 DIAGNOSIS — G89.29 CHRONIC BILATERAL LOW BACK PAIN WITH LEFT-SIDED SCIATICA: ICD-10-CM

## 2023-06-20 RX ORDER — OXYCODONE AND ACETAMINOPHEN 5; 325 MG/1; MG/1
1 TABLET ORAL EVERY 6 HOURS PRN
Qty: 28 TABLET | Refills: 0 | Status: SHIPPED | OUTPATIENT
Start: 2023-06-20 | End: 2023-06-26 | Stop reason: SDUPTHER

## 2023-06-26 DIAGNOSIS — G89.29 CHRONIC BILATERAL LOW BACK PAIN WITH LEFT-SIDED SCIATICA: ICD-10-CM

## 2023-06-26 DIAGNOSIS — M54.42 CHRONIC BILATERAL LOW BACK PAIN WITH LEFT-SIDED SCIATICA: ICD-10-CM

## 2023-06-26 NOTE — TELEPHONE ENCOUNTER
----- Message from Conner Asher sent at 6/26/2023 10:12 AM CDT -----  Contact: pt [phone]  Type:  RX Refill Request    Who Called:  pt  Refill or New Rx:  refill  RX Name and Strength:  oxyCODONE-acetaminophen (PERCOCET) 5-325 mg per tablet  How is the patient currently taking it? (ex. 1XDay):  1 every 6 hours  Is this a 30 day or 90 day RX:  28  Preferred Pharmacy with phone number:    [facility] - [place], [place] - [address]  [address]  [place] [address]  Phone: [phone] Fax: [phone]  Local or Mail Order:  local  Ordering Provider:  Dr.Braxton Cameron Call Back Number:  [phone]    Additional Information:  Pt is calling the office to get oxyCODONE-acetaminophen (PERCOCET) 5-325 mg per tablet refilled. Please call back and advise.

## 2023-06-26 NOTE — TELEPHONE ENCOUNTER
Spoke with patient to advise provider aware of refill request which is not due for refill until 6/27 and refill will be sent at that time. Verbalizes understanding.

## 2023-06-27 RX ORDER — OXYCODONE AND ACETAMINOPHEN 5; 325 MG/1; MG/1
1 TABLET ORAL EVERY 6 HOURS PRN
Qty: 28 TABLET | Refills: 0 | Status: SHIPPED | OUTPATIENT
Start: 2023-06-27 | End: 2023-06-29 | Stop reason: SDUPTHER

## 2023-06-29 ENCOUNTER — OFFICE VISIT (OUTPATIENT)
Dept: SPINE | Facility: CLINIC | Age: 53
End: 2023-06-29
Payer: COMMERCIAL

## 2023-06-29 DIAGNOSIS — M54.42 CHRONIC BILATERAL LOW BACK PAIN WITH LEFT-SIDED SCIATICA: Primary | ICD-10-CM

## 2023-06-29 DIAGNOSIS — G89.29 CHRONIC BILATERAL LOW BACK PAIN WITH LEFT-SIDED SCIATICA: Primary | ICD-10-CM

## 2023-06-29 PROCEDURE — 1159F PR MEDICATION LIST DOCUMENTED IN MEDICAL RECORD: ICD-10-PCS | Mod: CPTII,S$GLB,, | Performed by: PHYSICAL MEDICINE & REHABILITATION

## 2023-06-29 PROCEDURE — 4010F PR ACE/ARB THEARPY RXD/TAKEN: ICD-10-PCS | Mod: CPTII,S$GLB,, | Performed by: PHYSICAL MEDICINE & REHABILITATION

## 2023-06-29 PROCEDURE — 1159F MED LIST DOCD IN RCRD: CPT | Mod: CPTII,S$GLB,, | Performed by: PHYSICAL MEDICINE & REHABILITATION

## 2023-06-29 PROCEDURE — 99213 OFFICE O/P EST LOW 20 MIN: CPT | Mod: S$GLB,,, | Performed by: PHYSICAL MEDICINE & REHABILITATION

## 2023-06-29 PROCEDURE — 4010F ACE/ARB THERAPY RXD/TAKEN: CPT | Mod: CPTII,S$GLB,, | Performed by: PHYSICAL MEDICINE & REHABILITATION

## 2023-06-29 PROCEDURE — 1160F RVW MEDS BY RX/DR IN RCRD: CPT | Mod: CPTII,S$GLB,, | Performed by: PHYSICAL MEDICINE & REHABILITATION

## 2023-06-29 PROCEDURE — 99213 PR OFFICE/OUTPT VISIT, EST, LEVL III, 20-29 MIN: ICD-10-PCS | Mod: S$GLB,,, | Performed by: PHYSICAL MEDICINE & REHABILITATION

## 2023-06-29 PROCEDURE — 1160F PR REVIEW ALL MEDS BY PRESCRIBER/CLIN PHARMACIST DOCUMENTED: ICD-10-PCS | Mod: CPTII,S$GLB,, | Performed by: PHYSICAL MEDICINE & REHABILITATION

## 2023-06-29 RX ORDER — OXYCODONE AND ACETAMINOPHEN 5; 325 MG/1; MG/1
1 TABLET ORAL EVERY 6 HOURS PRN
Qty: 120 TABLET | Refills: 0 | Status: SHIPPED | OUTPATIENT
Start: 2023-06-29 | End: 2023-07-31 | Stop reason: SDUPTHER

## 2023-06-29 NOTE — PROGRESS NOTES
SUBJECTIVE:    Patient ID: Jacqui Crzu is a 53 y.o. female.    Chief Complaint: Leg/back pain     She is here for follow-up status post interlaminar injection at L4-5 on 05/30/2023 with Dr. Shepard.  Modest response to that procedure.  She describes about 40% improvement in her back and left leg symptoms for about 1 week and now she is back to baseline.  She has no new or progressive problems.  Complaining of left-sided low back pain at the lumbosacral junction radiating down the medial portion of the left leg to the medial ankle.  Pain level is 6/10.  I note she continues to take Percocet 5 mg 4 times per day.  She says the medication helps her to function.  She has no side effects from it        Past Medical History:   Diagnosis Date    Asthma     Hypertension      Social History     Socioeconomic History    Marital status:    Tobacco Use    Smoking status: Never    Smokeless tobacco: Never   Substance and Sexual Activity    Alcohol use: No    Drug use: No    Sexual activity: Not Currently     Partners: Male     Birth control/protection: None     Social Determinants of Health     Financial Resource Strain: Medium Risk    Difficulty of Paying Living Expenses: Somewhat hard   Food Insecurity: Food Insecurity Present    Worried About Running Out of Food in the Last Year: Sometimes true    Ran Out of Food in the Last Year: Often true   Transportation Needs: No Transportation Needs    Lack of Transportation (Medical): No    Lack of Transportation (Non-Medical): No   Physical Activity: Insufficiently Active    Days of Exercise per Week: 2 days    Minutes of Exercise per Session: 20 min   Stress: No Stress Concern Present    Feeling of Stress : Not at all   Social Connections: Unknown    Frequency of Communication with Friends and Family: Twice a week    Frequency of Social Gatherings with Friends and Family: Once a week    Active Member of Clubs or Organizations: No    Attends Club or Organization Meetings:  Never    Marital Status:    Housing Stability: High Risk    Unable to Pay for Housing in the Last Year: Yes    Number of Places Lived in the Last Year: 1    Unstable Housing in the Last Year: No     Past Surgical History:   Procedure Laterality Date    BREAST BIOPSY Left 2012    Benign calcifications    BREAST SURGERY      biopsy    DILATION AND CURETTAGE OF UTERUS      EPIDURAL STEROID INJECTION INTO LUMBAR SPINE N/A 5/30/2023    Procedure: Injection-steroid-epidural-lumbar;  Surgeon: Alex Shepard MD;  Location: [facility] OR;  Service: Pain Management;  Laterality: N/A;  L4-5    INJECTION OF ANESTHETIC AGENT AROUND MEDIAL BRANCH NERVES INNERVATING LUMBAR FACET JOINT Right 11/22/2022    Procedure: Block-nerve-medial branch-lumbar;  Surgeon: Alex Shepard MD;  Location: [facility] OR;  Service: Pain Management;  Laterality: Right;  L3,4,5 MBB    INJECTION OF ANESTHETIC AGENT AROUND MEDIAL BRANCH NERVES INNERVATING LUMBAR FACET JOINT Right 2/2/2023    Procedure: Block-nerve-medial branch-lumbar;  Surgeon: Alex Shepard MD;  Location: [facility] OR;  Service: Pain Management;  Laterality: Right;  L3,4,5  #2 MBB Rt  [place]    RADIOFREQUENCY ABLATION OF LUMBAR MEDIAL BRANCH NERVE AT SINGLE LEVEL Right 3/21/2023    Procedure: Radiofrequency Ablation, Nerve, Spinal, Lumbar, Medial Branch, 1 Level;  Surgeon: Alex Shepard MD;  Location: [facility] OR;  Service: Pain Management;  Laterality: Right;  L3,4,5 [place]  Burned at 80 degrees C. for 60 seconds x 2 each site    TRANSFORAMINAL EPIDURAL INJECTION OF STEROID Left 9/15/2022    Procedure: Injection,steroid,epidural,transforaminal approach;  Surgeon: Alex Shepard MD;  Location: [facility] OR;  Service: Pain Management;  Laterality: Left;  l4-5 and L5-s1      Family History   Problem Relation Age of Onset    Hypertension Mother     Cancer Maternal Aunt         cervical     There were no vitals filed for this visit.    Review of Systems   Constitutional:  Negative for chills, diaphoresis, fatigue,  fever and unexpected weight change.   HENT:  Negative for trouble swallowing.    Eyes:  Negative for visual disturbance.   Respiratory:  Negative for shortness of breath.    Cardiovascular:  Negative for chest pain.   Gastrointestinal:  Negative for abdominal pain, constipation, nausea and vomiting.   Genitourinary:  Negative for difficulty urinating.   Musculoskeletal:  Negative for arthralgias, back pain, gait problem, joint swelling, myalgias, neck pain and neck stiffness.   Neurological:  Negative for dizziness, speech difficulty, weakness, light-headedness, numbness and headaches.        Objective:      Physical Exam  Neurological:      Mental Status: She is alert and oriented to person, place, and time.           Assessment:       1. Chronic bilateral low back pain with left-sided sciatica           Plan:     No lasting benefit from interlaminar injection at L4-5.  Likewise she has failed transforaminal injections on left at L4-5 and L5-S1 as well as radiofrequency ablation right L4-5 and L5-S1.  Dr. Ellis feels that she is a neurosurgical candidate but she is not ready to take that step.  I do not have any additional recommendations for her.  I agreed to refill her Percocet prescription although I continue to advise her to ultimately discontinue that medication.  We will enroll her in the healthy back program.  She can let me know if she wants to go back to Neurosurgery otherwise follow up here in 3 months or as needed      Chronic bilateral low back pain with left-sided sciatica  -     Ambulatory referral/consult to [facility]; Future; Expected date: 07/06/2023  -     oxyCODONE-acetaminophen (PERCOCET) 5-325 mg per tablet; Take 1 tablet by mouth every 6 (six) hours as needed for Pain.  Dispense: 120 tablet; Refill: 0

## 2023-07-31 DIAGNOSIS — G89.29 CHRONIC BILATERAL LOW BACK PAIN WITH LEFT-SIDED SCIATICA: ICD-10-CM

## 2023-07-31 DIAGNOSIS — M54.42 CHRONIC BILATERAL LOW BACK PAIN WITH LEFT-SIDED SCIATICA: ICD-10-CM

## 2023-07-31 NOTE — TELEPHONE ENCOUNTER
----- Message from Conner Asher sent at 7/31/2023  4:20 PM CDT -----  Contact: pt  Type: Needs Medical Advice  Who Called:  pt  Best Call Back Number: [phone]    Additional Information: Pt is calling the office trying to see if office received refill message for oxyCODONE-acetaminophen (PERCOCET) 5-325 mg per tablet. Please call back and advise.

## 2023-07-31 NOTE — TELEPHONE ENCOUNTER
Patient requesting refill of Percocet. Advised would forward request to provider for review and response. Verbalizes undersatnding.

## 2023-08-01 RX ORDER — OXYCODONE AND ACETAMINOPHEN 5; 325 MG/1; MG/1
1 TABLET ORAL EVERY 6 HOURS PRN
Qty: 120 TABLET | Refills: 0 | Status: SHIPPED | OUTPATIENT
Start: 2023-08-01 | End: 2023-09-01 | Stop reason: SDUPTHER

## 2023-08-03 ENCOUNTER — PATIENT MESSAGE (OUTPATIENT)
Dept: ORTHOPEDICS | Facility: CLINIC | Age: 53
End: 2023-08-03
Payer: COMMERCIAL

## 2023-08-03 ENCOUNTER — OCCUPATIONAL HEALTH (OUTPATIENT)
Dept: URGENT CARE | Facility: CLINIC | Age: 53
End: 2023-08-03
Payer: COMMERCIAL

## 2023-08-03 DIAGNOSIS — Z00.00 ENCOUNTER FOR PHYSICAL EXAMINATION: Primary | ICD-10-CM

## 2023-08-03 DIAGNOSIS — M25.511 ACUTE PAIN OF RIGHT SHOULDER: ICD-10-CM

## 2023-08-03 LAB — COLLECTION ONLY: NORMAL

## 2023-08-03 PROCEDURE — 99499 UNLISTED E&M SERVICE: CPT | Mod: S$GLB,,, | Performed by: NURSE PRACTITIONER

## 2023-08-03 PROCEDURE — 99499 PHYSICAL, BASIC COMPLEXITY: ICD-10-PCS | Mod: S$GLB,,, | Performed by: NURSE PRACTITIONER

## 2023-08-03 RX ORDER — DICLOFENAC SODIUM 20 MG/G
SOLUTION TOPICAL
Qty: 112 G | Refills: 0 | Status: SHIPPED | OUTPATIENT
Start: 2023-08-03 | End: 2023-10-23 | Stop reason: SDUPTHER

## 2023-08-04 ENCOUNTER — TELEPHONE (OUTPATIENT)
Dept: FAMILY MEDICINE | Facility: CLINIC | Age: 53
End: 2023-08-04
Payer: COMMERCIAL

## 2023-08-04 NOTE — TELEPHONE ENCOUNTER
"Spoke with pt. Pt stated she works at [employer] in [place]. Pt stated she sees a temp gauge at work and its like 106-107 degrees in her building. Pt stated she passed out at work yesterday. Pt stated she has a cut on her eyelid & her ear was bleeding yesterday. Pt stated her head is "pounding" today & her ear feels swollen. Advised pt to go to the ER for evaluation. Pt VU. Pt stated she really wants an xray of her head. Informed pt the ER will be able to do that for her. Pt VU.   "

## 2023-08-04 NOTE — TELEPHONE ENCOUNTER
----- Message from Caroline Heart sent at 8/4/2023  4:12 PM CDT -----  Type:  Patient Returning Call         Who Called: pt          Who Left Message for Patient: Lombard, Shellie, LPN            Does the patient know what this is regarding? Advice          Best Call Back Number: [phone] (home)            Additional Information: please call before office hours are over in regards to pt fall and hitting her head
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
## 2023-08-04 NOTE — TELEPHONE ENCOUNTER
Called patient to discuss her fall. Patient was advised to go to the ED for immediate care. No answer , left voicemail to return call.

## 2023-08-05 ENCOUNTER — HOSPITAL ENCOUNTER (EMERGENCY)
Facility: HOSPITAL | Age: 53
Discharge: LEFT BEFORE DISCHARGE | End: 2023-08-05
Attending: EMERGENCY MEDICINE
Payer: COMMERCIAL

## 2023-08-05 VITALS
WEIGHT: 148 LBS | OXYGEN SATURATION: 99 % | DIASTOLIC BLOOD PRESSURE: 67 MMHG | HEART RATE: 82 BPM | HEIGHT: 63 IN | RESPIRATION RATE: 18 BRPM | BODY MASS INDEX: 26.22 KG/M2 | TEMPERATURE: 98 F | SYSTOLIC BLOOD PRESSURE: 137 MMHG

## 2023-08-05 DIAGNOSIS — R40.20 LOSS OF CONSCIOUSNESS: ICD-10-CM

## 2023-08-05 DIAGNOSIS — L03.811 CELLULITIS OF HEAD EXCEPT FACE: ICD-10-CM

## 2023-08-05 DIAGNOSIS — D64.9 SYMPTOMATIC ANEMIA: Primary | ICD-10-CM

## 2023-08-05 DIAGNOSIS — R55 SYNCOPE, UNSPECIFIED SYNCOPE TYPE: ICD-10-CM

## 2023-08-05 DIAGNOSIS — R51.9 NONINTRACTABLE HEADACHE, UNSPECIFIED CHRONICITY PATTERN, UNSPECIFIED HEADACHE TYPE: ICD-10-CM

## 2023-08-05 DIAGNOSIS — S09.91XA INJURY OF EAR, INITIAL ENCOUNTER: ICD-10-CM

## 2023-08-05 LAB
ABO + RH BLD: NORMAL
ANION GAP SERPL CALC-SCNC: 11 MMOL/L (ref 8–16)
ANISOCYTOSIS BLD QL SMEAR: ABNORMAL
BASOPHILS # BLD AUTO: 0.04 K/UL (ref 0–0.2)
BASOPHILS NFR BLD: 0.8 % (ref 0–1.9)
BLD GP AB SCN CELLS X3 SERPL QL: NORMAL
BLD PROD TYP BPU: NORMAL
BLOOD UNIT EXPIRATION DATE: NORMAL
BLOOD UNIT TYPE CODE: 7300
BLOOD UNIT TYPE: NORMAL
BUN SERPL-MCNC: 10 MG/DL (ref 6–20)
CALCIUM SERPL-MCNC: 9.3 MG/DL (ref 8.7–10.5)
CHLORIDE SERPL-SCNC: 105 MMOL/L (ref 95–110)
CK SERPL-CCNC: 48 U/L (ref 20–180)
CO2 SERPL-SCNC: 23 MMOL/L (ref 23–29)
CODING SYSTEM: NORMAL
CREAT SERPL-MCNC: 0.9 MG/DL (ref 0.5–1.4)
CROSSMATCH INTERPRETATION: NORMAL
DIFFERENTIAL METHOD: ABNORMAL
DISPENSE STATUS: NORMAL
EOSINOPHIL # BLD AUTO: 0.2 K/UL (ref 0–0.5)
EOSINOPHIL NFR BLD: 4 % (ref 0–8)
ERYTHROCYTE [DISTWIDTH] IN BLOOD BY AUTOMATED COUNT: 20 % (ref 11.5–14.5)
EST. GFR  (NO RACE VARIABLE): >60 ML/MIN/1.73 M^2
GLUCOSE SERPL-MCNC: 99 MG/DL (ref 70–110)
HCT VFR BLD AUTO: 26 % (ref 37–48.5)
HGB BLD-MCNC: 7 G/DL (ref 12–16)
HYPOCHROMIA BLD QL SMEAR: ABNORMAL
IMM GRANULOCYTES # BLD AUTO: 0.01 K/UL (ref 0–0.04)
IMM GRANULOCYTES NFR BLD AUTO: 0.2 % (ref 0–0.5)
LYMPHOCYTES # BLD AUTO: 1.8 K/UL (ref 1–4.8)
LYMPHOCYTES NFR BLD: 34.5 % (ref 18–48)
MCH RBC QN AUTO: 16.5 PG (ref 27–31)
MCHC RBC AUTO-ENTMCNC: 26.9 G/DL (ref 32–36)
MCV RBC AUTO: 62 FL (ref 82–98)
MONOCYTES # BLD AUTO: 0.5 K/UL (ref 0.3–1)
MONOCYTES NFR BLD: 9.3 % (ref 4–15)
NEUTROPHILS # BLD AUTO: 2.7 K/UL (ref 1.8–7.7)
NEUTROPHILS NFR BLD: 51.2 % (ref 38–73)
NRBC BLD-RTO: 0 /100 WBC
NUM UNITS TRANS PACKED RBC: NORMAL
OVALOCYTES BLD QL SMEAR: ABNORMAL
PLATELET # BLD AUTO: 343 K/UL (ref 150–450)
PLATELET BLD QL SMEAR: ABNORMAL
PMV BLD AUTO: ABNORMAL FL (ref 9.2–12.9)
POTASSIUM SERPL-SCNC: 3.5 MMOL/L (ref 3.5–5.1)
RBC # BLD AUTO: 4.23 M/UL (ref 4–5.4)
ROULEAUX BLD QL SMEAR: PRESENT
SODIUM SERPL-SCNC: 139 MMOL/L (ref 136–145)
SPECIMEN OUTDATE: NORMAL
WBC # BLD AUTO: 5.25 K/UL (ref 3.9–12.7)

## 2023-08-05 PROCEDURE — 36415 COLL VENOUS BLD VENIPUNCTURE: CPT | Performed by: EMERGENCY MEDICINE

## 2023-08-05 PROCEDURE — 25000003 PHARM REV CODE 250: Performed by: EMERGENCY MEDICINE

## 2023-08-05 PROCEDURE — 96360 HYDRATION IV INFUSION INIT: CPT

## 2023-08-05 PROCEDURE — 93010 ELECTROCARDIOGRAM REPORT: CPT | Mod: ,,, | Performed by: INTERNAL MEDICINE

## 2023-08-05 PROCEDURE — 86900 BLOOD TYPING SEROLOGIC ABO: CPT | Performed by: EMERGENCY MEDICINE

## 2023-08-05 PROCEDURE — 93005 ELECTROCARDIOGRAM TRACING: CPT

## 2023-08-05 PROCEDURE — 82550 ASSAY OF CK (CPK): CPT | Performed by: EMERGENCY MEDICINE

## 2023-08-05 PROCEDURE — 36430 TRANSFUSION BLD/BLD COMPNT: CPT

## 2023-08-05 PROCEDURE — 85025 COMPLETE CBC W/AUTO DIFF WBC: CPT | Performed by: EMERGENCY MEDICINE

## 2023-08-05 PROCEDURE — 99285 EMERGENCY DEPT VISIT HI MDM: CPT | Mod: 25

## 2023-08-05 PROCEDURE — 80048 BASIC METABOLIC PNL TOTAL CA: CPT | Performed by: EMERGENCY MEDICINE

## 2023-08-05 PROCEDURE — 86920 COMPATIBILITY TEST SPIN: CPT | Performed by: EMERGENCY MEDICINE

## 2023-08-05 PROCEDURE — 93010 EKG 12-LEAD: ICD-10-PCS | Mod: ,,, | Performed by: INTERNAL MEDICINE

## 2023-08-05 PROCEDURE — P9016 RBC LEUKOCYTES REDUCED: HCPCS | Performed by: EMERGENCY MEDICINE

## 2023-08-05 RX ORDER — HYDROCODONE BITARTRATE AND ACETAMINOPHEN 500; 5 MG/1; MG/1
TABLET ORAL
Status: DISCONTINUED | OUTPATIENT
Start: 2023-08-05 | End: 2023-08-05 | Stop reason: HOSPADM

## 2023-08-05 RX ORDER — CLINDAMYCIN HYDROCHLORIDE 150 MG/1
300 CAPSULE ORAL 4 TIMES DAILY
Qty: 56 CAPSULE | Refills: 0 | Status: SHIPPED | OUTPATIENT
Start: 2023-08-05 | End: 2023-08-12

## 2023-08-05 RX ADMIN — SODIUM CHLORIDE 1000 ML: 9 INJECTION, SOLUTION INTRAVENOUS at 09:08

## 2023-08-05 NOTE — Clinical Note
"Jacqui"Florence Cruz was seen and treated in our emergency department on 8/5/2023.  She may return to work on 08/14/2023.       If you have any questions or concerns, please don't hesitate to call.      Jake Matute RN    "

## 2023-08-05 NOTE — LETTER
Patient: Jacqui Cruz  YOB: 1970  Date: 8/5/2023 Time: 10:28 AM  [facility]    Leaving the Hospital Against Medical Advice    Chart #:[id number]    This will certify that I, the undersigned,    ______________________________________________________________________    A patient in the above named medical center, having requested discharge and removal from the medical center against the advice of my attending physician(s), hereby release [facility], its physicians, officers and employees, severally and individually, from any and all liability of any nature whatsoever for any injury or harm or complication of any kind that may result directly or indirectly, by reason of my terminating my stay as a patient at Critical access hospital and my departure from [facility], and hereby waive any and all rights of action I may now have or later acquire as a result of my voluntary departure from [facility] and the termination of my stay as a patient therein.    This release is made with the full knowledge of the danger that may result from the action which I am taking.      Date:_______________________                         ___________________________                                                                                    Patient/Legal Representative    Witness:        ____________________________                          ___________________________  Nurse                                                                        Physician
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
## 2023-08-07 ENCOUNTER — TELEPHONE (OUTPATIENT)
Dept: FAMILY MEDICINE | Facility: CLINIC | Age: 53
End: 2023-08-07
Payer: COMMERCIAL

## 2023-08-07 NOTE — TELEPHONE ENCOUNTER
----- Message from Sabra Perez sent at 8/7/2023  8:57 AM CDT -----  Contact: pt [phone]  Type: Needs Medical Advice  Who Called:  Pt     Best Call Back Number: [phone]    Additional Information: Pt requesting a call back to discuss her er visit on Friday. No further info provided. Pls call back and advise

## 2023-08-09 NOTE — TELEPHONE ENCOUNTER
Has severe anemia and head injury.  Head trauma precautions-if lethargy, severe headache, confusion then go back to ED. Keep appts with NP Lola and GYN for f/u severe anemia.  Stay out of the heat until appt with NP

## 2023-08-11 ENCOUNTER — HOSPITAL ENCOUNTER (OUTPATIENT)
Dept: RADIOLOGY | Facility: CLINIC | Age: 53
Discharge: HOME | End: 2023-08-11
Attending: NURSE PRACTITIONER
Payer: COMMERCIAL

## 2023-08-11 ENCOUNTER — OFFICE VISIT (OUTPATIENT)
Dept: FAMILY MEDICINE | Facility: CLINIC | Age: 53
End: 2023-08-11
Payer: COMMERCIAL

## 2023-08-11 ENCOUNTER — LAB VISIT (OUTPATIENT)
Dept: LAB | Facility: HOSPITAL | Age: 53
End: 2023-08-11
Attending: NURSE PRACTITIONER
Payer: COMMERCIAL

## 2023-08-11 ENCOUNTER — TELEPHONE (OUTPATIENT)
Dept: FAMILY MEDICINE | Facility: CLINIC | Age: 53
End: 2023-08-11
Payer: COMMERCIAL

## 2023-08-11 VITALS
DIASTOLIC BLOOD PRESSURE: 90 MMHG | OXYGEN SATURATION: 99 % | HEART RATE: 95 BPM | WEIGHT: 146.38 LBS | SYSTOLIC BLOOD PRESSURE: 140 MMHG | TEMPERATURE: 98 F | BODY MASS INDEX: 25.94 KG/M2 | HEIGHT: 63 IN

## 2023-08-11 DIAGNOSIS — S09.91XD INJURY OF EAR, SUBSEQUENT ENCOUNTER: ICD-10-CM

## 2023-08-11 DIAGNOSIS — D64.9 ANEMIA, UNSPECIFIED TYPE: Primary | ICD-10-CM

## 2023-08-11 DIAGNOSIS — M54.2 NECK PAIN: ICD-10-CM

## 2023-08-11 DIAGNOSIS — R19.7 DIARRHEA, UNSPECIFIED TYPE: ICD-10-CM

## 2023-08-11 DIAGNOSIS — R55 SYNCOPE, UNSPECIFIED SYNCOPE TYPE: ICD-10-CM

## 2023-08-11 DIAGNOSIS — L08.9 SKIN INFECTION: ICD-10-CM

## 2023-08-11 LAB
C DIFF GDH STL QL: NEGATIVE
C DIFF TOX A+B STL QL IA: NEGATIVE
OB PNL STL: POSITIVE
WBC #/AREA STL HPF: NORMAL /[HPF]

## 2023-08-11 PROCEDURE — 3080F DIAST BP >= 90 MM HG: CPT | Mod: CPTII,S$GLB,, | Performed by: NURSE PRACTITIONER

## 2023-08-11 PROCEDURE — 87045 FECES CULTURE AEROBIC BACT: CPT | Performed by: NURSE PRACTITIONER

## 2023-08-11 PROCEDURE — 1160F PR REVIEW ALL MEDS BY PRESCRIBER/CLIN PHARMACIST DOCUMENTED: ICD-10-PCS | Mod: CPTII,S$GLB,, | Performed by: NURSE PRACTITIONER

## 2023-08-11 PROCEDURE — 99999 PR PBB SHADOW E&M-EST. PATIENT-LVL V: CPT | Mod: PBBFAC,,, | Performed by: NURSE PRACTITIONER

## 2023-08-11 PROCEDURE — 1160F RVW MEDS BY RX/DR IN RCRD: CPT | Mod: CPTII,S$GLB,, | Performed by: NURSE PRACTITIONER

## 2023-08-11 PROCEDURE — 87209 SMEAR COMPLEX STAIN: CPT | Performed by: NURSE PRACTITIONER

## 2023-08-11 PROCEDURE — 72040 X-RAY EXAM NECK SPINE 2-3 VW: CPT | Mod: TC,FY,PO

## 2023-08-11 PROCEDURE — 3077F PR MOST RECENT SYSTOLIC BLOOD PRESSURE >= 140 MM HG: ICD-10-PCS | Mod: CPTII,S$GLB,, | Performed by: NURSE PRACTITIONER

## 2023-08-11 PROCEDURE — 3077F SYST BP >= 140 MM HG: CPT | Mod: CPTII,S$GLB,, | Performed by: NURSE PRACTITIONER

## 2023-08-11 PROCEDURE — 87046 STOOL CULTR AEROBIC BACT EA: CPT | Mod: 59 | Performed by: NURSE PRACTITIONER

## 2023-08-11 PROCEDURE — 4010F ACE/ARB THERAPY RXD/TAKEN: CPT | Mod: CPTII,S$GLB,, | Performed by: NURSE PRACTITIONER

## 2023-08-11 PROCEDURE — 72040 XR CERVICAL SPINE AP LATERAL: ICD-10-PCS | Mod: 26,,, | Performed by: RADIOLOGY

## 2023-08-11 PROCEDURE — 82272 OCCULT BLD FECES 1-3 TESTS: CPT | Performed by: NURSE PRACTITIONER

## 2023-08-11 PROCEDURE — 1159F MED LIST DOCD IN RCRD: CPT | Mod: CPTII,S$GLB,, | Performed by: NURSE PRACTITIONER

## 2023-08-11 PROCEDURE — 89055 LEUKOCYTE ASSESSMENT FECAL: CPT | Performed by: NURSE PRACTITIONER

## 2023-08-11 PROCEDURE — 3008F BODY MASS INDEX DOCD: CPT | Mod: CPTII,S$GLB,, | Performed by: NURSE PRACTITIONER

## 2023-08-11 PROCEDURE — 87449 NOS EACH ORGANISM AG IA: CPT | Performed by: NURSE PRACTITIONER

## 2023-08-11 PROCEDURE — 4010F PR ACE/ARB THEARPY RXD/TAKEN: ICD-10-PCS | Mod: CPTII,S$GLB,, | Performed by: NURSE PRACTITIONER

## 2023-08-11 PROCEDURE — 72040 X-RAY EXAM NECK SPINE 2-3 VW: CPT | Mod: 26,,, | Performed by: RADIOLOGY

## 2023-08-11 PROCEDURE — 1159F PR MEDICATION LIST DOCUMENTED IN MEDICAL RECORD: ICD-10-PCS | Mod: CPTII,S$GLB,, | Performed by: NURSE PRACTITIONER

## 2023-08-11 PROCEDURE — 3080F PR MOST RECENT DIASTOLIC BLOOD PRESSURE >= 90 MM HG: ICD-10-PCS | Mod: CPTII,S$GLB,, | Performed by: NURSE PRACTITIONER

## 2023-08-11 PROCEDURE — 87449 NOS EACH ORGANISM AG IA: CPT | Mod: 91 | Performed by: NURSE PRACTITIONER

## 2023-08-11 PROCEDURE — 99999 PR PBB SHADOW E&M-EST. PATIENT-LVL V: ICD-10-PCS | Mod: PBBFAC,,, | Performed by: NURSE PRACTITIONER

## 2023-08-11 PROCEDURE — 3008F PR BODY MASS INDEX (BMI) DOCUMENTED: ICD-10-PCS | Mod: CPTII,S$GLB,, | Performed by: NURSE PRACTITIONER

## 2023-08-11 PROCEDURE — 99213 OFFICE O/P EST LOW 20 MIN: CPT | Mod: S$GLB,,, | Performed by: NURSE PRACTITIONER

## 2023-08-11 PROCEDURE — 99213 PR OFFICE/OUTPT VISIT, EST, LEVL III, 20-29 MIN: ICD-10-PCS | Mod: S$GLB,,, | Performed by: NURSE PRACTITIONER

## 2023-08-11 RX ORDER — SILVER SULFADIAZINE 10 G/1000G
CREAM TOPICAL 2 TIMES DAILY
Qty: 85 G | Refills: 0 | Status: SHIPPED | OUTPATIENT
Start: 2023-08-11 | End: 2024-02-12

## 2023-08-11 RX ORDER — MUPIROCIN 20 MG/G
OINTMENT TOPICAL 3 TIMES DAILY
Qty: 30 G | Refills: 0 | Status: SHIPPED | OUTPATIENT
Start: 2023-08-11
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
## 2023-08-13 DIAGNOSIS — D50.9 IRON DEFICIENCY ANEMIA, UNSPECIFIED IRON DEFICIENCY ANEMIA TYPE: Primary | ICD-10-CM

## 2023-08-13 DIAGNOSIS — M47.812 CERVICAL SPONDYLOSIS: Primary | ICD-10-CM

## 2023-08-13 RX ORDER — METHYLPREDNISOLONE 4 MG/1
TABLET ORAL
Qty: 21 EACH | Refills: 0 | Status: SHIPPED | OUTPATIENT
Start: 2023-08-13 | End: 2023-09-03

## 2023-08-13 RX ORDER — FERROUS SULFATE 325(65) MG
325 TABLET ORAL
Qty: 30 TABLET | Refills: 5 | Status: SHIPPED | OUTPATIENT
Start: 2023-08-13

## 2023-08-14 ENCOUNTER — PATIENT MESSAGE (OUTPATIENT)
Dept: FAMILY MEDICINE | Facility: CLINIC | Age: 53
End: 2023-08-14
Payer: COMMERCIAL

## 2023-08-15 ENCOUNTER — TELEPHONE (OUTPATIENT)
Dept: FAMILY MEDICINE | Facility: CLINIC | Age: 53
End: 2023-08-15
Payer: COMMERCIAL

## 2023-08-15 NOTE — TELEPHONE ENCOUNTER
----- Message from Danna Mar sent at 8/15/2023  3:10 PM CDT -----  Type:  Patient Returning Call    Who Called:  Pt    Who Left Message for Patient:  Ainsley    Does the patient know what this is regarding?:  yes    Would the patient rather a call back or a response via [facility]?  Call back    Best Call Back Number:  [phone]    Additional Information:  Pt is calling to get in touch with someone.  Please call back to advise. Thanks!

## 2023-08-16 ENCOUNTER — TELEPHONE (OUTPATIENT)
Dept: SPINE | Facility: CLINIC | Age: 53
End: 2023-08-16
Payer: COMMERCIAL

## 2023-08-16 LAB
STOOL CULTURE: NORMAL

## 2023-08-16 NOTE — TELEPHONE ENCOUNTER
----- Message from Stacey M Lefort sent at 8/16/2023  2:36 PM CDT -----  Ed Mobley is the   for the pt. He would like a callback at [phone]. Thank you.

## 2023-08-17 ENCOUNTER — TELEPHONE (OUTPATIENT)
Dept: SPINE | Facility: CLINIC | Age: 53
End: 2023-08-17
Payer: COMMERCIAL

## 2023-08-19 LAB — O+P STL MICRO: NORMAL

## 2023-08-21 ENCOUNTER — TELEPHONE (OUTPATIENT)
Dept: SPINE | Facility: CLINIC | Age: 53
End: 2023-08-21
Payer: COMMERCIAL

## 2023-08-23 ENCOUNTER — TELEPHONE (OUTPATIENT)
Dept: GASTROENTEROLOGY | Facility: CLINIC | Age: 53
End: 2023-08-23
Payer: COMMERCIAL

## 2023-08-23 ENCOUNTER — OFFICE VISIT (OUTPATIENT)
Dept: SPINE | Facility: CLINIC | Age: 53
End: 2023-08-23
Payer: OTHER MISCELLANEOUS

## 2023-08-23 VITALS — BODY MASS INDEX: 25.87 KG/M2 | HEIGHT: 63 IN | WEIGHT: 146 LBS

## 2023-08-23 DIAGNOSIS — M54.2 CERVICALGIA: Primary | ICD-10-CM

## 2023-08-23 PROCEDURE — 99213 PR OFFICE/OUTPT VISIT, EST, LEVL III, 20-29 MIN: ICD-10-PCS | Mod: S$GLB,,, | Performed by: PHYSICAL MEDICINE & REHABILITATION

## 2023-08-23 PROCEDURE — 99213 OFFICE O/P EST LOW 20 MIN: CPT | Mod: S$GLB,,, | Performed by: PHYSICAL MEDICINE & REHABILITATION
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
## 2023-09-01 ENCOUNTER — OFFICE VISIT (OUTPATIENT)
Dept: GASTROENTEROLOGY | Facility: CLINIC | Age: 53
End: 2023-09-01
Payer: COMMERCIAL

## 2023-09-01 VITALS
SYSTOLIC BLOOD PRESSURE: 131 MMHG | DIASTOLIC BLOOD PRESSURE: 87 MMHG | BODY MASS INDEX: 25.78 KG/M2 | HEART RATE: 78 BPM | WEIGHT: 145.5 LBS | HEIGHT: 63 IN

## 2023-09-01 DIAGNOSIS — M54.42 CHRONIC BILATERAL LOW BACK PAIN WITH LEFT-SIDED SCIATICA: ICD-10-CM

## 2023-09-01 DIAGNOSIS — K59.00 CONSTIPATION, UNSPECIFIED CONSTIPATION TYPE: ICD-10-CM

## 2023-09-01 DIAGNOSIS — K92.1 HEMATOCHEZIA: ICD-10-CM

## 2023-09-01 DIAGNOSIS — R10.13 EPIGASTRIC PAIN: ICD-10-CM

## 2023-09-01 DIAGNOSIS — K76.0 FATTY LIVER: ICD-10-CM

## 2023-09-01 DIAGNOSIS — D50.9 IRON DEFICIENCY ANEMIA, UNSPECIFIED IRON DEFICIENCY ANEMIA TYPE: Primary | ICD-10-CM

## 2023-09-01 DIAGNOSIS — R93.3 ABNORMAL CT SCAN, COLON: ICD-10-CM

## 2023-09-01 DIAGNOSIS — R19.5 MUCUS IN STOOL: ICD-10-CM

## 2023-09-01 DIAGNOSIS — G89.29 CHRONIC BILATERAL LOW BACK PAIN WITH LEFT-SIDED SCIATICA: ICD-10-CM

## 2023-09-01 DIAGNOSIS — K21.9 GASTROESOPHAGEAL REFLUX DISEASE, UNSPECIFIED WHETHER ESOPHAGITIS PRESENT: ICD-10-CM

## 2023-09-01 DIAGNOSIS — R19.8 STRAINING DURING BOWEL MOVEMENTS: ICD-10-CM

## 2023-09-01 PROCEDURE — 3075F SYST BP GE 130 - 139MM HG: CPT | Mod: CPTII,S$GLB,,

## 2023-09-01 PROCEDURE — 1160F RVW MEDS BY RX/DR IN RCRD: CPT | Mod: CPTII,S$GLB,,

## 2023-09-01 PROCEDURE — 3008F PR BODY MASS INDEX (BMI) DOCUMENTED: ICD-10-PCS | Mod: CPTII,S$GLB,,

## 2023-09-01 PROCEDURE — 4010F ACE/ARB THERAPY RXD/TAKEN: CPT | Mod: CPTII,S$GLB,,

## 2023-09-01 PROCEDURE — 99999 PR PBB SHADOW E&M-EST. PATIENT-LVL V: ICD-10-PCS | Mod: PBBFAC,,,

## 2023-09-01 PROCEDURE — 1159F PR MEDICATION LIST DOCUMENTED IN MEDICAL RECORD: ICD-10-PCS | Mod: CPTII,S$GLB,,

## 2023-09-01 PROCEDURE — 3008F BODY MASS INDEX DOCD: CPT | Mod: CPTII,S$GLB,,

## 2023-09-01 PROCEDURE — 3079F PR MOST RECENT DIASTOLIC BLOOD PRESSURE 80-89 MM HG: ICD-10-PCS | Mod: CPTII,S$GLB,,

## 2023-09-01 PROCEDURE — 1159F MED LIST DOCD IN RCRD: CPT | Mod: CPTII,S$GLB,,

## 2023-09-01 PROCEDURE — 1160F PR REVIEW ALL MEDS BY PRESCRIBER/CLIN PHARMACIST DOCUMENTED: ICD-10-PCS | Mod: CPTII,S$GLB,,

## 2023-09-01 PROCEDURE — 99999 PR PBB SHADOW E&M-EST. PATIENT-LVL V: CPT | Mod: PBBFAC,,,

## 2023-09-01 PROCEDURE — 4010F PR ACE/ARB THEARPY RXD/TAKEN: ICD-10-PCS | Mod: CPTII,S$GLB,,

## 2023-09-01 PROCEDURE — 99214 PR OFFICE/OUTPT VISIT, EST, LEVL IV, 30-39 MIN: ICD-10-PCS | Mod: S$GLB,,,

## 2023-09-01 PROCEDURE — 3075F PR MOST RECENT SYSTOLIC BLOOD PRESS GE 130-139MM HG: ICD-10-PCS | Mod: CPTII,S$GLB,,

## 2023-09-01 PROCEDURE — 99214 OFFICE O/P EST MOD 30 MIN: CPT | Mod: S$GLB,,,

## 2023-09-01 PROCEDURE — 3079F DIAST BP 80-89 MM HG: CPT | Mod: CPTII,S$GLB,,

## 2023-09-01 RX ORDER — PANTOPRAZOLE SODIUM 40 MG/1
40 TABLET, DELAYED RELEASE ORAL DAILY
Qty: 30 TABLET | Refills: 2 | Status: SHIPPED | OUTPATIENT
Start: 2023-09-01 | End: 2023-11-30

## 2023-09-01 RX ORDER — OXYCODONE AND ACETAMINOPHEN 5; 325 MG/1; MG/1
1 TABLET ORAL EVERY 6 HOURS PRN
Qty: 120 TABLET | Refills: 0 | Status: SHIPPED | OUTPATIENT
Start: 2023-09-01 | End: 2023-09-29 | Stop reason: SDUPTHER

## 2023-09-05 ENCOUNTER — TELEPHONE (OUTPATIENT)
Dept: GASTROENTEROLOGY | Facility: CLINIC | Age: 53
End: 2023-09-05
Payer: COMMERCIAL

## 2023-09-05 NOTE — TELEPHONE ENCOUNTER
----- Message from Ricki Ashford sent at 9/5/2023  8:56 AM CDT -----  Type: Needs Medical Advice  Who Called:  pt  Symptoms (please be specific):  questions regarding rx's   Pharmacy name and phone #:    [facility] - [place] - [address]  [id number]  [facility] [address]  Phone: [phone] Fax: [phone]  Best Call Back Number: [phone]  Additional Information: pt stated she is needing to be advised in regards to rx prescribed and how pt needs to take rx please call to advise asap thanks!

## 2023-09-05 NOTE — TELEPHONE ENCOUNTER
----- Message from Thu Patel sent at 9/5/2023 10:09 AM CDT -----  Contact: pt  Type:  Patient Returning Call    Who Called:pt  Who Left Message for Patient:beatris  Does the patient know what this is regarding?:questions about medication   Would the patient rather a call back or a response via [facility]? Call back  Best Call Back Number:[phone]    Additional Information: please call to advise  Thanks

## 2023-09-14 ENCOUNTER — CLINICAL SUPPORT (OUTPATIENT)
Dept: REHABILITATION | Facility: HOSPITAL | Age: 53
End: 2023-09-14
Payer: OTHER MISCELLANEOUS

## 2023-09-14 DIAGNOSIS — R29.3 POSTURE ABNORMALITY: ICD-10-CM

## 2023-09-14 DIAGNOSIS — M54.2 CERVICALGIA: ICD-10-CM

## 2023-09-14 DIAGNOSIS — M25.60 RANGE OF MOTION DEFICIT: ICD-10-CM

## 2023-09-14 PROCEDURE — 97110 THERAPEUTIC EXERCISES: CPT | Mod: PN

## 2023-09-14 PROCEDURE — 97161 PT EVAL LOW COMPLEX 20 MIN: CPT | Mod: PN

## 2023-09-15 ENCOUNTER — CLINICAL SUPPORT (OUTPATIENT)
Dept: REHABILITATION | Facility: HOSPITAL | Age: 53
End: 2023-09-15
Payer: COMMERCIAL

## 2023-09-15 DIAGNOSIS — M54.42 CHRONIC BILATERAL LOW BACK PAIN WITH LEFT-SIDED SCIATICA: ICD-10-CM

## 2023-09-15 DIAGNOSIS — R29.898 DECREASED STRENGTH OF LOWER EXTREMITY: ICD-10-CM

## 2023-09-15 DIAGNOSIS — G89.29 CHRONIC BILATERAL LOW BACK PAIN WITH LEFT-SIDED SCIATICA: ICD-10-CM

## 2023-09-15 DIAGNOSIS — M53.86 DECREASED RANGE OF MOTION OF LUMBAR SPINE: ICD-10-CM

## 2023-09-15 DIAGNOSIS — R29.3 POOR POSTURE: ICD-10-CM

## 2023-09-15 PROCEDURE — 97162 PT EVAL MOD COMPLEX 30 MIN: CPT | Mod: PN

## 2023-09-15 PROCEDURE — 97112 NEUROMUSCULAR REEDUCATION: CPT | Mod: PN

## 2023-09-15 PROCEDURE — 97530 THERAPEUTIC ACTIVITIES: CPT | Mod: PN
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
## 2023-09-18 PROBLEM — R29.3 POSTURE ABNORMALITY: Status: ACTIVE | Noted: 2023-09-18

## 2023-09-18 PROBLEM — M25.60 RANGE OF MOTION DEFICIT: Status: ACTIVE | Noted: 2023-09-18

## 2023-09-18 NOTE — PATIENT INSTRUCTIONS
"Side Lying open book x 15 Bilateral   Supine chin tucks 10 x 10"   Seated thoracic extensions 5" x 15  "
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
## 2023-09-29 DIAGNOSIS — M54.42 CHRONIC BILATERAL LOW BACK PAIN WITH LEFT-SIDED SCIATICA: ICD-10-CM

## 2023-09-29 DIAGNOSIS — G89.29 CHRONIC BILATERAL LOW BACK PAIN WITH LEFT-SIDED SCIATICA: ICD-10-CM

## 2023-09-29 RX ORDER — OXYCODONE AND ACETAMINOPHEN 5; 325 MG/1; MG/1
1 TABLET ORAL EVERY 6 HOURS PRN
Qty: 120 TABLET | Refills: 0 | Status: SHIPPED | OUTPATIENT
Start: 2023-09-30 | End: 2023-10-28 | Stop reason: SDUPTHER

## 2023-10-04 ENCOUNTER — CLINICAL SUPPORT (OUTPATIENT)
Dept: REHABILITATION | Facility: HOSPITAL | Age: 53
End: 2023-10-04
Payer: OTHER MISCELLANEOUS

## 2023-10-04 DIAGNOSIS — M25.60 RANGE OF MOTION DEFICIT: Primary | ICD-10-CM

## 2023-10-04 PROCEDURE — 97140 MANUAL THERAPY 1/> REGIONS: CPT | Mod: PN

## 2023-10-04 PROCEDURE — 97112 NEUROMUSCULAR REEDUCATION: CPT | Mod: PN

## 2023-10-04 PROCEDURE — 97110 THERAPEUTIC EXERCISES: CPT | Mod: PN

## 2023-10-04 NOTE — PROGRESS NOTES
"[facility]   Physical Therapy Treatment Note     Name: Jacqui Cruz  Clinic Number: [id number]    Therapy Diagnosis: No diagnosis found.  Physician: Ramses Cheema MD    Visit Date: 10/4/2023    Physician Orders: PT Eval and Treat   Medical Diagnosis from Referral:   Diagnosis   M54.2 (ICD-10-CM) - Cervicalgia      Evaluation Date: 9/14/2023  Authorization Period Expiration: 08/22/2024  Plan of Care Expiration: 12/7/23  Visit # / Visits authorized: 2/12      FOTO 1st:   FOTO 3rd:  FOTO 10th:    Time in: 9:20am  Time out: 10:00am  Total Billable Time: 40 minutes    Precautions:  Standard, HTN      SUBJECTIVE     Pt reports: feeling about the same. She was sick so she didn't come to therapy or do her exercises.    She was not compliant with home exercise program.  Response to previous treatment: ongoing  Functional change: ongoing    Pain: 6/10  Location: neck and Bilateral upper traps     OBJECTIVE     Objective Measures updated at progress report unless specified.     Treatment       Jacqui received the treatments listed below:      Manual therapy techniques: Joint mobilizations and Soft tissue Mobilization were applied to the: cervical spine for 9 minutes, including:    Suboccipital release  Cervical paraspinal Soft tissue mobilization  Cervical side glides grade II-III to tolerance  Prone thoracic PA grade II to tolerance    Therapeutic exercises to develop strength, endurance, ROM, flexibility, posture, and core stabilization for 23 minutes including:    Side Lying open book x 15 Bilateral   Seated thoracic extension 5" x 15  Upper trap level 1 x 10    Neuromuscular re-education activities to improve: Balance, Coordination, Kinesthetic, Sense, Proprioception, and Posture for 8 minutes. The following activities were included:    Supine chin tuck 10" x 15   Diaphragmatic breathing x 4'    Therapeutic activities to improve functional performance for 0  minutes, " including:      Patient Education and Home Exercises     Home Exercises Provided and Patient Education Provided     Education provided:   - Home Exercise Program     Written Home Exercises Provided: Patient instructed to cont prior HEP. Exercises were reviewed and Jacqui was able to demonstrate them prior to the end of the session.  Jacqui demonstrated good  understanding of the education provided. See EMR under Patient Instructions for exercises provided during therapy sessions    ASSESSMENT     Jacqui presented with no improvement in symptoms today. Interventions aimed at decreasing pain levels as she was still very irritable to touch. Interventions modified as needed. Diaphragmatic breathing improved her symptoms the most today. Will progress as able.    Jacqui is progressing well towards her goals.     Pt prognosis is Guarded.     Pt will continue to benefit from skilled outpatient physical therapy to address the deficits listed in the problem list box on initial evaluation, provide pt/family education and to maximize pt's level of independence in the home and community environment.     Pt's spiritual, cultural and educational needs considered and pt agreeable to plan of care and goals.     Anticipated barriers to physical therapy: irritability, chronicity, co-morbidities, multiple sites of pain    Goals:   Short Term Goals (6 Weeks):   1. Pt will be independent with HEP to supplement PT in improving pain free cervical mobility  2. Pt will improve cervical AROM 10 deg in rotational planes to improve cervical mobility for driving  3. Pt will improve UE MMTs by 1/2 grade in all planes to improve strength for lifting and carrying tasks.  4. Pt will demonstrate improved sitting posture to decrease pain experienced in head and neck.     Long Term Goals (12 Weeks):   1. Pt will improve FOTO to predicted level to improve perceived limitation with changing and maintaining mobility.  2. Pt will improve cervical AROM  to WNL in all planes to improve cervical mobility for driving   3. Pt will improve UE MMTs 1 grade in all planes to improve strength for lifting and carrying tasks.  4. Pt will report no pain with lifting 15 lbs to promote physical activity.   5. Pt will report no pain with cervical AROM in all planes to promote QOL.    PLAN   Plan of care Certification: 9/14/2023 to 12/7/23.    Pain control, posture, strengthening, education on condition    Julisa Ariza, PT , DPT

## 2023-10-13 ENCOUNTER — CLINICAL SUPPORT (OUTPATIENT)
Dept: REHABILITATION | Facility: HOSPITAL | Age: 53
End: 2023-10-13
Payer: OTHER MISCELLANEOUS

## 2023-10-13 DIAGNOSIS — R29.3 POOR POSTURE: Primary | ICD-10-CM

## 2023-10-13 PROCEDURE — 97110 THERAPEUTIC EXERCISES: CPT | Mod: PN,CQ

## 2023-10-13 PROCEDURE — 97112 NEUROMUSCULAR REEDUCATION: CPT | Mod: PN,CQ

## 2023-10-13 NOTE — PROGRESS NOTES
"[facility]   Physical Therapy Treatment Note     Name: Jacqui Cruz  Clinic Number: [id number]    Therapy Diagnosis:   Encounter Diagnosis   Name Primary?    Poor posture Yes     Physician: Ramses Cheema MD    Visit Date: 10/13/2023    Physician Orders: PT Eval and Treat   Medical Diagnosis from Referral:   Diagnosis   M54.2 (ICD-10-CM) - Cervicalgia      Evaluation Date: 9/14/2023  Authorization Period Expiration: 08/22/2024  Plan of Care Expiration: 12/7/23  Visit # / Visits authorized: 3/12      FOTO 1st:   FOTO 3rd:  FOTO 10th:    Time in: 210 (patient late)   Time out: 300  Total Billable Time: 50 minutes    Precautions:  Standard, HTN      SUBJECTIVE     Pt reports: she is alright, the right side of the neck is always tight.   She was not compliant with home exercise program.  Response to previous treatment: no issues   Functional change: ongoing    Pain: 6/10  Location: neck and Bilateral upper traps     OBJECTIVE     Objective Measures updated at progress report unless specified.     Treatment       Jacqui received the treatments listed below:      Manual therapy techniques: Joint mobilizations and Soft tissue Mobilization were applied to the: cervical spine for 0 minutes, including:    Suboccipital release  Cervical paraspinal Soft tissue mobilization  Cervical side glides grade II-III to tolerance  Prone thoracic PA grade II to tolerance    Therapeutic exercises to develop strength, endurance, ROM, flexibility, posture, and core stabilization for 23 minutes including:    Upper Body Ergometer 3'/3'  Side Lying open book x 20 Bilateral   Seated thoracic extension 5" x 20  Upper trap level 1 x 10    Neuromuscular re-education activities to improve: Balance, Coordination, Kinesthetic, Sense, Proprioception, and Posture for 15 minutes. The following activities were included:    Supine chin tuck 10" x 15   Shoulder rows Red Theraband 2 x 10   Shoulder extension Red Theraband " 2 x 10     Therapeutic activities to improve functional performance for 0  minutes, including:      Patient Education and Home Exercises     Home Exercises Provided and Patient Education Provided     Education provided:   - Home Exercise Program     Written Home Exercises Provided: Patient instructed to cont prior HEP. Exercises were reviewed and Jacqui was able to demonstrate them prior to the end of the session.  Jacqui demonstrated good  understanding of the education provided. See EMR under Patient Instructions for exercises provided during therapy sessions    ASSESSMENT     Patient has increased challenge with postural strength and endurance. Cues needed to reduce upper trap compensation to help alleviate discomfort in right upper trap. Continue to progress range of motion and strength.     Jacqui is progressing well towards her goals.     Pt prognosis is Guarded.     Pt will continue to benefit from skilled outpatient physical therapy to address the deficits listed in the problem list box on initial evaluation, provide pt/family education and to maximize pt's level of independence in the home and community environment.     Pt's spiritual, cultural and educational needs considered and pt agreeable to plan of care and goals.     Anticipated barriers to physical therapy: irritability, chronicity, co-morbidities, multiple sites of pain    Goals:   Short Term Goals (6 Weeks):   1. Pt will be independent with HEP to supplement PT in improving pain free cervical mobility  2. Pt will improve cervical AROM 10 deg in rotational planes to improve cervical mobility for driving  3. Pt will improve UE MMTs by 1/2 grade in all planes to improve strength for lifting and carrying tasks.  4. Pt will demonstrate improved sitting posture to decrease pain experienced in head and neck.     Long Term Goals (12 Weeks):   1. Pt will improve FOTO to predicted level to improve perceived limitation with changing and maintaining  mobility.  2. Pt will improve cervical AROM to WNL in all planes to improve cervical mobility for driving   3. Pt will improve UE MMTs 1 grade in all planes to improve strength for lifting and carrying tasks.  4. Pt will report no pain with lifting 15 lbs to promote physical activity.   5. Pt will report no pain with cervical AROM in all planes to promote QOL.    PLAN   Plan of care Certification: 9/14/2023 to 12/7/23.    Pain control, posture, strengthening, education on condition    Livier Colunga, PTA

## 2023-10-23 ENCOUNTER — CLINICAL SUPPORT (OUTPATIENT)
Dept: REHABILITATION | Facility: HOSPITAL | Age: 53
End: 2023-10-23
Payer: OTHER MISCELLANEOUS

## 2023-10-23 DIAGNOSIS — M25.511 ACUTE PAIN OF RIGHT SHOULDER: ICD-10-CM

## 2023-10-23 DIAGNOSIS — R29.3 POOR POSTURE: Primary | ICD-10-CM

## 2023-10-23 PROCEDURE — 97112 NEUROMUSCULAR REEDUCATION: CPT | Mod: PN

## 2023-10-23 PROCEDURE — 97110 THERAPEUTIC EXERCISES: CPT | Mod: PN

## 2023-10-23 PROCEDURE — 97140 MANUAL THERAPY 1/> REGIONS: CPT | Mod: PN

## 2023-10-23 RX ORDER — DICLOFENAC SODIUM 20 MG/G
SOLUTION TOPICAL
Qty: 112 G | Refills: 0 | Status: SHIPPED | OUTPATIENT
Start: 2023-10-23

## 2023-10-23 NOTE — PROGRESS NOTES
"[facility]   Physical Therapy Treatment Note     Name: Jacqui Cruz  Clinic Number: [id number]    Therapy Diagnosis:   Encounter Diagnosis   Name Primary?    Poor posture Yes       Physician: Ramses Cheema MD    Visit Date: 10/23/2023    Physician Orders: PT Eval and Treat   Medical Diagnosis from Referral:   Diagnosis   M54.2 (ICD-10-CM) - Cervicalgia      Evaluation Date: 9/14/2023  Authorization Period Expiration: 08/22/2024  Plan of Care Expiration: 12/7/23  Visit # / Visits authorized: 4/12      FOTO 1st:   FOTO 3rd:  FOTO 10th:    Time in: 1100am  Time out: 1155am  Total Billable Time: 50 minutes    Precautions:  Standard, HTN      SUBJECTIVE     Pt reports: she feels tight in her upper trap.  She was not compliant with home exercise program.  Response to previous treatment: no issues   Functional change: ongoing    Pain: 5/10  Location: neck and Bilateral upper traps     OBJECTIVE     Objective Measures updated at progress report unless specified.     Treatment       Jacqui received the treatments listed below:      Manual therapy techniques: Joint mobilizations and Soft tissue Mobilization were applied to the: cervical spine for 10 minutes, including:    Suboccipital release  Cervical paraspinal Soft tissue mobilization  Cervical side glides grade II-III to tolerance  Prone thoracic PA grade II to tolerance    Therapeutic exercises to develop strength, endurance, ROM, flexibility, posture, and core stabilization for 17 minutes including:    Upper Body Ergometer 3'/3'  Standing open book x 20 Bilateral   Seated thoracic extension 5" x 20    Neuromuscular re-education activities to improve: Balance, Coordination, Kinesthetic, Sense, Proprioception, and Posture for 23 minutes. The following activities were included:    Supine chin tuck 10" x 15   Upper trap level 1, 3 x 10  Wall slides 1 x 10    Not Performed Today:  Shoulder rows Red Theraband 2 x 10   Shoulder extension " Red Theraband 2 x 10     Therapeutic activities to improve functional performance for 0  minutes, including:    Jacqui received a hot pack for 5' at the end of session for increased pain levels (patient requested).      Patient Education and Home Exercises     Home Exercises Provided and Patient Education Provided     Education provided:   - Home Exercise Program     Written Home Exercises Provided: Patient instructed to cont prior HEP. Exercises were reviewed and Jacqui was able to demonstrate them prior to the end of the session.  Jacqui demonstrated good  understanding of the education provided. See EMR under Patient Instructions for exercises provided during therapy sessions    ASSESSMENT     Jacqui presented with continued pain levels. She seemed to tolerate manual interventions better today. We progressed with exercises with increased time on the Upper Body Ergometer, standing open books, and wall slides. She tolerated well with some fatigue. She requested a hot pack to see if that helped the pain levels which she reported it did.    Jacqui is progressing well towards her goals.     Pt prognosis is Guarded.     Pt will continue to benefit from skilled outpatient physical therapy to address the deficits listed in the problem list box on initial evaluation, provide pt/family education and to maximize pt's level of independence in the home and community environment.     Pt's spiritual, cultural and educational needs considered and pt agreeable to plan of care and goals.     Anticipated barriers to physical therapy: irritability, chronicity, co-morbidities, multiple sites of pain    Goals:   Short Term Goals (6 Weeks):   1. Pt will be independent with HEP to supplement PT in improving pain free cervical mobility  2. Pt will improve cervical AROM 10 deg in rotational planes to improve cervical mobility for driving  3. Pt will improve UE MMTs by 1/2 grade in all planes to improve strength for lifting and  carrying tasks.  4. Pt will demonstrate improved sitting posture to decrease pain experienced in head and neck.     Long Term Goals (12 Weeks):   1. Pt will improve FOTO to predicted level to improve perceived limitation with changing and maintaining mobility.  2. Pt will improve cervical AROM to WNL in all planes to improve cervical mobility for driving   3. Pt will improve UE MMTs 1 grade in all planes to improve strength for lifting and carrying tasks.  4. Pt will report no pain with lifting 15 lbs to promote physical activity.   5. Pt will report no pain with cervical AROM in all planes to promote QOL.    PLAN   Plan of care Certification: 9/14/2023 to 12/7/23.    Pain control, posture, strengthening, education on condition    Julisa Ariza, PT , DPT

## 2023-10-28 DIAGNOSIS — G89.29 CHRONIC BILATERAL LOW BACK PAIN WITH LEFT-SIDED SCIATICA: ICD-10-CM

## 2023-10-28 DIAGNOSIS — M54.42 CHRONIC BILATERAL LOW BACK PAIN WITH LEFT-SIDED SCIATICA: ICD-10-CM

## 2023-10-30 RX ORDER — OXYCODONE AND ACETAMINOPHEN 5; 325 MG/1; MG/1
1 TABLET ORAL EVERY 6 HOURS PRN
Qty: 120 TABLET | Refills: 0 | Status: SHIPPED | OUTPATIENT
Start: 2023-10-30 | End: 2023-11-30 | Stop reason: SDUPTHER

## 2023-11-02 ENCOUNTER — CLINICAL SUPPORT (OUTPATIENT)
Dept: REHABILITATION | Facility: HOSPITAL | Age: 53
End: 2023-11-02
Payer: OTHER MISCELLANEOUS

## 2023-11-02 DIAGNOSIS — M25.60 RANGE OF MOTION DEFICIT: ICD-10-CM

## 2023-11-02 DIAGNOSIS — R29.3 POOR POSTURE: ICD-10-CM

## 2023-11-02 DIAGNOSIS — R29.898 DECREASED STRENGTH OF LOWER EXTREMITY: ICD-10-CM

## 2023-11-02 DIAGNOSIS — R29.3 POSTURE ABNORMALITY: Primary | ICD-10-CM

## 2023-11-02 DIAGNOSIS — M53.86 DECREASED RANGE OF MOTION OF LUMBAR SPINE: ICD-10-CM

## 2023-11-02 PROCEDURE — 97112 NEUROMUSCULAR REEDUCATION: CPT | Mod: PN

## 2023-11-02 PROCEDURE — 97110 THERAPEUTIC EXERCISES: CPT | Mod: PN

## 2023-11-02 NOTE — PROGRESS NOTES
"[facility]   Physical Therapy Treatment Note     Name: Jacqui Cruz  Clinic Number: [id number]    Therapy Diagnosis:   Encounter Diagnoses   Name Primary?    Posture abnormality Yes    Decreased range of motion of lumbar spine     Decreased strength of lower extremity     Poor posture     Range of motion deficit        Physician: Ramses Cheema MD    Visit Date: 11/2/2023    Physician Orders: PT Eval and Treat   Medical Diagnosis from Referral:   Diagnosis   M54.2 (ICD-10-CM) - Cervicalgia      Evaluation Date: 9/14/2023  Authorization Period Expiration: 08/22/2024  Plan of Care Expiration: 12/7/23  Visit # / Visits authorized: 5/12      FOTO 1st:   FOTO 3rd:  FOTO 10th:    Time in: 105pm  Time out: 145pm  Total Billable Time: 40 minutes    Precautions:  Standard, HTN      SUBJECTIVE     Pt reports: feeling sick today and doesn't want to be prone or supine.  She was not compliant with home exercise program.  Response to previous treatment: no issues   Functional change: ongoing    Pain: 5/10  Location: neck and Bilateral upper traps     OBJECTIVE     Objective Measures updated at progress report unless specified.     Treatment       Jacqui received the treatments listed below:      Manual therapy techniques: Joint mobilizations and Soft tissue Mobilization were applied to the: cervical spine for 0 minutes, including:    Suboccipital release  Cervical paraspinal Soft tissue mobilization  Cervical side glides grade II-III to tolerance  Prone thoracic PA grade II to tolerance    Therapeutic exercises to develop strength, endurance, ROM, flexibility, posture, and core stabilization for 17 minutes including:    Upper Body Ergometer 3'/3'  Standing open book x 20 Bilateral   Seated thoracic extension 5" x 20    Neuromuscular re-education activities to improve: Balance, Coordination, Kinesthetic, Sense, Proprioception, and Posture for 23 minutes. The following activities were " "included:    Standing at the wall chin tuck 10" x 15   Upper trap level 1, 3 x 10  Wall slides 1 x 10    Not Performed Today:  Shoulder rows Red Theraband 2 x 10   Shoulder extension Red Theraband 2 x 10     Therapeutic activities to improve functional performance for 0  minutes, including:    Jacqui received a hot pack for 0' at the end of session for increased pain levels (patient requested).      Patient Education and Home Exercises     Home Exercises Provided and Patient Education Provided     Education provided:   - Home Exercise Program     Written Home Exercises Provided: Patient instructed to cont prior HEP. Exercises were reviewed and Jacqui was able to demonstrate them prior to the end of the session.  Jacqui demonstrated good  understanding of the education provided. See EMR under Patient Instructions for exercises provided during therapy sessions    ASSESSMENT     Jacqui presented to clinic feeling ill today. She did not want to be prone or supine due to nausea. Activities modified so she could tolerate them. She did well with some fatigue.    Jacqui is progressing well towards her goals.     Pt prognosis is Guarded.     Pt will continue to benefit from skilled outpatient physical therapy to address the deficits listed in the problem list box on initial evaluation, provide pt/family education and to maximize pt's level of independence in the home and community environment.     Pt's spiritual, cultural and educational needs considered and pt agreeable to plan of care and goals.     Anticipated barriers to physical therapy: irritability, chronicity, co-morbidities, multiple sites of pain    Goals:   Short Term Goals (6 Weeks):   1. Pt will be independent with HEP to supplement PT in improving pain free cervical mobility  2. Pt will improve cervical AROM 10 deg in rotational planes to improve cervical mobility for driving  3. Pt will improve UE MMTs by 1/2 grade in all planes to improve strength for " lifting and carrying tasks.  4. Pt will demonstrate improved sitting posture to decrease pain experienced in head and neck.     Long Term Goals (12 Weeks):   1. Pt will improve FOTO to predicted level to improve perceived limitation with changing and maintaining mobility.  2. Pt will improve cervical AROM to WNL in all planes to improve cervical mobility for driving   3. Pt will improve UE MMTs 1 grade in all planes to improve strength for lifting and carrying tasks.  4. Pt will report no pain with lifting 15 lbs to promote physical activity.   5. Pt will report no pain with cervical AROM in all planes to promote QOL.    PLAN   Plan of care Certification: 9/14/2023 to 12/7/23.    Pain control, posture, strengthening, education on condition    Julisa Ariza, PT , DPT

## 2023-11-03 ENCOUNTER — CLINICAL SUPPORT (OUTPATIENT)
Dept: REHABILITATION | Facility: HOSPITAL | Age: 53
End: 2023-11-03
Payer: COMMERCIAL

## 2023-11-03 DIAGNOSIS — R29.898 DECREASED STRENGTH OF LOWER EXTREMITY: ICD-10-CM

## 2023-11-03 DIAGNOSIS — M53.86 DECREASED RANGE OF MOTION OF LUMBAR SPINE: ICD-10-CM

## 2023-11-03 DIAGNOSIS — R29.3 POSTURE ABNORMALITY: Primary | ICD-10-CM

## 2023-11-03 DIAGNOSIS — M25.60 RANGE OF MOTION DEFICIT: ICD-10-CM

## 2023-11-03 DIAGNOSIS — R29.3 POOR POSTURE: ICD-10-CM

## 2023-11-03 PROCEDURE — 97112 NEUROMUSCULAR REEDUCATION: CPT | Mod: PN

## 2023-11-03 PROCEDURE — 97110 THERAPEUTIC EXERCISES: CPT | Mod: PN

## 2023-11-03 PROCEDURE — 97750 PHYSICAL PERFORMANCE TEST: CPT | Mod: PN

## 2023-11-03 NOTE — PROGRESS NOTES
[facility]  Physical Therapy Treatment Note     11-:   Reassessment 2' 7 week lapse since initial evaluation    Name: Jacqui Cruz  Clinic Number: [id number]    Therapy Diagnosis:   Encounter Diagnoses   Name Primary?    Posture abnormality Yes    Decreased range of motion of lumbar spine     Decreased strength of lower extremity     Poor posture     Range of motion deficit      Physician: Ramses Cheema MD    Visit Date: 11/3/2023    Physician Orders: PT Eval and Treat  Medical Diagnosis from Referral: M54.42,G89.29 (ICD-10-CM) - Chronic bilateral low back pain with left-sided sciatica  Evaluation Date: 9/15/2023  Authorization Period Expiration: 10/1/2023  Plan of Care Expiration: 11/24/2023  Reassessment Due: 10/15/2023  Visit # / Visits authorized: 1/20       PTA Visit #: 0/5     Time In: 3:30  Time Out: 4: 20  Total Billable Time: 45 minutes  INSURANCE and OUTCOMES: Fee for Service with FOTO Outcomes 1/3    Precautions: standard    Pattern of pain determined: 1 PEP    Subjective     Jacqui Cruz reports worsening of symptoms and increased pain.  She states she had covid and had to defer Healthy Back program x 7 weeks.   She reports multiple co-morbidities which limit her ability to lie supine or prone. She states that she has continued to complete the home exercise program as issued at the initial evaluation.   She has been attending PT for another issue.    Patient reports tolerating previous visit fair  Patient reports their pain to be 8/10 on a 0-10 scale with 0 being no pain and 10 being the worst pain imaginable.  Pain Location: low back - broad, bilateral sciatica to bottom of foot.     Work and leisure: not working x 1 year;   watch TV in a recliner, places pillow to her under her head  Pt goals: get a better way of living    Objective      Lumbar  Isometric Testing on Med X equipment: Testing administered by PT    Test  V2  retest Midpoint  Final   Date 11-     ROM 0-39 deg     Max Peak Torque 52      Min Peak Torque 14      Flex/Ext Ratio 3.7 / 1     % below normative data 78 %     % gain from initial test (-) 42%        OUTCOMES SELECTION:   Intake Outcome Measure for FOTO 9/15/2023 Survey     Therapist reviewed FOTO scores for Jacqui Cruz on 9/15/2023.   FOTO documents entered into [employer] - see Media section.     Intake Score: 41% functional ability  Goal Score: 52% functional ability        Outcomes:  Intake Score: 41%  Visit 6 Score:   Visit 10 Score:   Discharge Score:  Goal Score: 52%     Treatment     Jacqui received the treatments listed below:      Medical MedX Treatment as follows:  MedX testing performed day 2: Patient  received neuromuscular education to engage spinal musculature correctly for motor control and engagement of musculature for 20 minutes including the MedX exercise component and practice and standard testing. MedX dynamic exercise and baseline isometric test performed with instructions to guide the patient safely through the testing procedure. Patient instructed to perform isometric test correctly and safely while building to an optimal force with a pain-free effort. Patient also instructed that they should feel support/pressure from MedX restraints but no pain/discomfort, and encouraged to report any pain to therapist. Patient demonstrated appropriate understanding of information and tolerance of test.  Education regarding purpose of test, safety during test given, and reviewed possible more soreness and strategies.         Jacqui participated in neuromuscular re-education activities to improve balance, coordination, proprioception, motor control and/or posture for 10 minutes. The following activities were included:    Extension in stance 2 x 10 - no change in her symptoms  SG right x 10 - no change in symptoms      Jacqui participated in therapeutic exercises to develop strength, endurance, ROM, flexibility,  posture, and core stabilization for 10 minutes including:    Initial cardio - nu step x 6 mins    Recumbent position on plinth:  Bridging @ 50% range of motion, x 10  Posterior pelvic tilts x 10, with 3 seconds hold  LTR left and right x 10  Cervical retractions x 10    Peripheral muscle strengthening which included one set of 15-20 repetitions at a slow and controlled 10-13 second per rep pace focused on strengthening supporting musculature in order to improve body mechanics and functional mobility. Patient and therapist focused on proper form during treatment to ensure optimal strengthening of each targeted muscle group.  Machines utilized included:  To be added next visit:Torso rotation, Leg Ext, Leg Curl, Chest Press, Rowing, Triceps, Biceps, Hip Abd, Hip Add, and Leg Press      Jacqui participated in dynamic functional therapeutic activities to improve functional performance and simulate household and community activities for 0  minutes. The following activities were included:      Jacqui received manual therapy techniques for 0  minutes. The following activities were included:      Pt given cold pack for 5 minutes to low back  in recumbent position.    Patient Education and Home Exercises     Home exercises include:  Extension in prone, thoracic extension  Cardio program (V5): - initiated  Lifting education (V11): - TBD  Posture/Lumbar roll: recommended at initial evaluation   Fridge Magnet Discharge handout (date given): - TBD  Equipment at home/gym membership: none    Education provided:   - PT role and POC  - HEP  11-:  reviewed home exercise program, use of CP for local symptoms management, review of Healthy Back program      Written Home Exercises Provided: Patient instructed to cont prior HEP.  Exercises were reviewed and Jacqui was able to demonstrate them prior to the end of the session.  Jacqui demonstrated fair  understanding of the education provided.     See EMR under Patient  Instructions for exercises provided prior visit.    Assessment     Jacqui presents to second healthy back visit reporting worsening of her symptoms, was able to demo HEP with Mod VC for form. Pt was able to tolerate Medical MedX machine well as follows:  testing was completed, patient complaint of increased lumbar symptoms 2' pressure from roller bars during testing.   Will consider padding or repositioning at next visit.   She reports a recent OTJI to her head, face and neck and gastro-intestinal issues.  That preclude lying supine or prone.      Patient is making limited progress towards established goals.  Pt will continue to benefit from skilled outpatient physical therapy to address the deficits stated in the impairment chart, provide pt/family education and to maximize pt's level of independence in the home and community environment.     Anticipated Barriers for therapy: severity and chronicity of her symptoms, multiple medical co-morbidities    Pt's spiritual, cultural and educational needs considered and pt agreeable to plan of care and goals as stated below:     Goals:   Short term goals:  6 weeks or 10 visits   - Pt will demonstrate increased lumbar MedX ROM by at least 6 degrees from the initial ROM value with improvements noted in functional ROM and ability to perform ADLs. Appropriate and Ongoing  - Pt will demonstrate increased MedX average isometric strength value by 15% from initial test resulting in improved ability to perform bending, lifting, and carrying activities safely, confidently. Appropriate and Ongoing  - Pt will report a reduction in worst pain score by 1-2 points for improved tolerance for sitting . Appropriate and Ongoing  - Pt able to perform HEP correctly with minimal cueing or supervision from therapist to encourage independent management of symptoms. Appropriate and Ongoing     Long term goals: 10 weeks or 20 visits   - Pt will demonstrate increased lumbar MedX ROM by at least 12  degrees from initial ROM value, resulting in improved ability to perform functional forward bending while standing and sitting. Appropriate and Ongoing  - Pt will demonstrate increased MedX average isometric strength value by 30% from initial test resulting in improved ability to perform bending, lifting, and carrying activities safely and confidently. Appropriate and Ongoing  - Pt to demonstrate ability to independently control and reduce their pain through posture positioning and mechanical movements throughout a typical day. Appropriate and Ongoing  - Pt will demonstrate reduced pain and improved functional outcomes as reported on the FOTO by reaching an intake score of >/= 52% functional ability in order to demonstrate subjective improvement in patient's condition. . Appropriate and Ongoing  - Pt will demonstrate independence with the HEP at discharge. Appropriate and Ongoing  - Patient will report >/= 50% improvement in pain since evaluation (patient goal)to improve activity tolerance and return to work Appropriate and Ongoing       Plan     Continue with established Plan of Care towards established PT goals.     Therapist: Zamzam Marcum, PT CLT  11/3/2023

## 2023-11-06 ENCOUNTER — TELEPHONE (OUTPATIENT)
Dept: GASTROENTEROLOGY | Facility: CLINIC | Age: 53
End: 2023-11-06
Payer: COMMERCIAL

## 2023-11-06 NOTE — TELEPHONE ENCOUNTER
----- Message from Grisel Dominguez, Patient Care Assistant sent at 11/6/2023  7:19 AM CST -----  Regarding: appointment  Contact: pt  Type: Needs Medical Advice    Who Called:  pt     Best Call Back Number: [phone] (home)     Additional Information: pt states she would like a callback regarding cancelling her appointment on 11/6/23. Please call to advise. Thanks!

## 2023-11-06 NOTE — TELEPHONE ENCOUNTER
Placed call to patient to follow up. Patient states that she experienced severe cramps and nausea during her prep. She was unable to keep her prep down and vomited repeatedly.Suggested alternative preps to patient. Patient will check with her insurance to see what they cover. Procedure removed from snap board. Will call back and reschedule.

## 2023-11-17 ENCOUNTER — DOCUMENTATION ONLY (OUTPATIENT)
Dept: REHABILITATION | Facility: HOSPITAL | Age: 53
End: 2023-11-17
Payer: COMMERCIAL

## 2023-11-17 NOTE — PROGRESS NOTES
Patient is being discharged from skilled Physical Therapy services at this time due to poor compliance. She has exceeded the number of cancellations allowed per clinic policy. She was evaluated on 9/15/2023 and has attended only one follow up visit since evaluation on 11/3/2023.     Josi Whyte, PT

## 2023-11-30 DIAGNOSIS — M54.42 CHRONIC BILATERAL LOW BACK PAIN WITH LEFT-SIDED SCIATICA: ICD-10-CM

## 2023-11-30 DIAGNOSIS — G89.29 CHRONIC BILATERAL LOW BACK PAIN WITH LEFT-SIDED SCIATICA: ICD-10-CM

## 2023-11-30 RX ORDER — OXYCODONE AND ACETAMINOPHEN 5; 325 MG/1; MG/1
1 TABLET ORAL EVERY 6 HOURS PRN
Qty: 120 TABLET | Refills: 0 | Status: SHIPPED | OUTPATIENT
Start: 2023-11-30 | End: 2023-12-29 | Stop reason: SDUPTHER

## 2023-12-29 DIAGNOSIS — M54.42 CHRONIC BILATERAL LOW BACK PAIN WITH LEFT-SIDED SCIATICA: ICD-10-CM

## 2023-12-29 DIAGNOSIS — G89.29 CHRONIC BILATERAL LOW BACK PAIN WITH LEFT-SIDED SCIATICA: ICD-10-CM

## 2023-12-29 RX ORDER — OXYCODONE AND ACETAMINOPHEN 5; 325 MG/1; MG/1
1 TABLET ORAL EVERY 6 HOURS PRN
Qty: 120 TABLET | Refills: 0 | Status: SHIPPED | OUTPATIENT
Start: 2023-12-29 | End: 2024-01-30 | Stop reason: SDUPTHER

## 2024-01-10 ENCOUNTER — TELEPHONE (OUTPATIENT)
Dept: PAIN MEDICINE | Facility: CLINIC | Age: 54
End: 2024-01-10
Payer: COMMERCIAL

## 2024-01-10 ENCOUNTER — OFFICE VISIT (OUTPATIENT)
Dept: SPINE | Facility: CLINIC | Age: 54
End: 2024-01-10
Payer: COMMERCIAL

## 2024-01-10 VITALS — WEIGHT: 145.5 LBS | HEIGHT: 63 IN | BODY MASS INDEX: 25.78 KG/M2

## 2024-01-10 DIAGNOSIS — M54.2 CERVICALGIA: Primary | ICD-10-CM

## 2024-01-10 DIAGNOSIS — M47.816 LUMBAR SPONDYLOSIS: Primary | ICD-10-CM

## 2024-01-10 DIAGNOSIS — M54.50 CHRONIC RIGHT-SIDED LOW BACK PAIN WITHOUT SCIATICA: ICD-10-CM

## 2024-01-10 DIAGNOSIS — G89.29 CHRONIC RIGHT-SIDED LOW BACK PAIN WITHOUT SCIATICA: ICD-10-CM

## 2024-01-10 PROCEDURE — 4010F ACE/ARB THERAPY RXD/TAKEN: CPT | Mod: CPTII,S$GLB,, | Performed by: PHYSICAL MEDICINE & REHABILITATION

## 2024-01-10 PROCEDURE — 3008F BODY MASS INDEX DOCD: CPT | Mod: CPTII,S$GLB,, | Performed by: PHYSICAL MEDICINE & REHABILITATION

## 2024-01-10 PROCEDURE — 1160F RVW MEDS BY RX/DR IN RCRD: CPT | Mod: CPTII,S$GLB,, | Performed by: PHYSICAL MEDICINE & REHABILITATION

## 2024-01-10 PROCEDURE — 1159F MED LIST DOCD IN RCRD: CPT | Mod: CPTII,S$GLB,, | Performed by: PHYSICAL MEDICINE & REHABILITATION

## 2024-01-10 PROCEDURE — 99213 OFFICE O/P EST LOW 20 MIN: CPT | Mod: S$GLB,,, | Performed by: PHYSICAL MEDICINE & REHABILITATION

## 2024-01-10 NOTE — PROGRESS NOTES
SUBJECTIVE:    Patient ID: Jacqui Cruz is a 53 y.o. female.    Chief Complaint: Follow-up    She presents today with ongoing complaints of neck and low back pain.  She did not find physical therapy beneficial.  She is complaining of posterior neck discomfort at the cervical thoracic junction without radicular symptoms and right-sided low back pain at the lumbosacral junction without radicular symptoms.  She is interested in repeating radiofrequency ablation.  She had RFA right L4-5 and L5-S1 in March of last year.  On follow-up she describes 40-50% improvement in her back pain symptoms.  She says the pain relief lasted for 4-5 months.  Her current pain level is 8/10 and interferes with her quality of life in terms of activities of daily living recreation and social activities.  Otherwise she has no new or progressive problems          Past Medical History:   Diagnosis Date    Asthma     Hypertension      Social History     Socioeconomic History    Marital status:    Tobacco Use    Smoking status: Never    Smokeless tobacco: Never   Substance and Sexual Activity    Alcohol use: No    Drug use: No    Sexual activity: Not Currently     Partners: Male     Birth control/protection: None     Social Determinants of Health     Financial Resource Strain: Medium Risk (12/14/2022)    Overall Financial Resource Strain (CARDIA)     Difficulty of Paying Living Expenses: Somewhat hard   Food Insecurity: Food Insecurity Present (12/14/2022)    Hunger Vital Sign     Worried About Running Out of Food in the Last Year: Sometimes true     Ran Out of Food in the Last Year: Often true   Transportation Needs: No Transportation Needs (12/14/2022)    PRAPARE - Transportation     Lack of Transportation (Medical): No     Lack of Transportation (Non-Medical): No   Physical Activity: Insufficiently Active (12/14/2022)    Exercise Vital Sign     Days of Exercise per Week: 2 days     Minutes of Exercise per Session: 20 min   Stress:  No Stress Concern Present (12/14/2022)    [employer] - Occupational Stress Questionnaire     Feeling of Stress : Not at all   Social Connections: Unknown (12/14/2022)    Social Connection and Isolation Panel [NHANES]     Frequency of Communication with Friends and Family: Twice a week     Frequency of Social Gatherings with Friends and Family: Once a week     Active Member of Clubs or Organizations: No     Attends Club or Organization Meetings: Never     Marital Status:    Housing Stability: High Risk (12/14/2022)    Housing Stability Vital Sign     Unable to Pay for Housing in the Last Year: Yes     Number of Places Lived in the Last Year: 1     Unstable Housing in the Last Year: No     Past Surgical History:   Procedure Laterality Date    BREAST BIOPSY Left 2012    Benign calcifications    BREAST SURGERY      biopsy    DILATION AND CURETTAGE OF UTERUS      EPIDURAL STEROID INJECTION INTO LUMBAR SPINE N/A 05/30/2023    Procedure: Injection-steroid-epidural-lumbar;  Surgeon: Alex Shepard MD;  Location: [facility];  Service: Pain Management;  Laterality: N/A;  L4-5    INJECTION OF ANESTHETIC AGENT AROUND MEDIAL BRANCH NERVES INNERVATING LUMBAR FACET JOINT Right 11/22/2022    Procedure: Block-nerve-medial branch-lumbar;  Surgeon: Alex Shepard MD;  Location: [facility] OR;  Service: Pain Management;  Laterality: Right;  L3,4,5 MBB    INJECTION OF ANESTHETIC AGENT AROUND MEDIAL BRANCH NERVES INNERVATING LUMBAR FACET JOINT Right 02/02/2023    Procedure: Block-nerve-medial branch-lumbar;  Surgeon: Alex Shepard MD;  Location: [facility] OR;  Service: Pain Management;  Laterality: Right;  L3,4,5  #2 MBB Rt  [place]    RADIOFREQUENCY ABLATION OF LUMBAR MEDIAL BRANCH NERVE AT SINGLE LEVEL Right 03/21/2023    Procedure: Radiofrequency Ablation, Nerve, Spinal, Lumbar, Medial Branch, 1 Level;  Surgeon: Alex Shepard MD;  Location: [facility] OR;  Service: Pain Management;  Laterality: Right;  L3,4,5 [place]  Burned at  "80 degrees C. for 60 seconds x 2 each site    TRANSFORAMINAL EPIDURAL INJECTION OF STEROID Left 09/15/2022    Procedure: Injection,steroid,epidural,transforaminal approach;  Surgeon: Alex Shepard MD;  Location: [facility] OR;  Service: Pain Management;  Laterality: Left;  l4-5 and L5-s1      Family History   Problem Relation Age of Onset    Hypertension Mother     Cancer Maternal Aunt         cervical    Colon cancer Neg Hx     Colon polyps Neg Hx     Esophageal cancer Neg Hx     Stomach cancer Neg Hx      Vitals:    01/10/24 1157   Weight: 66 kg (145 lb 8.1 oz)   Height: 5' 2.99" (1.6 m)       Review of Systems   Constitutional:  Negative for chills, diaphoresis, fatigue, fever and unexpected weight change.   HENT:  Negative for trouble swallowing.    Eyes:  Negative for visual disturbance.   Respiratory:  Negative for shortness of breath.    Cardiovascular:  Negative for chest pain.   Gastrointestinal:  Negative for abdominal pain, constipation, nausea and vomiting.   Genitourinary:  Negative for difficulty urinating.   Musculoskeletal:  Negative for arthralgias, back pain, gait problem, joint swelling, myalgias, neck pain and neck stiffness.   Neurological:  Negative for dizziness, speech difficulty, weakness, light-headedness, numbness and headaches.          Objective:      Physical Exam  Neurological:      Mental Status: She is alert and oriented to person, place, and time.      Comments: She is awake and in no acute distress  Forward flexion of the lumbar spine is to about 60° before she complains of pain at the lumbosacral junction.  Extension at just past neutral causes pain on the right at the lumbosacral junction             Assessment:       1. Cervicalgia    2. Chronic right-sided low back pain without sciatica           Plan:     Not improved to her satisfaction with physical therapy.  I am recommending an MRI of the cervical spine.  Consider epidural steroid injections.  Repeat radiofrequency ablation right " L4-5 and L5-S1.  Follow-up with me after the scan      Cervicalgia  -     MRI Cervical Spine Without Contrast; Future; Expected date: 01/10/2024    Chronic right-sided low back pain without sciatica

## 2024-01-10 NOTE — TELEPHONE ENCOUNTER
----- Message from Ramses Cheema MD sent at 1/10/2024 12:09 PM CST -----  Please schedule for radiofrequency ablation right L4-5 and L5-S1

## 2024-01-22 ENCOUNTER — HOSPITAL ENCOUNTER (OUTPATIENT)
Dept: RADIOLOGY | Facility: HOSPITAL | Age: 54
Discharge: HOME | End: 2024-01-22
Attending: PHYSICAL MEDICINE & REHABILITATION
Payer: OTHER MISCELLANEOUS

## 2024-01-22 DIAGNOSIS — M54.2 CERVICALGIA: ICD-10-CM

## 2024-01-22 PROCEDURE — 72141 MRI NECK SPINE W/O DYE: CPT | Mod: TC

## 2024-01-22 PROCEDURE — 72141 MRI NECK SPINE W/O DYE: CPT | Mod: 26,,, | Performed by: RADIOLOGY

## 2024-01-23 ENCOUNTER — TELEPHONE (OUTPATIENT)
Dept: PAIN MEDICINE | Facility: CLINIC | Age: 54
End: 2024-01-23
Payer: COMMERCIAL

## 2024-01-23 NOTE — TELEPHONE ENCOUNTER
----- Message from Grisel Dominguez, Patient Care Assistant sent at 1/23/2024  8:35 AM CST -----  Regarding: appointment  Contact: pt  Type: Needs Medical Advice    Who Called:  pt     Best Call Back Number: [phone] (home)     Additional Information: pt states she would like a callback regarding her appointment time on 1/26/24. Please call to advise. Thanks!

## 2024-01-25 NOTE — TELEPHONE ENCOUNTER
Pt is not authorized for procedure 1/26 called her to inform her and received VM   left a message on VM

## 2024-01-29 NOTE — PROGRESS NOTES
Subjective:       Patient ID: Jacqui Cruz is a 53 y.o. female Body mass index is 25.78 kg/m².    Chief Complaint: Rectal Bleeding and Anemia    This patient is new to me.  Referring Provider: Joni Davila for anemia.  Established patient of Dr. Link and Dr. Umanzor.     Anemia  Presents for initial visit. Symptoms include abdominal pain (Denies currently; reports intermittent epigastric burning after eating greasy foods or after taking her oral iron supplement) and weight loss (Intentional; reports eating healthier options). There has been no anorexia, bruising/bleeding easily, confusion, fever, leg swelling, light-headedness, malaise/fatigue, pallor, palpitations, paresthesias or pica. (Reports GERD type symptoms occur intermittently weekly after eating certain foods; past: Nexium; was taking Goody's and Ibuprofen p.r.n., but stopped; she also gets intermittent nausea after taking oral iron. Intermittent constipation currently having 1 BM daily rated stool 4 on [place] scale; does not feel empty after BMs and straining at times) Signs of blood loss that are present include hematochezia (notices small amounts of intermittent BRBPR on tissue paper when straining after BMs; also reports noticing mucus in stool). Signs of blood loss that are not present include hematemesis, melena (Noticed stools were darker after taking oral iron, but resolved) and vaginal bleeding (Last cycle was 1 year ago). Past treatments include oral iron supplements (currently taking ferrous sulfate 325 mg, but sometime skips because it causes nausea). Past medical history includes recent trauma (reports getting overheated at work and passing out 08/03/23 buring side of face/ear). There is no history of alcohol abuse, cancer, chronic liver disease, chronic renal disease, clotting disorder, dementia, heart failure, HIV/AIDS, hypothyroidism, inflammatory bowel disease, malabsorption, malnutrition, neuropathy, recent illness, recent surgery or  rheumatic disease. Procedure history includes FOBT (occult stool positive 08/11/23). There is no past history of bone marrow exam, colonoscopy or EGD.     Review of Systems   Constitutional:  Positive for weight loss (Intentional; reports eating healthier options). Negative for activity change, appetite change, chills, diaphoresis, fatigue, fever, malaise/fatigue and unexpected weight change.   HENT:  Negative for sore throat and trouble swallowing.    Respiratory:  Negative for cough, choking and shortness of breath.    Cardiovascular:  Negative for chest pain and palpitations.   Gastrointestinal:  Positive for abdominal pain (Denies currently; reports intermittent epigastric burning after eating greasy foods or after taking her oral iron supplement), anal bleeding, constipation and hematochezia (notices small amounts of intermittent BRBPR on tissue paper when straining after BMs; also reports noticing mucus in stool). Negative for abdominal distention, anorexia, blood in stool, diarrhea, hematemesis, melena (Noticed stools were darker after taking oral iron, but resolved), nausea, rectal pain and vomiting.   Genitourinary:  Negative for hematuria and vaginal bleeding (Last cycle was 1 year ago).   Musculoskeletal:  Negative for arthralgias.   Skin:  Negative for pallor and rash.   Neurological:  Negative for light-headedness and paresthesias.   Hematological:  Does not bruise/bleed easily.   Psychiatric/Behavioral:  Negative for confusion.        Patient's last menstrual period was 10/14/2020.  Past Medical History:   Diagnosis Date    Asthma     Hypertension      Past Surgical History:   Procedure Laterality Date    BREAST BIOPSY Left 2012    Benign calcifications    BREAST SURGERY      biopsy    DILATION AND CURETTAGE OF UTERUS      EPIDURAL STEROID INJECTION INTO LUMBAR SPINE N/A 05/30/2023    Procedure: Injection-steroid-epidural-lumbar;  Surgeon: Alex Shepard MD;  Location: [facility] OR;  Service: Pain Management;   Laterality: N/A;  L4-5    INJECTION OF ANESTHETIC AGENT AROUND MEDIAL BRANCH NERVES INNERVATING LUMBAR FACET JOINT Right 11/22/2022    Procedure: Block-nerve-medial branch-lumbar;  Surgeon: Alex Shepard MD;  Location: [facility] OR;  Service: Pain Management;  Laterality: Right;  L3,4,5 MBB    INJECTION OF ANESTHETIC AGENT AROUND MEDIAL BRANCH NERVES INNERVATING LUMBAR FACET JOINT Right 02/02/2023    Procedure: Block-nerve-medial branch-lumbar;  Surgeon: Alex Shepard MD;  Location: [facility] OR;  Service: Pain Management;  Laterality: Right;  L3,4,5  #2 MBB Rt  [place]    RADIOFREQUENCY ABLATION OF LUMBAR MEDIAL BRANCH NERVE AT SINGLE LEVEL Right 03/21/2023    Procedure: Radiofrequency Ablation, Nerve, Spinal, Lumbar, Medial Branch, 1 Level;  Surgeon: Alex Shepard MD;  Location: [facility] OR;  Service: Pain Management;  Laterality: Right;  L3,4,5 [place]  Burned at 80 degrees C. for 60 seconds x 2 each site    TRANSFORAMINAL EPIDURAL INJECTION OF STEROID Left 09/15/2022    Procedure: Injection,steroid,epidural,transforaminal approach;  Surgeon: Alex Shepard MD;  Location: [facility] OR;  Service: Pain Management;  Laterality: Left;  l4-5 and L5-s1      Family History   Problem Relation Age of Onset    Hypertension Mother     Cancer Maternal Aunt         cervical    Colon cancer Neg Hx     Colon polyps Neg Hx     Esophageal cancer Neg Hx     Stomach cancer Neg Hx      Social History     Tobacco Use    Smoking status: Never    Smokeless tobacco: Never   Substance Use Topics    Alcohol use: No    Drug use: No     Wt Readings from Last 10 Encounters:   09/01/23 66 kg (145 lb 8.1 oz)   08/23/23 66.2 kg (146 lb)   08/11/23 66.4 kg (146 lb 6.2 oz)   08/05/23 67.1 kg (148 lb)   05/24/23 68.5 kg (151 lb)   05/01/23 68.5 kg (151 lb)   03/16/23 68.5 kg (151 lb)   02/28/23 68.5 kg (151 lb)   02/06/23 70.8 kg (156 lb)   01/26/23 70.8 kg (156 lb)     Lab Results   Component Value Date    WBC 6.92 08/11/2023    HGB 8.5 (L) 08/11/2023    HCT 31.1 (L)  08/11/2023    MCV 67 (L) 08/11/2023     08/11/2023     CMP  Sodium   Date Value Ref Range Status   08/11/2023 140 136 - 145 mmol/L Final     Potassium   Date Value Ref Range Status   08/11/2023 4.2 3.5 - 5.1 mmol/L Final     Chloride   Date Value Ref Range Status   08/11/2023 105 95 - 110 mmol/L Final     CO2   Date Value Ref Range Status   08/11/2023 21 (L) 23 - 29 mmol/L Final     Glucose   Date Value Ref Range Status   08/11/2023 81 70 - 110 mg/dL Final     BUN   Date Value Ref Range Status   08/11/2023 9 6 - 20 mg/dL Final     Creatinine   Date Value Ref Range Status   08/11/2023 0.9 0.5 - 1.4 mg/dL Final   06/21/2013 0.9 0.5 - 1.4 mg/dL Final     Calcium   Date Value Ref Range Status   08/11/2023 9.3 8.7 - 10.5 mg/dL Final   06/21/2013 10.3 8.7 - 10.5 mg/dL Final     Total Protein   Date Value Ref Range Status   08/11/2023 8.2 6.0 - 8.4 g/dL Final     Albumin   Date Value Ref Range Status   08/11/2023 3.6 3.5 - 5.2 g/dL Final     Total Bilirubin   Date Value Ref Range Status   08/11/2023 0.3 0.1 - 1.0 mg/dL Final     Comment:     For infants and newborns, interpretation of results should be based  on gestational age, weight and in agreement with clinical  observations.    Premature Infant recommended reference ranges:  Up to 24 hours.............<8.0 mg/dL  Up to 48 hours............<12.0 mg/dL  3-5 days..................<15.0 mg/dL  6-29 days.................<15.0 mg/dL       Alkaline Phosphatase   Date Value Ref Range Status   08/11/2023 65 55 - 135 U/L Final   06/21/2013 78 55 - 135 U/L Final     AST   Date Value Ref Range Status   08/11/2023 28 10 - 40 U/L Final   06/21/2013 38 10 - 40 U/L Final     ALT   Date Value Ref Range Status   08/11/2023 16 10 - 44 U/L Final     Anion Gap   Date Value Ref Range Status   08/11/2023 14 8 - 16 mmol/L Final   06/21/2013 12 5 - 15 meq/L Final     eGFR if    Date Value Ref Range Status   05/04/2022 >60 >60 mL/min/1.73 m^2 Final     eGFR if non     Date Value Ref Range Status   05/04/2022 >60 >60 mL/min/1.73 m^2 Final     Comment:     Calculation used to obtain the estimated glomerular filtration  rate (eGFR) is the CKD-EPI equation.        Lab Results   Component Value Date    LIPASE 34 05/04/2022     Lab Results   Component Value Date    IRON 22 (L) 08/11/2023    TRANSFERRIN 450 (H) 08/11/2023    TIBC 666 (H) 08/11/2023    FESATURATED 3 (L) 08/11/2023      Lab Results   Component Value Date    FERRITIN 7 (L) 08/11/2023     Reviewed prior medical records including radiology report of CT abdomen and pelvis 05/04/2022, KUB 05/04/2022, abdominal ultrasound 09/18/2021 & endoscopy history (see surgical history).    Objective:      Physical Exam  Vitals and nursing note reviewed.   Constitutional:       General: She is not in acute distress.     Appearance: Normal appearance. She is not ill-appearing.   HENT:      Mouth/Throat:      Lips: Pink. No lesions.   Cardiovascular:      Rate and Rhythm: Normal rate.      Pulses: Normal pulses.      Heart sounds: Normal heart sounds.   Pulmonary:      Effort: Pulmonary effort is normal. No respiratory distress.      Breath sounds: Normal breath sounds.   Abdominal:      General: Bowel sounds are normal. There is no distension or abdominal bruit. There are no signs of injury.      Palpations: Abdomen is soft. There is no shifting dullness, fluid wave, hepatomegaly, splenomegaly or mass.      Tenderness: There is no abdominal tenderness. There is no guarding or rebound. Negative signs include Padilla's sign, Rovsing's sign and McBurney's sign.   Skin:     General: Skin is warm and dry.      Coloration: Skin is not jaundiced or pale.   Neurological:      Mental Status: She is alert and oriented to person, place, and time.   Psychiatric:         Attention and Perception: Attention normal.         Mood and Affect: Mood normal.         Speech: Speech normal.         Behavior: Behavior normal.         Assessment:        1. Iron deficiency anemia, unspecified iron deficiency anemia type    2. Epigastric pain    3. Gastroesophageal reflux disease, unspecified whether esophagitis present    4. Hematochezia    5. Constipation, unspecified constipation type    6. Straining during bowel movements    7. Mucus in stool    8. Abnormal CT scan, colon    9. Fatty liver        Plan:       Iron deficiency anemia, unspecified iron deficiency anemia type  - schedule EGD, discussed procedure with patient, including risks and benefits, patient verbalized understanding  - schedule Colonoscopy, discussed procedure with the patient, including risks and benefits, patient verbalized understanding  - discussed with patient the different ways that anemia occurs: blood loss (such as from the gi tract), the body is not making enough, or the body is breaking down the rbcs too quickly; recommend EGD and colonoscopy to further evaluate gi tract for possible blood loss and pending results of endoscopies, possible UGI with Small Bowel Follow Through/video capsule study  -follow-up with PCP and/or hematology for continued evaluation and management  -     Case Request Endoscopy: EGD (ESOPHAGOGASTRODUODENOSCOPY), COLONOSCOPY    Epigastric pain  - schedule EGD, discussed procedure with patient, including risks and benefits, patient verbalized understanding  - START: pantoprazole (PROTONIX) 40 MG tablet; Take 1 tablet (40 mg total) by mouth once daily.  Dispense: 30 tablet; Refill: 2  -     Case Request Endoscopy: EGD (ESOPHAGOGASTRODUODENOSCOPY), COLONOSCOPY    Gastroesophageal reflux disease, unspecified whether esophagitis present  - schedule EGD, discussed procedure with patient, including risks and benefits, patient verbalized understanding  -Avoid large meals, avoid eating within 2-3 hours of bedtime (avoid late night eating & lying down soon after eating), elevate head of bed if nocturnal symptoms are present, smoking cessation (if current smoker), &  weight loss (if overweight).   -Avoid known foods which trigger reflux symptoms & to minimize/avoid high-fat foods, chocolate, caffeine, citrus, alcohol, & tomato products.  -Avoid/limit use of NSAID's, since they can cause GI upset, bleeding, and/or ulcers. If needed, take with food.  - START: pantoprazole (PROTONIX) 40 MG tablet; Take 1 tablet (40 mg total) by mouth once daily.  Dispense: 30 tablet; Refill: 2  -     Case Request Endoscopy: EGD (ESOPHAGOGASTRODUODENOSCOPY), COLONOSCOPY    Hematochezia  - schedule Colonoscopy, discussed procedure with the patient, including risks and benefits, patient verbalized understanding  - discussed about different etiologies that can cause rectal bleeding, such as but not limited to diverticulosis, polyps, colon mass, colon inflammation or infection, anal fissure or hemorrhoids.   - You may resume normal activity as long as you feel well.  - Avoid/minimize NSAIDs such as ibuprofen (Advil, Motrin) and naproxen (Aleve and Naprosyn).  - Avoid alcohol.  -     Case Request Endoscopy: EGD (ESOPHAGOGASTRODUODENOSCOPY), COLONOSCOPY    Constipation, unspecified constipation type  - schedule Colonoscopy, discussed procedure with the patient, including risks and benefits, patient verbalized understanding  -Recommend daily exercise as tolerated, adequate water intake (six 8-oz glasses of water daily), and high fiber diet. OTC fiber supplements are recommended if diet does not reach daily fiber goal (20-30 grams daily), such as Metamucil, Citrucel, or FiberCon (take as directed, separate from other oral medications by >2 hours).  -Recommend trying OTC MiraLax once daily (17g PO) as directed  -If no improvement with above recommendations, try intermittently dosed Dulcolax OTC as directed (every 3-4 days) PRN to facilitate bowel movements  -If no relief with this, consider adding a emollient laxative (castor oil or mineral oil) +/- enema  -If still no improvement with these measures,  call/follow-up   -     Case Request Endoscopy: EGD (ESOPHAGOGASTRODUODENOSCOPY), COLONOSCOPY    Straining during bowel movements  - schedule Colonoscopy, discussed procedure with the patient, including risks and benefits, patient verbalized understanding  - avoid constipation and straining with bowel movements; try using an OTC stool softener as directed and increase fiber in diet (20-30 grams daily)/OTC fiber supplement such as metamucil (take as directed)  -     Case Request Endoscopy: EGD (ESOPHAGOGASTRODUODENOSCOPY), COLONOSCOPY    Mucus in stool  - schedule Colonoscopy, discussed procedure with the patient, including risks and benefits, patient verbalized understanding  -     Case Request Endoscopy: EGD (ESOPHAGOGASTRODUODENOSCOPY), COLONOSCOPY    Abnormal CT scan, colon  - schedule Colonoscopy, discussed procedure with the patient, including risks and benefits, patient verbalized understanding  -     Case Request Endoscopy: EGD (ESOPHAGOGASTRODUODENOSCOPY), COLONOSCOPY    Fatty liver  -For fatty liver recommend: low fat, low cholesterol diet, maintain good control of blood sugars and cholesterol levels, exercise, weight loss (if overweight), minimize/avoid alcohol and tylenol products, & follow-up with PCP for continued evaluation and management; if specialist is needed, recommend seeing hepatology.    Follow up in about 4 weeks (around 9/29/2023), or if symptoms worsen or fail to improve.      If no improvement in symptoms or symptoms worsen, call/follow-up at clinic or go to ER.        39 minutes of total time spent on the encounter, which includes face to face time and non-face to face time preparing to see the patient (eg, review of tests), Obtaining and/or reviewing separately obtained history, Documenting clinical information in the electronic or other health record, Independently interpreting results (not separately reported) and communicating results to the patient/family/caregiver, or Care  coordination (not separately reported).     A dictation software program was used for this note. Please expect some simple typographical  errors in this note.     ---

## 2024-01-30 DIAGNOSIS — M54.42 CHRONIC BILATERAL LOW BACK PAIN WITH LEFT-SIDED SCIATICA: ICD-10-CM

## 2024-01-30 DIAGNOSIS — G89.29 CHRONIC BILATERAL LOW BACK PAIN WITH LEFT-SIDED SCIATICA: ICD-10-CM

## 2024-01-30 RX ORDER — OXYCODONE AND ACETAMINOPHEN 5; 325 MG/1; MG/1
1 TABLET ORAL EVERY 6 HOURS PRN
Qty: 120 TABLET | Refills: 0 | Status: SHIPPED | OUTPATIENT
Start: 2024-01-30 | End: 2024-03-01 | Stop reason: SDUPTHER

## 2024-02-05 ENCOUNTER — OFFICE VISIT (OUTPATIENT)
Dept: SPINE | Facility: CLINIC | Age: 54
End: 2024-02-05
Payer: COMMERCIAL

## 2024-02-05 ENCOUNTER — TELEPHONE (OUTPATIENT)
Dept: PAIN MEDICINE | Facility: CLINIC | Age: 54
End: 2024-02-05
Payer: COMMERCIAL

## 2024-02-05 VITALS — BODY MASS INDEX: 25.78 KG/M2 | HEIGHT: 63 IN | WEIGHT: 145.5 LBS

## 2024-02-05 DIAGNOSIS — G89.29 CHRONIC RIGHT-SIDED LOW BACK PAIN WITHOUT SCIATICA: ICD-10-CM

## 2024-02-05 DIAGNOSIS — M54.50 CHRONIC RIGHT-SIDED LOW BACK PAIN WITHOUT SCIATICA: ICD-10-CM

## 2024-02-05 DIAGNOSIS — M54.12 CERVICAL RADICULOPATHY: Primary | ICD-10-CM

## 2024-02-05 DIAGNOSIS — M54.2 CERVICALGIA: Primary | ICD-10-CM

## 2024-02-05 PROCEDURE — 3008F BODY MASS INDEX DOCD: CPT | Mod: CPTII,S$GLB,, | Performed by: PHYSICAL MEDICINE & REHABILITATION

## 2024-02-05 PROCEDURE — 1159F MED LIST DOCD IN RCRD: CPT | Mod: CPTII,S$GLB,, | Performed by: PHYSICAL MEDICINE & REHABILITATION

## 2024-02-05 PROCEDURE — 4010F ACE/ARB THERAPY RXD/TAKEN: CPT | Mod: CPTII,S$GLB,, | Performed by: PHYSICAL MEDICINE & REHABILITATION

## 2024-02-05 PROCEDURE — 1160F RVW MEDS BY RX/DR IN RCRD: CPT | Mod: CPTII,S$GLB,, | Performed by: PHYSICAL MEDICINE & REHABILITATION

## 2024-02-05 PROCEDURE — 99213 OFFICE O/P EST LOW 20 MIN: CPT | Mod: S$GLB,,, | Performed by: PHYSICAL MEDICINE & REHABILITATION

## 2024-02-05 NOTE — PROGRESS NOTES
SUBJECTIVE:    Patient ID: Jacqui Cruz is a 53 y.o. female.    Chief Complaint: Follow-up    She is here to review her cervical MRI done 01/22/2024 to evaluate her complaint of posterior neck discomfort without radicular symptoms.      The MRI is summarized below:    FINDINGS:  Morphology: Vertebral body heights are preserved and marrow signal is within normal limits.     Alignment: Cervical spinal alignment is normal.     Cord: The cervical cord shows normal contour and signal content throughout its length.     Craniocervical Junction: Cerebellar tonsils are normally positioned. The visualized portions of the posterior fossa are unremarkable. The regional osseous anatomy is normal.        Disc levels:        C2-C3: Within normal limits.     C3-C4: Shallow broad-based disc osteophyte complex.  The spinal canal and foramina are patent..     C4-C5: Broad shallow central disc osteophyte complex/20 protrusion flattening the ventral thecal sac without substantial narrowing of the spinal canal.  The foramina are patent..     C5-C6: Mild degenerative disc height loss and broad-based central disc protrusion/osteophyte complex mildly narrowing the spinal canal.  Shallow uncovertebral spurring and mild facet arthrosis produces mild right foraminal narrowing.  The left foramen is patent..     C6-C7: Shallow broad-based disc osteophyte complex flattening the ventral thecal sac without substantial narrowing of the spinal canal.  The foramina are patent..     C7-T1: Shallow broad-based disc osteophyte complex.  The spinal canal and foramina are patent..     Soft tissues: The visualized paraspinal soft tissues are within normal limits.        Impression:     1. Mild degenerative changes of the cervical spine most pronounced at C5-C6 producing mild narrowing of the spinal canal and right foramen.  Level by level details given above.      Clinically she is about the same.  Her symptoms are described above.  She has posterior  neck discomfort radiates into the upper thoracic region.  No radicular symptoms.  Current pain level is 8/10 and interferes with her quality of life in terms of activities of daily living recreation and social activities.  She has ongoing complaints of right-sided low back pain.  The radiofrequency ablation on the right at L4-5 and L5-S1 was not approved by worker's comp.            Past Medical History:   Diagnosis Date    Asthma     Hypertension      Social History     Socioeconomic History    Marital status:    Tobacco Use    Smoking status: Never    Smokeless tobacco: Never   Substance and Sexual Activity    Alcohol use: No    Drug use: No    Sexual activity: Not Currently     Partners: Male     Birth control/protection: None     Social Determinants of Health     Financial Resource Strain: Medium Risk (12/14/2022)    Overall Financial Resource Strain (CARDIA)     Difficulty of Paying Living Expenses: Somewhat hard   Food Insecurity: Food Insecurity Present (12/14/2022)    Hunger Vital Sign     Worried About Running Out of Food in the Last Year: Sometimes true     Ran Out of Food in the Last Year: Often true   Transportation Needs: No Transportation Needs (12/14/2022)    PRAPARE - Transportation     Lack of Transportation (Medical): No     Lack of Transportation (Non-Medical): No   Physical Activity: Insufficiently Active (12/14/2022)    Exercise Vital Sign     Days of Exercise per Week: 2 days     Minutes of Exercise per Session: 20 min   Stress: No Stress Concern Present (12/14/2022)    [employer] - Occupational Stress Questionnaire     Feeling of Stress : Not at all   Social Connections: Unknown (12/14/2022)    Social Connection and Isolation Panel [NHANES]     Frequency of Communication with Friends and Family: Twice a week     Frequency of Social Gatherings with Friends and Family: Once a week     Active Member of Clubs or Organizations: No     Attends Club or  Organization Meetings: Never     Marital Status:    Housing Stability: High Risk (12/14/2022)    Housing Stability Vital Sign     Unable to Pay for Housing in the Last Year: Yes     Number of Places Lived in the Last Year: 1     Unstable Housing in the Last Year: No     Past Surgical History:   Procedure Laterality Date    BREAST BIOPSY Left 2012    Benign calcifications    BREAST SURGERY      biopsy    DILATION AND CURETTAGE OF UTERUS      EPIDURAL STEROID INJECTION INTO LUMBAR SPINE N/A 05/30/2023    Procedure: Injection-steroid-epidural-lumbar;  Surgeon: Alex Shepard MD;  Location: [facility] OR;  Service: Pain Management;  Laterality: N/A;  L4-5    INJECTION OF ANESTHETIC AGENT AROUND MEDIAL BRANCH NERVES INNERVATING LUMBAR FACET JOINT Right 11/22/2022    Procedure: Block-nerve-medial branch-lumbar;  Surgeon: Alex Shepard MD;  Location: [facility] OR;  Service: Pain Management;  Laterality: Right;  L3,4,5 MBB    INJECTION OF ANESTHETIC AGENT AROUND MEDIAL BRANCH NERVES INNERVATING LUMBAR FACET JOINT Right 02/02/2023    Procedure: Block-nerve-medial branch-lumbar;  Surgeon: Alex Shepard MD;  Location: [facility] OR;  Service: Pain Management;  Laterality: Right;  L3,4,5  #2 MBB Rt  [place]    RADIOFREQUENCY ABLATION OF LUMBAR MEDIAL BRANCH NERVE AT SINGLE LEVEL Right 03/21/2023    Procedure: Radiofrequency Ablation, Nerve, Spinal, Lumbar, Medial Branch, 1 Level;  Surgeon: Alex Shepard MD;  Location: [facility] OR;  Service: Pain Management;  Laterality: Right;  L3,4,5 [place]  Burned at 80 degrees C. for 60 seconds x 2 each site    TRANSFORAMINAL EPIDURAL INJECTION OF STEROID Left 09/15/2022    Procedure: Injection,steroid,epidural,transforaminal approach;  Surgeon: Alex Shepard MD;  Location: [facility] OR;  Service: Pain Management;  Laterality: Left;  l4-5 and L5-s1      Family History   Problem Relation Age of Onset    Hypertension Mother     Cancer Maternal Aunt         cervical    Colon cancer Neg Hx     Colon polyps Neg Hx      "Esophageal cancer Neg Hx     Stomach cancer Neg Hx      Vitals:    02/05/24 1131   Weight: 66 kg (145 lb 8.1 oz)   Height: 5' 2.99" (1.6 m)       Review of Systems   Constitutional:  Negative for chills, diaphoresis, fatigue, fever and unexpected weight change.   HENT:  Negative for trouble swallowing.    Eyes:  Negative for visual disturbance.   Respiratory:  Negative for shortness of breath.    Cardiovascular:  Negative for chest pain.   Gastrointestinal:  Negative for abdominal pain, constipation, nausea and vomiting.   Genitourinary:  Negative for difficulty urinating.   Musculoskeletal:  Negative for arthralgias, back pain, gait problem, joint swelling, myalgias, neck pain and neck stiffness.   Neurological:  Negative for dizziness, speech difficulty, weakness, light-headedness, numbness and headaches.          Objective:      Physical Exam  Neurological:      Mental Status: She is alert and oriented to person, place, and time.             Assessment:       1. Cervicalgia    2. Chronic right-sided low back pain without sciatica           Plan:     I reassured her she has no worrisome findings on her cervical MRI.  For symptom relief I am recommending interlaminar injections at C7-T1 with consideration of medial branch blocks and subsequent radiofrequency ablation C5-6 and C6-7.  Follow-up with me after the procedure      Cervicalgia    Chronic right-sided low back pain without sciatica          "

## 2024-02-05 NOTE — TELEPHONE ENCOUNTER
----- Message from Ramses Cheema MD sent at 2/5/2024 11:45 AM CST -----  Please schedule for interlaminar injection C7-T1

## 2024-02-07 ENCOUNTER — PATIENT MESSAGE (OUTPATIENT)
Dept: SPINE | Facility: CLINIC | Age: 54
End: 2024-02-07
Payer: COMMERCIAL

## 2024-02-07 ENCOUNTER — TELEPHONE (OUTPATIENT)
Dept: PAIN MEDICINE | Facility: CLINIC | Age: 54
End: 2024-02-07
Payer: COMMERCIAL

## 2024-02-08 NOTE — TELEPHONE ENCOUNTER
"Kamila Tran [facility] Staff  Received DENIAL LETTER for  Admit Dx: Cervical radiculopathy [M54.12]      Admit Px: Pr Inj Cerv/Thorac, W/Guidance [[id number]]      Denial states, " [place] treatment guidelines state that cervical ESIs are usefu in patients with symptoms of cervical radicular pain syndromes. They have less defined usefulness in non-radiacular pain. There is some evidence that epidural steroid injections are effective for patients with radicular pain or radiculopathy. In this case, the claimant has posterior neck discomfort that radiates into the upper thoracic region. There are no radicular symptoms. The pain is not described in any type of dermatomal pattern to support a radicular pain syndrome correlating with the C7-T1 level. There is no evidence of any focal, specific dermatomal, or myotomal neurologic findings on the exam to support cervical radiculopathy at the requested levels. Therefore, this request is not medically necessary."    PATIENT IS NOT OKAY TO PROCEED AS SCHEDULED        [please inform pt  of denial and let me know so I can get  her procedure cancelled.   "
I will submit for medial branch blocks bilaterally C5-6 and C6-7.
Just following up on this one? Thank you   
[id number] Critical Care - 30 to 74 minutes

## 2024-02-09 ENCOUNTER — PATIENT MESSAGE (OUTPATIENT)
Dept: FAMILY MEDICINE | Facility: CLINIC | Age: 54
End: 2024-02-09
Payer: COMMERCIAL

## 2024-02-09 ENCOUNTER — TELEPHONE (OUTPATIENT)
Dept: SPINE | Facility: CLINIC | Age: 54
End: 2024-02-09
Payer: COMMERCIAL

## 2024-02-09 NOTE — TELEPHONE ENCOUNTER
----- Message from Sajan Sarah sent at 2/9/2024 10:55 AM CST -----  Regarding: question  Contact: cyndi at [phone]  Type: Needs Medical Advice    Who Called:  Cyndi    Best Call Back Number: [phone]    Additional Information: please call to discuss procedure denial.

## 2024-02-12 ENCOUNTER — ON-DEMAND VIRTUAL (OUTPATIENT)
Dept: URGENT CARE | Facility: CLINIC | Age: 54
End: 2024-02-12
Payer: COMMERCIAL

## 2024-02-12 DIAGNOSIS — J01.90 ACUTE NON-RECURRENT SINUSITIS, UNSPECIFIED LOCATION: Primary | ICD-10-CM

## 2024-02-12 PROCEDURE — 99212 OFFICE O/P EST SF 10 MIN: CPT | Mod: 95,,, | Performed by: NURSE PRACTITIONER

## 2024-02-12 RX ORDER — PREDNISONE 10 MG/1
10 TABLET ORAL 2 TIMES DAILY
Qty: 6 TABLET | Refills: 0 | Status: SHIPPED | OUTPATIENT
Start: 2024-02-12 | End: 2024-02-15

## 2024-02-12 RX ORDER — AMOXICILLIN 500 MG/1
500 CAPSULE ORAL EVERY 12 HOURS
Qty: 20 CAPSULE | Refills: 0 | Status: SHIPPED | OUTPATIENT
Start: 2024-02-12 | End: 2024-02-22
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
## 2024-02-22 ENCOUNTER — TELEPHONE (OUTPATIENT)
Dept: SPINE | Facility: CLINIC | Age: 54
End: 2024-02-22
Payer: COMMERCIAL

## 2024-02-22 NOTE — TELEPHONE ENCOUNTER
Pt has a denial from 02/07 she was informed to return to office to possibly order another injection type and she did not return the call. According to the notes, the 101 form was sent and the procedure was still denied. I have asked WC for guidance then I will call the patient.

## 2024-02-22 NOTE — TELEPHONE ENCOUNTER
----- Message from Catherine Moeller MA sent at 2/21/2024  1:50 PM CST -----    ----- Message -----  From: Mk Leal  Sent: 2/21/2024   1:48 PM CST  To: Deni CHAVIS Staff    Type: Needs Medical Advice  Who Called:  pt  Best Call Back Number: [phone]  Additional Information: pt is requesting to speak with the supv about her 1010 form for workers comp, states she has been trying to speak with the nurse but no luck , pl call bk to advise thanks
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
## 2024-02-22 NOTE — TELEPHONE ENCOUNTER
See denial letter it was updated because there are no radicular symptoms.      Left a message for a return call.

## 2024-02-23 ENCOUNTER — TELEPHONE (OUTPATIENT)
Dept: PAIN MEDICINE | Facility: CLINIC | Age: 54
End: 2024-02-23
Payer: COMMERCIAL

## 2024-02-23 NOTE — TELEPHONE ENCOUNTER
Pt came into the office inquiring about a procedure for right sided RFA denial on 01/26 , pt states that her  keeps sending a 1010 form to Dr Cheema which has not been received. Called    1010 and documents faxed via [employer] to  [id number]  Body Parts: Head, Neck, Back  Ed De Dios   [employer]  [facility]  T [phone]  F [phone]  [email or web address]  [email or web address], pending determination       Left a detailed message to call us back and fax us the 1010 form.

## 2024-02-26 ENCOUNTER — TELEPHONE (OUTPATIENT)
Dept: SPINE | Facility: CLINIC | Age: 54
End: 2024-02-26
Payer: COMMERCIAL

## 2024-02-26 NOTE — TELEPHONE ENCOUNTER
I spoke with the pt and advised the 1010A form has been filled out and will be forwarded to WC dept. She verbalized understanding.

## 2024-03-01 DIAGNOSIS — G89.29 CHRONIC BILATERAL LOW BACK PAIN WITH LEFT-SIDED SCIATICA: ICD-10-CM

## 2024-03-01 DIAGNOSIS — M54.42 CHRONIC BILATERAL LOW BACK PAIN WITH LEFT-SIDED SCIATICA: ICD-10-CM

## 2024-03-01 RX ORDER — OXYCODONE AND ACETAMINOPHEN 5; 325 MG/1; MG/1
1 TABLET ORAL EVERY 6 HOURS PRN
Qty: 120 TABLET | Refills: 0 | Status: SHIPPED | OUTPATIENT
Start: 2024-03-01 | End: 2024-04-01 | Stop reason: SDUPTHER

## 2024-04-01 DIAGNOSIS — I10 ESSENTIAL HYPERTENSION: ICD-10-CM

## 2024-04-01 DIAGNOSIS — G89.29 CHRONIC BILATERAL LOW BACK PAIN WITH LEFT-SIDED SCIATICA: ICD-10-CM

## 2024-04-01 DIAGNOSIS — M54.42 CHRONIC BILATERAL LOW BACK PAIN WITH LEFT-SIDED SCIATICA: ICD-10-CM

## 2024-04-02 RX ORDER — OXYCODONE AND ACETAMINOPHEN 5; 325 MG/1; MG/1
1 TABLET ORAL EVERY 6 HOURS PRN
Qty: 120 TABLET | Refills: 0 | Status: SHIPPED | OUTPATIENT
Start: 2024-04-02 | End: 2024-05-01 | Stop reason: SDUPTHER

## 2024-04-03 ENCOUNTER — TELEPHONE (OUTPATIENT)
Dept: SPINE | Facility: CLINIC | Age: 54
End: 2024-04-03
Payer: COMMERCIAL

## 2024-04-03 RX ORDER — LISINOPRIL 20 MG/1
20 TABLET ORAL DAILY
Qty: 90 TABLET | Refills: 3 | OUTPATIENT
Start: 2024-04-03

## 2024-04-03 NOTE — TELEPHONE ENCOUNTER
----- Message from Stacey M Lefort sent at 4/3/2024  8:22 AM CDT -----  Pt is returning your call at  [phone]. Thank you.

## 2024-04-03 NOTE — TELEPHONE ENCOUNTER
Called and spoke to patient regarding Rx refill and follow up appointment. Appointment scheduled for Tuesday 4/9/24 with Ms. Lola NP

## 2024-04-04 ENCOUNTER — OFFICE VISIT (OUTPATIENT)
Dept: SPINE | Facility: CLINIC | Age: 54
End: 2024-04-04
Payer: COMMERCIAL

## 2024-04-04 ENCOUNTER — TELEPHONE (OUTPATIENT)
Dept: PAIN MEDICINE | Facility: CLINIC | Age: 54
End: 2024-04-04
Payer: COMMERCIAL

## 2024-04-04 VITALS — WEIGHT: 145.5 LBS | HEIGHT: 63 IN | BODY MASS INDEX: 25.78 KG/M2

## 2024-04-04 DIAGNOSIS — M54.42 CHRONIC BILATERAL LOW BACK PAIN WITH LEFT-SIDED SCIATICA: Primary | ICD-10-CM

## 2024-04-04 DIAGNOSIS — G89.29 CHRONIC BILATERAL LOW BACK PAIN WITH LEFT-SIDED SCIATICA: Primary | ICD-10-CM

## 2024-04-04 DIAGNOSIS — M54.16 LUMBAR RADICULOPATHY: Primary | ICD-10-CM

## 2024-04-04 DIAGNOSIS — M54.2 CERVICALGIA: ICD-10-CM

## 2024-04-04 PROCEDURE — 1159F MED LIST DOCD IN RCRD: CPT | Mod: CPTII,S$GLB,, | Performed by: PHYSICAL MEDICINE & REHABILITATION

## 2024-04-04 PROCEDURE — 3008F BODY MASS INDEX DOCD: CPT | Mod: CPTII,S$GLB,, | Performed by: PHYSICAL MEDICINE & REHABILITATION

## 2024-04-04 PROCEDURE — 99999 PR PBB SHADOW E&M-EST. PATIENT-LVL III: CPT | Mod: PBBFAC,,, | Performed by: PHYSICAL MEDICINE & REHABILITATION

## 2024-04-04 PROCEDURE — 4010F ACE/ARB THERAPY RXD/TAKEN: CPT | Mod: CPTII,S$GLB,, | Performed by: PHYSICAL MEDICINE & REHABILITATION

## 2024-04-04 PROCEDURE — 99213 OFFICE O/P EST LOW 20 MIN: CPT | Mod: S$GLB,,, | Performed by: PHYSICAL MEDICINE & REHABILITATION

## 2024-04-04 PROCEDURE — 1160F RVW MEDS BY RX/DR IN RCRD: CPT | Mod: CPTII,S$GLB,, | Performed by: PHYSICAL MEDICINE & REHABILITATION
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
## 2024-04-04 NOTE — TELEPHONE ENCOUNTER
Added to 04/11 this is  sandip not sure it will be authorized by this date instructions were give in office

## 2024-04-04 NOTE — TELEPHONE ENCOUNTER
----- Message from Ramses Cheema MD sent at 4/4/2024  9:20 AM CDT -----  Please schedule for transforaminal injection left L5-S1 and S1

## 2024-04-05 ENCOUNTER — TELEPHONE (OUTPATIENT)
Dept: SPINE | Facility: CLINIC | Age: 54
End: 2024-04-05
Payer: COMMERCIAL

## 2024-04-05 NOTE — TELEPHONE ENCOUNTER
VM left letting pt know that WC is denying her procedure because it is not related to initial work comp claim. 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

## 2024-04-08 ENCOUNTER — PATIENT MESSAGE (OUTPATIENT)
Dept: FAMILY MEDICINE | Facility: CLINIC | Age: 54
End: 2024-04-08
Payer: COMMERCIAL

## 2024-04-11 ENCOUNTER — TELEPHONE (OUTPATIENT)
Dept: PAIN MEDICINE | Facility: CLINIC | Age: 54
End: 2024-04-11
Payer: COMMERCIAL
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
## 2024-04-17 ENCOUNTER — TELEPHONE (OUTPATIENT)
Dept: PAIN MEDICINE | Facility: CLINIC | Age: 54
End: 2024-04-17
Payer: COMMERCIAL

## 2024-04-17 DIAGNOSIS — M54.16 LUMBAR RADICULOPATHY: Primary | ICD-10-CM

## 2024-04-17 NOTE — TELEPHONE ENCOUNTER
----- Message from Nidhi Fontanez sent at 4/17/2024 11:32 AM CDT -----  Type:  Patient Returning Call    Who Called:  pt  Who Left Message for Patient:  Geri  Does the patient know what this is regarding?:  yes  Best Call Back Number:  [phone] (home)     Additional Information:  please call and advise--thank you

## 2024-04-23 ENCOUNTER — TELEPHONE (OUTPATIENT)
Dept: PAIN MEDICINE | Facility: CLINIC | Age: 54
End: 2024-04-23

## 2024-04-23 NOTE — TELEPHONE ENCOUNTER
----- Message from Armando Montes De Oca sent at 4/23/2024  2:35 PM CDT -----  Regarding: Returning call  Type:  Patient Returning Call    Who Called:PT    Who Left Message for Patient:Geri    Does the patient know what this is regarding?: procedure    Would the patient rather a call back or a response via [employer]? Call back    Best Call Back Number:[phone]      Additional Information: Sts she was told she is approved.   Please advise -- Thank you

## 2024-04-23 NOTE — TELEPHONE ENCOUNTER
See other messages pt insurance is NOT active and her WC  does not cover this claim. Her last approval is null and void because her insurance is inactive.     General 04/22/2024  8:22 AM Tyler Mena Coverage still showing inactive. -   Note:  Coverage still showing inactive.  Called and left message for pt on 4/19/24.  Advised MD office of same, they will try to contact patient.      Spoke with pt she is going to speak to her insurance

## 2024-05-01 ENCOUNTER — TELEPHONE (OUTPATIENT)
Dept: PAIN MEDICINE | Facility: CLINIC | Age: 54
End: 2024-05-01

## 2024-05-01 DIAGNOSIS — M54.42 CHRONIC BILATERAL LOW BACK PAIN WITH LEFT-SIDED SCIATICA: ICD-10-CM

## 2024-05-01 DIAGNOSIS — G89.29 CHRONIC BILATERAL LOW BACK PAIN WITH LEFT-SIDED SCIATICA: ICD-10-CM

## 2024-05-01 RX ORDER — OXYCODONE AND ACETAMINOPHEN 5; 325 MG/1; MG/1
1 TABLET ORAL EVERY 6 HOURS PRN
Qty: 120 TABLET | Refills: 0 | Status: SHIPPED | OUTPATIENT
Start: 2024-05-01 | End: 2024-05-30 | Stop reason: SDUPTHER

## 2024-05-13 ENCOUNTER — TELEPHONE (OUTPATIENT)
Dept: PAIN MEDICINE | Facility: CLINIC | Age: 54
End: 2024-05-13

## 2024-05-13 ENCOUNTER — TELEPHONE (OUTPATIENT)
Dept: SPINE | Facility: CLINIC | Age: 54
End: 2024-05-13

## 2024-05-13 NOTE — TELEPHONE ENCOUNTER
Pt is Dr Cheema pt, she has an appt 05/14 if her WC has been reinstated she can reschedule her procedure tomorrow . Aliza Dr Cheema nurse has left her a message todat as well.

## 2024-05-13 NOTE — TELEPHONE ENCOUNTER
----- Message from Ashley Brown sent at 5/13/2024  9:10 AM CDT -----  Contact: pt  Type: Needs Medical Advice         Who Called: Pt  Best Call Back Number:[phone]  Additional Information: Requesting a call back regarding  pt is asking for the office to call her please. Pt said it's about her appt for tomorrow.   Please Advise- Thank you

## 2024-05-13 NOTE — TELEPHONE ENCOUNTER
----- Message from Stacy Martin sent at 5/13/2024 12:09 PM CDT -----  Regarding: call back  Contact: pt  Type: Needs Medical Advice  Who Called:  patient   Symptoms (please be specific):  workers comp   How long has patient had these symptoms:    Pharmacy name and phone #:    Best Call Back Number: [phone]    Additional Information: needs to discuss procedure

## 2024-05-14 ENCOUNTER — TELEPHONE (OUTPATIENT)
Dept: PAIN MEDICINE | Facility: CLINIC | Age: 54
End: 2024-05-14

## 2024-05-14 NOTE — TELEPHONE ENCOUNTER
Injection-steroid-epidural-cervical N/A RN IV Sedation   C7-T1           Pt states that she is approved through  for tjis procedure I will ask  to take a look and then inform me so we can get it scheduled for her she verbalizes understanding.

## 2024-05-14 NOTE — TELEPHONE ENCOUNTER
----- Message from Stacey M Lefort sent at 5/14/2024  2:09 PM CDT -----  Pt is requesting a callback at [phone]. Thank you.

## 2024-05-29 ENCOUNTER — TELEPHONE (OUTPATIENT)
Dept: PAIN MEDICINE | Facility: CLINIC | Age: 54
End: 2024-05-29

## 2024-05-29 NOTE — TELEPHONE ENCOUNTER
Pt wants to schedule an injection per WC she is not approved for an injection at all . Pt will need to be seen by Dr WHITLEY or call her WC I tried to  her and WC said  they have no approvals of any kind.

## 2024-05-29 NOTE — TELEPHONE ENCOUNTER
----- Message from Sultana Means sent at 5/29/2024  3:14 PM CDT -----  Type: Needs Medical Advice  Who Called:  pt  Best Call Back Number: [phone]  Additional Information: pt is calling in regards to needing to speak to someone in the office.  Please call back and advise. Thanks!

## 2024-05-30 DIAGNOSIS — G89.29 CHRONIC BILATERAL LOW BACK PAIN WITH LEFT-SIDED SCIATICA: ICD-10-CM

## 2024-05-30 DIAGNOSIS — M54.42 CHRONIC BILATERAL LOW BACK PAIN WITH LEFT-SIDED SCIATICA: ICD-10-CM

## 2024-05-30 NOTE — TELEPHONE ENCOUNTER
Spoke to pt and let her know at this time WC is denying treatment. She stated her  has approved treatment. Pt was advised we would need that in writing with auth number. She stated she did not have that and does not have insurance at this time. She was advised once she gets that sorted out to call and we could get her scheduled to come in the office and we could try again for procedure. She was advised due to this being a WC injury, insurance may not cover. She verbalized understanding. Dr. Cheema has agreed to send in one more script of oxycodone for her until she can get this worked out. Pt advised on how taper down on medication. She verbalized understanding.

## 2024-05-30 NOTE — TELEPHONE ENCOUNTER
Pt requested a call from a supervisor.called pt to discuss case. Pt states she is approved for a cervical injection and gave me the information for her . Leonor Dupree at [phone] .  Sent information to  pre service team members to follow up on this case. Pt states to please call her with any concerns. Will await follow up from  team.

## 2024-05-30 NOTE — TELEPHONE ENCOUNTER
Jellyry we do not speak with  office, they will need to speak with [facility] team. The 's office does speak with WC  in the patient behalf.  I will follow up with  only.      Per WC team     Legal team number is [phone]      I will call the  office for the pt and  give them the legal team phone number.

## 2024-05-30 NOTE — TELEPHONE ENCOUNTER
Spoke with attorneys leyda crane and gave our legal team phone number to them to call and sort this out.

## 2024-05-31 RX ORDER — OXYCODONE AND ACETAMINOPHEN 5; 325 MG/1; MG/1
1 TABLET ORAL EVERY 6 HOURS PRN
Qty: 120 TABLET | Refills: 0 | Status: SHIPPED | OUTPATIENT
Start: 2024-05-31

## 2024-06-07 ENCOUNTER — TELEPHONE (OUTPATIENT)
Dept: UROLOGY | Facility: CLINIC | Age: 54
End: 2024-06-07

## 2024-06-07 NOTE — TELEPHONE ENCOUNTER
----- Message from Deacon Gonzalez MA sent at 6/6/2024  4:55 PM CDT -----  Contact: PT  Hey she said she needs a 1010 form and she spoke to her WC  and they said they will not dispute or deny her procedure for her neck she wanted to talk to you and Geri about this situation.  ----- Message -----  From: Raza Wilson  Sent: 6/6/2024   4:52 PM CDT  To: Deni CHAVIS Staff    Type: Needs Medical Advice  Who Called:  PT    Best Call Back Number:  [phone]   Additional Information: Needs a call from Divina to discuss WC form.

## 2024-06-07 NOTE — TELEPHONE ENCOUNTER
----- Message from Aliza Feliz LPN sent at 6/7/2024 12:48 PM CDT -----  Contact: pt    ----- Message -----  From: Thu Patel  Sent: 6/7/2024  11:02 AM CDT  To: Devyn Johnson Staff    Type:  Needs Medical Advice    Who Called: pt    Would the patient rather a call back or a response via [facility]? Call back    Best Call Back Number: [phone]    Additional Information: about the 1010 form    Please call to advise  Thanks

## 2024-06-07 NOTE — TELEPHONE ENCOUNTER
Spoke with the [pt and advised to have the WC provider send us the 1010 form and I will give it to the provider. Pt agreed with the plan.

## 2024-07-02 ENCOUNTER — TELEPHONE (OUTPATIENT)
Dept: UROLOGY | Facility: CLINIC | Age: 54
End: 2024-07-02
Payer: MEDICAID

## 2024-07-02 DIAGNOSIS — M54.42 CHRONIC BILATERAL LOW BACK PAIN WITH LEFT-SIDED SCIATICA: ICD-10-CM

## 2024-07-02 DIAGNOSIS — G89.29 CHRONIC BILATERAL LOW BACK PAIN WITH LEFT-SIDED SCIATICA: ICD-10-CM

## 2024-07-02 RX ORDER — OXYCODONE AND ACETAMINOPHEN 5; 325 MG/1; MG/1
1 TABLET ORAL EVERY 6 HOURS PRN
Qty: 120 TABLET | Refills: 0 | Status: SHIPPED | OUTPATIENT
Start: 2024-07-02 | End: 2024-07-03 | Stop reason: SDUPTHER

## 2024-07-02 NOTE — TELEPHONE ENCOUNTER
Spoke with the pt. Scheduled f/u appointment on 7/8 @ 10:45. Also advised that the prescription has been sent to the pharmacy.

## 2024-07-02 NOTE — TELEPHONE ENCOUNTER
----- Message from Aliza Feliz LPN sent at 7/2/2024  3:37 PM CDT -----    ----- Message -----  From: Ramses Cheema MD  Sent: 7/2/2024   3:36 PM CDT  To: Aliza Feliz LPN    I sent her prescription.  She needs to arrange follow up in the office prior to additional refills

## 2024-07-02 NOTE — TELEPHONE ENCOUNTER
----- Message from Aliza Feliz LPN sent at 7/2/2024  3:15 PM CDT -----  Contact: pt    ----- Message -----  From: Thu Patel  Sent: 7/2/2024   2:51 PM CDT  To: Deni CHAVIS Staff    Type:  Sooner Apoointment Request    Caller is requesting a sooner appointment.  Caller declined first available appointment listed below.  Caller will not accept being placed on the waitlist and is requesting a message be sent to doctor.    Name of Caller: Pt     When is the first available appointment? None    Symptoms: follow up    Would the patient rather a call back or a response via [facility]?  Call back    Best Call Back Number: [phone]    Additional Information:  requesting call back by Divina Ansari

## 2024-07-03 ENCOUNTER — TELEPHONE (OUTPATIENT)
Dept: SPINE | Facility: CLINIC | Age: 54
End: 2024-07-03
Payer: MEDICAID

## 2024-07-03 RX ORDER — OXYCODONE AND ACETAMINOPHEN 5; 325 MG/1; MG/1
1 TABLET ORAL EVERY 6 HOURS PRN
Qty: 28 TABLET | Refills: 0 | Status: SHIPPED | OUTPATIENT
Start: 2024-07-03

## 2024-07-03 NOTE — TELEPHONE ENCOUNTER
Spoke to Ramu with [employer] and the claim for 7 day supply of percocet was processed and approved for payment at 12:04PM today. Pt notified

## 2024-07-03 NOTE — TELEPHONE ENCOUNTER
----- Message from Catalino Sequeira sent at 7/3/2024 12:06 PM CDT -----  Type: Needs Medical Advice    Who Called:  Pt      Best Call Back Number: [phone]    Additional Information: Pt request a call back from ene or nael in regards to fax, etc. Please call back to advise, Thanks!

## 2024-07-03 NOTE — TELEPHONE ENCOUNTER
Spoke to Maximilian with [employer]. 2 prior auths have been submitted. 1st one denied. 2nd one was closed by cover my meds

## 2024-07-03 NOTE — TELEPHONE ENCOUNTER
----- Message from Georgette Graves sent at 7/3/2024 10:16 AM CDT -----  Type: Needs Medical Advice  Who Called:  pt     Best Call Back Number: [phone] (home)     Additional Information: pt requesting call back in regards to her pharmacy please advise

## 2024-07-10 DIAGNOSIS — M54.42 CHRONIC BILATERAL LOW BACK PAIN WITH LEFT-SIDED SCIATICA: ICD-10-CM

## 2024-07-10 DIAGNOSIS — G89.29 CHRONIC BILATERAL LOW BACK PAIN WITH LEFT-SIDED SCIATICA: ICD-10-CM

## 2024-07-10 RX ORDER — OXYCODONE AND ACETAMINOPHEN 5; 325 MG/1; MG/1
1 TABLET ORAL EVERY 6 HOURS PRN
Qty: 120 TABLET | Refills: 0 | Status: SHIPPED | OUTPATIENT
Start: 2024-07-10 | End: 2024-07-11

## 2024-07-11 ENCOUNTER — TELEPHONE (OUTPATIENT)
Dept: SPINE | Facility: CLINIC | Age: 54
End: 2024-07-11
Payer: MEDICAID

## 2024-07-11 DIAGNOSIS — G89.29 CHRONIC BILATERAL LOW BACK PAIN WITH LEFT-SIDED SCIATICA: ICD-10-CM

## 2024-07-11 DIAGNOSIS — M54.42 CHRONIC BILATERAL LOW BACK PAIN WITH LEFT-SIDED SCIATICA: ICD-10-CM

## 2024-07-11 RX ORDER — OXYCODONE AND ACETAMINOPHEN 5; 325 MG/1; MG/1
1 TABLET ORAL EVERY 6 HOURS PRN
Qty: 28 TABLET | Refills: 0 | Status: SHIPPED | OUTPATIENT
Start: 2024-07-11

## 2024-07-11 NOTE — TELEPHONE ENCOUNTER
Spoke to pt and let her know prior auth was completed yesterday at 1153AM. Prior auth not approved or denied. It was returned stating no matching pt was found. Pt will call pharmacy and insurance company. Pt was advised to call back with futher questions or concerns

## 2024-07-11 NOTE — TELEPHONE ENCOUNTER
----- Message from Conner Asher sent at 7/11/2024 10:49 AM CDT -----  Type: Needs Medical Advice  Who Called:  pt  Best Call Back Number: [phone]    Additional Information: Pt is calling the office asking to speak with the  nurse regarding prior authorization for script.Please call back and advise.

## 2024-07-12 ENCOUNTER — OFFICE VISIT (OUTPATIENT)
Dept: SPINE | Facility: CLINIC | Age: 54
End: 2024-07-12
Payer: MEDICAID

## 2024-07-12 ENCOUNTER — TELEPHONE (OUTPATIENT)
Dept: PAIN MEDICINE | Facility: CLINIC | Age: 54
End: 2024-07-12
Payer: MEDICAID

## 2024-07-12 VITALS — WEIGHT: 145.5 LBS | HEIGHT: 63 IN | BODY MASS INDEX: 25.78 KG/M2

## 2024-07-12 DIAGNOSIS — G89.29 CHRONIC BILATERAL LOW BACK PAIN WITH LEFT-SIDED SCIATICA: Primary | ICD-10-CM

## 2024-07-12 DIAGNOSIS — M54.42 CHRONIC BILATERAL LOW BACK PAIN WITH LEFT-SIDED SCIATICA: Primary | ICD-10-CM

## 2024-07-12 DIAGNOSIS — M54.2 CERVICALGIA: ICD-10-CM

## 2024-07-12 DIAGNOSIS — M54.16 LUMBAR RADICULOPATHY: Primary | ICD-10-CM

## 2024-07-12 PROCEDURE — 99213 OFFICE O/P EST LOW 20 MIN: CPT | Mod: PBBFAC,PN | Performed by: PHYSICAL MEDICINE & REHABILITATION

## 2024-07-12 PROCEDURE — 99999 PR PBB SHADOW E&M-EST. PATIENT-LVL III: CPT | Mod: PBBFAC,,, | Performed by: PHYSICAL MEDICINE & REHABILITATION

## 2024-07-12 NOTE — H&P (VIEW-ONLY)
SUBJECTIVE:    Patient ID: Jacqui Cruz is a 54 y.o. female.    Chief Complaint: Back Pain    She presents today with ongoing complaints of low back pain radiating into the left calf and into the medial portion of the left leg as well.  These are familiar symptoms.  She is interested in going forward with the transforaminal injections on the left at L5-S1 and S1.  Also continues to complain of posterior neck discomfort.  No radicular arm symptoms.  Pain level is 8/10 and interferes with quality of life in terms of activities of daily living recreation and social activities          Past Medical History:   Diagnosis Date    Asthma     Hypertension      Social History     Socioeconomic History    Marital status:    Tobacco Use    Smoking status: Never    Smokeless tobacco: Never   Substance and Sexual Activity    Alcohol use: No    Drug use: No    Sexual activity: Not Currently     Partners: Male     Birth control/protection: None     Social Determinants of Health     Financial Resource Strain: Medium Risk (12/14/2022)    Overall Financial Resource Strain (CARDIA)     Difficulty of Paying Living Expenses: Somewhat hard   Food Insecurity: Food Insecurity Present (12/14/2022)    Hunger Vital Sign     Worried About Running Out of Food in the Last Year: Sometimes true     Ran Out of Food in the Last Year: Often true   Transportation Needs: No Transportation Needs (12/14/2022)    PRAPARE - Transportation     Lack of Transportation (Medical): No     Lack of Transportation (Non-Medical): No   Physical Activity: Insufficiently Active (12/14/2022)    Exercise Vital Sign     Days of Exercise per Week: 2 days     Minutes of Exercise per Session: 20 min   Stress: No Stress Concern Present (12/14/2022)    [employer] [place] [employer] - Occupational Stress Questionnaire     Feeling of Stress : Not at all   Housing Stability: High Risk (12/14/2022)    Housing Stability Vital Sign     Unable to Pay for  "Housing in the Last Year: Yes     Number of Places Lived in the Last Year: 1     Unstable Housing in the Last Year: No     Past Surgical History:   Procedure Laterality Date    BREAST BIOPSY Left 2012    Benign calcifications    BREAST SURGERY      biopsy    DILATION AND CURETTAGE OF UTERUS      EPIDURAL STEROID INJECTION INTO LUMBAR SPINE N/A 05/30/2023    Procedure: Injection-steroid-epidural-lumbar;  Surgeon: Alex Shepard MD;  Location: [facility] OR;  Service: Pain Management;  Laterality: N/A;  L4-5    INJECTION OF ANESTHETIC AGENT AROUND MEDIAL BRANCH NERVES INNERVATING LUMBAR FACET JOINT Right 11/22/2022    Procedure: Block-nerve-medial branch-lumbar;  Surgeon: Alex Shepard MD;  Location: [facility] OR;  Service: Pain Management;  Laterality: Right;  L3,4,5 MBB    INJECTION OF ANESTHETIC AGENT AROUND MEDIAL BRANCH NERVES INNERVATING LUMBAR FACET JOINT Right 02/02/2023    Procedure: Block-nerve-medial branch-lumbar;  Surgeon: Alex Shepard MD;  Location: [facility] OR;  Service: Pain Management;  Laterality: Right;  L3,4,5  #2 MBB Rt  [place]    RADIOFREQUENCY ABLATION OF LUMBAR MEDIAL BRANCH NERVE AT SINGLE LEVEL Right 03/21/2023    Procedure: Radiofrequency Ablation, Nerve, Spinal, Lumbar, Medial Branch, 1 Level;  Surgeon: Alex Shepard MD;  Location: [facility] OR;  Service: Pain Management;  Laterality: Right;  L3,4,5 Deni  Burned at 80 degrees C. for 60 seconds x 2 each site    TRANSFORAMINAL EPIDURAL INJECTION OF STEROID Left 09/15/2022    Procedure: Injection,steroid,epidural,transforaminal approach;  Surgeon: Alex Shepard MD;  Location: [facility] OR;  Service: Pain Management;  Laterality: Left;  l4-5 and L5-s1      Family History   Problem Relation Name Age of Onset    Hypertension Mother      Cancer Maternal Aunt          cervical    Colon cancer Neg Hx      Colon polyps Neg Hx      Esophageal cancer Neg Hx      Stomach cancer Neg Hx       Vitals:    07/12/24 1421   Weight: 66 kg (145 lb 8.1 oz)   Height: 5' 3" (1.6 m)       Review " of Systems   Constitutional:  Negative for chills, diaphoresis, fatigue, fever and unexpected weight change.   HENT:  Negative for trouble swallowing.    Eyes:  Negative for visual disturbance.   Respiratory:  Negative for shortness of breath.    Cardiovascular:  Negative for chest pain.   Gastrointestinal:  Negative for abdominal pain, constipation, nausea and vomiting.   Genitourinary:  Negative for difficulty urinating.   Musculoskeletal:  Negative for arthralgias, back pain, gait problem, joint swelling, myalgias, neck pain and neck stiffness.   Neurological:  Negative for dizziness, speech difficulty, weakness, light-headedness, numbness and headaches.          Objective:      Physical Exam  Neurological:      Mental Status: She is alert and oriented to person, place, and time.             Assessment:       1. Chronic bilateral low back pain with left-sided sciatica    2. Cervicalgia           Plan:     We will get her scheduled for transforaminal injections left L5-S1 and S1.  Consider caudal epidural steroid injection.  Consider surgical consultation.  Consider medial branch blocks and subsequent radiofrequency ablation bilateral C5-6 and C6-7.  Follow up with me after the procedure      Chronic bilateral low back pain with left-sided sciatica    Cervicalgia

## 2024-07-12 NOTE — TELEPHONE ENCOUNTER
----- Message from Ramses Cheema MD sent at 7/12/2024  2:36 PM CDT -----  Please schedule for transforaminal injection left L5-S1 and S1

## 2024-07-12 NOTE — PROGRESS NOTES
SUBJECTIVE:    Patient ID: Jacqui Cruz is a 54 y.o. female.    Chief Complaint: Back Pain    She presents today with ongoing complaints of low back pain radiating into the left calf and into the medial portion of the left leg as well.  These are familiar symptoms.  She is interested in going forward with the transforaminal injections on the left at L5-S1 and S1.  Also continues to complain of posterior neck discomfort.  No radicular arm symptoms.  Pain level is 8/10 and interferes with quality of life in terms of activities of daily living recreation and social activities          Past Medical History:   Diagnosis Date    Asthma     Hypertension      Social History     Socioeconomic History    Marital status:    Tobacco Use    Smoking status: Never    Smokeless tobacco: Never   Substance and Sexual Activity    Alcohol use: No    Drug use: No    Sexual activity: Not Currently     Partners: Male     Birth control/protection: None     Social Determinants of Health     Financial Resource Strain: Medium Risk (12/14/2022)    Overall Financial Resource Strain (CARDIA)     Difficulty of Paying Living Expenses: Somewhat hard   Food Insecurity: Food Insecurity Present (12/14/2022)    Hunger Vital Sign     Worried About Running Out of Food in the Last Year: Sometimes true     Ran Out of Food in the Last Year: Often true   Transportation Needs: No Transportation Needs (12/14/2022)    PRAPARE - Transportation     Lack of Transportation (Medical): No     Lack of Transportation (Non-Medical): No   Physical Activity: Insufficiently Active (12/14/2022)    Exercise Vital Sign     Days of Exercise per Week: 2 days     Minutes of Exercise per Session: 20 min   Stress: No Stress Concern Present (12/14/2022)    [employer] [place] [employer] - Occupational Stress Questionnaire     Feeling of Stress : Not at all   Housing Stability: High Risk (12/14/2022)    Housing Stability Vital Sign     Unable to Pay for  "Housing in the Last Year: Yes     Number of Places Lived in the Last Year: 1     Unstable Housing in the Last Year: No     Past Surgical History:   Procedure Laterality Date    BREAST BIOPSY Left 2012    Benign calcifications    BREAST SURGERY      biopsy    DILATION AND CURETTAGE OF UTERUS      EPIDURAL STEROID INJECTION INTO LUMBAR SPINE N/A 05/30/2023    Procedure: Injection-steroid-epidural-lumbar;  Surgeon: Alex Shepard MD;  Location: [facility] OR;  Service: Pain Management;  Laterality: N/A;  L4-5    INJECTION OF ANESTHETIC AGENT AROUND MEDIAL BRANCH NERVES INNERVATING LUMBAR FACET JOINT Right 11/22/2022    Procedure: Block-nerve-medial branch-lumbar;  Surgeon: Alex Shepard MD;  Location: [facility] OR;  Service: Pain Management;  Laterality: Right;  L3,4,5 MBB    INJECTION OF ANESTHETIC AGENT AROUND MEDIAL BRANCH NERVES INNERVATING LUMBAR FACET JOINT Right 02/02/2023    Procedure: Block-nerve-medial branch-lumbar;  Surgeon: Alex Shepard MD;  Location: [facility] OR;  Service: Pain Management;  Laterality: Right;  L3,4,5  #2 MBB Rt  [place]    RADIOFREQUENCY ABLATION OF LUMBAR MEDIAL BRANCH NERVE AT SINGLE LEVEL Right 03/21/2023    Procedure: Radiofrequency Ablation, Nerve, Spinal, Lumbar, Medial Branch, 1 Level;  Surgeon: Alex Shepard MD;  Location: [facility] OR;  Service: Pain Management;  Laterality: Right;  L3,4,5 Deni  Burned at 80 degrees C. for 60 seconds x 2 each site    TRANSFORAMINAL EPIDURAL INJECTION OF STEROID Left 09/15/2022    Procedure: Injection,steroid,epidural,transforaminal approach;  Surgeon: Alex Shepard MD;  Location: [facility] OR;  Service: Pain Management;  Laterality: Left;  l4-5 and L5-s1      Family History   Problem Relation Name Age of Onset    Hypertension Mother      Cancer Maternal Aunt          cervical    Colon cancer Neg Hx      Colon polyps Neg Hx      Esophageal cancer Neg Hx      Stomach cancer Neg Hx       Vitals:    07/12/24 1421   Weight: 66 kg (145 lb 8.1 oz)   Height: 5' 3" (1.6 m)       Review " of Systems   Constitutional:  Negative for chills, diaphoresis, fatigue, fever and unexpected weight change.   HENT:  Negative for trouble swallowing.    Eyes:  Negative for visual disturbance.   Respiratory:  Negative for shortness of breath.    Cardiovascular:  Negative for chest pain.   Gastrointestinal:  Negative for abdominal pain, constipation, nausea and vomiting.   Genitourinary:  Negative for difficulty urinating.   Musculoskeletal:  Negative for arthralgias, back pain, gait problem, joint swelling, myalgias, neck pain and neck stiffness.   Neurological:  Negative for dizziness, speech difficulty, weakness, light-headedness, numbness and headaches.          Objective:      Physical Exam  Neurological:      Mental Status: She is alert and oriented to person, place, and time.             Assessment:       1. Chronic bilateral low back pain with left-sided sciatica    2. Cervicalgia           Plan:     We will get her scheduled for transforaminal injections left L5-S1 and S1.  Consider caudal epidural steroid injection.  Consider surgical consultation.  Consider medial branch blocks and subsequent radiofrequency ablation bilateral C5-6 and C6-7.  Follow up with me after the procedure      Chronic bilateral low back pain with left-sided sciatica    Cervicalgia

## 2024-07-18 DIAGNOSIS — M54.42 CHRONIC BILATERAL LOW BACK PAIN WITH LEFT-SIDED SCIATICA: ICD-10-CM

## 2024-07-18 DIAGNOSIS — G89.29 CHRONIC BILATERAL LOW BACK PAIN WITH LEFT-SIDED SCIATICA: ICD-10-CM

## 2024-07-18 RX ORDER — OXYCODONE AND ACETAMINOPHEN 5; 325 MG/1; MG/1
1 TABLET ORAL EVERY 6 HOURS PRN
Qty: 28 TABLET | Refills: 0 | Status: SHIPPED | OUTPATIENT
Start: 2024-07-18

## 2024-07-18 NOTE — TELEPHONE ENCOUNTER
----- Message from Aliza Feliz LPN sent at 7/18/2024  2:40 PM CDT -----    ----- Message -----  From: Ricki Ashford  Sent: 7/18/2024   2:25 PM CDT  To: Deni CHAVIS Staff    Type: Needs Medical Advice  Who Called:  pt   Symptoms (please be specific):  rx needs PA   Pharmacy name and phone #:    [facility] - [place], [place] - [address]  [address]  [facility] [address]  Phone: [phone] Fax: [phone]  Best Call Back Number: [phone]  Additional Information: pt sated she would like to speak to someone in office in regards to pts needed PA on rx and asking to speak to Aliza or Divina Murcia please ensure to call pt back to advise asap thanks!

## 2024-07-18 NOTE — TELEPHONE ENCOUNTER
I spoke with the pt and advised her the 120 tablets have been approved. Spoke with [facility] and they were able to fill prescription. Pt voiced understanding.

## 2024-07-25 ENCOUNTER — HOSPITAL ENCOUNTER (OUTPATIENT)
Facility: HOSPITAL | Age: 54
Discharge: HOME | End: 2024-07-25
Attending: ANESTHESIOLOGY | Admitting: ANESTHESIOLOGY
Payer: MEDICAID

## 2024-07-25 ENCOUNTER — TELEPHONE (OUTPATIENT)
Dept: SPINE | Facility: CLINIC | Age: 54
End: 2024-07-25
Payer: MEDICAID

## 2024-07-25 VITALS
RESPIRATION RATE: 18 BRPM | WEIGHT: 145.5 LBS | SYSTOLIC BLOOD PRESSURE: 137 MMHG | HEIGHT: 63 IN | TEMPERATURE: 99 F | HEART RATE: 83 BPM | BODY MASS INDEX: 25.78 KG/M2 | DIASTOLIC BLOOD PRESSURE: 82 MMHG | OXYGEN SATURATION: 100 %

## 2024-07-25 DIAGNOSIS — M54.16 LUMBAR RADICULITIS: ICD-10-CM

## 2024-07-25 PROCEDURE — 63600175 PHARM REV CODE 636 W HCPCS: Performed by: ANESTHESIOLOGY

## 2024-07-25 PROCEDURE — 25000003 PHARM REV CODE 250: Performed by: ANESTHESIOLOGY

## 2024-07-25 PROCEDURE — 64484 NJX AA&/STRD TFRM EPI L/S EA: CPT | Mod: LT | Performed by: ANESTHESIOLOGY

## 2024-07-25 PROCEDURE — 25500020 PHARM REV CODE 255: Performed by: ANESTHESIOLOGY

## 2024-07-25 PROCEDURE — 64483 NJX AA&/STRD TFRM EPI L/S 1: CPT | Mod: LT,,, | Performed by: ANESTHESIOLOGY

## 2024-07-25 PROCEDURE — 64484 NJX AA&/STRD TFRM EPI L/S EA: CPT | Mod: LT,,, | Performed by: ANESTHESIOLOGY

## 2024-07-25 PROCEDURE — 64483 NJX AA&/STRD TFRM EPI L/S 1: CPT | Mod: LT | Performed by: ANESTHESIOLOGY

## 2024-07-25 RX ORDER — MIDAZOLAM HYDROCHLORIDE 1 MG/ML
INJECTION INTRAMUSCULAR; INTRAVENOUS
Status: DISCONTINUED | OUTPATIENT
Start: 2024-07-25 | End: 2024-07-25 | Stop reason: HOSPADM

## 2024-07-25 RX ORDER — DEXAMETHASONE SODIUM PHOSPHATE 10 MG/ML
INJECTION INTRAMUSCULAR; INTRAVENOUS
Status: DISCONTINUED | OUTPATIENT
Start: 2024-07-25 | End: 2024-07-25 | Stop reason: HOSPADM

## 2024-07-25 RX ORDER — LIDOCAINE HYDROCHLORIDE 10 MG/ML
INJECTION, SOLUTION EPIDURAL; INFILTRATION; INTRACAUDAL; PERINEURAL
Status: DISCONTINUED | OUTPATIENT
Start: 2024-07-25 | End: 2024-07-25 | Stop reason: HOSPADM

## 2024-07-25 RX ORDER — FENTANYL CITRATE 50 UG/ML
INJECTION, SOLUTION INTRAMUSCULAR; INTRAVENOUS
Status: DISCONTINUED | OUTPATIENT
Start: 2024-07-25 | End: 2024-07-25 | Stop reason: HOSPADM

## 2024-07-25 RX ORDER — LIDOCAINE HYDROCHLORIDE 10 MG/ML
1 INJECTION, SOLUTION EPIDURAL; INFILTRATION; INTRACAUDAL; PERINEURAL ONCE
Status: COMPLETED | OUTPATIENT
Start: 2024-07-25 | End: 2024-07-25

## 2024-07-25 RX ORDER — SODIUM CHLORIDE, SODIUM LACTATE, POTASSIUM CHLORIDE, CALCIUM CHLORIDE 600; 310; 30; 20 MG/100ML; MG/100ML; MG/100ML; MG/100ML
INJECTION, SOLUTION INTRAVENOUS CONTINUOUS
Status: DISCONTINUED | OUTPATIENT
Start: 2024-07-25 | End: 2024-07-25 | Stop reason: HOSPADM

## 2024-07-25 RX ORDER — BUPIVACAINE HYDROCHLORIDE 2.5 MG/ML
INJECTION, SOLUTION EPIDURAL; INFILTRATION; INTRACAUDAL
Status: DISCONTINUED | OUTPATIENT
Start: 2024-07-25 | End: 2024-07-25 | Stop reason: HOSPADM

## 2024-07-25 RX ADMIN — SODIUM CHLORIDE, POTASSIUM CHLORIDE, SODIUM LACTATE AND CALCIUM CHLORIDE: 600; 310; 30; 20 INJECTION, SOLUTION INTRAVENOUS at 12:07

## 2024-07-25 RX ADMIN — LIDOCAINE HYDROCHLORIDE 0.2 ML: 10 SOLUTION INTRAVENOUS at 12:07

## 2024-07-25 NOTE — DISCHARGE SUMMARY
[facility] - Periop Services  Discharge Note  Short Stay    Procedure(s) (LRB):  Injection,steroid,epidural,transforaminal approach l5-s1 and s1 (Left)      OUTCOME: Patient tolerated treatment/procedure well without complication and is now ready for discharge.    DISPOSITION: Home or Self Care    FINAL DIAGNOSIS:  <principal problem not specified>    FOLLOWUP: In clinic    DISCHARGE INSTRUCTIONS:    Discharge Procedure Orders   Notify your health care provider if you experience any of the following:  temperature >100.4     Notify your health care provider if you experience any of the following:  severe uncontrolled pain     Notify your health care provider if you experience any of the following:  redness, tenderness, or signs of infection (pain, swelling, redness, odor or green/yellow discharge around incision site)     Activity as tolerated        TIME SPENT ON DISCHARGE: 30 minutes

## 2024-07-25 NOTE — PLAN OF CARE
Discharge instructions given to pt, verbalized understanding.  Tolerating PO fluids.  IV removed.  Pain 4/10.  Ambulating out to cousin  per RN in no distress.

## 2024-07-25 NOTE — OP NOTE
PROCEDURE DATE: 7/25/2024    PROCEDURE: Left L5-S1 and S1 transforaminal epidural steroid injection under fluoroscopy    DIAGNOSIS: Lumbar radiculitis    Post op diagnosis: Same    PHYSICIAN: Alex Shepard MD    MEDICATIONS INJECTED:  Dexamethasone 5mg (0.5ml) and 1.5ml 0.25% bupivicaine at each nerve root.     LOCAL ANESTHETIC INJECTED:  Lidocaine 1%. 2 ml per site.    SEDATION MEDICATIONS: RN IV sedation    ESTIMATED BLOOD LOSS:  None    COMPLICATIONS:  None    TECHNIQUE:   A time-out was taken to identify patient and procedure side prior to starting the procedure. The patient was placed in a prone position, prepped and draped in the usual sterile fashion using ChloraPrep and sterile towels.  The area to be injected was determined under fluoroscopic guidance in AP and oblique view.  Local anesthetic was given by raising a wheal and going down to the hub of a 25-gauge 1.5 inch needle.  In oblique view, a 3.5 inch 22-gauge bent-tip spinal needle was introduced towards 6 oclock position of the pedicle of each above named nerve root level.  The needle was walked medially then hinged into the neural foramen and position was confirmed in AP and lateral views.  1ml contrast dye was injected to confirm appropriate placement and that there was no vascular uptake.  After negative aspiration for blood or CSF, the medication was then injected. This was performed at the left L5-S1 and S1 level(s). The patient tolerated the procedure well.    The patient was monitored after the procedure.  Patient was given post procedure and discharge instructions to follow at home. The patient was discharged in a stable condition.

## 2024-07-30 NOTE — TELEPHONE ENCOUNTER
----- Message from Jennifer Meza sent at 11/18/2022  8:45 AM CST -----  Contact: Self  Type:  Needs Medical Advice    Who Called: patient    Symptoms (please be specific): lisinopriL (PRINIVIL,ZESTRIL) 20 MG tablet   How long has patient had these symptoms:  no meds 3 days  Pharmacy name and phone #:    [facility] [place], [place] - [address]  [address] [facility] [address]  Phone: [phone] Fax: [phone]        Would the patient rather a call back or a response via [facility]? call  Best Call Back Number: [phone]    Additional Information: patient has been requesting BP medication and hasn't heard back. Pt is upset she can't ever get an appt w/PCP or get meds refilled        
No new care gaps identified.  [facility] Embedded Care Gaps. Reference number: [id number]. 11/18/2022   9:47:23 AM CST  
Spoke to patient. Advised that she must be seen for BP f/u  to continue getting refills. Explained that she will need fto schedule with PCP in advance for regular f/u and see her team in the interim. Understanding was verbalized.    Please refill lisinopril x1 to carry through to appointment with Mrs Escamilla  on 12/1/22.  
The patient is a 38y Female complaining of hyperglycemia.

## 2024-08-05 ENCOUNTER — TELEPHONE (OUTPATIENT)
Dept: SPINE | Facility: CLINIC | Age: 54
End: 2024-08-05
Payer: MEDICAID

## 2024-08-06 ENCOUNTER — TELEPHONE (OUTPATIENT)
Dept: SPINE | Facility: CLINIC | Age: 54
End: 2024-08-06
Payer: MEDICAID

## 2024-08-06 ENCOUNTER — TELEPHONE (OUTPATIENT)
Dept: PAIN MEDICINE | Facility: CLINIC | Age: 54
End: 2024-08-06
Payer: MEDICAID

## 2024-08-06 DIAGNOSIS — M47.812 CERVICAL SPONDYLOSIS: Primary | ICD-10-CM

## 2024-08-12 ENCOUNTER — TELEPHONE (OUTPATIENT)
Dept: SPINE | Facility: CLINIC | Age: 54
End: 2024-08-12
Payer: MEDICAID

## 2024-08-12 NOTE — TELEPHONE ENCOUNTER
Spoke with the pt and advised her we received communication from her  requesting that we send an appeal to the medical director. I advised her we will not be able to do that. She verbalized understanding.

## 2024-08-12 NOTE — TELEPHONE ENCOUNTER
----- Message from Stacey M Lefort sent at 8/12/2024 10:44 AM CDT -----  Pt asked specifically for you to call her back at  [phone]

## 2024-08-12 NOTE — TELEPHONE ENCOUNTER
Spoke to pt and let her know procedure was denied. She stated she is going to speak with her  and call back.

## 2024-08-18 DIAGNOSIS — M54.42 CHRONIC BILATERAL LOW BACK PAIN WITH LEFT-SIDED SCIATICA: ICD-10-CM

## 2024-08-18 DIAGNOSIS — G89.29 CHRONIC BILATERAL LOW BACK PAIN WITH LEFT-SIDED SCIATICA: ICD-10-CM

## 2024-08-19 ENCOUNTER — TELEPHONE (OUTPATIENT)
Dept: SPINE | Facility: CLINIC | Age: 54
End: 2024-08-19
Payer: MEDICAID

## 2024-08-19 DIAGNOSIS — M54.42 CHRONIC BILATERAL LOW BACK PAIN WITH LEFT-SIDED SCIATICA: ICD-10-CM

## 2024-08-19 DIAGNOSIS — G89.29 CHRONIC BILATERAL LOW BACK PAIN WITH LEFT-SIDED SCIATICA: ICD-10-CM

## 2024-08-19 RX ORDER — OXYCODONE AND ACETAMINOPHEN 5; 325 MG/1; MG/1
1 TABLET ORAL EVERY 6 HOURS PRN
Qty: 120 TABLET | Refills: 0 | Status: SHIPPED | OUTPATIENT
Start: 2024-08-19

## 2024-08-19 RX ORDER — OXYCODONE AND ACETAMINOPHEN 5; 325 MG/1; MG/1
1 TABLET ORAL EVERY 6 HOURS PRN
Qty: 28 TABLET | Refills: 0 | Status: SHIPPED | OUTPATIENT
Start: 2024-08-19 | End: 2024-08-19 | Stop reason: SDUPTHER

## 2024-08-19 NOTE — TELEPHONE ENCOUNTER
----- Message from Lizette Melchor LPN sent at 8/19/2024 10:20 AM CDT -----  Contact: Pt [phone]  She requesting another call back.  ----- Message -----  From: Sabra Perez  Sent: 8/19/2024   9:54 AM CDT  To: Deni CHAVIS Staff    Type: Needs Medical Advice  Who Called:  Pt     Best Call Back Number: [phone]    Additional Information: Pt requesting Aliza or Divina call her back for something personal. No further info provided. Thank you

## 2024-08-19 NOTE — TELEPHONE ENCOUNTER
----- Message from Lizette Melchor LPN sent at 8/19/2024  9:09 AM CDT -----  Contact: self  She is asking a call back from you or dariana.  ----- Message -----  From: Cathernie Ro  Sent: 8/19/2024   8:34 AM CDT  To: Deni CHAVIS Staff    Type:  RX Refill Request    Who Called:  the patient  Refill or New Rx:  refill  RX Name and Strength:  oxyCODONE-acetaminophen (PERCOCET) 5-325 mg per tablet  How is the patient currently taking it? (ex. 1XDay):  as directed  Is this a 30 day or 90 day RX:  30  Preferred Pharmacy with phone number:    [facility] - [place], [place] - [address] [address]  [address] [employer] [address]  Phone: [phone] Fax: [phone]     Local or Mail Order:  local  Ordering Provider:  Deni Cameron Call Back Number:  [phone]  Additional Information:  pt asks for Dariana or Divina to please call her, the script amount is not correct it is for 28 days and pt is looking for a months refill please call, pt is also looking for portal help.

## 2024-08-19 NOTE — TELEPHONE ENCOUNTER
Spoke with the pt and advised new prescription of oxycodone has been sent to the pharmacy with a quantity of 120 tablets. She was also advised her follow up appointment is scheduled on Thursday 8/22  @ 1:30. Understanding was verbalized.

## 2024-09-19 DIAGNOSIS — M54.42 CHRONIC BILATERAL LOW BACK PAIN WITH LEFT-SIDED SCIATICA: ICD-10-CM

## 2024-09-19 DIAGNOSIS — G89.29 CHRONIC BILATERAL LOW BACK PAIN WITH LEFT-SIDED SCIATICA: ICD-10-CM

## 2024-09-19 RX ORDER — OXYCODONE AND ACETAMINOPHEN 5; 325 MG/1; MG/1
1 TABLET ORAL EVERY 6 HOURS PRN
Qty: 120 TABLET | Refills: 0 | Status: SHIPPED | OUTPATIENT
Start: 2024-09-19

## 2024-10-25 DIAGNOSIS — M54.42 CHRONIC BILATERAL LOW BACK PAIN WITH LEFT-SIDED SCIATICA: ICD-10-CM

## 2024-10-25 DIAGNOSIS — G89.29 CHRONIC BILATERAL LOW BACK PAIN WITH LEFT-SIDED SCIATICA: ICD-10-CM

## 2024-10-25 RX ORDER — OXYCODONE AND ACETAMINOPHEN 5; 325 MG/1; MG/1
1 TABLET ORAL EVERY 6 HOURS PRN
Qty: 120 TABLET | Refills: 0 | Status: SHIPPED | OUTPATIENT
Start: 2024-10-25

## 2024-11-26 DIAGNOSIS — G89.29 CHRONIC BILATERAL LOW BACK PAIN WITH LEFT-SIDED SCIATICA: ICD-10-CM

## 2024-11-26 DIAGNOSIS — M54.42 CHRONIC BILATERAL LOW BACK PAIN WITH LEFT-SIDED SCIATICA: ICD-10-CM

## 2024-11-26 RX ORDER — OXYCODONE AND ACETAMINOPHEN 5; 325 MG/1; MG/1
1 TABLET ORAL EVERY 6 HOURS PRN
Qty: 120 TABLET | Refills: 0 | Status: SHIPPED | OUTPATIENT
Start: 2024-11-26

## 2024-12-20 ENCOUNTER — TELEPHONE (OUTPATIENT)
Dept: SPINE | Facility: CLINIC | Age: 54
End: 2024-12-20
Payer: MEDICAID

## 2024-12-20 DIAGNOSIS — G89.29 CHRONIC BILATERAL LOW BACK PAIN WITH LEFT-SIDED SCIATICA: ICD-10-CM

## 2024-12-20 DIAGNOSIS — M54.42 CHRONIC BILATERAL LOW BACK PAIN WITH LEFT-SIDED SCIATICA: ICD-10-CM

## 2024-12-20 RX ORDER — OXYCODONE AND ACETAMINOPHEN 5; 325 MG/1; MG/1
1 TABLET ORAL EVERY 6 HOURS PRN
Qty: 120 TABLET | Refills: 0 | Status: SHIPPED | OUTPATIENT
Start: 2024-12-26

## 2024-12-20 NOTE — TELEPHONE ENCOUNTER
----- Message from Teresita sent at 12/20/2024 11:38 AM CST -----  Pt is requesting a refill on her pain medication.

## 2024-12-20 NOTE — TELEPHONE ENCOUNTER
Pt advised prescription will be at the pharmacy on her profile dated 12/26/24. She will have to call the pharmacy prior to picking up.

## 2024-12-23 ENCOUNTER — TELEPHONE (OUTPATIENT)
Dept: PRIMARY CARE CLINIC | Facility: CLINIC | Age: 54
End: 2024-12-23

## 2024-12-23 ENCOUNTER — E-VISIT (OUTPATIENT)
Dept: PRIMARY CARE CLINIC | Facility: CLINIC | Age: 54
End: 2024-12-23
Payer: MEDICAID

## 2024-12-23 DIAGNOSIS — H57.89 REDNESS OF RIGHT EYE: Primary | ICD-10-CM

## 2024-12-23 RX ORDER — NEOMYCIN SULFATE, POLYMYXIN B SULFATE, BACITRACIN ZINC, HYDROCORTISONE 3.5; 10000; 400; 1 MG/G; [USP'U]/G; [USP'U]/G; MG/G
OINTMENT OPHTHALMIC 2 TIMES DAILY
Qty: 3.5 G | Refills: 0 | Status: SHIPPED | OUTPATIENT
Start: 2024-12-23 | End: 2024-12-28

## 2024-12-23 NOTE — PROGRESS NOTES
Patient ID: Jacqui Cruz is a 54 y.o. female.    Chief Complaint: General Illness (Entered automatically based on patient selection in [employer].)    The patient initiated a request through [employer] on 12/23/2024 for evaluation and management with a chief complaint of General Illness (Entered automatically based on patient selection in [employer].)     I evaluated the questionnaire submission on [employer].    [facility] Evisit Supergroup-Common Problems    12/23/2024 10:21 AM CST - Filed by Patient   What do you need help with? Red Eye   Do you agree to participate in an E-Visit? Yes   If you have any of the following symptoms, please present to your local emergency room or call 911:  I acknowledge   What is the main issue you would like addressed today? Pink eye   Which of the following have you been experiencing? One eye is red;  Eye itching;  Increased tearing   Have you had any of the following? Allergy symptoms    How long have you been having these symptoms? Just today   Do you have a fever? No, I do not have a fever   Are your symptoms associated with swimming? No, I have not been swimming recently   Have your eyes been exposed to any chemicals, creams, or drops that may be causing irritation? No   Have you suffered any recent injury to your eyes? No   Have you been exposed to anyone with similar symptoms? No   Did or do you wear contact lenses? No   Have you had any of the following? None of the above   Have you had any of the following in the past? I have not had any past problems with my eyes.   What medications are you currently using for these symptoms? Nothing   Please enter the medications you have been using: None   Provide any additional information you feel is important. I keep rubbing it   At least one photo is required for treatment to be provided. You can upload a maximum of three photos of the affected area.     Are you able to take your vital signs? No         Encounter Diagnosis   Name Primary?     Redness of right eye Yes        No orders of the defined types were placed in this encounter.     Medications Ordered This Encounter   Medications    neomycin-bacitracin-polymyxin-hydrocortisone (CORTISPORIN) ophthalmic ointment     Sig: Place into the right eye 2 (two) times daily. for 5 days     Dispense:  3.5 g     Refill:  0        No follow-ups on file.      E-Visit Time Tracking:    Day 1 Time (in minutes): 20    Total Time (in minutes): 20

## 2024-12-23 NOTE — TELEPHONE ENCOUNTER
Pharmacy requesting alternate , ointment is no longer being made   neomycin-bacitracin-polymyxin-hydrocortisone (CORTISPORIN) ophthalmic ointment

## 2024-12-23 NOTE — TELEPHONE ENCOUNTER
----- Message from Georgette sent at 12/23/2024 12:08 PM CST -----  Type:  Pharmacy Calling to Clarify an RX      [facility]  Prescription Name:  neomycin-bacitracin-polymyxin-hydrocortisone (CORTISPORIN) ophthalmic ointment  What do they need to clarify?: needs an alternate , aointment is no longer being made   Best Call Back Number:    [facility] [address] [place] [address] [facility]  [address] [facility] [address]  Phone: [phone] Fax: [phone]        Additional Information:  please advise

## 2024-12-24 ENCOUNTER — TELEPHONE (OUTPATIENT)
Dept: SPINE | Facility: CLINIC | Age: 54
End: 2024-12-24
Payer: MEDICAID

## 2025-01-24 DIAGNOSIS — G89.29 CHRONIC BILATERAL LOW BACK PAIN WITH LEFT-SIDED SCIATICA: ICD-10-CM

## 2025-01-24 DIAGNOSIS — M54.42 CHRONIC BILATERAL LOW BACK PAIN WITH LEFT-SIDED SCIATICA: ICD-10-CM

## 2025-01-24 RX ORDER — OXYCODONE AND ACETAMINOPHEN 5; 325 MG/1; MG/1
1 TABLET ORAL EVERY 6 HOURS PRN
Qty: 120 TABLET | Refills: 0 | Status: SHIPPED | OUTPATIENT
Start: 2025-01-24

## 2025-02-24 DIAGNOSIS — M54.42 CHRONIC BILATERAL LOW BACK PAIN WITH LEFT-SIDED SCIATICA: ICD-10-CM

## 2025-02-24 DIAGNOSIS — G89.29 CHRONIC BILATERAL LOW BACK PAIN WITH LEFT-SIDED SCIATICA: ICD-10-CM

## 2025-02-26 RX ORDER — OXYCODONE AND ACETAMINOPHEN 5; 325 MG/1; MG/1
1 TABLET ORAL EVERY 6 HOURS PRN
Qty: 120 TABLET | Refills: 0 | Status: SHIPPED | OUTPATIENT
Start: 2025-02-26

## 2025-03-25 ENCOUNTER — TELEPHONE (OUTPATIENT)
Dept: SPINE | Facility: CLINIC | Age: 55
End: 2025-03-25
Payer: MEDICAID

## 2025-03-25 DIAGNOSIS — G89.29 CHRONIC BILATERAL LOW BACK PAIN WITH LEFT-SIDED SCIATICA: ICD-10-CM

## 2025-03-25 DIAGNOSIS — M54.42 CHRONIC BILATERAL LOW BACK PAIN WITH LEFT-SIDED SCIATICA: ICD-10-CM

## 2025-03-25 NOTE — TELEPHONE ENCOUNTER
----- Message from Mecca sent at 3/25/2025  1:55 PM CDT -----  Type:  RX Refill RequestWho Called:  ptRefill or New Rx:  REFILLRX Name and Strength:  oxyCODONE-acetaminophen (PERCOCET) 5-325 mg per tabletHow is the patient currently taking it? (ex. 1XDay):  as directedIs this a 30 day or 90 day RX:  30Preferred Pharmacy with phone number:  [facility] [place] - [address] [address] [address] [facility] [address]: [phone] Fax: [phone] or Mail Order:  localOrdering Provider:  Misty Call Back Number:  [phone] Information:  pt is calling this in about 2 days early so she can get it sent in before the weekend. Please call back to advise. Thanks!

## 2025-03-28 RX ORDER — OXYCODONE AND ACETAMINOPHEN 5; 325 MG/1; MG/1
1 TABLET ORAL EVERY 6 HOURS PRN
Qty: 120 TABLET | Refills: 0 | Status: SHIPPED | OUTPATIENT
Start: 2025-03-28

## 2025-04-28 DIAGNOSIS — G89.29 CHRONIC BILATERAL LOW BACK PAIN WITH LEFT-SIDED SCIATICA: ICD-10-CM

## 2025-04-28 DIAGNOSIS — M54.42 CHRONIC BILATERAL LOW BACK PAIN WITH LEFT-SIDED SCIATICA: ICD-10-CM

## 2025-04-28 RX ORDER — OXYCODONE AND ACETAMINOPHEN 5; 325 MG/1; MG/1
1 TABLET ORAL EVERY 6 HOURS PRN
Qty: 120 TABLET | Refills: 0 | Status: SHIPPED | OUTPATIENT
Start: 2025-04-28

## 2025-05-28 DIAGNOSIS — G89.29 CHRONIC BILATERAL LOW BACK PAIN WITH LEFT-SIDED SCIATICA: ICD-10-CM

## 2025-05-28 DIAGNOSIS — M54.42 CHRONIC BILATERAL LOW BACK PAIN WITH LEFT-SIDED SCIATICA: ICD-10-CM

## 2025-05-28 RX ORDER — OXYCODONE AND ACETAMINOPHEN 5; 325 MG/1; MG/1
1 TABLET ORAL EVERY 6 HOURS PRN
Qty: 120 TABLET | Refills: 0 | Status: SHIPPED | OUTPATIENT
Start: 2025-05-28

## 2025-06-27 ENCOUNTER — TELEPHONE (OUTPATIENT)
Dept: SPINE | Facility: CLINIC | Age: 55
End: 2025-06-27
Payer: MEDICAID

## 2025-06-27 DIAGNOSIS — M54.42 CHRONIC BILATERAL LOW BACK PAIN WITH LEFT-SIDED SCIATICA: ICD-10-CM

## 2025-06-27 DIAGNOSIS — G89.29 CHRONIC BILATERAL LOW BACK PAIN WITH LEFT-SIDED SCIATICA: ICD-10-CM

## 2025-06-27 RX ORDER — OXYCODONE AND ACETAMINOPHEN 5; 325 MG/1; MG/1
1 TABLET ORAL EVERY 6 HOURS PRN
Qty: 120 TABLET | Refills: 0 | Status: SHIPPED | OUTPATIENT
Start: 2025-06-27

## 2025-06-27 NOTE — TELEPHONE ENCOUNTER
Copied from CRM #[id number]. Topic: Medications - Medication Refill  >> Jun 27, 2025  9:52 AM Les wrote:  Type:  RX Refill Request    Who Called:  pt    Refill or New Rx:  refill    RX Name and Strength:  oxyCODONE-acetaminophen (PERCOCET) 5-325 mg per table    How is the patient currently taking it? (ex. 1XDay):  as rx'd    Is this a 30 day or 90 day RX:  30    Preferred Pharmacy with phone number:    [facility] [address] [place] [address] [facility]  [address] [facility] [address]  Phone: [phone] Fax: [phone]      Ordering Provider:  same    Best Call Back Number:  [phone]     Additional Information:  Thanks

## 2025-08-11 ENCOUNTER — OFFICE VISIT (OUTPATIENT)
Dept: SPINE | Facility: CLINIC | Age: 55
End: 2025-08-11
Payer: MEDICAID

## 2025-08-11 ENCOUNTER — TELEPHONE (OUTPATIENT)
Dept: PAIN MEDICINE | Facility: CLINIC | Age: 55
End: 2025-08-11
Payer: MEDICAID

## 2025-08-11 ENCOUNTER — TELEPHONE (OUTPATIENT)
Dept: SPINE | Facility: CLINIC | Age: 55
End: 2025-08-11

## 2025-08-11 DIAGNOSIS — G89.29 CHRONIC BILATERAL LOW BACK PAIN WITH LEFT-SIDED SCIATICA: Primary | ICD-10-CM

## 2025-08-11 DIAGNOSIS — M54.42 CHRONIC BILATERAL LOW BACK PAIN WITH LEFT-SIDED SCIATICA: Primary | ICD-10-CM

## 2025-08-11 DIAGNOSIS — M54.16 LUMBAR RADICULOPATHY: Primary | ICD-10-CM

## 2025-08-11 PROCEDURE — 1159F MED LIST DOCD IN RCRD: CPT | Mod: CPTII,,, | Performed by: PHYSICAL MEDICINE & REHABILITATION

## 2025-08-11 PROCEDURE — 99999 PR PBB SHADOW E&M-EST. PATIENT-LVL I: CPT | Mod: PBBFAC,,, | Performed by: PHYSICAL MEDICINE & REHABILITATION

## 2025-08-11 PROCEDURE — 99211 OFF/OP EST MAY X REQ PHY/QHP: CPT | Mod: PBBFAC,PN | Performed by: PHYSICAL MEDICINE & REHABILITATION

## 2025-08-11 PROCEDURE — 99213 OFFICE O/P EST LOW 20 MIN: CPT | Mod: S$PBB,,, | Performed by: PHYSICAL MEDICINE & REHABILITATION

## 2025-08-11 PROCEDURE — 1160F RVW MEDS BY RX/DR IN RCRD: CPT | Mod: CPTII,,, | Performed by: PHYSICAL MEDICINE & REHABILITATION

## 2025-08-11 RX ORDER — OXYCODONE AND ACETAMINOPHEN 5; 325 MG/1; MG/1
1 TABLET ORAL EVERY 6 HOURS PRN
Qty: 120 TABLET | Refills: 0 | Status: SHIPPED | OUTPATIENT
Start: 2025-08-11

## 2025-08-26 ENCOUNTER — TELEPHONE (OUTPATIENT)
Dept: PAIN MEDICINE | Facility: CLINIC | Age: 55
End: 2025-08-26
Payer: MEDICAID

## (undated) DEVICE — NDL BLUNT W/O FILTER 18GX1.5IN

## (undated) DEVICE — SYR DISP LL 5CC

## (undated) DEVICE — NDL HYPODERMIC BLUNT 18G 1.5IN

## (undated) DEVICE — SYS LABEL CORRECT MED

## (undated) DEVICE — SKIN MARKER STER DUAL TIP

## (undated) DEVICE — GLOVE SURG ULTRA TOUCH 7.5

## (undated) DEVICE — SYR GLASS 5CC LUER LOK

## (undated) DEVICE — NDL SPINAL 25GX3.5 SPINOCAN

## (undated) DEVICE — NDL SAFETY 25G X 1.5 ECLIPSE

## (undated) DEVICE — GLOVE SENSICARE PI GRN 7.5

## (undated) DEVICE — NDL TUOHY EPIDURAL 20G X 3.5

## (undated) DEVICE — SYR LUER LOCK STERILE 10ML

## (undated) DEVICE — NDL SPINAL 22GX5

## (undated) DEVICE — CHLORAPREP 10.5 ML APPLICATOR

## (undated) DEVICE — CANNULA RADIOPAQUE 20G CURVED

## (undated) DEVICE — TUBING MINIBORE EXTENSION

## (undated) DEVICE — TOWEL OR DISP STRL BLUE 4/PK

## (undated) DEVICE — DRAPE MEDIUM SHEET 40X70IN

## (undated) DEVICE — PAD GROUNDING DISPER ELECTRODE

## (undated) DEVICE — GLOVE SURG ULTRA TOUCH 6

## (undated) DEVICE — GLOVE SURGEONS ULTRA TOUCH 6.5